# Patient Record
Sex: FEMALE | Race: WHITE | Employment: OTHER | URBAN - METROPOLITAN AREA
[De-identification: names, ages, dates, MRNs, and addresses within clinical notes are randomized per-mention and may not be internally consistent; named-entity substitution may affect disease eponyms.]

---

## 2017-03-07 ENCOUNTER — TELEPHONE (OUTPATIENT)
Dept: FAMILY MEDICINE CLINIC | Age: 82
End: 2017-03-07

## 2017-04-11 DIAGNOSIS — I10 ESSENTIAL HYPERTENSION, BENIGN: ICD-10-CM

## 2017-04-11 DIAGNOSIS — N95.1 SYMPTOMATIC MENOPAUSAL OR FEMALE CLIMACTERIC STATES: ICD-10-CM

## 2017-04-11 RX ORDER — MEDROXYPROGESTERONE ACETATE 2.5 MG/1
TABLET ORAL
Qty: 90 TABLET | Refills: 1 | Status: SHIPPED | OUTPATIENT
Start: 2017-04-11 | End: 2017-10-09 | Stop reason: SDUPTHER

## 2017-04-11 RX ORDER — HYDROCHLOROTHIAZIDE 25 MG/1
TABLET ORAL
Qty: 90 TABLET | Refills: 1 | Status: SHIPPED | OUTPATIENT
Start: 2017-04-11 | End: 2017-10-30

## 2017-04-20 RX ORDER — CELECOXIB 200 MG/1
CAPSULE ORAL
Qty: 30 CAPSULE | Refills: 5 | Status: SHIPPED | OUTPATIENT
Start: 2017-04-20 | End: 2017-10-17 | Stop reason: SDUPTHER

## 2017-05-08 ENCOUNTER — OFFICE VISIT (OUTPATIENT)
Dept: FAMILY MEDICINE CLINIC | Age: 82
End: 2017-05-08

## 2017-05-08 VITALS
SYSTOLIC BLOOD PRESSURE: 132 MMHG | DIASTOLIC BLOOD PRESSURE: 78 MMHG | OXYGEN SATURATION: 98 % | HEART RATE: 82 BPM | TEMPERATURE: 97.5 F | BODY MASS INDEX: 21.51 KG/M2 | WEIGHT: 112 LBS | RESPIRATION RATE: 16 BRPM

## 2017-05-08 DIAGNOSIS — K58.9 IRRITABLE BOWEL SYNDROME WITHOUT DIARRHEA: ICD-10-CM

## 2017-05-08 DIAGNOSIS — I10 ESSENTIAL HYPERTENSION, BENIGN: Primary | ICD-10-CM

## 2017-05-08 DIAGNOSIS — R60.0 PEDAL EDEMA: ICD-10-CM

## 2017-05-08 DIAGNOSIS — R73.9 HYPERGLYCEMIA: ICD-10-CM

## 2017-05-08 DIAGNOSIS — Z13.29 THYROID DISORDER SCREEN: ICD-10-CM

## 2017-05-08 PROCEDURE — 1036F TOBACCO NON-USER: CPT | Performed by: FAMILY MEDICINE

## 2017-05-08 PROCEDURE — 1123F ACP DISCUSS/DSCN MKR DOCD: CPT | Performed by: FAMILY MEDICINE

## 2017-05-08 PROCEDURE — 1090F PRES/ABSN URINE INCON ASSESS: CPT | Performed by: FAMILY MEDICINE

## 2017-05-08 PROCEDURE — G8419 CALC BMI OUT NRM PARAM NOF/U: HCPCS | Performed by: FAMILY MEDICINE

## 2017-05-08 PROCEDURE — 4040F PNEUMOC VAC/ADMIN/RCVD: CPT | Performed by: FAMILY MEDICINE

## 2017-05-08 PROCEDURE — G8399 PT W/DXA RESULTS DOCUMENT: HCPCS | Performed by: FAMILY MEDICINE

## 2017-05-08 PROCEDURE — 99214 OFFICE O/P EST MOD 30 MIN: CPT | Performed by: FAMILY MEDICINE

## 2017-05-08 PROCEDURE — G8427 DOCREV CUR MEDS BY ELIG CLIN: HCPCS | Performed by: FAMILY MEDICINE

## 2017-05-08 RX ORDER — DIPHENOXYLATE HYDROCHLORIDE AND ATROPINE SULFATE 2.5; .025 MG/1; MG/1
1 TABLET ORAL 4 TIMES DAILY
Qty: 120 TABLET | Refills: 5 | Status: SHIPPED | OUTPATIENT
Start: 2017-05-08 | End: 2017-10-30 | Stop reason: SDUPTHER

## 2017-05-08 ASSESSMENT — PATIENT HEALTH QUESTIONNAIRE - PHQ9
2. FEELING DOWN, DEPRESSED OR HOPELESS: 0
1. LITTLE INTEREST OR PLEASURE IN DOING THINGS: 0
SUM OF ALL RESPONSES TO PHQ QUESTIONS 1-9: 0
SUM OF ALL RESPONSES TO PHQ9 QUESTIONS 1 & 2: 0

## 2017-07-17 DIAGNOSIS — I10 ESSENTIAL HYPERTENSION, BENIGN: ICD-10-CM

## 2017-07-17 DIAGNOSIS — R73.9 HYPERGLYCEMIA: ICD-10-CM

## 2017-07-17 DIAGNOSIS — Z13.29 THYROID DISORDER SCREEN: ICD-10-CM

## 2017-07-17 DIAGNOSIS — R60.0 PEDAL EDEMA: ICD-10-CM

## 2017-07-17 LAB
A/G RATIO: 1.4 (ref 1.1–2.2)
ALBUMIN SERPL-MCNC: 4.2 G/DL (ref 3.4–5)
ALP BLD-CCNC: 46 U/L (ref 40–129)
ALT SERPL-CCNC: <5 U/L (ref 10–40)
ANION GAP SERPL CALCULATED.3IONS-SCNC: 13 MMOL/L (ref 3–16)
AST SERPL-CCNC: 12 U/L (ref 15–37)
BILIRUB SERPL-MCNC: 0.3 MG/DL (ref 0–1)
BUN BLDV-MCNC: 19 MG/DL (ref 7–20)
CALCIUM SERPL-MCNC: 9.6 MG/DL (ref 8.3–10.6)
CHLORIDE BLD-SCNC: 99 MMOL/L (ref 99–110)
CO2: 28 MMOL/L (ref 21–32)
CREAT SERPL-MCNC: 0.6 MG/DL (ref 0.6–1.2)
GFR AFRICAN AMERICAN: >60
GFR NON-AFRICAN AMERICAN: >60
GLOBULIN: 2.9 G/DL
GLUCOSE BLD-MCNC: 109 MG/DL (ref 70–99)
HCT VFR BLD CALC: 38.6 % (ref 36–48)
HEMOGLOBIN: 12.7 G/DL (ref 12–16)
MCH RBC QN AUTO: 31.4 PG (ref 26–34)
MCHC RBC AUTO-ENTMCNC: 32.9 G/DL (ref 31–36)
MCV RBC AUTO: 95.4 FL (ref 80–100)
PDW BLD-RTO: 14.1 % (ref 12.4–15.4)
PLATELET # BLD: 270 K/UL (ref 135–450)
PMV BLD AUTO: 7.6 FL (ref 5–10.5)
POTASSIUM SERPL-SCNC: 3.4 MMOL/L (ref 3.5–5.1)
RBC # BLD: 4.05 M/UL (ref 4–5.2)
SODIUM BLD-SCNC: 140 MMOL/L (ref 136–145)
TOTAL PROTEIN: 7.1 G/DL (ref 6.4–8.2)
WBC # BLD: 8.1 K/UL (ref 4–11)

## 2017-07-18 DIAGNOSIS — E87.6 HYPOKALEMIA: Primary | ICD-10-CM

## 2017-07-18 LAB
ESTIMATED AVERAGE GLUCOSE: 102.5 MG/DL
HBA1C MFR BLD: 5.2 %
TSH REFLEX: 1.89 UIU/ML (ref 0.27–4.2)

## 2017-07-18 RX ORDER — POTASSIUM CHLORIDE 750 MG/1
10 TABLET, FILM COATED, EXTENDED RELEASE ORAL DAILY
Qty: 30 TABLET | Refills: 2 | Status: SHIPPED | OUTPATIENT
Start: 2017-07-18 | End: 2018-04-30

## 2017-10-09 DIAGNOSIS — N95.1 SYMPTOMATIC MENOPAUSAL OR FEMALE CLIMACTERIC STATES: ICD-10-CM

## 2017-10-10 RX ORDER — MEDROXYPROGESTERONE ACETATE 2.5 MG/1
TABLET ORAL
Qty: 90 TABLET | Refills: 1 | Status: SHIPPED | OUTPATIENT
Start: 2017-10-10 | End: 2018-04-17 | Stop reason: SDUPTHER

## 2017-10-16 ENCOUNTER — TELEPHONE (OUTPATIENT)
Dept: FAMILY MEDICINE CLINIC | Age: 82
End: 2017-10-16

## 2017-10-16 NOTE — TELEPHONE ENCOUNTER
Medication name:celecoxib (CELEBREX) 200 MG capsule      Medication dose:  Frequency:TAKE ONE CAPSULE BY MOUTH DAILY  Quantity:30 capsule  Pharmacy name:  [facility] [address] [place] [address], [address] [place] [address] [phone] Mikel Lopez [phone]  Last OV:5/8/17  Last Labs:

## 2017-10-17 RX ORDER — CELECOXIB 200 MG/1
CAPSULE ORAL
Qty: 30 CAPSULE | Refills: 5 | Status: SHIPPED | OUTPATIENT
Start: 2017-10-17 | End: 2018-04-30 | Stop reason: SDUPTHER

## 2017-10-30 ENCOUNTER — OFFICE VISIT (OUTPATIENT)
Dept: FAMILY MEDICINE CLINIC | Age: 82
End: 2017-10-30

## 2017-10-30 VITALS
SYSTOLIC BLOOD PRESSURE: 148 MMHG | WEIGHT: 117.2 LBS | RESPIRATION RATE: 12 BRPM | HEART RATE: 106 BPM | BODY MASS INDEX: 22.13 KG/M2 | OXYGEN SATURATION: 98 % | TEMPERATURE: 97.1 F | HEIGHT: 61 IN | DIASTOLIC BLOOD PRESSURE: 92 MMHG

## 2017-10-30 DIAGNOSIS — I10 ESSENTIAL HYPERTENSION, BENIGN: Primary | ICD-10-CM

## 2017-10-30 DIAGNOSIS — M17.0 PRIMARY OSTEOARTHRITIS OF BOTH KNEES: ICD-10-CM

## 2017-10-30 DIAGNOSIS — K58.9 IRRITABLE BOWEL SYNDROME WITHOUT DIARRHEA: ICD-10-CM

## 2017-10-30 PROCEDURE — 4040F PNEUMOC VAC/ADMIN/RCVD: CPT | Performed by: FAMILY MEDICINE

## 2017-10-30 PROCEDURE — 1090F PRES/ABSN URINE INCON ASSESS: CPT | Performed by: FAMILY MEDICINE

## 2017-10-30 PROCEDURE — G8427 DOCREV CUR MEDS BY ELIG CLIN: HCPCS | Performed by: FAMILY MEDICINE

## 2017-10-30 PROCEDURE — 1123F ACP DISCUSS/DSCN MKR DOCD: CPT | Performed by: FAMILY MEDICINE

## 2017-10-30 PROCEDURE — 99214 OFFICE O/P EST MOD 30 MIN: CPT | Performed by: FAMILY MEDICINE

## 2017-10-30 PROCEDURE — G8484 FLU IMMUNIZE NO ADMIN: HCPCS | Performed by: FAMILY MEDICINE

## 2017-10-30 PROCEDURE — G8420 CALC BMI NORM PARAMETERS: HCPCS | Performed by: FAMILY MEDICINE

## 2017-10-30 PROCEDURE — 1036F TOBACCO NON-USER: CPT | Performed by: FAMILY MEDICINE

## 2017-10-30 PROCEDURE — G8399 PT W/DXA RESULTS DOCUMENT: HCPCS | Performed by: FAMILY MEDICINE

## 2017-10-30 RX ORDER — LISINOPRIL 10 MG/1
10 TABLET ORAL DAILY
Qty: 30 TABLET | Refills: 5 | Status: SHIPPED | OUTPATIENT
Start: 2017-10-30 | End: 2018-04-30 | Stop reason: SDUPTHER

## 2017-10-30 RX ORDER — DIPHENOXYLATE HYDROCHLORIDE AND ATROPINE SULFATE 2.5; .025 MG/1; MG/1
1 TABLET ORAL 4 TIMES DAILY
Qty: 120 TABLET | Refills: 5 | Status: SHIPPED | OUTPATIENT
Start: 2017-10-30 | End: 2018-04-30 | Stop reason: SDUPTHER

## 2017-10-30 NOTE — LETTER
[address] [place] [address] Drive [address]  Phone: [phone]  Fax: [phone]    Glenn Diaz MD        October 30, 2017     Patient: Malika Lucero   YOB: 1935   Date of Visit: 10/30/2017       To Whom It May Concern: It is my medical opinion that Mg Hobson requires a disability parking placard for the following reasons:  She has limited walking ability due to an arthritic condition. Duration of need: 5 years    If you have any questions or concerns, please don't hesitate to call.     Sincerely,        Glenn Diaz MD

## 2018-01-30 LAB
ANION GAP SERPL CALCULATED.3IONS-SCNC: 15 MMOL/L (ref 3–16)
BUN BLDV-MCNC: 19 MG/DL (ref 7–20)
CALCIUM SERPL-MCNC: 9.2 MG/DL (ref 8.3–10.6)
CHLORIDE BLD-SCNC: 103 MMOL/L (ref 99–110)
CO2: 26 MMOL/L (ref 21–32)
CREAT SERPL-MCNC: 0.9 MG/DL (ref 0.6–1.2)
GFR AFRICAN AMERICAN: >60
GFR NON-AFRICAN AMERICAN: 60
GLUCOSE BLD-MCNC: 92 MG/DL (ref 70–99)
POTASSIUM SERPL-SCNC: 3.9 MMOL/L (ref 3.5–5.1)
SODIUM BLD-SCNC: 144 MMOL/L (ref 136–145)

## 2018-02-27 ENCOUNTER — OFFICE VISIT (OUTPATIENT)
Dept: DERMATOLOGY | Age: 83
End: 2018-02-27

## 2018-02-27 DIAGNOSIS — Z41.1 ELECTIVE PROCEDURE FOR UNACCEPTABLE COSMETIC APPEARANCE: ICD-10-CM

## 2018-02-27 DIAGNOSIS — L82.1 SEBORRHEIC KERATOSES: Primary | ICD-10-CM

## 2018-02-27 PROCEDURE — 1711000 COSMETIC-DESTRUCT B9 LESION 1-14: Performed by: DERMATOLOGY

## 2018-04-17 DIAGNOSIS — N95.1 SYMPTOMATIC MENOPAUSAL OR FEMALE CLIMACTERIC STATES: ICD-10-CM

## 2018-04-17 RX ORDER — MEDROXYPROGESTERONE ACETATE 2.5 MG/1
TABLET ORAL
Qty: 90 TABLET | Refills: 1 | Status: SHIPPED | OUTPATIENT
Start: 2018-04-17 | End: 2018-10-18 | Stop reason: SDUPTHER

## 2018-04-24 ENCOUNTER — OFFICE VISIT (OUTPATIENT)
Dept: DERMATOLOGY | Age: 83
End: 2018-04-24

## 2018-04-24 DIAGNOSIS — Z41.1 ELECTIVE PROCEDURE FOR UNACCEPTABLE COSMETIC APPEARANCE: ICD-10-CM

## 2018-04-24 DIAGNOSIS — L82.1 SEBORRHEIC KERATOSES: Primary | ICD-10-CM

## 2018-04-24 PROCEDURE — 99999 PR OFFICE/OUTPT VISIT,PROCEDURE ONLY: CPT | Performed by: DERMATOLOGY

## 2018-04-30 ENCOUNTER — OFFICE VISIT (OUTPATIENT)
Dept: FAMILY MEDICINE CLINIC | Age: 83
End: 2018-04-30

## 2018-04-30 VITALS
RESPIRATION RATE: 12 BRPM | DIASTOLIC BLOOD PRESSURE: 96 MMHG | WEIGHT: 119.4 LBS | SYSTOLIC BLOOD PRESSURE: 159 MMHG | OXYGEN SATURATION: 91 % | HEART RATE: 113 BPM | BODY MASS INDEX: 22.93 KG/M2 | TEMPERATURE: 97.4 F

## 2018-04-30 DIAGNOSIS — K58.9 IRRITABLE BOWEL SYNDROME WITHOUT DIARRHEA: Primary | ICD-10-CM

## 2018-04-30 DIAGNOSIS — Z23 NEED FOR SHINGLES VACCINE: ICD-10-CM

## 2018-04-30 DIAGNOSIS — I10 ESSENTIAL HYPERTENSION, BENIGN: ICD-10-CM

## 2018-04-30 PROCEDURE — G8420 CALC BMI NORM PARAMETERS: HCPCS | Performed by: FAMILY MEDICINE

## 2018-04-30 PROCEDURE — G8399 PT W/DXA RESULTS DOCUMENT: HCPCS | Performed by: FAMILY MEDICINE

## 2018-04-30 PROCEDURE — 1090F PRES/ABSN URINE INCON ASSESS: CPT | Performed by: FAMILY MEDICINE

## 2018-04-30 PROCEDURE — 4040F PNEUMOC VAC/ADMIN/RCVD: CPT | Performed by: FAMILY MEDICINE

## 2018-04-30 PROCEDURE — 99214 OFFICE O/P EST MOD 30 MIN: CPT | Performed by: FAMILY MEDICINE

## 2018-04-30 PROCEDURE — 1123F ACP DISCUSS/DSCN MKR DOCD: CPT | Performed by: FAMILY MEDICINE

## 2018-04-30 PROCEDURE — 1036F TOBACCO NON-USER: CPT | Performed by: FAMILY MEDICINE

## 2018-04-30 PROCEDURE — G8427 DOCREV CUR MEDS BY ELIG CLIN: HCPCS | Performed by: FAMILY MEDICINE

## 2018-04-30 RX ORDER — CELECOXIB 200 MG/1
CAPSULE ORAL
Qty: 30 CAPSULE | Refills: 5 | Status: SHIPPED | OUTPATIENT
Start: 2018-04-30 | End: 2018-11-16 | Stop reason: SDUPTHER

## 2018-04-30 RX ORDER — DIPHENOXYLATE HYDROCHLORIDE AND ATROPINE SULFATE 2.5; .025 MG/1; MG/1
1 TABLET ORAL 4 TIMES DAILY
Qty: 120 TABLET | Refills: 5 | Status: SHIPPED | OUTPATIENT
Start: 2018-04-30 | End: 2018-11-16 | Stop reason: SDUPTHER

## 2018-04-30 RX ORDER — LISINOPRIL 10 MG/1
10 TABLET ORAL DAILY
Qty: 30 TABLET | Refills: 5 | Status: SHIPPED | OUTPATIENT
Start: 2018-04-30 | End: 2018-11-16 | Stop reason: ALTCHOICE

## 2018-05-16 ENCOUNTER — TELEPHONE (OUTPATIENT)
Dept: FAMILY MEDICINE CLINIC | Age: 83
End: 2018-05-16

## 2018-10-18 DIAGNOSIS — N95.1 SYMPTOMATIC MENOPAUSAL OR FEMALE CLIMACTERIC STATES: ICD-10-CM

## 2018-10-18 RX ORDER — MEDROXYPROGESTERONE ACETATE 2.5 MG/1
TABLET ORAL
Qty: 90 TABLET | Refills: 1 | Status: SHIPPED | OUTPATIENT
Start: 2018-10-18 | End: 2019-01-21 | Stop reason: SDUPTHER

## 2018-11-15 PROBLEM — E87.6 HYPOKALEMIA: Status: RESOLVED | Noted: 2017-07-18 | Resolved: 2018-11-15

## 2018-11-15 NOTE — PROGRESS NOTES
Subjective:      Patient ID: Renée Torres 81 y.o. female. The primary encounter diagnosis was Irritable bowel syndrome with diarrhea. Diagnoses of Essential hypertension, benign, Hyperglycemia, and Glucose intolerance (impaired glucose tolerance) were also pertinent to this visit. HPI  She feels well. Continues to live independently in her 3 story house. Independent in [facility]. She stopped taking Lisinopril. Took it for 4 months; BP was normal at her dentist visit. She developed swelling of her tongue an hour after taking a dose so she stopped taking it. She thought about going to the ER but did not. She did not go to the ER because she had not had a shower. The symptoms stopped after a few hours. The OA in the knees has progressed. She is going to see Dr. Caitlin Leon. She feels her balance is off; uses a cane. No falls. Diarrhea is well controlled with Lomotil. Lomotil is well tolerated. Has normal daily BMS. No bernice or hematochezia. She never tried to wean and is reluctant to do so. Keeps medication secure. She prefers to continue to take HRT. Controls hot flashes. She is not interested in mammography. Hypertension: Patient here for follow-up of elevated blood pressure. She is exercising and is adherent to low salt diet. can't do much exercise due to knee pain; does try to stay active. Blood pressure is not testing home. Cardiac symptoms none. Patient denies chest pain, chest pressure/discomfort, exertional chest pressure/discomfort, irregular heart beat and lower extremity edema. Cardiovascular risk factors: advanced age (older than 54 for men, 72 for women) and hypertension. Use of agents associated with hypertension: none. History of target organ damage: none. She continues to eat a very white and very restricted diet. Weight is stable. No polyuria, polydipsia, blurred vision.          Outpatient Prescriptions Marked as Taking for the 11/16/18 encounter (Office Visit) with Alisson Damon MD   Medication Sig Dispense Refill    estrogens, conjugated, (PREMARIN) 0.625 MG tablet TAKE ONE TABLET BY MOUTH DAILY 90 tablet 1    medroxyPROGESTERone (PROVERA) 2.5 MG tablet TAKE ONE TABLET BY MOUTH EVERY DAY 90 tablet 1    celecoxib (CELEBREX) 200 MG capsule TAKE ONE CAPSULE BY MOUTH DAILY 30 capsule 5    diphenoxylate-atropine (LOMOTIL) 2.5-0.025 MG per tablet Take 1 tablet by mouth 4 times daily. Do not fill before 05/13/2015. 120 tablet 5    aspirin 500 MG EC tablet Take 500 mg by mouth every 6 hours as needed for Pain      Ascorbic Acid (VITAMIN C) 500 MG tablet Take 500 mg by mouth daily      lansoprazole (PREVACID 24HR) 15 MG capsule Take 15 mg by mouth daily.  cycloSPORINE (RESTASIS) 0.05 % ophthalmic emulsion 1 drop 2 times daily.           Allergies   Allergen Reactions    Clindamycin/Lincomycin        Patient Active Problem List   Diagnosis    Osteoarthritis    IBS (irritable bowel syndrome)    Gout    Dry eye syndrome    Osteopenia    Essential hypertension, benign    Hyperglycemia    Symptomatic menopausal or female climacteric states    Glucose intolerance (impaired glucose tolerance)    Primary osteoarthritis of both knees    Pedal edema       Past Medical History:   Diagnosis Date    Anterior corneal dystrophy     Asymptomatic postmenopausal status (age-related) (natural)     Cervical polyp     Dry eye syndrome     Edema     Fuch's endothelial dystrophy     Gout     IBS (irritable bowel syndrome)     Osteoarthritis     Osteopenia     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    Screening mammogram     Vaginal candidiasis        Past Surgical History:   Procedure Laterality Date    APPENDECTOMY      EYE SURGERY      Cataract Surgery        Family History   Problem Relation Age of Onset    Breast Cancer Daughter         BRAC 2    Ovarian Cancer Daughter        Social History   Substance Use Topics    Smoking status: Never Smoker    Smokeless tobacco: Never Used    Alcohol use No            Review of Systems  Review of Systems    Objective:   Physical Exam  Vitals:    11/16/18 1529 11/16/18 1605   BP: (!) 165/75 (!) 152/78   Pulse: 103    Resp: 12    Temp: 96.5 °F (35.8 °C)    TempSrc: Oral    SpO2: 96%    Weight: 113 lb 8 oz (51.5 kg)        Physical Exam    NAD    Skin is warm and dry. No rash. Well hydrated  Alert and oriented x 3. Mood and affect are normal.  The neck is supple and free of adenopathy or masses, the thyroid is normal without enlargement or nodules. Chest: clear with no wheezes or rales. No retractions, or use of accessory muscles noted. Cardiovascular: PMI is not displaced, and no thrill noted. Regular rate and rhythm with no rub, murmur or gallop. No peripheral edema. No carotid bruits. The abdomen is soft without tenderness, guarding, mass, rebound or organomegaly. Aorta, femoral, DP and PT pulses intact. Assessment:       Diagnosis Orders   1. Irritable bowel syndrome with diarrhea  Controlled  Recommend try to wean off medication   2. Essential hypertension, benign  amLODIPine (NORVASC) 5 MG tablet  Lisinopril added to allergy list  Advised to follow up if intolerant of med and to call asap or go to ER if severe rxn  DASH diet  Side effects of current medications reviewed and questions answered. 3. Hyperglycemia  Diet and exercise addressed. Check glucose and HGA1C in the spring             4 Need for influenza vaccination  INFLUENZA, HIGH DOSE, 65 YRS +, IM, PF, PREFILL SYR, 0.5ML (FLUZONE HD)       5. Hormone replacement rx - risk including more aggressive breast cancer discussed. She prefers to continue HRT and to no have mammography preformed. Plan:      Side effects of current medications reviewed and questions answered. Follow up in 6 months or prn.

## 2018-11-16 ENCOUNTER — OFFICE VISIT (OUTPATIENT)
Dept: FAMILY MEDICINE CLINIC | Age: 83
End: 2018-11-16
Payer: MEDICARE

## 2018-11-16 VITALS
WEIGHT: 113.5 LBS | TEMPERATURE: 96.5 F | BODY MASS INDEX: 21.8 KG/M2 | DIASTOLIC BLOOD PRESSURE: 78 MMHG | RESPIRATION RATE: 12 BRPM | SYSTOLIC BLOOD PRESSURE: 152 MMHG | HEART RATE: 103 BPM | OXYGEN SATURATION: 96 %

## 2018-11-16 DIAGNOSIS — K58.9 IRRITABLE BOWEL SYNDROME WITHOUT DIARRHEA: ICD-10-CM

## 2018-11-16 DIAGNOSIS — K58.0 IRRITABLE BOWEL SYNDROME WITH DIARRHEA: Primary | ICD-10-CM

## 2018-11-16 DIAGNOSIS — R73.9 HYPERGLYCEMIA: ICD-10-CM

## 2018-11-16 DIAGNOSIS — Z79.890 HORMONE REPLACEMENT THERAPY: ICD-10-CM

## 2018-11-16 DIAGNOSIS — I10 ESSENTIAL HYPERTENSION, BENIGN: ICD-10-CM

## 2018-11-16 DIAGNOSIS — Z23 NEED FOR INFLUENZA VACCINATION: ICD-10-CM

## 2018-11-16 PROCEDURE — 1090F PRES/ABSN URINE INCON ASSESS: CPT | Performed by: FAMILY MEDICINE

## 2018-11-16 PROCEDURE — G8420 CALC BMI NORM PARAMETERS: HCPCS | Performed by: FAMILY MEDICINE

## 2018-11-16 PROCEDURE — G8482 FLU IMMUNIZE ORDER/ADMIN: HCPCS | Performed by: FAMILY MEDICINE

## 2018-11-16 PROCEDURE — 99214 OFFICE O/P EST MOD 30 MIN: CPT | Performed by: FAMILY MEDICINE

## 2018-11-16 PROCEDURE — G8399 PT W/DXA RESULTS DOCUMENT: HCPCS | Performed by: FAMILY MEDICINE

## 2018-11-16 PROCEDURE — 1036F TOBACCO NON-USER: CPT | Performed by: FAMILY MEDICINE

## 2018-11-16 PROCEDURE — G8427 DOCREV CUR MEDS BY ELIG CLIN: HCPCS | Performed by: FAMILY MEDICINE

## 2018-11-16 PROCEDURE — 90662 IIV NO PRSV INCREASED AG IM: CPT | Performed by: FAMILY MEDICINE

## 2018-11-16 PROCEDURE — G0008 ADMIN INFLUENZA VIRUS VAC: HCPCS | Performed by: FAMILY MEDICINE

## 2018-11-16 PROCEDURE — 1123F ACP DISCUSS/DSCN MKR DOCD: CPT | Performed by: FAMILY MEDICINE

## 2018-11-16 PROCEDURE — 1101F PT FALLS ASSESS-DOCD LE1/YR: CPT | Performed by: FAMILY MEDICINE

## 2018-11-16 PROCEDURE — 4040F PNEUMOC VAC/ADMIN/RCVD: CPT | Performed by: FAMILY MEDICINE

## 2018-11-16 RX ORDER — CELECOXIB 200 MG/1
CAPSULE ORAL
Qty: 30 CAPSULE | Refills: 5 | Status: SHIPPED | OUTPATIENT
Start: 2018-11-16 | End: 2019-05-14 | Stop reason: SDUPTHER

## 2018-11-16 RX ORDER — DIPHENOXYLATE HYDROCHLORIDE AND ATROPINE SULFATE 2.5; .025 MG/1; MG/1
1 TABLET ORAL 4 TIMES DAILY
Qty: 120 TABLET | Refills: 5 | Status: SHIPPED | OUTPATIENT
Start: 2018-11-16 | End: 2019-05-14 | Stop reason: SDUPTHER

## 2018-11-16 RX ORDER — AMLODIPINE BESYLATE 5 MG/1
5 TABLET ORAL DAILY
Qty: 90 TABLET | Refills: 1 | Status: SHIPPED | OUTPATIENT
Start: 2018-11-16 | End: 2019-05-14

## 2018-11-16 ASSESSMENT — PATIENT HEALTH QUESTIONNAIRE - PHQ9
1. LITTLE INTEREST OR PLEASURE IN DOING THINGS: 0
2. FEELING DOWN, DEPRESSED OR HOPELESS: 0
SUM OF ALL RESPONSES TO PHQ9 QUESTIONS 1 & 2: 0
SUM OF ALL RESPONSES TO PHQ QUESTIONS 1-9: 0
SUM OF ALL RESPONSES TO PHQ QUESTIONS 1-9: 0

## 2018-11-16 NOTE — PROGRESS NOTES
Vaccine Information Sheet, \"Influenza - Inactivated\"  given to Clau Us, or parent/legal guardian of  Clau Us and verbalized understanding. Patient responses:    Have you ever had a reaction to a flu vaccine? No  Are you able to eat eggs without adverse effects? Yes  Do you have any current illness? No  Have you ever had Guillian Mary Alice Syndrome? No    Flu vaccine given per order. Please see immunization tab. Current Influenza vaccine VIS given to patient. Influenza consent form/questionnaire completed and signed. Patient responses to  Influenza consent form / questionnaire  reviewed. Vaccine given per protocol.

## 2018-12-07 ENCOUNTER — TELEPHONE (OUTPATIENT)
Dept: FAMILY MEDICINE CLINIC | Age: 83
End: 2018-12-07

## 2019-01-21 DIAGNOSIS — N95.1 SYMPTOMATIC MENOPAUSAL OR FEMALE CLIMACTERIC STATES: ICD-10-CM

## 2019-01-21 RX ORDER — MEDROXYPROGESTERONE ACETATE 2.5 MG/1
TABLET ORAL
Qty: 90 TABLET | Refills: 1 | Status: SHIPPED | OUTPATIENT
Start: 2019-01-21 | End: 2019-04-22 | Stop reason: SDUPTHER

## 2019-04-22 ENCOUNTER — TELEPHONE (OUTPATIENT)
Dept: FAMILY MEDICINE CLINIC | Age: 84
End: 2019-04-22

## 2019-04-22 DIAGNOSIS — N95.1 SYMPTOMATIC MENOPAUSAL OR FEMALE CLIMACTERIC STATES: ICD-10-CM

## 2019-04-22 RX ORDER — MEDROXYPROGESTERONE ACETATE 2.5 MG/1
TABLET ORAL
Qty: 90 TABLET | Refills: 1 | Status: SHIPPED | OUTPATIENT
Start: 2019-04-22 | End: 2019-05-14 | Stop reason: SDUPTHER

## 2019-05-14 ENCOUNTER — OFFICE VISIT (OUTPATIENT)
Dept: FAMILY MEDICINE CLINIC | Age: 84
End: 2019-05-14
Payer: MEDICARE

## 2019-05-14 VITALS
SYSTOLIC BLOOD PRESSURE: 118 MMHG | WEIGHT: 121 LBS | BODY MASS INDEX: 23.75 KG/M2 | HEIGHT: 60 IN | TEMPERATURE: 96.7 F | OXYGEN SATURATION: 97 % | HEART RATE: 107 BPM | DIASTOLIC BLOOD PRESSURE: 64 MMHG | RESPIRATION RATE: 10 BRPM

## 2019-05-14 DIAGNOSIS — M17.0 PRIMARY OSTEOARTHRITIS OF BOTH KNEES: ICD-10-CM

## 2019-05-14 DIAGNOSIS — R60.0 PEDAL EDEMA: ICD-10-CM

## 2019-05-14 DIAGNOSIS — I10 ESSENTIAL HYPERTENSION, BENIGN: Primary | ICD-10-CM

## 2019-05-14 DIAGNOSIS — Z13.29 THYROID DISORDER SCREEN: ICD-10-CM

## 2019-05-14 DIAGNOSIS — K58.9 IRRITABLE BOWEL SYNDROME WITHOUT DIARRHEA: ICD-10-CM

## 2019-05-14 DIAGNOSIS — R73.02 GLUCOSE INTOLERANCE (IMPAIRED GLUCOSE TOLERANCE): ICD-10-CM

## 2019-05-14 DIAGNOSIS — M85.89 OSTEOPENIA OF MULTIPLE SITES: ICD-10-CM

## 2019-05-14 DIAGNOSIS — N95.1 SYMPTOMATIC MENOPAUSAL OR FEMALE CLIMACTERIC STATES: ICD-10-CM

## 2019-05-14 PROCEDURE — 1036F TOBACCO NON-USER: CPT | Performed by: FAMILY MEDICINE

## 2019-05-14 PROCEDURE — G8399 PT W/DXA RESULTS DOCUMENT: HCPCS | Performed by: FAMILY MEDICINE

## 2019-05-14 PROCEDURE — G8420 CALC BMI NORM PARAMETERS: HCPCS | Performed by: FAMILY MEDICINE

## 2019-05-14 PROCEDURE — 1123F ACP DISCUSS/DSCN MKR DOCD: CPT | Performed by: FAMILY MEDICINE

## 2019-05-14 PROCEDURE — 4040F PNEUMOC VAC/ADMIN/RCVD: CPT | Performed by: FAMILY MEDICINE

## 2019-05-14 PROCEDURE — G8427 DOCREV CUR MEDS BY ELIG CLIN: HCPCS | Performed by: FAMILY MEDICINE

## 2019-05-14 PROCEDURE — 99214 OFFICE O/P EST MOD 30 MIN: CPT | Performed by: FAMILY MEDICINE

## 2019-05-14 PROCEDURE — 1090F PRES/ABSN URINE INCON ASSESS: CPT | Performed by: FAMILY MEDICINE

## 2019-05-14 RX ORDER — CELECOXIB 200 MG/1
CAPSULE ORAL
Qty: 30 CAPSULE | Refills: 5 | Status: ON HOLD | OUTPATIENT
Start: 2019-05-14 | End: 2019-09-19 | Stop reason: HOSPADM

## 2019-05-14 RX ORDER — MEDROXYPROGESTERONE ACETATE 2.5 MG/1
TABLET ORAL
Qty: 90 TABLET | Refills: 1 | Status: ON HOLD | OUTPATIENT
Start: 2019-05-14 | End: 2019-09-21 | Stop reason: HOSPADM

## 2019-05-14 RX ORDER — DIPHENOXYLATE HYDROCHLORIDE AND ATROPINE SULFATE 2.5; .025 MG/1; MG/1
1 TABLET ORAL 4 TIMES DAILY
Qty: 120 TABLET | Refills: 5 | Status: SHIPPED | OUTPATIENT
Start: 2019-05-14 | End: 2019-09-17 | Stop reason: CLARIF

## 2019-05-14 NOTE — PROGRESS NOTES
TABLET BY MOUTH EVERY DAY 90 tablet 1    celecoxib (CELEBREX) 200 MG capsule TAKE ONE CAPSULE BY MOUTH DAILY 30 capsule 5    diphenoxylate-atropine (LOMOTIL) 2.5-0.025 MG per tablet Take 1 tablet by mouth 4 times daily. Do not fill before 05/13/2015. 120 tablet 5    aspirin 500 MG EC tablet Take 500 mg by mouth every 6 hours as needed for Pain      Ascorbic Acid (VITAMIN C) 500 MG tablet Take 500 mg by mouth daily      lansoprazole (PREVACID 24HR) 15 MG capsule Take 15 mg by mouth daily. Allergies   Allergen Reactions    Lisinopril Swelling     Swelling tongue.      Clindamycin/Lincomycin        Patient Active Problem List   Diagnosis    Osteoarthritis    IBS (irritable bowel syndrome)    Gout    Dry eye syndrome    Osteopenia    Essential hypertension, benign    Hyperglycemia    Symptomatic menopausal or female climacteric states    Glucose intolerance (impaired glucose tolerance)    Primary osteoarthritis of both knees    Pedal edema    Hormone replacement therapy       Past Medical History:   Diagnosis Date    Anterior corneal dystrophy     Asymptomatic postmenopausal status (age-related) (natural)     Cervical polyp     Dry eye syndrome     Edema     Fuch's endothelial dystrophy     Gout     IBS (irritable bowel syndrome)     Osteoarthritis     Osteopenia     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    Screening mammogram     Vaginal candidiasis        Past Surgical History:   Procedure Laterality Date    APPENDECTOMY      EYE SURGERY      Cataract Surgery        Family History   Problem Relation Age of Onset    Breast Cancer Daughter         BRAC 2    Ovarian Cancer Daughter        Social History     Tobacco Use    Smoking status: Never Smoker    Smokeless tobacco: Never Used   Substance Use Topics    Alcohol use: No    Drug use: No            Review of Systems  Review of Systems  Lab Results   Component Value Date     01/30/2018    K 3.9 01/30/2018     01/30/2018 CO2 26 01/30/2018    BUN 19 01/30/2018    CREATININE 0.9 01/30/2018    GLUCOSE 92 01/30/2018    CALCIUM 9.2 01/30/2018    PROT 7.1 07/17/2017    LABALBU 4.2 07/17/2017    BILITOT 0.3 07/17/2017    ALKPHOS 46 07/17/2017    AST 12 (L) 07/17/2017    ALT <5 (L) 07/17/2017    LABGLOM 60 (A) 01/30/2018    GFRAA >60 01/30/2018    AGRATIO 1.4 07/17/2017    GLOB 2.9 07/17/2017        No results found for: CHOL  No results found for: TRIG  No results found for: HDL  No results found for: LDLCHOLESTEROL, LDLCALC  No results found for: LABVLDL, VLDL  No results found for: CHOLHDLRATIO  TSH   Date Value Ref Range Status   07/17/2017 1.89 0.27 - 4.20 uIU/mL Final   02/23/2016 1.92 0.27 - 4.20 uIU/mL Final   04/26/2013 1.86 0.35 - 5.5 uIU/ml Final      Lab Results   Component Value Date    LABA1C 5.2 07/17/2017     Lab Results   Component Value Date    .5 07/17/2017      Objective:   Physical Exam  Vitals:    05/14/19 1536 05/14/19 1552   BP: (!) 142/80 118/64   Pulse: 107    Resp: 10    Temp: 96.7 °F (35.9 °C)    TempSrc: Oral    SpO2: 97%    Weight: 121 lb (54.9 kg)    Height: 5' (1.524 m)        Physical Exam  NAD    Skin is warm and dry. No rash. Well hydrated  Alert and oriented x 3. Mood and affect are normal.  The neck is supple and free of adenopathy or masses, the thyroid is normal without enlargement or nodules. Chest: clear with no wheezes or rales. No retractions, or use of accessory muscles noted. Cardiovascular: PMI is not displaced, and no thrill noted. Regular rate and rhythm with no rub, murmur or gallop. 1+ pitting edema. No cords or calf tenderness. The abdomen is soft without tenderness, guarding, mass, rebound or organomegaly. Aorta, femoral, DP and PT pulses intact. Gait antalgic bilateral.  Stable with walker. Assessment:       Diagnosis Orders   1. Essential hypertension, benign  At goal, currently off meds. Continue to monitor.     2. Symptomatic menopausal or female climacteric states  medroxyPROGESTERone (PROVERA) 2.5 MG tablet    estrogens, conjugated, (PREMARIN) 0.625 MG tablet   3. Glucose intolerance (impaired glucose tolerance)  Discussed diet, exercise and weight loss strategies   She refuses labs. 4. Primary osteoarthritis of both knees  TKR addressed. safety and fall risk at  Home discussed. 5. Osteopenia of multiple sites  continue estrogen. Ca and Vitamin D supplementation addressed   6. Thyroid disorder screen  Declines labs. 7. Irritable bowel syndrome without diarrhea  diphenoxylate-atropine (LOMOTIL) 2.5-0.025 MG per tablet  Well controlled. 8. Pedal edema  - low sodium diet, support hose      Plan:      She declines mammogram (risk of \"feeding\" a cancer with HRT addressed), labs, bone density. Concerns addressed. She will consider DTAP and SHingrix. Will discuss with her pharmacist.    Side effects of current medications reviewed and questions answered. Follow up in 6 months or prn.

## 2019-05-29 ENCOUNTER — TELEPHONE (OUTPATIENT)
Dept: FAMILY MEDICINE CLINIC | Age: 84
End: 2019-05-29

## 2019-09-17 ENCOUNTER — APPOINTMENT (OUTPATIENT)
Dept: GENERAL RADIOLOGY | Age: 84
DRG: 956 | End: 2019-09-17
Payer: MEDICARE

## 2019-09-17 ENCOUNTER — HOSPITAL ENCOUNTER (INPATIENT)
Age: 84
LOS: 4 days | Discharge: NURSING, REHAB OR LONG-TERM CARE | DRG: 956 | End: 2019-09-21
Attending: EMERGENCY MEDICINE | Admitting: INTERNAL MEDICINE
Payer: MEDICARE

## 2019-09-17 DIAGNOSIS — S72.142A CLOSED COMMINUTED INTERTROCHANTERIC FRACTURE OF LEFT FEMUR, INITIAL ENCOUNTER (HCC): Primary | ICD-10-CM

## 2019-09-17 PROBLEM — S72.002A HIP FRACTURE REQUIRING OPERATIVE REPAIR, LEFT, CLOSED, INITIAL ENCOUNTER (HCC): Status: ACTIVE | Noted: 2019-09-17

## 2019-09-17 LAB
ABO/RH: NORMAL
ANION GAP SERPL CALCULATED.3IONS-SCNC: 15 MMOL/L (ref 3–16)
ANTIBODY SCREEN: NORMAL
BACTERIA: ABNORMAL /HPF
BASE EXCESS VENOUS: 2 (ref -3–3)
BASOPHILS ABSOLUTE: 0 K/UL (ref 0–0.2)
BASOPHILS ABSOLUTE: 0.1 K/UL (ref 0–0.2)
BASOPHILS RELATIVE PERCENT: 0.3 %
BASOPHILS RELATIVE PERCENT: 0.4 %
BILIRUBIN URINE: NEGATIVE
BLOOD, URINE: ABNORMAL
BUN BLDV-MCNC: 43 MG/DL (ref 7–20)
CALCIUM SERPL-MCNC: 9.2 MG/DL (ref 8.3–10.6)
CHLORIDE BLD-SCNC: 103 MMOL/L (ref 99–110)
CLARITY: ABNORMAL
CO2: 26 MMOL/L (ref 21–32)
COLOR: YELLOW
CREAT SERPL-MCNC: 1.1 MG/DL (ref 0.6–1.2)
EKG ATRIAL RATE: 103 BPM
EKG DIAGNOSIS: NORMAL
EKG P AXIS: 69 DEGREES
EKG P-R INTERVAL: 130 MS
EKG Q-T INTERVAL: 310 MS
EKG QRS DURATION: 72 MS
EKG QTC CALCULATION (BAZETT): 406 MS
EKG R AXIS: -32 DEGREES
EKG T AXIS: 50 DEGREES
EKG VENTRICULAR RATE: 103 BPM
EOSINOPHILS ABSOLUTE: 0 K/UL (ref 0–0.6)
EOSINOPHILS ABSOLUTE: 0 K/UL (ref 0–0.6)
EOSINOPHILS RELATIVE PERCENT: 0 %
EOSINOPHILS RELATIVE PERCENT: 0.2 %
EPITHELIAL CELLS, UA: ABNORMAL /HPF
GFR AFRICAN AMERICAN: 57
GFR NON-AFRICAN AMERICAN: 47
GLUCOSE BLD-MCNC: 116 MG/DL (ref 70–99)
GLUCOSE URINE: NEGATIVE MG/DL
HCO3 VENOUS: 26.4 MMOL/L (ref 23–29)
HCT VFR BLD CALC: 25.3 % (ref 36–48)
HCT VFR BLD CALC: 30.7 % (ref 36–48)
HEMOGLOBIN: 10 G/DL (ref 12–16)
HEMOGLOBIN: 8.3 G/DL (ref 12–16)
INR BLD: 1.06 (ref 0.86–1.14)
KETONES, URINE: ABNORMAL MG/DL
LEUKOCYTE ESTERASE, URINE: ABNORMAL
LYMPHOCYTES ABSOLUTE: 0.6 K/UL (ref 1–5.1)
LYMPHOCYTES ABSOLUTE: 0.7 K/UL (ref 1–5.1)
LYMPHOCYTES RELATIVE PERCENT: 3.8 %
LYMPHOCYTES RELATIVE PERCENT: 4.3 %
MCH RBC QN AUTO: 29 PG (ref 26–34)
MCH RBC QN AUTO: 29.1 PG (ref 26–34)
MCHC RBC AUTO-ENTMCNC: 32.7 G/DL (ref 31–36)
MCHC RBC AUTO-ENTMCNC: 32.7 G/DL (ref 31–36)
MCV RBC AUTO: 88.8 FL (ref 80–100)
MCV RBC AUTO: 88.9 FL (ref 80–100)
MICROSCOPIC EXAMINATION: YES
MONOCYTES ABSOLUTE: 0.7 K/UL (ref 0–1.3)
MONOCYTES ABSOLUTE: 0.8 K/UL (ref 0–1.3)
MONOCYTES RELATIVE PERCENT: 4.3 %
MONOCYTES RELATIVE PERCENT: 4.8 %
NEUTROPHILS ABSOLUTE: 12.6 K/UL (ref 1.7–7.7)
NEUTROPHILS ABSOLUTE: 16.5 K/UL (ref 1.7–7.7)
NEUTROPHILS RELATIVE PERCENT: 90.4 %
NEUTROPHILS RELATIVE PERCENT: 91.5 %
NITRITE, URINE: NEGATIVE
O2 SAT, VEN: 29 %
PCO2, VEN: 38.6 MM HG (ref 40–50)
PDW BLD-RTO: 14.9 % (ref 12.4–15.4)
PDW BLD-RTO: 15.1 % (ref 12.4–15.4)
PERFORMED ON: ABNORMAL
PH UA: 6 (ref 5–8)
PH VENOUS: 7.44 (ref 7.35–7.45)
PLATELET # BLD: 250 K/UL (ref 135–450)
PLATELET # BLD: 279 K/UL (ref 135–450)
PMV BLD AUTO: 7 FL (ref 5–10.5)
PMV BLD AUTO: 7.4 FL (ref 5–10.5)
PO2, VEN: 18 MM HG
POC SAMPLE TYPE: ABNORMAL
POTASSIUM SERPL-SCNC: 3.3 MMOL/L (ref 3.5–5.1)
PROTEIN UA: 100 MG/DL
PROTHROMBIN TIME: 12.1 SEC (ref 9.8–13)
RBC # BLD: 2.85 M/UL (ref 4–5.2)
RBC # BLD: 3.45 M/UL (ref 4–5.2)
RBC UA: ABNORMAL /HPF (ref 0–2)
SODIUM BLD-SCNC: 144 MMOL/L (ref 136–145)
SPECIFIC GRAVITY UA: >=1.03 (ref 1–1.03)
TCO2 CALC VENOUS: 28 MMOL/L
TOTAL CK: 1187 U/L (ref 26–192)
URINE REFLEX TO CULTURE: YES
URINE TYPE: ABNORMAL
UROBILINOGEN, URINE: 0.2 E.U./DL
WBC # BLD: 14 K/UL (ref 4–11)
WBC # BLD: 18.1 K/UL (ref 4–11)
WBC UA: >100 /HPF (ref 0–5)

## 2019-09-17 PROCEDURE — 80048 BASIC METABOLIC PNL TOTAL CA: CPT

## 2019-09-17 PROCEDURE — 73560 X-RAY EXAM OF KNEE 1 OR 2: CPT

## 2019-09-17 PROCEDURE — 83550 IRON BINDING TEST: CPT

## 2019-09-17 PROCEDURE — 83540 ASSAY OF IRON: CPT

## 2019-09-17 PROCEDURE — 36415 COLL VENOUS BLD VENIPUNCTURE: CPT

## 2019-09-17 PROCEDURE — 82306 VITAMIN D 25 HYDROXY: CPT

## 2019-09-17 PROCEDURE — 86923 COMPATIBILITY TEST ELECTRIC: CPT

## 2019-09-17 PROCEDURE — 86900 BLOOD TYPING SEROLOGIC ABO: CPT

## 2019-09-17 PROCEDURE — 96361 HYDRATE IV INFUSION ADD-ON: CPT

## 2019-09-17 PROCEDURE — 86850 RBC ANTIBODY SCREEN: CPT

## 2019-09-17 PROCEDURE — 85025 COMPLETE CBC W/AUTO DIFF WBC: CPT

## 2019-09-17 PROCEDURE — 87086 URINE CULTURE/COLONY COUNT: CPT

## 2019-09-17 PROCEDURE — 6360000002 HC RX W HCPCS: Performed by: PHYSICIAN ASSISTANT

## 2019-09-17 PROCEDURE — 73552 X-RAY EXAM OF FEMUR 2/>: CPT

## 2019-09-17 PROCEDURE — 2580000003 HC RX 258: Performed by: INTERNAL MEDICINE

## 2019-09-17 PROCEDURE — 82746 ASSAY OF FOLIC ACID SERUM: CPT

## 2019-09-17 PROCEDURE — 99285 EMERGENCY DEPT VISIT HI MDM: CPT

## 2019-09-17 PROCEDURE — 73502 X-RAY EXAM HIP UNI 2-3 VIEWS: CPT

## 2019-09-17 PROCEDURE — 82803 BLOOD GASES ANY COMBINATION: CPT

## 2019-09-17 PROCEDURE — 96375 TX/PRO/DX INJ NEW DRUG ADDON: CPT

## 2019-09-17 PROCEDURE — 82607 VITAMIN B-12: CPT

## 2019-09-17 PROCEDURE — 86901 BLOOD TYPING SEROLOGIC RH(D): CPT

## 2019-09-17 PROCEDURE — 82550 ASSAY OF CK (CPK): CPT

## 2019-09-17 PROCEDURE — 93005 ELECTROCARDIOGRAM TRACING: CPT | Performed by: EMERGENCY MEDICINE

## 2019-09-17 PROCEDURE — 96365 THER/PROPH/DIAG IV INF INIT: CPT

## 2019-09-17 PROCEDURE — 2580000003 HC RX 258: Performed by: PHYSICIAN ASSISTANT

## 2019-09-17 PROCEDURE — 1200000000 HC SEMI PRIVATE

## 2019-09-17 PROCEDURE — 85610 PROTHROMBIN TIME: CPT

## 2019-09-17 PROCEDURE — P9016 RBC LEUKOCYTES REDUCED: HCPCS

## 2019-09-17 PROCEDURE — 81001 URINALYSIS AUTO W/SCOPE: CPT

## 2019-09-17 PROCEDURE — 51702 INSERT TEMP BLADDER CATH: CPT

## 2019-09-17 RX ORDER — MORPHINE SULFATE 2 MG/ML
2 INJECTION, SOLUTION INTRAMUSCULAR; INTRAVENOUS ONCE
Status: COMPLETED | OUTPATIENT
Start: 2019-09-17 | End: 2019-09-17

## 2019-09-17 RX ORDER — POLYVINYL ALCOHOL 14 MG/ML
1 SOLUTION/ DROPS OPHTHALMIC 2 TIMES DAILY
Status: DISCONTINUED | OUTPATIENT
Start: 2019-09-17 | End: 2019-09-17

## 2019-09-17 RX ORDER — SODIUM CHLORIDE 0.9 % (FLUSH) 0.9 %
10 SYRINGE (ML) INJECTION PRN
Status: DISCONTINUED | OUTPATIENT
Start: 2019-09-17 | End: 2019-09-21 | Stop reason: HOSPADM

## 2019-09-17 RX ORDER — SODIUM CHLORIDE 0.9 % (FLUSH) 0.9 %
10 SYRINGE (ML) INJECTION EVERY 12 HOURS SCHEDULED
Status: DISCONTINUED | OUTPATIENT
Start: 2019-09-17 | End: 2019-09-21 | Stop reason: HOSPADM

## 2019-09-17 RX ORDER — SODIUM CHLORIDE 9 MG/ML
INJECTION, SOLUTION INTRAVENOUS CONTINUOUS
Status: DISCONTINUED | OUTPATIENT
Start: 2019-09-17 | End: 2019-09-17 | Stop reason: SDUPTHER

## 2019-09-17 RX ORDER — ACETAMINOPHEN 325 MG/1
650 TABLET ORAL EVERY 4 HOURS PRN
Status: DISCONTINUED | OUTPATIENT
Start: 2019-09-17 | End: 2019-09-21 | Stop reason: HOSPADM

## 2019-09-17 RX ORDER — SODIUM CHLORIDE, SODIUM LACTATE, POTASSIUM CHLORIDE, CALCIUM CHLORIDE 600; 310; 30; 20 MG/100ML; MG/100ML; MG/100ML; MG/100ML
1000 INJECTION, SOLUTION INTRAVENOUS ONCE
Status: COMPLETED | OUTPATIENT
Start: 2019-09-17 | End: 2019-09-17

## 2019-09-17 RX ORDER — ONDANSETRON 2 MG/ML
4 INJECTION INTRAMUSCULAR; INTRAVENOUS EVERY 6 HOURS PRN
Status: DISCONTINUED | OUTPATIENT
Start: 2019-09-17 | End: 2019-09-17 | Stop reason: SDUPTHER

## 2019-09-17 RX ORDER — HYDROCODONE BITARTRATE AND ACETAMINOPHEN 5; 325 MG/1; MG/1
2 TABLET ORAL EVERY 4 HOURS PRN
Status: DISCONTINUED | OUTPATIENT
Start: 2019-09-17 | End: 2019-09-21 | Stop reason: HOSPADM

## 2019-09-17 RX ORDER — SODIUM CHLORIDE 9 MG/ML
INJECTION, SOLUTION INTRAVENOUS CONTINUOUS
Status: DISCONTINUED | OUTPATIENT
Start: 2019-09-17 | End: 2019-09-18

## 2019-09-17 RX ORDER — MORPHINE SULFATE 2 MG/ML
4 INJECTION, SOLUTION INTRAMUSCULAR; INTRAVENOUS
Status: DISCONTINUED | OUTPATIENT
Start: 2019-09-17 | End: 2019-09-21 | Stop reason: HOSPADM

## 2019-09-17 RX ORDER — PANTOPRAZOLE SODIUM 40 MG/1
40 TABLET, DELAYED RELEASE ORAL
Status: DISCONTINUED | OUTPATIENT
Start: 2019-09-18 | End: 2019-09-21 | Stop reason: HOSPADM

## 2019-09-17 RX ORDER — SODIUM CHLORIDE 0.9 % (FLUSH) 0.9 %
10 SYRINGE (ML) INJECTION EVERY 12 HOURS SCHEDULED
Status: DISCONTINUED | OUTPATIENT
Start: 2019-09-17 | End: 2019-09-17 | Stop reason: SDUPTHER

## 2019-09-17 RX ORDER — ACETAMINOPHEN 325 MG/1
650 TABLET ORAL EVERY 4 HOURS PRN
Status: DISCONTINUED | OUTPATIENT
Start: 2019-09-17 | End: 2019-09-17 | Stop reason: SDUPTHER

## 2019-09-17 RX ORDER — MORPHINE SULFATE 2 MG/ML
2 INJECTION, SOLUTION INTRAMUSCULAR; INTRAVENOUS
Status: DISCONTINUED | OUTPATIENT
Start: 2019-09-17 | End: 2019-09-21 | Stop reason: HOSPADM

## 2019-09-17 RX ORDER — SODIUM CHLORIDE 0.9 % (FLUSH) 0.9 %
10 SYRINGE (ML) INJECTION PRN
Status: DISCONTINUED | OUTPATIENT
Start: 2019-09-17 | End: 2019-09-17 | Stop reason: SDUPTHER

## 2019-09-17 RX ORDER — ONDANSETRON 2 MG/ML
4 INJECTION INTRAMUSCULAR; INTRAVENOUS EVERY 6 HOURS PRN
Status: DISCONTINUED | OUTPATIENT
Start: 2019-09-17 | End: 2019-09-21 | Stop reason: HOSPADM

## 2019-09-17 RX ORDER — HYDROCODONE BITARTRATE AND ACETAMINOPHEN 5; 325 MG/1; MG/1
1 TABLET ORAL EVERY 4 HOURS PRN
Status: DISCONTINUED | OUTPATIENT
Start: 2019-09-17 | End: 2019-09-21 | Stop reason: HOSPADM

## 2019-09-17 RX ORDER — DIPHENOXYLATE HYDROCHLORIDE AND ATROPINE SULFATE 2.5; .025 MG/1; MG/1
1 TABLET ORAL 4 TIMES DAILY PRN
COMMUNITY
End: 2020-03-06 | Stop reason: SDUPTHER

## 2019-09-17 RX ADMIN — MORPHINE SULFATE 2 MG: 2 INJECTION, SOLUTION INTRAMUSCULAR; INTRAVENOUS at 14:36

## 2019-09-17 RX ADMIN — SODIUM CHLORIDE: 9 INJECTION, SOLUTION INTRAVENOUS at 19:40

## 2019-09-17 RX ADMIN — SODIUM CHLORIDE, POTASSIUM CHLORIDE, SODIUM LACTATE AND CALCIUM CHLORIDE 1000 ML: 600; 310; 30; 20 INJECTION, SOLUTION INTRAVENOUS at 14:36

## 2019-09-17 RX ADMIN — SODIUM CHLORIDE, POTASSIUM CHLORIDE, SODIUM LACTATE AND CALCIUM CHLORIDE 1000 ML: 600; 310; 30; 20 INJECTION, SOLUTION INTRAVENOUS at 16:57

## 2019-09-17 RX ADMIN — CEFTRIAXONE 1 G: 1 INJECTION, POWDER, FOR SOLUTION INTRAMUSCULAR; INTRAVENOUS at 16:24

## 2019-09-17 ASSESSMENT — PAIN SCALES - GENERAL
PAINLEVEL_OUTOF10: 0
PAINLEVEL_OUTOF10: 9

## 2019-09-17 NOTE — ED NOTES
Pt is now boarding, Bed not assigned yet, still says cleaning.       Alanna Lomeli, LEIGHANN  09/17/19 [id number]

## 2019-09-17 NOTE — ED PROVIDER NOTES
EKG 12 Lead   Result Value Ref Range    Ventricular Rate 103 BPM    Atrial Rate 103 BPM    P-R Interval 130 ms    QRS Duration 72 ms    Q-T Interval 310 ms    QTc Calculation (Bazett) 406 ms    P Axis 69 degrees    R Axis -32 degrees    T Axis 50 degrees    Diagnosis       EKG performed in ER and to be interpreted by ER physician. Confirmed by MD, ER ([id number]),  Mae Robles ([id number]) on 9/17/2019 2:18:06 PM   TYPE AND SCREEN   Result Value Ref Range    ABO/Rh A POS     Antibody Screen NEG        ED BEDSIDE ULTRASOUND:  n/a    RECENT VITALS:  BP: 120/69, Temp: 98.2 °F (36.8 °C), Pulse: 85, Resp: 16, SpO2: 95 %     Procedures     n/a    ED Course     Nursing Notes, Past Medical Hx,Past Surgical Hx, Social Hx, Allergies, and Family Hx were reviewed.     The patient was given the following medications:  Orders Placed This Encounter   Medications    morphine (PF) injection 2 mg    lactated ringers infusion 1,000 mL    cefTRIAXone (ROCEPHIN) 1 g IVPB in 50 mL D5W minibag    lactated ringers infusion 1,000 mL    DISCONTD: polyvinyl alcohol (LIQUIFILM TEARS) 1.4 % ophthalmic solution 1 drop    pantoprazole (PROTONIX) tablet 40 mg    DISCONTD: vitamin D (CHOLECALCIFEROL) tablet 1,000 Units    DISCONTD: 0.9 % sodium chloride infusion    cefTRIAXone (ROCEPHIN) 1 g IVPB in 50 mL D5W minibag    sodium chloride flush 0.9 % injection 10 mL    sodium chloride flush 0.9 % injection 10 mL    magnesium hydroxide (MILK OF MAGNESIA) 400 MG/5ML suspension 30 mL    DISCONTD: ondansetron (ZOFRAN) injection 4 mg    DISCONTD: acetaminophen (TYLENOL) tablet 650 mg    DISCONTD: 0.9 % sodium chloride infusion    DISCONTD: sodium chloride flush 0.9 % injection 10 mL    DISCONTD: sodium chloride flush 0.9 % injection 10 mL    acetaminophen (TYLENOL) tablet 650 mg    ondansetron (ZOFRAN) injection 4 mg    OR Linked Order Group     HYDROcodone-acetaminophen (NORCO) 5-325 MG per tablet 1 tablet     HYDROcodone-acetaminophen (NORCO) 5-325 MG per tablet 2 tablet    OR Linked Order Group     morphine (PF) injection 2 mg     morphine (PF) injection 4 mg    potassium chloride 10 mEq/100 mL IVPB (Peripheral Line)    dextrose 5 % and 0.45 % NaCl with KCl 20 mEq infusion       CONSULTS:  IP CONSULT TO ORTHOPEDIC SURGERY  IP CONSULT TO HOSPITALIST  IP CONSULT TO HOSPITALIST  [facility] / ASSESSMENT / PLAN       Vitals:    09/17/19 1842 09/17/19 2245 09/18/19 0245 09/18/19 0700   BP: 118/75 128/74 118/70 120/69   Pulse: 89 88 85    Resp: 16 16 16 16   Temp: 99 °F (37.2 °C) 98.9 °F (37.2 °C) 98.6 °F (37 °C) 98.2 °F (36.8 °C)   TempSrc: Oral Oral Oral Oral   SpO2: 90% 94% 93% 95%   Weight:       Height:           Sonali Glynn is a 81 y.o. female who presents to the emergency department with left leg pain after fall. The patient fell 3 days ago and has been crawling to get around her house. She did not hit her head or lose consciousness and is not on any blood thinners. She has no other complaints at this time, including specifically chest pain, shortness of breath or abdominal pain. She has not had any fevers or chills at home. Patient's left leg is externally rotated and shortened. The patient has remained hemodynamically stable throughout their stay in the emergency department, and appears well overall. Diffuse tenderness to palpation over the lateral aspect of the hip, distal femur and left knee. X-rays were obtained of all these. Patient's lungs are clear to oscillation bilaterally without any obvious wheezing or rales. Cardiac exam reveals regular rate and rhythm without any obvious murmurs. Abdomen is soft without any focal tenderness or rebound or guarding. She is no lower extremity edema. Work-up was performed for syncopal type event that was all unremarkable. Her x-ray of her hip shows an intertrochanteric, comminuted, displaced left fracture.   Orthopedics was consulted who asked the patient be admitted to the hospital for likely OR tomorrow. The patient also has a significant urinary tract infection that we treated with Rocephin prior to her being admitted to the hospital.  She was given IV fluids as well. At this point in time, patient will require admission to the hospital for further management of their clinical condition. I spoke with the admitting team who is in agreement with plan for admission. Patient and family are in agreement with plan for admission. Patient will be cared for in the ED prior to a bed becoming available upstairs    The patient was evaluated by myself and the ED Attending Physician, Dr. Jewel Rosado. All management and disposition plans were discussed and agreed upon. Clinical Impression     1.  Closed comminuted intertrochanteric fracture of left femur, initial encounter [facility])        Disposition     PATIENT REFERRED TO:  MD Geoffrey Retana 150  [address]            DISCHARGE MEDICATIONS:  Current Discharge Medication List          DISPOSITION Admitted 09/17/2019 05:08:45 PM     Luis Toth, [address]  09/18/19 Josefina 22 [address] [facility], [address]  09/18/19 0643

## 2019-09-17 NOTE — H&P
medroxyPROGESTERone (PROVERA) 2.5 MG tablet TAKE ONE TABLET BY MOUTH EVERY DAY 5/14/19  Yes Jimbo Forte MD   estrogens, conjugated, (PREMARIN) 0.625 MG tablet TAKE ONE TABLET BY MOUTH DAILY 5/14/19  Yes Jimbo Forte MD   celecoxib (CELEBREX) 200 MG capsule TAKE ONE CAPSULE BY MOUTH DAILY 5/14/19  Yes Jimbo Forte MD   aspirin 500 MG EC tablet Take 500 mg by mouth every 6 hours as needed for Pain   Yes Historical Provider, MD   Ascorbic Acid (VITAMIN C) 500 MG tablet Take 500 mg by mouth daily   Yes Historical Provider, MD   lansoprazole (PREVACID 24HR) 15 MG capsule Take 15 mg by mouth daily. Yes Historical Provider, MD       Allergies:  Lisinopril and Clindamycin/lincomycin    Social History:  The patient currently lives at home alone    TOBACCO:   reports that she has never smoked. She has never used smokeless tobacco.  ETOH:   reports that she does not drink alcohol. Family History:  Reviewed in detail and  Positive as follows:        Problem Relation Age of Onset    Breast Cancer Daughter         BRAC 2    Ovarian Cancer Daughter        REVIEW OF SYSTEMS:   Positive and negative as noted in the HPI. All other systems reviewed and negative. PHYSICAL EXAM:    /75   Pulse 89   Temp 99 °F (37.2 °C) (Oral)   Resp 16   Ht 5' (1.524 m)   Wt 121 lb (54.9 kg)   SpO2 90%   BMI 23.63 kg/m²     General appearance: No apparent distress appears stated age and cooperative. HEENT Normal cephalic, atraumatic without obvious deformity. Conjunctivae/corneas clear. Neck: Supple, No jugular venous distention/bruits. Trachea midline without thyromegaly or adenopathy with full range of motion. Lungs: Clear to auscultation, bilaterally without Rales/Wheezes/Rhonchi with good respiratory effort.   Heart: Regular rate and rhythm with Normal S1/S2 without murmurs, rubs or gallops, point of maximum impulse non-displaced  Abdomen: Soft, non-tender or non-distended without rigidity or for resistant organism. Will cont with ceftriaxone and await culture from this admission. Traumatic rhabdomyolysis:  Trend CK daily, renal function daily and strict I/O with miranda   IVF at 100ml/hr    Anemia:  Per family and patient- had vaginal spotting. And no blood in stool. Will check folate, B12 and iron. Needs type and screen prior to surgery. Expect Hgb to drift after she is hydrated. Vaginal post menopausal bleeding: At this time family and patient does not want work up. Will monitor her symptoms closely. Will stop hormonal therapy for now that she takes for symptoms of menopause. DVT Prophylaxis: SCD  Diet: DIET GENERAL;  Diet NPO, After Midnight  Diet NPO Time Specified  Dietary Nutrition Supplements: Standard High Calorie Oral Supplement  Code Status: Full Code  PT/OT Eval Status: bedrest    Dispo - Kajal Ness MD    Thank you Tori Burden MD for the opportunity to be involved in this patient's care. If you have any questions or concerns please feel free to contact me at [phone].

## 2019-09-18 ENCOUNTER — APPOINTMENT (OUTPATIENT)
Dept: GENERAL RADIOLOGY | Age: 84
DRG: 956 | End: 2019-09-18
Payer: MEDICARE

## 2019-09-18 ENCOUNTER — ANESTHESIA (OUTPATIENT)
Dept: OPERATING ROOM | Age: 84
DRG: 956 | End: 2019-09-18
Payer: MEDICARE

## 2019-09-18 ENCOUNTER — ANESTHESIA EVENT (OUTPATIENT)
Dept: OPERATING ROOM | Age: 84
DRG: 956 | End: 2019-09-18
Payer: MEDICARE

## 2019-09-18 VITALS
SYSTOLIC BLOOD PRESSURE: 146 MMHG | DIASTOLIC BLOOD PRESSURE: 64 MMHG | RESPIRATION RATE: 7 BRPM | OXYGEN SATURATION: 99 %

## 2019-09-18 LAB
ALBUMIN SERPL-MCNC: 2.5 G/DL (ref 3.4–5)
ANION GAP SERPL CALCULATED.3IONS-SCNC: 11 MMOL/L (ref 3–16)
BASOPHILS ABSOLUTE: 0 K/UL (ref 0–0.2)
BASOPHILS RELATIVE PERCENT: 0.2 %
BUN BLDV-MCNC: 33 MG/DL (ref 7–20)
CALCIUM SERPL-MCNC: 8.3 MG/DL (ref 8.3–10.6)
CHLORIDE BLD-SCNC: 107 MMOL/L (ref 99–110)
CO2: 25 MMOL/L (ref 21–32)
CREAT SERPL-MCNC: 1 MG/DL (ref 0.6–1.2)
EOSINOPHILS ABSOLUTE: 0 K/UL (ref 0–0.6)
EOSINOPHILS RELATIVE PERCENT: 0.2 %
FOLATE: 11.99 NG/ML (ref 4.78–24.2)
GFR AFRICAN AMERICAN: >60
GFR NON-AFRICAN AMERICAN: 53
GLUCOSE BLD-MCNC: 130 MG/DL (ref 70–99)
HCT VFR BLD CALC: 24.1 % (ref 36–48)
HEMOGLOBIN: 7.8 G/DL (ref 12–16)
INR BLD: 1.07 (ref 0.86–1.14)
IRON SATURATION: 5 % (ref 15–50)
IRON: 14 UG/DL (ref 37–145)
LYMPHOCYTES ABSOLUTE: 0.8 K/UL (ref 1–5.1)
LYMPHOCYTES RELATIVE PERCENT: 7.1 %
MAGNESIUM: 2.1 MG/DL (ref 1.8–2.4)
MCH RBC QN AUTO: 28.8 PG (ref 26–34)
MCHC RBC AUTO-ENTMCNC: 32.4 G/DL (ref 31–36)
MCV RBC AUTO: 88.8 FL (ref 80–100)
MONOCYTES ABSOLUTE: 0.7 K/UL (ref 0–1.3)
MONOCYTES RELATIVE PERCENT: 5.8 %
NEUTROPHILS ABSOLUTE: 10.2 K/UL (ref 1.7–7.7)
NEUTROPHILS RELATIVE PERCENT: 86.7 %
PDW BLD-RTO: 15.2 % (ref 12.4–15.4)
PHOSPHORUS: 2.9 MG/DL (ref 2.5–4.9)
PLATELET # BLD: 221 K/UL (ref 135–450)
PMV BLD AUTO: 7.2 FL (ref 5–10.5)
POTASSIUM SERPL-SCNC: 3.2 MMOL/L (ref 3.5–5.1)
PROTHROMBIN TIME: 12.2 SEC (ref 9.8–13)
RBC # BLD: 2.72 M/UL (ref 4–5.2)
SODIUM BLD-SCNC: 143 MMOL/L (ref 136–145)
TOTAL CK: 281 U/L (ref 26–192)
TOTAL IRON BINDING CAPACITY: 275 UG/DL (ref 260–445)
URINE CULTURE, ROUTINE: NORMAL
VITAMIN B-12: 734 PG/ML (ref 211–911)
VITAMIN D 25-HYDROXY: 25.8 NG/ML
WBC # BLD: 11.8 K/UL (ref 4–11)

## 2019-09-18 PROCEDURE — 73501 X-RAY EXAM HIP UNI 1 VIEW: CPT

## 2019-09-18 PROCEDURE — C1713 ANCHOR/SCREW BN/BN,TIS/BN: HCPCS | Performed by: ORTHOPAEDIC SURGERY

## 2019-09-18 PROCEDURE — 83735 ASSAY OF MAGNESIUM: CPT

## 2019-09-18 PROCEDURE — 1200000000 HC SEMI PRIVATE

## 2019-09-18 PROCEDURE — 3209999900 FLUORO FOR SURGICAL PROCEDURES

## 2019-09-18 PROCEDURE — 3700000000 HC ANESTHESIA ATTENDED CARE: Performed by: ORTHOPAEDIC SURGERY

## 2019-09-18 PROCEDURE — 0QS736Z REPOSITION LEFT UPPER FEMUR WITH INTRAMEDULLARY INTERNAL FIXATION DEVICE, PERCUTANEOUS APPROACH: ICD-10-PCS | Performed by: ORTHOPAEDIC SURGERY

## 2019-09-18 PROCEDURE — 2720000010 HC SURG SUPPLY STERILE: Performed by: ORTHOPAEDIC SURGERY

## 2019-09-18 PROCEDURE — 80069 RENAL FUNCTION PANEL: CPT

## 2019-09-18 PROCEDURE — 73590 X-RAY EXAM OF LOWER LEG: CPT

## 2019-09-18 PROCEDURE — 7100000001 HC PACU RECOVERY - ADDTL 15 MIN: Performed by: ORTHOPAEDIC SURGERY

## 2019-09-18 PROCEDURE — 2580000003 HC RX 258: Performed by: NURSE ANESTHETIST, CERTIFIED REGISTERED

## 2019-09-18 PROCEDURE — 6370000000 HC RX 637 (ALT 250 FOR IP): Performed by: ORTHOPAEDIC SURGERY

## 2019-09-18 PROCEDURE — 2580000003 HC RX 258: Performed by: ORTHOPAEDIC SURGERY

## 2019-09-18 PROCEDURE — 85025 COMPLETE CBC W/AUTO DIFF WBC: CPT

## 2019-09-18 PROCEDURE — 2W3KX1Z IMMOBILIZATION OF LEFT FINGER USING SPLINT: ICD-10-PCS | Performed by: ORTHOPAEDIC SURGERY

## 2019-09-18 PROCEDURE — 7100000000 HC PACU RECOVERY - FIRST 15 MIN: Performed by: ORTHOPAEDIC SURGERY

## 2019-09-18 PROCEDURE — 6360000002 HC RX W HCPCS: Performed by: INTERNAL MEDICINE

## 2019-09-18 PROCEDURE — 2580000003 HC RX 258: Performed by: INTERNAL MEDICINE

## 2019-09-18 PROCEDURE — 73140 X-RAY EXAM OF FINGER(S): CPT

## 2019-09-18 PROCEDURE — 36415 COLL VENOUS BLD VENIPUNCTURE: CPT

## 2019-09-18 PROCEDURE — 3700000001 HC ADD 15 MINUTES (ANESTHESIA): Performed by: ORTHOPAEDIC SURGERY

## 2019-09-18 PROCEDURE — 73552 X-RAY EXAM OF FEMUR 2/>: CPT

## 2019-09-18 PROCEDURE — 6360000002 HC RX W HCPCS: Performed by: ORTHOPAEDIC SURGERY

## 2019-09-18 PROCEDURE — 2500000003 HC RX 250 WO HCPCS: Performed by: INTERNAL MEDICINE

## 2019-09-18 PROCEDURE — C1769 GUIDE WIRE: HCPCS | Performed by: ORTHOPAEDIC SURGERY

## 2019-09-18 PROCEDURE — 3600000014 HC SURGERY LEVEL 4 ADDTL 15MIN: Performed by: ORTHOPAEDIC SURGERY

## 2019-09-18 PROCEDURE — 3E0R3BZ INTRODUCTION OF ANESTHETIC AGENT INTO SPINAL CANAL, PERCUTANEOUS APPROACH: ICD-10-PCS | Performed by: ANESTHESIOLOGY

## 2019-09-18 PROCEDURE — 3600000004 HC SURGERY LEVEL 4 BASE: Performed by: ORTHOPAEDIC SURGERY

## 2019-09-18 PROCEDURE — 2709999900 HC NON-CHARGEABLE SUPPLY: Performed by: ORTHOPAEDIC SURGERY

## 2019-09-18 PROCEDURE — 85610 PROTHROMBIN TIME: CPT

## 2019-09-18 PROCEDURE — 6370000000 HC RX 637 (ALT 250 FOR IP): Performed by: INTERNAL MEDICINE

## 2019-09-18 PROCEDURE — 2500000003 HC RX 250 WO HCPCS: Performed by: NURSE ANESTHETIST, CERTIFIED REGISTERED

## 2019-09-18 PROCEDURE — 6360000002 HC RX W HCPCS: Performed by: NURSE ANESTHETIST, CERTIFIED REGISTERED

## 2019-09-18 PROCEDURE — 82550 ASSAY OF CK (CPK): CPT

## 2019-09-18 DEVICE — LONG NAIL KIT R1.5, TI, LEFT
Type: IMPLANTABLE DEVICE | Site: HIP | Status: FUNCTIONAL
Brand: GAMMA

## 2019-09-18 DEVICE — LAG SCREW, TI
Type: IMPLANTABLE DEVICE | Site: HIP | Status: FUNCTIONAL
Brand: GAMMA

## 2019-09-18 DEVICE — LOCKING SCREW
Type: IMPLANTABLE DEVICE | Site: HIP | Status: FUNCTIONAL
Brand: T2 ALPHA

## 2019-09-18 RX ORDER — MEPERIDINE HYDROCHLORIDE 25 MG/ML
12.5 INJECTION INTRAMUSCULAR; INTRAVENOUS; SUBCUTANEOUS EVERY 5 MIN PRN
Status: DISCONTINUED | OUTPATIENT
Start: 2019-09-18 | End: 2019-09-18 | Stop reason: HOSPADM

## 2019-09-18 RX ORDER — DOCUSATE SODIUM 100 MG/1
100 CAPSULE, LIQUID FILLED ORAL 2 TIMES DAILY
Status: DISCONTINUED | OUTPATIENT
Start: 2019-09-18 | End: 2019-09-21 | Stop reason: HOSPADM

## 2019-09-18 RX ORDER — CEFAZOLIN SODIUM 1 G/3ML
INJECTION, POWDER, FOR SOLUTION INTRAMUSCULAR; INTRAVENOUS PRN
Status: DISCONTINUED | OUTPATIENT
Start: 2019-09-18 | End: 2019-09-18 | Stop reason: SDUPTHER

## 2019-09-18 RX ORDER — POTASSIUM CHLORIDE 7.45 MG/ML
10 INJECTION INTRAVENOUS
Status: COMPLETED | OUTPATIENT
Start: 2019-09-18 | End: 2019-09-18

## 2019-09-18 RX ORDER — SODIUM CHLORIDE 0.9 % (FLUSH) 0.9 %
10 SYRINGE (ML) INJECTION PRN
Status: DISCONTINUED | OUTPATIENT
Start: 2019-09-18 | End: 2019-09-21 | Stop reason: HOSPADM

## 2019-09-18 RX ORDER — PROMETHAZINE HYDROCHLORIDE 25 MG/ML
6.25 INJECTION, SOLUTION INTRAMUSCULAR; INTRAVENOUS
Status: DISCONTINUED | OUTPATIENT
Start: 2019-09-18 | End: 2019-09-18 | Stop reason: HOSPADM

## 2019-09-18 RX ORDER — MORPHINE SULFATE 4 MG/ML
1 INJECTION, SOLUTION INTRAMUSCULAR; INTRAVENOUS EVERY 5 MIN PRN
Status: DISCONTINUED | OUTPATIENT
Start: 2019-09-18 | End: 2019-09-18 | Stop reason: HOSPADM

## 2019-09-18 RX ORDER — HYDRALAZINE HYDROCHLORIDE 20 MG/ML
5 INJECTION INTRAMUSCULAR; INTRAVENOUS EVERY 10 MIN PRN
Status: DISCONTINUED | OUTPATIENT
Start: 2019-09-18 | End: 2019-09-18 | Stop reason: HOSPADM

## 2019-09-18 RX ORDER — METOCLOPRAMIDE HYDROCHLORIDE 5 MG/ML
10 INJECTION INTRAMUSCULAR; INTRAVENOUS
Status: DISCONTINUED | OUTPATIENT
Start: 2019-09-18 | End: 2019-09-18 | Stop reason: HOSPADM

## 2019-09-18 RX ORDER — OXYCODONE HYDROCHLORIDE 5 MG/1
10 TABLET ORAL PRN
Status: DISCONTINUED | OUTPATIENT
Start: 2019-09-18 | End: 2019-09-18 | Stop reason: HOSPADM

## 2019-09-18 RX ORDER — PROPOFOL 10 MG/ML
INJECTION, EMULSION INTRAVENOUS PRN
Status: DISCONTINUED | OUTPATIENT
Start: 2019-09-18 | End: 2019-09-18 | Stop reason: SDUPTHER

## 2019-09-18 RX ORDER — BUPIVACAINE HYDROCHLORIDE 7.5 MG/ML
INJECTION, SOLUTION INTRASPINAL PRN
Status: DISCONTINUED | OUTPATIENT
Start: 2019-09-18 | End: 2019-09-18 | Stop reason: SDUPTHER

## 2019-09-18 RX ORDER — SODIUM CHLORIDE 0.9 % (FLUSH) 0.9 %
10 SYRINGE (ML) INJECTION EVERY 12 HOURS SCHEDULED
Status: DISCONTINUED | OUTPATIENT
Start: 2019-09-18 | End: 2019-09-21 | Stop reason: HOSPADM

## 2019-09-18 RX ORDER — DIPHENHYDRAMINE HYDROCHLORIDE 50 MG/ML
12.5 INJECTION INTRAMUSCULAR; INTRAVENOUS
Status: DISCONTINUED | OUTPATIENT
Start: 2019-09-18 | End: 2019-09-18 | Stop reason: HOSPADM

## 2019-09-18 RX ORDER — LABETALOL 20 MG/4 ML (5 MG/ML) INTRAVENOUS SYRINGE
5 EVERY 10 MIN PRN
Status: DISCONTINUED | OUTPATIENT
Start: 2019-09-18 | End: 2019-09-18 | Stop reason: HOSPADM

## 2019-09-18 RX ORDER — SODIUM CHLORIDE, SODIUM LACTATE, POTASSIUM CHLORIDE, CALCIUM CHLORIDE 600; 310; 30; 20 MG/100ML; MG/100ML; MG/100ML; MG/100ML
INJECTION, SOLUTION INTRAVENOUS CONTINUOUS PRN
Status: DISCONTINUED | OUTPATIENT
Start: 2019-09-18 | End: 2019-09-18 | Stop reason: SDUPTHER

## 2019-09-18 RX ORDER — KETAMINE HYDROCHLORIDE 50 MG/ML
INJECTION, SOLUTION, CONCENTRATE INTRAMUSCULAR; INTRAVENOUS PRN
Status: DISCONTINUED | OUTPATIENT
Start: 2019-09-18 | End: 2019-09-18 | Stop reason: SDUPTHER

## 2019-09-18 RX ORDER — DEXTROSE, SODIUM CHLORIDE, AND POTASSIUM CHLORIDE 5; .45; .15 G/100ML; G/100ML; G/100ML
INJECTION INTRAVENOUS CONTINUOUS
Status: DISCONTINUED | OUTPATIENT
Start: 2019-09-18 | End: 2019-09-19

## 2019-09-18 RX ORDER — OXYCODONE HYDROCHLORIDE 5 MG/1
5 TABLET ORAL PRN
Status: DISCONTINUED | OUTPATIENT
Start: 2019-09-18 | End: 2019-09-18 | Stop reason: HOSPADM

## 2019-09-18 RX ADMIN — PROPOFOL 20 MG: 10 INJECTION, EMULSION INTRAVENOUS at 18:53

## 2019-09-18 RX ADMIN — KETAMINE HYDROCHLORIDE 25 MG: 50 INJECTION, SOLUTION INTRAMUSCULAR; INTRAVENOUS at 18:00

## 2019-09-18 RX ADMIN — PROPOFOL 20 MG: 10 INJECTION, EMULSION INTRAVENOUS at 18:31

## 2019-09-18 RX ADMIN — POTASSIUM CHLORIDE 10 MEQ: 7.46 INJECTION, SOLUTION INTRAVENOUS at 09:35

## 2019-09-18 RX ADMIN — PROPOFOL 40 MG: 10 INJECTION, EMULSION INTRAVENOUS at 18:17

## 2019-09-18 RX ADMIN — PHENYLEPHRINE HYDROCHLORIDE 100 MCG: 10 INJECTION, SOLUTION INTRAMUSCULAR; INTRAVENOUS; SUBCUTANEOUS at 18:29

## 2019-09-18 RX ADMIN — CEFAZOLIN SODIUM 1000 MG: 1 POWDER, FOR SOLUTION INTRAMUSCULAR; INTRAVENOUS at 18:10

## 2019-09-18 RX ADMIN — PANTOPRAZOLE SODIUM 40 MG: 40 TABLET, DELAYED RELEASE ORAL at 05:25

## 2019-09-18 RX ADMIN — DOCUSATE SODIUM 100 MG: 100 CAPSULE, LIQUID FILLED ORAL at 21:10

## 2019-09-18 RX ADMIN — PHENYLEPHRINE HYDROCHLORIDE 100 MCG: 10 INJECTION, SOLUTION INTRAMUSCULAR; INTRAVENOUS; SUBCUTANEOUS at 19:03

## 2019-09-18 RX ADMIN — IRON SUCROSE 200 MG: 20 INJECTION, SOLUTION INTRAVENOUS at 14:23

## 2019-09-18 RX ADMIN — SODIUM CHLORIDE, SODIUM LACTATE, POTASSIUM CHLORIDE, AND CALCIUM CHLORIDE: 600; 310; 30; 20 INJECTION, SOLUTION INTRAVENOUS at 16:30

## 2019-09-18 RX ADMIN — MORPHINE SULFATE 2 MG: 2 INJECTION, SOLUTION INTRAMUSCULAR; INTRAVENOUS at 10:12

## 2019-09-18 RX ADMIN — POTASSIUM CHLORIDE 10 MEQ: 7.46 INJECTION, SOLUTION INTRAVENOUS at 08:37

## 2019-09-18 RX ADMIN — PROPOFOL 20 MG: 10 INJECTION, EMULSION INTRAVENOUS at 18:45

## 2019-09-18 RX ADMIN — Medication 10 ML: at 08:42

## 2019-09-18 RX ADMIN — Medication 10 ML: at 22:08

## 2019-09-18 RX ADMIN — KETAMINE HYDROCHLORIDE 25 MG: 50 INJECTION, SOLUTION INTRAMUSCULAR; INTRAVENOUS at 17:54

## 2019-09-18 RX ADMIN — KETAMINE HYDROCHLORIDE 25 MG: 50 INJECTION, SOLUTION INTRAMUSCULAR; INTRAVENOUS at 17:58

## 2019-09-18 RX ADMIN — Medication 10 ML: at 21:13

## 2019-09-18 RX ADMIN — KETAMINE HYDROCHLORIDE 25 MG: 50 INJECTION, SOLUTION INTRAMUSCULAR; INTRAVENOUS at 17:56

## 2019-09-18 RX ADMIN — PHENYLEPHRINE HYDROCHLORIDE 100 MCG: 10 INJECTION, SOLUTION INTRAMUSCULAR; INTRAVENOUS; SUBCUTANEOUS at 18:27

## 2019-09-18 RX ADMIN — CEFTRIAXONE 1 G: 1 INJECTION, POWDER, FOR SOLUTION INTRAMUSCULAR; INTRAVENOUS at 16:59

## 2019-09-18 RX ADMIN — SODIUM CHLORIDE, SODIUM LACTATE, POTASSIUM CHLORIDE, AND CALCIUM CHLORIDE: 600; 310; 30; 20 INJECTION, SOLUTION INTRAVENOUS at 17:49

## 2019-09-18 RX ADMIN — PHENYLEPHRINE HYDROCHLORIDE 100 MCG: 10 INJECTION, SOLUTION INTRAMUSCULAR; INTRAVENOUS; SUBCUTANEOUS at 18:57

## 2019-09-18 RX ADMIN — POTASSIUM CHLORIDE, DEXTROSE MONOHYDRATE AND SODIUM CHLORIDE: 150; 5; 450 INJECTION, SOLUTION INTRAVENOUS at 08:36

## 2019-09-18 RX ADMIN — PHENYLEPHRINE HYDROCHLORIDE 100 MCG: 10 INJECTION, SOLUTION INTRAMUSCULAR; INTRAVENOUS; SUBCUTANEOUS at 19:08

## 2019-09-18 RX ADMIN — MORPHINE SULFATE 2 MG: 2 INJECTION, SOLUTION INTRAMUSCULAR; INTRAVENOUS at 06:15

## 2019-09-18 RX ADMIN — BUPIVACAINE HYDROCHLORIDE IN DEXTROSE 15 MG: 7.5 INJECTION, SOLUTION SUBARACHNOID at 18:02

## 2019-09-18 ASSESSMENT — PAIN DESCRIPTION - LOCATION
LOCATION: HIP
LOCATION: HIP

## 2019-09-18 ASSESSMENT — PAIN DESCRIPTION - PROGRESSION
CLINICAL_PROGRESSION: GRADUALLY WORSENING
CLINICAL_PROGRESSION: NOT CHANGED

## 2019-09-18 ASSESSMENT — PULMONARY FUNCTION TESTS
PIF_VALUE: 1
PIF_VALUE: 0
PIF_VALUE: 1
PIF_VALUE: 1
PIF_VALUE: 0
PIF_VALUE: 0
PIF_VALUE: 1
PIF_VALUE: 0
PIF_VALUE: 0
PIF_VALUE: 1
PIF_VALUE: 0
PIF_VALUE: 0
PIF_VALUE: 1
PIF_VALUE: 0
PIF_VALUE: 1
PIF_VALUE: 0
PIF_VALUE: 1
PIF_VALUE: 0
PIF_VALUE: 1
PIF_VALUE: 0
PIF_VALUE: 1
PIF_VALUE: 0
PIF_VALUE: 1
PIF_VALUE: 0
PIF_VALUE: 0
PIF_VALUE: 1
PIF_VALUE: 0
PIF_VALUE: 1
PIF_VALUE: 0
PIF_VALUE: 0
PIF_VALUE: 1
PIF_VALUE: 0
PIF_VALUE: 1
PIF_VALUE: 0
PIF_VALUE: 1
PIF_VALUE: 0
PIF_VALUE: 1
PIF_VALUE: 0
PIF_VALUE: 1
PIF_VALUE: 0
PIF_VALUE: 1
PIF_VALUE: 0
PIF_VALUE: 1
PIF_VALUE: 1
PIF_VALUE: 0
PIF_VALUE: 1
PIF_VALUE: 0

## 2019-09-18 ASSESSMENT — PAIN DESCRIPTION - ORIENTATION
ORIENTATION: LEFT
ORIENTATION: LEFT

## 2019-09-18 ASSESSMENT — PAIN DESCRIPTION - FREQUENCY
FREQUENCY: CONTINUOUS
FREQUENCY: CONTINUOUS

## 2019-09-18 ASSESSMENT — PAIN SCALES - GENERAL
PAINLEVEL_OUTOF10: 5
PAINLEVEL_OUTOF10: 0
PAINLEVEL_OUTOF10: 2
PAINLEVEL_OUTOF10: 0
PAINLEVEL_OUTOF10: 5
PAINLEVEL_OUTOF10: 0

## 2019-09-18 ASSESSMENT — PAIN - FUNCTIONAL ASSESSMENT
PAIN_FUNCTIONAL_ASSESSMENT: PREVENTS OR INTERFERES SOME ACTIVE ACTIVITIES AND ADLS
PAIN_FUNCTIONAL_ASSESSMENT: PREVENTS OR INTERFERES SOME ACTIVE ACTIVITIES AND ADLS

## 2019-09-18 ASSESSMENT — PAIN DESCRIPTION - ONSET
ONSET: ON-GOING
ONSET: ON-GOING

## 2019-09-18 ASSESSMENT — PAIN DESCRIPTION - PAIN TYPE
TYPE: ACUTE PAIN
TYPE: ACUTE PAIN

## 2019-09-18 ASSESSMENT — ENCOUNTER SYMPTOMS
SHORTNESS OF BREATH: 0
ABDOMINAL PAIN: 0
BACK PAIN: 0

## 2019-09-18 ASSESSMENT — PAIN DESCRIPTION - DESCRIPTORS
DESCRIPTORS: ACHING
DESCRIPTORS: ACHING

## 2019-09-18 NOTE — PLAN OF CARE
Problem: Falls - Risk of:  Goal: Will remain free from falls  Description  Will remain free from falls  Outcome: Ongoing  Note:   Non skid socks in use, bed alarm on with 2/4 guardrails in position. Bed is locked and in the lowest position with call light and personal belongings placed within reach and instructed to use as necessary. Will continue to monitor.

## 2019-09-18 NOTE — OP NOTE
PREOPERATIVE DIAGNOSIS:   Left hip intertrochanteric femur fracture  Left small finger fracture of the base of the proximal phalanx    POSTOPERATIVE DIAGNOSIS:   Left hip intertrochanteric femur fracture  Left small finger fracture of the base of the proximal phalanx    OPERATION PERFORMED:   Left hip cephalomedullary nail  Left small finger closed management with splint placement    SURGEON: Rolando Menjivar MD    ESTIMATED BLOOD LOSS: 207 mL    COMPLICATIONS: None. SPECIMENS: None. IMPLANTS:  1. Saundra gamma nail 10 x 380 mm with 125-degree neck angle. 2. A 10.5 x 90 mm lag screw. 3. A 5 x 42.5 mm locking screw fully threaded. ANTIOBIOTICS: Cefazolin 2G administered preoperatively  ? INDICATIONS FOR THE PROCEDURE:   Joby Trevizo, is a 81 y.o. female patient who was in their normal state of health when they sustained a low energy fall. The patient was transported to the emergency department where x-rays demonstrated an intertrochanteric femur fracture. I was consulted as the Orthopedic surgeon on-call regarding their injury. I discussed with the patient and their family the risks and benefits of nonoperative versus operative management. We discussed alternatives to operative management. The goal of an operation is to get the patient up and weightbearing as tolerated. The risks of non-operative care would include the inability to ever ambulate again. The standard risk of operation applies with risk of anesthesia, risk of medical complication, damage to surrounding structures, blood clot, risk of infection, further fracture or implant failure. After a full discussion of the risks, benefits, and alternatives to surgery, the patient and family did elect to proceed with cephalomedullary nail. Additionally, she was found to have a fracture of the proximal phalanx of the small finger. I recommended closed management with a splint and she and her family agreed with this as well.    ?  DESCRIPTION OF

## 2019-09-19 LAB
ALBUMIN SERPL-MCNC: 2.3 G/DL (ref 3.4–5)
ANION GAP SERPL CALCULATED.3IONS-SCNC: 10 MMOL/L (ref 3–16)
BASOPHILS ABSOLUTE: 0 K/UL (ref 0–0.2)
BASOPHILS RELATIVE PERCENT: 0 %
BLOOD BANK DISPENSE STATUS: NORMAL
BLOOD BANK PRODUCT CODE: NORMAL
BPU ID: NORMAL
BUN BLDV-MCNC: 17 MG/DL (ref 7–20)
CALCIUM SERPL-MCNC: 7.8 MG/DL (ref 8.3–10.6)
CHLORIDE BLD-SCNC: 106 MMOL/L (ref 99–110)
CO2: 24 MMOL/L (ref 21–32)
CREAT SERPL-MCNC: 0.7 MG/DL (ref 0.6–1.2)
DESCRIPTION BLOOD BANK: NORMAL
EOSINOPHILS ABSOLUTE: 0.1 K/UL (ref 0–0.6)
EOSINOPHILS RELATIVE PERCENT: 1 %
GFR AFRICAN AMERICAN: >60
GFR NON-AFRICAN AMERICAN: >60
GLUCOSE BLD-MCNC: 135 MG/DL (ref 70–99)
HCT VFR BLD CALC: 22.3 % (ref 36–48)
HCT VFR BLD CALC: 28 % (ref 36–48)
HEMOGLOBIN: 7.2 G/DL (ref 12–16)
HEMOGLOBIN: 9.3 G/DL (ref 12–16)
LYMPHOCYTES ABSOLUTE: 1.2 K/UL (ref 1–5.1)
LYMPHOCYTES RELATIVE PERCENT: 15 %
MCH RBC QN AUTO: 29.1 PG (ref 26–34)
MCHC RBC AUTO-ENTMCNC: 32.5 G/DL (ref 31–36)
MCV RBC AUTO: 89.6 FL (ref 80–100)
MONOCYTES ABSOLUTE: 0.3 K/UL (ref 0–1.3)
MONOCYTES RELATIVE PERCENT: 4 %
NEUTROPHILS ABSOLUTE: 6.5 K/UL (ref 1.7–7.7)
NEUTROPHILS RELATIVE PERCENT: 80 %
PDW BLD-RTO: 15.3 % (ref 12.4–15.4)
PHOSPHORUS: 1.8 MG/DL (ref 2.5–4.9)
PLATELET # BLD: 193 K/UL (ref 135–450)
PMV BLD AUTO: 7.4 FL (ref 5–10.5)
POTASSIUM SERPL-SCNC: 3.5 MMOL/L (ref 3.5–5.1)
RBC # BLD: 2.49 M/UL (ref 4–5.2)
SODIUM BLD-SCNC: 140 MMOL/L (ref 136–145)
WBC # BLD: 8.1 K/UL (ref 4–11)

## 2019-09-19 PROCEDURE — 2580000003 HC RX 258: Performed by: INTERNAL MEDICINE

## 2019-09-19 PROCEDURE — 6360000002 HC RX W HCPCS: Performed by: ORTHOPAEDIC SURGERY

## 2019-09-19 PROCEDURE — 1200000000 HC SEMI PRIVATE

## 2019-09-19 PROCEDURE — 85025 COMPLETE CBC W/AUTO DIFF WBC: CPT

## 2019-09-19 PROCEDURE — 2580000003 HC RX 258: Performed by: ORTHOPAEDIC SURGERY

## 2019-09-19 PROCEDURE — 85014 HEMATOCRIT: CPT

## 2019-09-19 PROCEDURE — 2500000003 HC RX 250 WO HCPCS: Performed by: ORTHOPAEDIC SURGERY

## 2019-09-19 PROCEDURE — 6370000000 HC RX 637 (ALT 250 FOR IP): Performed by: ORTHOPAEDIC SURGERY

## 2019-09-19 PROCEDURE — 97530 THERAPEUTIC ACTIVITIES: CPT

## 2019-09-19 PROCEDURE — 36430 TRANSFUSION BLD/BLD COMPNT: CPT

## 2019-09-19 PROCEDURE — 36415 COLL VENOUS BLD VENIPUNCTURE: CPT

## 2019-09-19 PROCEDURE — 97166 OT EVAL MOD COMPLEX 45 MIN: CPT

## 2019-09-19 PROCEDURE — 85018 HEMOGLOBIN: CPT

## 2019-09-19 PROCEDURE — 80069 RENAL FUNCTION PANEL: CPT

## 2019-09-19 PROCEDURE — 97162 PT EVAL MOD COMPLEX 30 MIN: CPT

## 2019-09-19 RX ORDER — HYDROCODONE BITARTRATE AND ACETAMINOPHEN 5; 325 MG/1; MG/1
1 TABLET ORAL EVERY 6 HOURS PRN
Qty: 28 TABLET | Refills: 0 | Status: SHIPPED | OUTPATIENT
Start: 2019-09-19 | End: 2019-09-26

## 2019-09-19 RX ORDER — 0.9 % SODIUM CHLORIDE 0.9 %
250 INTRAVENOUS SOLUTION INTRAVENOUS ONCE
Status: COMPLETED | OUTPATIENT
Start: 2019-09-19 | End: 2019-09-19

## 2019-09-19 RX ADMIN — HYDROCODONE BITARTRATE AND ACETAMINOPHEN 1 TABLET: 5; 325 TABLET ORAL at 14:35

## 2019-09-19 RX ADMIN — HYDROCODONE BITARTRATE AND ACETAMINOPHEN 1 TABLET: 5; 325 TABLET ORAL at 09:59

## 2019-09-19 RX ADMIN — Medication 10 ML: at 20:09

## 2019-09-19 RX ADMIN — POTASSIUM CHLORIDE, DEXTROSE MONOHYDRATE AND SODIUM CHLORIDE: 150; 5; 450 INJECTION, SOLUTION INTRAVENOUS at 07:16

## 2019-09-19 RX ADMIN — RIVAROXABAN 10 MG: 10 TABLET, FILM COATED ORAL at 12:07

## 2019-09-19 RX ADMIN — CEFTRIAXONE 1 G: 1 INJECTION, POWDER, FOR SOLUTION INTRAMUSCULAR; INTRAVENOUS at 16:24

## 2019-09-19 RX ADMIN — HYDROCODONE BITARTRATE AND ACETAMINOPHEN 1 TABLET: 5; 325 TABLET ORAL at 22:28

## 2019-09-19 RX ADMIN — SODIUM CHLORIDE 250 ML: 9 INJECTION, SOLUTION INTRAVENOUS at 18:07

## 2019-09-19 RX ADMIN — MORPHINE SULFATE 2 MG: 2 INJECTION, SOLUTION INTRAMUSCULAR; INTRAVENOUS at 09:31

## 2019-09-19 RX ADMIN — DOCUSATE SODIUM 100 MG: 100 CAPSULE, LIQUID FILLED ORAL at 09:59

## 2019-09-19 RX ADMIN — PANTOPRAZOLE SODIUM 40 MG: 40 TABLET, DELAYED RELEASE ORAL at 05:52

## 2019-09-19 ASSESSMENT — PAIN DESCRIPTION - PROGRESSION
CLINICAL_PROGRESSION: NOT CHANGED
CLINICAL_PROGRESSION: GRADUALLY IMPROVING
CLINICAL_PROGRESSION: NOT CHANGED
CLINICAL_PROGRESSION: GRADUALLY IMPROVING

## 2019-09-19 ASSESSMENT — PAIN DESCRIPTION - LOCATION
LOCATION: HIP

## 2019-09-19 ASSESSMENT — PAIN DESCRIPTION - FREQUENCY
FREQUENCY: CONTINUOUS

## 2019-09-19 ASSESSMENT — PAIN DESCRIPTION - PAIN TYPE
TYPE: SURGICAL PAIN
TYPE: ACUTE PAIN
TYPE: SURGICAL PAIN
TYPE: SURGICAL PAIN

## 2019-09-19 ASSESSMENT — PAIN SCALES - GENERAL
PAINLEVEL_OUTOF10: 10
PAINLEVEL_OUTOF10: 2
PAINLEVEL_OUTOF10: 4
PAINLEVEL_OUTOF10: 4
PAINLEVEL_OUTOF10: 2
PAINLEVEL_OUTOF10: 6
PAINLEVEL_OUTOF10: 0

## 2019-09-19 ASSESSMENT — PAIN - FUNCTIONAL ASSESSMENT
PAIN_FUNCTIONAL_ASSESSMENT: PREVENTS OR INTERFERES SOME ACTIVE ACTIVITIES AND ADLS

## 2019-09-19 ASSESSMENT — PAIN DESCRIPTION - ONSET
ONSET: ON-GOING

## 2019-09-19 ASSESSMENT — PAIN DESCRIPTION - ORIENTATION
ORIENTATION: LEFT

## 2019-09-19 ASSESSMENT — PAIN DESCRIPTION - DESCRIPTORS
DESCRIPTORS: ACHING
DESCRIPTORS: SHARP;SHOOTING
DESCRIPTORS: ACHING
DESCRIPTORS: ACHING

## 2019-09-19 NOTE — PROGRESS NOTES
Physical Therapy    Facility/Department: [facility] [address]  Initial Assessment/Tretament    NAME: Ko De Luna  : 1935  MRN: [id number]    Date of Service: 2019    Discharge Recommendations: Ko De Luna scored a 6/24 on the AM-PAC short mobility form. Current research shows that an AM-PAC score of 17 or less is typically not associated with a discharge to the patient's home setting. Based on the patients AM-PAC score and their current functional mobility deficits, it is recommended that the patient have 3-5 sessions per week of Physical Therapy at d/c to increase the patients independence. PT Equipment Recommendations  Equipment Needed: No    Assessment   Body structures, Functions, Activity limitations: Decreased functional mobility ; Decreased ROM; Decreased strength;Decreased balance; Increased Pain  Assessment: 81year old seen POD 1 from L hip nailing. Pt also has fx base of L 5th phalanx & is NWB to L hand. Required MAX A of 2 to get OOB today. Pt fearful, anxious & in pain. Was able to get IV pain meds prior to getting OOB. Was unable to stand with walker but did pivot with max A of 2 to chair. Son reports that his mom will likely not be able to return to her home unless significant modifications are done or they will look into an apt while she is recuperating from this. Anticipate need for further IP PT at [place] since pt currently requiring heavy A of 2.   Treatment Diagnosis: impaired mobility  Prognosis: Good  Decision Making: Medium Complexity  PT Education: PT Role;Goals;Plan of Care;Transfer Training;Functional Mobility Training;Weight-bearing Education  Patient Education: verbalized understanding  REQUIRES PT FOLLOW UP: Yes  Activity Tolerance  Activity Tolerance: Patient limited by pain  Activity Tolerance: anxious when trying to stand       Patient Diagnosis(es): The encounter diagnosis was Closed comminuted intertrochanteric fracture of left femur, initial encounter ([facility] has a past medical history of Anterior corneal dystrophy, Asymptomatic postmenopausal status (age-related) (natural), Cervical polyp, Dry eye syndrome, Edema, Fuch's endothelial dystrophy, Gout, IBS (irritable bowel syndrome), Osteoarthritis, Osteopenia, Papilloma of breast, Screening mammogram, and Vaginal candidiasis. has a past surgical history that includes Appendectomy; eye surgery; and hip pinning (Left, 9/18/2019). Restrictions  Position Activity Restriction  Other position/activity restrictions: Full weight bearing on operative leg ; NWB left hand. Platform walker OK        Subjective  General  Chart Reviewed: Yes  Additional Pertinent Hx: Adm 9/17 s/p fall 3 days ago.  x-ray of her hip shows an intertrochanteric, comminuted, displaced left fracture. Left small finger: minimally displaced fracture of the base of the 5th proximal phalanx, extra-articular (SPLINT APPLIED). s/p LEFT HIP GAMMA NAIL   Family / Caregiver Present: Yes(son )  Referring Practitioner: Aries Ballesteros MD  Diagnosis: L hip fracture  Follows Commands: Within Functional Limits  Subjective  Subjective: Found in bed, agreeable to PT. \"Do I have to do this? \"  \"Just say I got up. \"  Pain Screening  Patient Currently in Pain: Denies          Orientation  Orientation  Overall Orientation Status: Within Normal Limits  Social/Functional History  Social/Functional History  Lives With: Alone  Type of Home: House  Home Layout: 1/2 bath on main level, Multi-level(4 story home- laundry in basement, kitchen on first floor, bedroom on second floor, goes upstairs to 3rd floor on occasion)  Home Access: Level entry  Bathroom Shower/Tub: Tub/Shower unit  Bathroom Toilet: Standard  Home Equipment: Rolling walker, Cane  ADL Assistance: Independent  Homemaking Assistance: Independent(increasing difficulty cleaning the house)  Ambulation Assistance: Independent(uses RW (keeps a walker on each floor of home))  Transfer Assistance:

## 2019-09-19 NOTE — PROGRESS NOTES
anxious and fearful of falling)                 LUE AROM (degrees)  LUE AROM : WFL  LUE General AROM: WFL AROM digits 1-3 with minimal edema noted. splint in place to digits 4-5  RUE AROM (degrees)  RUE AROM : WFL  LUE Strength  Gross LUE Strength: WFL(not formally tested, but noted functional strength during AROM)  RUE Strength  Gross RUE Strength: WFL          Treatment consisted of:  ADLs, transfer training, education on activity promotion and fall precautions.              Plan   Plan  Times per week: 5-7x  Times per day: Daily  Current Treatment Recommendations: Strengthening, Patient/Caregiver Education & Training, Self-Care / ADL, Functional Mobility Training, Safety Education & Training, Balance Training    AM-PAC Score        AM-PAC Inpatient Daily Activity Raw Score: 15 (09/19/19 1222)  AM-PAC Inpatient ADL T-Scale Score : 34.69 (09/19/19 1222)  ADL Inpatient CMS 0-100% Score: 56.46 (09/19/19 1222)  ADL Inpatient CMS G-Code Modifier : CK (09/19/19 1222)    Goals  Short term goals  Time Frame for Short term goals: by discharge  Short term goal 1: complete bed mobility with mod A   Short term goal 2: independent sitting balance eob x10 min during ADLs/therex  Short term goal 3: transfer to bsc/chair using LRAD and mod A   Short term goal 4: tolerate standing 2 min in prep for standing ADLs/mobility  Patient Goals   Patient goals : less pain, walk again       Therapy Time   Individual Concurrent Group Co-treatment   Time In 0900         Time Out 0956         Minutes 56         Timed Code Treatment Minutes:   41  Total Treatment Minutes:  64     If patient is d/c prior to next treatment session, this note will serve as the discharge summary    Jose G Woodard OTR/L, [address], OT

## 2019-09-19 NOTE — PROGRESS NOTES
[facility] Progress Note        Subjective:  Pt is resting in bed with minimal complaints of discomfort. Blood pressure (!) 98/58, pulse 84, temperature 97.9 °F (36.6 °C), temperature source Oral, resp. rate 16, height 5' (1.524 m), weight 121 lb (54.9 kg), SpO2 94 %, not currently breastfeeding. PHYSICAL EXAM:   Left lower extremity  Dressings clean and dry  +EHL/FHL/ADF  Sensation intact to light touch distally  Skin warm and well perfused    LUE  Splint c/d/i      HgB:    Lab Results   Component Value Date    HGB 7.2 09/19/2019       ASSESSMENT AND PLAN:    81 y.o. female status post left cephalomedullary nail for intertrochanteric femur fracture. Closed mgmt of left small finger fx. Acute on chronic anemia - imminent blood transfusion. Acute blood loss anemia from fracture and surgery. 1:  Weight bearing as tolerated LLE. NWB through hand of LUE. OK for platform walker. 2:  Deep venous thrombosis prophylaxis: xarelto x 6 weeks.    3:  Continue mobilization, physical therapy  4:  D/C Plan:  Likely SNF      Guillermo Frances MD

## 2019-09-19 NOTE — CARE COORDINATION
/     Pre-cert required for SNF: No    Pharmacy    [facility] [address], [place]  Phone: [phone] Fax: [phone]      Potential assistance Purchasing Medications: Potential Assistance Purchasing Medications: No  Does Patient want to participate in local refill/ meds to beds program?: Not Assessed    Meds To Beds General Rules:  1. Can ONLY be done Monday- Friday between 8:30am-5pm  2. Prescription(s) must be in pharmacy by 3pm to be filled same day  3. Copy of patient's insurance/ prescription drug card and patient face sheet must be sent along with the prescription(s)  4. Cost of Rx cannot be added to hospital bill. If financial assistance is needed, please contact unit  or ;  or  CANNOT provide pharmacy voucher for patients co-pays  5. Patients can then  the prescription on their way out of the hospital at discharge, or pharmacy can deliver to the bedside if staff is available. (payment due at time of pick-up or delivery - cash, check, or card accepted)     Able to afford home medications/ co-pay costs: Yes    ADLS  Support Systems: Children    PT AM-PAC: 6 /24  OT AM-PAC: 15 /24    [place]: home  Steps: level entry    Plans to RETURN to current housing: No  Barriers to RETURNING to current housing: will need rehab      DISCHARGE PLAN:  Disposition: [address] for discharge: EMS transportation     Factors facilitating achievement of predicted outcomes: Family support, Cooperative, Pleasant and Sense of humor    Barriers to discharge: getting blood today    Additional Case Management Notes: MICHAEL    Radha Noe and her family were provided with choice of provider; she and her family are in agreement with the discharge plan.     Care Transition patient: No    Ashwin Reyes, LEIGHANN  [facility]  Case Management

## 2019-09-20 LAB
ALBUMIN SERPL-MCNC: 2.2 G/DL (ref 3.4–5)
ANION GAP SERPL CALCULATED.3IONS-SCNC: 10 MMOL/L (ref 3–16)
BASOPHILS ABSOLUTE: 0 K/UL (ref 0–0.2)
BASOPHILS RELATIVE PERCENT: 0 %
BUN BLDV-MCNC: 18 MG/DL (ref 7–20)
CALCIUM SERPL-MCNC: 8 MG/DL (ref 8.3–10.6)
CHLORIDE BLD-SCNC: 106 MMOL/L (ref 99–110)
CO2: 23 MMOL/L (ref 21–32)
CREAT SERPL-MCNC: 0.7 MG/DL (ref 0.6–1.2)
EOSINOPHILS ABSOLUTE: 0.4 K/UL (ref 0–0.6)
EOSINOPHILS RELATIVE PERCENT: 4 %
GFR AFRICAN AMERICAN: >60
GFR NON-AFRICAN AMERICAN: >60
GLUCOSE BLD-MCNC: 110 MG/DL (ref 70–99)
HCT VFR BLD CALC: 27.4 % (ref 36–48)
HEMOGLOBIN: 9.2 G/DL (ref 12–16)
LYMPHOCYTES ABSOLUTE: 0.9 K/UL (ref 1–5.1)
LYMPHOCYTES RELATIVE PERCENT: 9 %
MCH RBC QN AUTO: 29.8 PG (ref 26–34)
MCHC RBC AUTO-ENTMCNC: 33.4 G/DL (ref 31–36)
MCV RBC AUTO: 89.2 FL (ref 80–100)
MONOCYTES ABSOLUTE: 1 K/UL (ref 0–1.3)
MONOCYTES RELATIVE PERCENT: 10 %
MYELOCYTE PERCENT: 5 %
NEUTROPHILS ABSOLUTE: 7.9 K/UL (ref 1.7–7.7)
NEUTROPHILS RELATIVE PERCENT: 72 %
PDW BLD-RTO: 14.8 % (ref 12.4–15.4)
PHOSPHORUS: 2.2 MG/DL (ref 2.5–4.9)
PLATELET # BLD: 210 K/UL (ref 135–450)
PMV BLD AUTO: 7.2 FL (ref 5–10.5)
POTASSIUM SERPL-SCNC: 3.5 MMOL/L (ref 3.5–5.1)
RBC # BLD: 3.08 M/UL (ref 4–5.2)
SODIUM BLD-SCNC: 139 MMOL/L (ref 136–145)
WBC # BLD: 10.2 K/UL (ref 4–11)

## 2019-09-20 PROCEDURE — 1200000000 HC SEMI PRIVATE

## 2019-09-20 PROCEDURE — 2580000003 HC RX 258: Performed by: INTERNAL MEDICINE

## 2019-09-20 PROCEDURE — 6370000000 HC RX 637 (ALT 250 FOR IP): Performed by: INTERNAL MEDICINE

## 2019-09-20 PROCEDURE — 6360000002 HC RX W HCPCS: Performed by: ORTHOPAEDIC SURGERY

## 2019-09-20 PROCEDURE — 97530 THERAPEUTIC ACTIVITIES: CPT

## 2019-09-20 PROCEDURE — 2580000003 HC RX 258: Performed by: ORTHOPAEDIC SURGERY

## 2019-09-20 PROCEDURE — 2500000003 HC RX 250 WO HCPCS: Performed by: INTERNAL MEDICINE

## 2019-09-20 PROCEDURE — 36415 COLL VENOUS BLD VENIPUNCTURE: CPT

## 2019-09-20 PROCEDURE — 6370000000 HC RX 637 (ALT 250 FOR IP): Performed by: ORTHOPAEDIC SURGERY

## 2019-09-20 PROCEDURE — 85025 COMPLETE CBC W/AUTO DIFF WBC: CPT

## 2019-09-20 PROCEDURE — 51701 INSERT BLADDER CATHETER: CPT

## 2019-09-20 PROCEDURE — 80069 RENAL FUNCTION PANEL: CPT

## 2019-09-20 PROCEDURE — 51798 US URINE CAPACITY MEASURE: CPT

## 2019-09-20 RX ORDER — SENNA PLUS 8.6 MG/1
2 TABLET ORAL NIGHTLY
Status: DISCONTINUED | OUTPATIENT
Start: 2019-09-20 | End: 2019-09-21

## 2019-09-20 RX ORDER — BETHANECHOL CHLORIDE 10 MG/1
10 TABLET ORAL 3 TIMES DAILY
Status: DISCONTINUED | OUTPATIENT
Start: 2019-09-20 | End: 2019-09-21 | Stop reason: HOSPADM

## 2019-09-20 RX ADMIN — PANTOPRAZOLE SODIUM 40 MG: 40 TABLET, DELAYED RELEASE ORAL at 05:47

## 2019-09-20 RX ADMIN — Medication 10 ML: at 10:36

## 2019-09-20 RX ADMIN — POTASSIUM PHOSPHATE, MONOBASIC AND POTASSIUM PHOSPHATE, DIBASIC 20 MMOL: 224; 236 INJECTION, SOLUTION, CONCENTRATE INTRAVENOUS at 10:35

## 2019-09-20 RX ADMIN — HYDROCODONE BITARTRATE AND ACETAMINOPHEN 1 TABLET: 5; 325 TABLET ORAL at 12:06

## 2019-09-20 RX ADMIN — RIVAROXABAN 10 MG: 10 TABLET, FILM COATED ORAL at 17:49

## 2019-09-20 RX ADMIN — BETHANECHOL CHLORIDE 10 MG: 10 TABLET ORAL at 15:03

## 2019-09-20 RX ADMIN — CEFTRIAXONE 1 G: 1 INJECTION, POWDER, FOR SOLUTION INTRAMUSCULAR; INTRAVENOUS at 16:48

## 2019-09-20 RX ADMIN — BETHANECHOL CHLORIDE 10 MG: 10 TABLET ORAL at 22:53

## 2019-09-20 RX ADMIN — Medication 10 ML: at 09:00

## 2019-09-20 RX ADMIN — Medication 10 ML: at 22:53

## 2019-09-20 ASSESSMENT — PAIN SCALES - GENERAL
PAINLEVEL_OUTOF10: 2
PAINLEVEL_OUTOF10: 0
PAINLEVEL_OUTOF10: 4

## 2019-09-20 ASSESSMENT — PAIN DESCRIPTION - DESCRIPTORS: DESCRIPTORS: ACHING

## 2019-09-20 ASSESSMENT — PAIN DESCRIPTION - PAIN TYPE
TYPE: SURGICAL PAIN
TYPE: SURGICAL PAIN

## 2019-09-20 ASSESSMENT — PAIN DESCRIPTION - ORIENTATION
ORIENTATION: LEFT
ORIENTATION: LEFT

## 2019-09-20 ASSESSMENT — PAIN - FUNCTIONAL ASSESSMENT: PAIN_FUNCTIONAL_ASSESSMENT: PREVENTS OR INTERFERES SOME ACTIVE ACTIVITIES AND ADLS

## 2019-09-20 ASSESSMENT — PAIN DESCRIPTION - LOCATION
LOCATION: HIP
LOCATION: HIP

## 2019-09-20 ASSESSMENT — PAIN DESCRIPTION - FREQUENCY: FREQUENCY: CONTINUOUS

## 2019-09-20 ASSESSMENT — PAIN DESCRIPTION - ONSET: ONSET: ON-GOING

## 2019-09-20 ASSESSMENT — PAIN DESCRIPTION - PROGRESSION: CLINICAL_PROGRESSION: NOT CHANGED

## 2019-09-20 NOTE — PROGRESS NOTES
Patient bladder scanned, 383 ml. Burk catheter inserted,  per order. Patient tolerated well. Will continue to monitor.

## 2019-09-20 NOTE — PROGRESS NOTES
Physical Therapy  Facility/Department: [facility] 5T ORTHO/NEURO  Daily Treatment Note  NAME: Joby Trevizo  : 1935  MRN: [id number]    Date of Service: 2019    Discharge Recommendations: Joby Trevizo scored a 8/24 on the AM-PAC short mobility form. Current research shows that an AM-PAC score of 17 or less is typically not associated with a discharge to the patient's home setting. Based on the patients AM-PAC score and their current functional mobility deficits, it is recommended that the patient have 3-5 sessions per week of Physical Therapy at d/c to increase the patients independence. PT Equipment Recommendations  Equipment Needed: No    Assessment   Body structures, Functions, Activity limitations: Decreased functional mobility ; Decreased ROM; Decreased strength;Decreased balance; Increased Pain  Assessment: 81year old seen POD 2 from L hip nailing. Also is NWB on L hand due to fx base of L 5th phalanx. Taylor mckinney utilized for [employer]- chair. Pt highly anxious when trying to stand. Pt prefers to lie in bed all day & needs encouragement to get up. Was living at home alone PTA & is currently well below her baseline. [facility] Treatment Diagnosis: impaired mobility  Prognosis: Good  PT Education: PT Role;Goals;Plan of Care;Transfer Training;Functional Mobility Training;Weight-bearing Education  Patient Education: verbalized understanding  REQUIRES PT FOLLOW UP: Yes  Activity Tolerance  Activity Tolerance: Patient limited by pain  Activity Tolerance: anxious when trying to stand     Patient Diagnosis(es): The encounter diagnosis was Closed comminuted intertrochanteric fracture of left femur, initial encounter ([facility] [address]      has a past medical history of Anterior corneal dystrophy, Asymptomatic postmenopausal status (age-related) (natural), Cervical polyp, Dry eye syndrome, Edema, Fuch's endothelial dystrophy, Gout, IBS (irritable bowel syndrome), Osteoarthritis, Osteopenia,

## 2019-09-20 NOTE — PROGRESS NOTES
Patient bladder scanned. 300 ml of urine found in bladder. Patient straight cathed. 425 ml urine drained from bladder. Will continue to monitor and reassess.

## 2019-09-21 VITALS
RESPIRATION RATE: 16 BRPM | TEMPERATURE: 98.5 F | WEIGHT: 121 LBS | BODY MASS INDEX: 23.75 KG/M2 | DIASTOLIC BLOOD PRESSURE: 67 MMHG | SYSTOLIC BLOOD PRESSURE: 121 MMHG | OXYGEN SATURATION: 93 % | HEART RATE: 86 BPM | HEIGHT: 60 IN

## 2019-09-21 PROBLEM — N30.00 ACUTE CYSTITIS: Status: ACTIVE | Noted: 2019-09-21

## 2019-09-21 PROBLEM — D64.9 ANEMIA: Status: ACTIVE | Noted: 2019-09-21

## 2019-09-21 PROBLEM — R33.9 URINARY RETENTION: Status: ACTIVE | Noted: 2019-09-21

## 2019-09-21 LAB
ALBUMIN SERPL-MCNC: 2.3 G/DL (ref 3.4–5)
ANION GAP SERPL CALCULATED.3IONS-SCNC: 9 MMOL/L (ref 3–16)
ANISOCYTOSIS: ABNORMAL
BASOPHILS ABSOLUTE: 0 K/UL (ref 0–0.2)
BASOPHILS RELATIVE PERCENT: 0 %
BUN BLDV-MCNC: 15 MG/DL (ref 7–20)
CALCIUM SERPL-MCNC: 8 MG/DL (ref 8.3–10.6)
CHLORIDE BLD-SCNC: 105 MMOL/L (ref 99–110)
CO2: 27 MMOL/L (ref 21–32)
CREAT SERPL-MCNC: 0.6 MG/DL (ref 0.6–1.2)
EOSINOPHILS ABSOLUTE: 0.4 K/UL (ref 0–0.6)
EOSINOPHILS RELATIVE PERCENT: 4 %
GFR AFRICAN AMERICAN: >60
GFR NON-AFRICAN AMERICAN: >60
GLUCOSE BLD-MCNC: 109 MG/DL (ref 70–99)
HCT VFR BLD CALC: 26 % (ref 36–48)
HEMOGLOBIN: 8.8 G/DL (ref 12–16)
LYMPHOCYTES ABSOLUTE: 1.1 K/UL (ref 1–5.1)
LYMPHOCYTES RELATIVE PERCENT: 10 %
MCH RBC QN AUTO: 29.5 PG (ref 26–34)
MCHC RBC AUTO-ENTMCNC: 33.6 G/DL (ref 31–36)
MCV RBC AUTO: 87.8 FL (ref 80–100)
METAMYELOCYTES RELATIVE PERCENT: 1 %
MONOCYTES ABSOLUTE: 0.3 K/UL (ref 0–1.3)
MONOCYTES RELATIVE PERCENT: 3 %
NEUTROPHILS ABSOLUTE: 8.8 K/UL (ref 1.7–7.7)
NEUTROPHILS RELATIVE PERCENT: 82 %
PDW BLD-RTO: 15.1 % (ref 12.4–15.4)
PHOSPHORUS: 2.8 MG/DL (ref 2.5–4.9)
PLATELET # BLD: 249 K/UL (ref 135–450)
PMV BLD AUTO: 7.2 FL (ref 5–10.5)
POTASSIUM SERPL-SCNC: 3.8 MMOL/L (ref 3.5–5.1)
RBC # BLD: 2.96 M/UL (ref 4–5.2)
SODIUM BLD-SCNC: 141 MMOL/L (ref 136–145)
WBC # BLD: 10.6 K/UL (ref 4–11)

## 2019-09-21 PROCEDURE — 85025 COMPLETE CBC W/AUTO DIFF WBC: CPT

## 2019-09-21 PROCEDURE — 6370000000 HC RX 637 (ALT 250 FOR IP): Performed by: INTERNAL MEDICINE

## 2019-09-21 PROCEDURE — 2580000003 HC RX 258: Performed by: ORTHOPAEDIC SURGERY

## 2019-09-21 PROCEDURE — 36415 COLL VENOUS BLD VENIPUNCTURE: CPT

## 2019-09-21 PROCEDURE — 6370000000 HC RX 637 (ALT 250 FOR IP): Performed by: ORTHOPAEDIC SURGERY

## 2019-09-21 PROCEDURE — 80069 RENAL FUNCTION PANEL: CPT

## 2019-09-21 RX ORDER — CEFUROXIME AXETIL 250 MG/1
250 TABLET ORAL 2 TIMES DAILY
Qty: 6 TABLET | Refills: 0 | Status: SHIPPED | OUTPATIENT
Start: 2019-09-21 | End: 2019-09-24

## 2019-09-21 RX ORDER — PSEUDOEPHEDRINE HCL 30 MG
100 TABLET ORAL 2 TIMES DAILY
Qty: 60 CAPSULE | Refills: 1 | Status: ON HOLD | OUTPATIENT
Start: 2019-09-21 | End: 2020-01-21

## 2019-09-21 RX ORDER — BETHANECHOL CHLORIDE 10 MG/1
10 TABLET ORAL 3 TIMES DAILY
Qty: 90 TABLET | Refills: 0 | Status: SHIPPED | OUTPATIENT
Start: 2019-09-21 | End: 2019-12-16

## 2019-09-21 RX ORDER — BETHANECHOL CHLORIDE 10 MG/1
10 TABLET ORAL 3 TIMES DAILY
Qty: 90 TABLET | Refills: 3 | Status: SHIPPED | OUTPATIENT
Start: 2019-09-21 | End: 2019-09-21

## 2019-09-21 RX ADMIN — HYDROCODONE BITARTRATE AND ACETAMINOPHEN 1 TABLET: 5; 325 TABLET ORAL at 04:53

## 2019-09-21 RX ADMIN — PANTOPRAZOLE SODIUM 40 MG: 40 TABLET, DELAYED RELEASE ORAL at 04:30

## 2019-09-21 RX ADMIN — Medication 10 ML: at 08:28

## 2019-09-21 RX ADMIN — HYDROCODONE BITARTRATE AND ACETAMINOPHEN 2 TABLET: 5; 325 TABLET ORAL at 13:28

## 2019-09-21 RX ADMIN — BETHANECHOL CHLORIDE 10 MG: 10 TABLET ORAL at 08:27

## 2019-09-21 RX ADMIN — BETHANECHOL CHLORIDE 10 MG: 10 TABLET ORAL at 13:28

## 2019-09-21 ASSESSMENT — PAIN DESCRIPTION - DESCRIPTORS
DESCRIPTORS: ACHING
DESCRIPTORS: ACHING

## 2019-09-21 ASSESSMENT — PAIN DESCRIPTION - FREQUENCY: FREQUENCY: CONTINUOUS

## 2019-09-21 ASSESSMENT — PAIN DESCRIPTION - PAIN TYPE
TYPE: SURGICAL PAIN
TYPE: SURGICAL PAIN

## 2019-09-21 ASSESSMENT — PAIN SCALES - GENERAL
PAINLEVEL_OUTOF10: 0
PAINLEVEL_OUTOF10: 0
PAINLEVEL_OUTOF10: 6
PAINLEVEL_OUTOF10: 0
PAINLEVEL_OUTOF10: 7

## 2019-09-21 ASSESSMENT — PAIN DESCRIPTION - ORIENTATION
ORIENTATION: LEFT
ORIENTATION: LEFT

## 2019-09-21 ASSESSMENT — PAIN DESCRIPTION - LOCATION
LOCATION: HIP
LOCATION: HIP

## 2019-09-21 ASSESSMENT — PAIN DESCRIPTION - ONSET: ONSET: ON-GOING

## 2019-09-21 ASSESSMENT — PAIN DESCRIPTION - PROGRESSION: CLINICAL_PROGRESSION: NOT CHANGED

## 2019-10-03 ENCOUNTER — TELEPHONE (OUTPATIENT)
Dept: FAMILY MEDICINE CLINIC | Age: 84
End: 2019-10-03

## 2019-10-03 PROBLEM — I97.89 ACUTE DEEP VEIN THROMBOSIS (DVT) OF LEFT LOWER EXTREMITY AFTER PROCEDURE (HCC): Status: ACTIVE | Noted: 2019-10-03

## 2019-10-03 PROBLEM — I82.402 ACUTE DEEP VEIN THROMBOSIS (DVT) OF LEFT LOWER EXTREMITY AFTER PROCEDURE (HCC): Status: ACTIVE | Noted: 2019-10-03

## 2019-11-15 DIAGNOSIS — N95.1 SYMPTOMATIC MENOPAUSAL OR FEMALE CLIMACTERIC STATES: ICD-10-CM

## 2019-11-15 RX ORDER — MEDROXYPROGESTERONE ACETATE 2.5 MG/1
TABLET ORAL
Qty: 90 TABLET | Refills: 1 | Status: SHIPPED | OUTPATIENT
Start: 2019-11-15 | End: 2019-12-16 | Stop reason: ALTCHOICE

## 2019-11-18 ENCOUNTER — TELEPHONE (OUTPATIENT)
Dept: FAMILY MEDICINE CLINIC | Age: 84
End: 2019-11-18

## 2019-11-19 ENCOUNTER — TELEPHONE (OUTPATIENT)
Dept: FAMILY MEDICINE CLINIC | Age: 84
End: 2019-11-19

## 2019-11-22 ENCOUNTER — TELEPHONE (OUTPATIENT)
Dept: FAMILY MEDICINE CLINIC | Age: 84
End: 2019-11-22

## 2019-11-26 ENCOUNTER — TELEPHONE (OUTPATIENT)
Dept: FAMILY MEDICINE CLINIC | Age: 84
End: 2019-11-26

## 2019-12-04 PROBLEM — N30.00 ACUTE CYSTITIS: Status: RESOLVED | Noted: 2019-09-21 | Resolved: 2019-12-04

## 2019-12-04 PROBLEM — R33.9 URINARY RETENTION: Status: RESOLVED | Noted: 2019-09-21 | Resolved: 2019-12-04

## 2019-12-04 PROBLEM — K40.90 INGUINAL HERNIA, RIGHT: Status: ACTIVE | Noted: 2019-12-04

## 2019-12-15 PROBLEM — R60.0 PEDAL EDEMA: Status: RESOLVED | Noted: 2017-05-08 | Resolved: 2019-12-15

## 2019-12-16 ENCOUNTER — OFFICE VISIT (OUTPATIENT)
Dept: FAMILY MEDICINE CLINIC | Age: 84
End: 2019-12-16
Payer: MEDICARE

## 2019-12-16 VITALS
BODY MASS INDEX: 22.69 KG/M2 | OXYGEN SATURATION: 99 % | DIASTOLIC BLOOD PRESSURE: 82 MMHG | SYSTOLIC BLOOD PRESSURE: 138 MMHG | WEIGHT: 116.2 LBS | HEART RATE: 96 BPM | TEMPERATURE: 98 F

## 2019-12-16 DIAGNOSIS — R73.02 GLUCOSE INTOLERANCE (IMPAIRED GLUCOSE TOLERANCE): ICD-10-CM

## 2019-12-16 DIAGNOSIS — I82.402 ACUTE DEEP VEIN THROMBOSIS (DVT) OF LEFT LOWER EXTREMITY AFTER PROCEDURE (HCC): ICD-10-CM

## 2019-12-16 DIAGNOSIS — S72.002A HIP FRACTURE REQUIRING OPERATIVE REPAIR, LEFT, CLOSED, INITIAL ENCOUNTER (HCC): ICD-10-CM

## 2019-12-16 DIAGNOSIS — D64.9 POSTOPERATIVE ANEMIA: ICD-10-CM

## 2019-12-16 DIAGNOSIS — L89.159 PRESSURE INJURY OF SKIN OF SACRAL REGION, UNSPECIFIED INJURY STAGE: ICD-10-CM

## 2019-12-16 DIAGNOSIS — Z23 NEED FOR INFLUENZA VACCINATION: Primary | ICD-10-CM

## 2019-12-16 DIAGNOSIS — I97.89 ACUTE DEEP VEIN THROMBOSIS (DVT) OF LEFT LOWER EXTREMITY AFTER PROCEDURE (HCC): ICD-10-CM

## 2019-12-16 DIAGNOSIS — Z91.81 AT HIGH RISK FOR FALLS: ICD-10-CM

## 2019-12-16 DIAGNOSIS — I10 ESSENTIAL HYPERTENSION, BENIGN: ICD-10-CM

## 2019-12-16 PROCEDURE — G0008 ADMIN INFLUENZA VIRUS VAC: HCPCS | Performed by: FAMILY MEDICINE

## 2019-12-16 PROCEDURE — G8427 DOCREV CUR MEDS BY ELIG CLIN: HCPCS | Performed by: FAMILY MEDICINE

## 2019-12-16 PROCEDURE — G8399 PT W/DXA RESULTS DOCUMENT: HCPCS | Performed by: FAMILY MEDICINE

## 2019-12-16 PROCEDURE — G8484 FLU IMMUNIZE NO ADMIN: HCPCS | Performed by: FAMILY MEDICINE

## 2019-12-16 PROCEDURE — 1123F ACP DISCUSS/DSCN MKR DOCD: CPT | Performed by: FAMILY MEDICINE

## 2019-12-16 PROCEDURE — 99214 OFFICE O/P EST MOD 30 MIN: CPT | Performed by: FAMILY MEDICINE

## 2019-12-16 PROCEDURE — 1036F TOBACCO NON-USER: CPT | Performed by: FAMILY MEDICINE

## 2019-12-16 PROCEDURE — 4040F PNEUMOC VAC/ADMIN/RCVD: CPT | Performed by: FAMILY MEDICINE

## 2019-12-16 PROCEDURE — 1090F PRES/ABSN URINE INCON ASSESS: CPT | Performed by: FAMILY MEDICINE

## 2019-12-16 PROCEDURE — 90662 IIV NO PRSV INCREASED AG IM: CPT | Performed by: FAMILY MEDICINE

## 2019-12-16 PROCEDURE — G8420 CALC BMI NORM PARAMETERS: HCPCS | Performed by: FAMILY MEDICINE

## 2019-12-16 RX ORDER — ALENDRONATE SODIUM 70 MG/1
70 TABLET ORAL
Qty: 12 TABLET | Refills: 3 | Status: SHIPPED | OUTPATIENT
Start: 2019-12-16

## 2019-12-16 RX ORDER — PSEUDOEPHEDRINE HCL 30 MG
100 TABLET ORAL 2 TIMES DAILY
Qty: 60 CAPSULE | Refills: 1 | Status: CANCELLED | OUTPATIENT
Start: 2019-12-16

## 2019-12-16 RX ORDER — CELECOXIB 200 MG/1
CAPSULE ORAL
Qty: 90 CAPSULE | Refills: 1 | Status: SHIPPED | OUTPATIENT
Start: 2019-12-16 | End: 2020-01-16 | Stop reason: ALTCHOICE

## 2019-12-19 ENCOUNTER — TELEPHONE (OUTPATIENT)
Dept: FAMILY MEDICINE CLINIC | Age: 84
End: 2019-12-19
Payer: MEDICARE

## 2019-12-19 ENCOUNTER — TELEPHONE (OUTPATIENT)
Dept: FAMILY MEDICINE CLINIC | Age: 84
End: 2019-12-19

## 2019-12-19 DIAGNOSIS — I10 ESSENTIAL HYPERTENSION, MALIGNANT: ICD-10-CM

## 2019-12-19 DIAGNOSIS — Z87.440 HISTORY OF URINARY TRACT INFECTION: ICD-10-CM

## 2019-12-19 DIAGNOSIS — Z79.01 LONG TERM (CURRENT) USE OF ANTICOAGULANTS: ICD-10-CM

## 2019-12-19 DIAGNOSIS — Z96.642 PRESENCE OF LEFT ARTIFICIAL HIP JOINT: ICD-10-CM

## 2019-12-19 DIAGNOSIS — Z47.1 AFTERCARE FOLLOWING JOINT REPLACEMENT SURGERY, UNSPECIFIED JOINT: ICD-10-CM

## 2019-12-19 DIAGNOSIS — L89.154 STAGE IV PRESSURE ULCER OF SACRAL REGION (HCC): Primary | ICD-10-CM

## 2019-12-19 PROCEDURE — G0180 MD CERTIFICATION HHA PATIENT: HCPCS | Performed by: FAMILY MEDICINE

## 2020-01-16 ENCOUNTER — TELEPHONE (OUTPATIENT)
Dept: FAMILY MEDICINE CLINIC | Age: 85
End: 2020-01-16

## 2020-01-16 DIAGNOSIS — K58.0 IRRITABLE BOWEL SYNDROME WITH DIARRHEA: Primary | ICD-10-CM

## 2020-01-16 RX ORDER — DIPHENOXYLATE HYDROCHLORIDE AND ATROPINE SULFATE 2.5; .025 MG/1; MG/1
1 TABLET ORAL 4 TIMES DAILY PRN
Qty: 90 TABLET | Refills: 0 | Status: ON HOLD | OUTPATIENT
Start: 2020-01-16 | End: 2020-01-22 | Stop reason: HOSPADM

## 2020-01-17 ENCOUNTER — HOSPITAL ENCOUNTER (INPATIENT)
Age: 85
LOS: 5 days | Discharge: NURSING, REHAB OR LONG-TERM CARE | DRG: 757 | End: 2020-01-22
Attending: EMERGENCY MEDICINE | Admitting: INTERNAL MEDICINE
Payer: MEDICARE

## 2020-01-17 PROBLEM — N17.9 ACUTE RENAL FAILURE (ARF) (HCC): Status: ACTIVE | Noted: 2020-01-17

## 2020-01-17 LAB
ANION GAP SERPL CALCULATED.3IONS-SCNC: 17 MMOL/L (ref 3–16)
BACTERIA: ABNORMAL /HPF
BASOPHILS ABSOLUTE: 0.1 K/UL (ref 0–0.2)
BASOPHILS RELATIVE PERCENT: 0.9 %
BILIRUBIN URINE: NEGATIVE
BLOOD, URINE: ABNORMAL
BUN BLDV-MCNC: 35 MG/DL (ref 7–20)
CALCIUM SERPL-MCNC: 9.1 MG/DL (ref 8.3–10.6)
CHLORIDE BLD-SCNC: 103 MMOL/L (ref 99–110)
CLARITY: CLEAR
CO2: 19 MMOL/L (ref 21–32)
COLOR: YELLOW
CREAT SERPL-MCNC: 1.4 MG/DL (ref 0.6–1.2)
EOSINOPHILS ABSOLUTE: 0.1 K/UL (ref 0–0.6)
EOSINOPHILS RELATIVE PERCENT: 1.2 %
GFR AFRICAN AMERICAN: 43
GFR NON-AFRICAN AMERICAN: 36
GLUCOSE BLD-MCNC: 127 MG/DL (ref 70–99)
GLUCOSE BLD-MCNC: 131 MG/DL (ref 70–99)
GLUCOSE URINE: NEGATIVE MG/DL
HCT VFR BLD CALC: 35.9 % (ref 36–48)
HEMOGLOBIN: 11.5 G/DL (ref 12–16)
KETONES, URINE: ABNORMAL MG/DL
LACTIC ACID: 1.4 MMOL/L (ref 0.4–2)
LEUKOCYTE ESTERASE, URINE: ABNORMAL
LYMPHOCYTES ABSOLUTE: 0.5 K/UL (ref 1–5.1)
LYMPHOCYTES RELATIVE PERCENT: 7.6 %
MCH RBC QN AUTO: 29.2 PG (ref 26–34)
MCHC RBC AUTO-ENTMCNC: 31.9 G/DL (ref 31–36)
MCV RBC AUTO: 91.4 FL (ref 80–100)
MICROSCOPIC EXAMINATION: YES
MONOCYTES ABSOLUTE: 0.3 K/UL (ref 0–1.3)
MONOCYTES RELATIVE PERCENT: 5.2 %
NEUTROPHILS ABSOLUTE: 5.3 K/UL (ref 1.7–7.7)
NEUTROPHILS RELATIVE PERCENT: 85.1 %
NITRITE, URINE: NEGATIVE
PDW BLD-RTO: 14.7 % (ref 12.4–15.4)
PERFORMED ON: ABNORMAL
PH UA: 6 (ref 5–8)
PLATELET # BLD: 400 K/UL (ref 135–450)
PMV BLD AUTO: 6.4 FL (ref 5–10.5)
POTASSIUM REFLEX MAGNESIUM: 3.8 MMOL/L (ref 3.5–5.1)
PROTEIN UA: 100 MG/DL
RBC # BLD: 3.93 M/UL (ref 4–5.2)
RBC UA: ABNORMAL /HPF (ref 0–2)
SODIUM BLD-SCNC: 139 MMOL/L (ref 136–145)
SPECIFIC GRAVITY UA: 1.02 (ref 1–1.03)
URINE REFLEX TO CULTURE: YES
URINE TYPE: ABNORMAL
UROBILINOGEN, URINE: 0.2 E.U./DL
WBC # BLD: 6.2 K/UL (ref 4–11)
WBC UA: >100 /HPF (ref 0–5)

## 2020-01-17 PROCEDURE — 99285 EMERGENCY DEPT VISIT HI MDM: CPT

## 2020-01-17 PROCEDURE — 96365 THER/PROPH/DIAG IV INF INIT: CPT

## 2020-01-17 PROCEDURE — 80048 BASIC METABOLIC PNL TOTAL CA: CPT

## 2020-01-17 PROCEDURE — 2580000003 HC RX 258: Performed by: PHYSICIAN ASSISTANT

## 2020-01-17 PROCEDURE — 1200000000 HC SEMI PRIVATE

## 2020-01-17 PROCEDURE — 6360000002 HC RX W HCPCS: Performed by: PHYSICIAN ASSISTANT

## 2020-01-17 PROCEDURE — 85025 COMPLETE CBC W/AUTO DIFF WBC: CPT

## 2020-01-17 PROCEDURE — 36415 COLL VENOUS BLD VENIPUNCTURE: CPT

## 2020-01-17 PROCEDURE — 81001 URINALYSIS AUTO W/SCOPE: CPT

## 2020-01-17 PROCEDURE — 83605 ASSAY OF LACTIC ACID: CPT

## 2020-01-17 PROCEDURE — 87086 URINE CULTURE/COLONY COUNT: CPT

## 2020-01-17 RX ORDER — CELECOXIB 200 MG/1
200 CAPSULE ORAL DAILY
Status: ON HOLD | COMMUNITY
End: 2020-01-22 | Stop reason: HOSPADM

## 2020-01-17 RX ORDER — ASPIRIN 81 MG/1
81 TABLET, CHEWABLE ORAL DAILY
Status: ON HOLD | COMMUNITY
End: 2020-01-21

## 2020-01-17 RX ORDER — SODIUM CHLORIDE, SODIUM LACTATE, POTASSIUM CHLORIDE, CALCIUM CHLORIDE 600; 310; 30; 20 MG/100ML; MG/100ML; MG/100ML; MG/100ML
1000 INJECTION, SOLUTION INTRAVENOUS ONCE
Status: COMPLETED | OUTPATIENT
Start: 2020-01-17 | End: 2020-01-18

## 2020-01-17 RX ADMIN — SODIUM CHLORIDE, POTASSIUM CHLORIDE, SODIUM LACTATE AND CALCIUM CHLORIDE 1000 ML: 600; 310; 30; 20 INJECTION, SOLUTION INTRAVENOUS at 23:12

## 2020-01-17 RX ADMIN — CEFTRIAXONE 1 G: 1 INJECTION, POWDER, FOR SOLUTION INTRAMUSCULAR; INTRAVENOUS at 23:12

## 2020-01-18 ENCOUNTER — APPOINTMENT (OUTPATIENT)
Dept: CT IMAGING | Age: 85
DRG: 757 | End: 2020-01-18
Payer: MEDICARE

## 2020-01-18 ENCOUNTER — APPOINTMENT (OUTPATIENT)
Dept: GENERAL RADIOLOGY | Age: 85
DRG: 757 | End: 2020-01-18
Payer: MEDICARE

## 2020-01-18 PROBLEM — R53.1 GENERALIZED WEAKNESS: Status: ACTIVE | Noted: 2020-01-18

## 2020-01-18 PROBLEM — N30.01 ACUTE CYSTITIS WITH HEMATURIA: Status: ACTIVE | Noted: 2019-09-21

## 2020-01-18 LAB
ACETAMINOPHEN LEVEL: <5 UG/ML (ref 10–30)
AMMONIA: 46 UMOL/L (ref 11–51)
ANION GAP SERPL CALCULATED.3IONS-SCNC: 16 MMOL/L (ref 3–16)
BASOPHILS ABSOLUTE: 0 K/UL (ref 0–0.2)
BASOPHILS RELATIVE PERCENT: 0.7 %
BUN BLDV-MCNC: 31 MG/DL (ref 7–20)
CALCIUM SERPL-MCNC: 8.6 MG/DL (ref 8.3–10.6)
CHLORIDE BLD-SCNC: 104 MMOL/L (ref 99–110)
CHLORIDE URINE RANDOM: 56 MMOL/L
CO2: 18 MMOL/L (ref 21–32)
CREAT SERPL-MCNC: 1.3 MG/DL (ref 0.6–1.2)
CREATININE URINE: 59.8 MG/DL (ref 28–259)
EOSINOPHILS ABSOLUTE: 0.1 K/UL (ref 0–0.6)
EOSINOPHILS RELATIVE PERCENT: 1.7 %
FERRITIN: 31.8 NG/ML (ref 15–150)
GFR AFRICAN AMERICAN: 47
GFR NON-AFRICAN AMERICAN: 39
GLUCOSE BLD-MCNC: 97 MG/DL (ref 70–99)
HCT VFR BLD CALC: 28.1 % (ref 36–48)
HEMOGLOBIN: 9.2 G/DL (ref 12–16)
IRON SATURATION: 9 % (ref 15–50)
IRON: 23 UG/DL (ref 37–145)
LYMPHOCYTES ABSOLUTE: 0.5 K/UL (ref 1–5.1)
LYMPHOCYTES RELATIVE PERCENT: 9.6 %
MAGNESIUM: 1.8 MG/DL (ref 1.8–2.4)
MCH RBC QN AUTO: 29.5 PG (ref 26–34)
MCHC RBC AUTO-ENTMCNC: 32.9 G/DL (ref 31–36)
MCV RBC AUTO: 89.8 FL (ref 80–100)
MONOCYTES ABSOLUTE: 0.3 K/UL (ref 0–1.3)
MONOCYTES RELATIVE PERCENT: 6.4 %
NEUTROPHILS ABSOLUTE: 4.4 K/UL (ref 1.7–7.7)
NEUTROPHILS RELATIVE PERCENT: 81.6 %
PDW BLD-RTO: 14.9 % (ref 12.4–15.4)
PLATELET # BLD: 324 K/UL (ref 135–450)
PMV BLD AUTO: 6.2 FL (ref 5–10.5)
POTASSIUM REFLEX MAGNESIUM: 3.5 MMOL/L (ref 3.5–5.1)
PROTEIN PROTEIN: 163.7 MG/DL
RBC # BLD: 3.13 M/UL (ref 4–5.2)
SALICYLATE, SERUM: 20.8 MG/DL (ref 15–30)
SODIUM BLD-SCNC: 138 MMOL/L (ref 136–145)
SODIUM URINE: 84 MMOL/L
T4 TOTAL: 2.3 UG/DL (ref 4.5–10.9)
TOTAL IRON BINDING CAPACITY: 254 UG/DL (ref 260–445)
TSH SERPL DL<=0.05 MIU/L-ACNC: 0.23 UIU/ML (ref 0.27–4.2)
WBC # BLD: 5.4 K/UL (ref 4–11)

## 2020-01-18 PROCEDURE — 82436 ASSAY OF URINE CHLORIDE: CPT

## 2020-01-18 PROCEDURE — 51798 US URINE CAPACITY MEASURE: CPT

## 2020-01-18 PROCEDURE — 83540 ASSAY OF IRON: CPT

## 2020-01-18 PROCEDURE — 70450 CT HEAD/BRAIN W/O DYE: CPT

## 2020-01-18 PROCEDURE — 82140 ASSAY OF AMMONIA: CPT

## 2020-01-18 PROCEDURE — 84156 ASSAY OF PROTEIN URINE: CPT

## 2020-01-18 PROCEDURE — 80048 BASIC METABOLIC PNL TOTAL CA: CPT

## 2020-01-18 PROCEDURE — 2580000003 HC RX 258: Performed by: INTERNAL MEDICINE

## 2020-01-18 PROCEDURE — 36415 COLL VENOUS BLD VENIPUNCTURE: CPT

## 2020-01-18 PROCEDURE — G0480 DRUG TEST DEF 1-7 CLASSES: HCPCS

## 2020-01-18 PROCEDURE — 6360000002 HC RX W HCPCS: Performed by: INTERNAL MEDICINE

## 2020-01-18 PROCEDURE — 84443 ASSAY THYROID STIM HORMONE: CPT

## 2020-01-18 PROCEDURE — 6370000000 HC RX 637 (ALT 250 FOR IP): Performed by: INTERNAL MEDICINE

## 2020-01-18 PROCEDURE — 82570 ASSAY OF URINE CREATININE: CPT

## 2020-01-18 PROCEDURE — 84300 ASSAY OF URINE SODIUM: CPT

## 2020-01-18 PROCEDURE — 82728 ASSAY OF FERRITIN: CPT

## 2020-01-18 PROCEDURE — 85025 COMPLETE CBC W/AUTO DIFF WBC: CPT

## 2020-01-18 PROCEDURE — 1200000000 HC SEMI PRIVATE

## 2020-01-18 PROCEDURE — 83550 IRON BINDING TEST: CPT

## 2020-01-18 PROCEDURE — 84436 ASSAY OF TOTAL THYROXINE: CPT

## 2020-01-18 PROCEDURE — 80307 DRUG TEST PRSMV CHEM ANLYZR: CPT

## 2020-01-18 PROCEDURE — 83735 ASSAY OF MAGNESIUM: CPT

## 2020-01-18 PROCEDURE — 71046 X-RAY EXAM CHEST 2 VIEWS: CPT

## 2020-01-18 RX ORDER — SODIUM CHLORIDE 0.9 % (FLUSH) 0.9 %
10 SYRINGE (ML) INJECTION PRN
Status: DISCONTINUED | OUTPATIENT
Start: 2020-01-18 | End: 2020-01-22 | Stop reason: HOSPADM

## 2020-01-18 RX ORDER — ONDANSETRON 2 MG/ML
4 INJECTION INTRAMUSCULAR; INTRAVENOUS EVERY 4 HOURS PRN
Status: DISCONTINUED | OUTPATIENT
Start: 2020-01-18 | End: 2020-01-22 | Stop reason: HOSPADM

## 2020-01-18 RX ORDER — SODIUM CHLORIDE 9 MG/ML
1000 INJECTION, SOLUTION INTRAVENOUS CONTINUOUS
Status: DISCONTINUED | OUTPATIENT
Start: 2020-01-18 | End: 2020-01-18

## 2020-01-18 RX ORDER — ACETAMINOPHEN 325 MG/1
650 TABLET ORAL EVERY 4 HOURS PRN
Status: DISCONTINUED | OUTPATIENT
Start: 2020-01-18 | End: 2020-01-22 | Stop reason: HOSPADM

## 2020-01-18 RX ORDER — SODIUM CHLORIDE 0.9 % (FLUSH) 0.9 %
10 SYRINGE (ML) INJECTION EVERY 12 HOURS SCHEDULED
Status: DISCONTINUED | OUTPATIENT
Start: 2020-01-18 | End: 2020-01-22 | Stop reason: HOSPADM

## 2020-01-18 RX ORDER — SODIUM BICARBONATE 650 MG/1
650 TABLET ORAL 2 TIMES DAILY
Status: DISCONTINUED | OUTPATIENT
Start: 2020-01-18 | End: 2020-01-22 | Stop reason: HOSPADM

## 2020-01-18 RX ORDER — ALENDRONATE SODIUM 70 MG/1
70 TABLET ORAL
Status: DISCONTINUED | OUTPATIENT
Start: 2020-01-18 | End: 2020-01-18 | Stop reason: ALTCHOICE

## 2020-01-18 RX ORDER — SODIUM CHLORIDE 9 MG/ML
INJECTION, SOLUTION INTRAVENOUS CONTINUOUS
Status: CANCELLED | OUTPATIENT
Start: 2020-01-18

## 2020-01-18 RX ORDER — ASPIRIN 81 MG/1
81 TABLET, CHEWABLE ORAL DAILY
Status: CANCELLED | OUTPATIENT
Start: 2020-01-18

## 2020-01-18 RX ORDER — HEPARIN SODIUM 5000 [USP'U]/ML
5000 INJECTION, SOLUTION INTRAVENOUS; SUBCUTANEOUS 2 TIMES DAILY
Status: DISCONTINUED | OUTPATIENT
Start: 2020-01-18 | End: 2020-01-21

## 2020-01-18 RX ADMIN — Medication 10 ML: at 21:50

## 2020-01-18 RX ADMIN — SODIUM BICARBONATE 650 MG TABLET 650 MG: at 21:50

## 2020-01-18 RX ADMIN — SODIUM CHLORIDE 1000 ML: 9 INJECTION, SOLUTION INTRAVENOUS at 01:38

## 2020-01-18 RX ADMIN — HEPARIN SODIUM 5000 UNITS: 5000 INJECTION INTRAVENOUS; SUBCUTANEOUS at 10:02

## 2020-01-18 RX ADMIN — CEFTRIAXONE 1 G: 1 INJECTION, POWDER, FOR SOLUTION INTRAMUSCULAR; INTRAVENOUS at 23:37

## 2020-01-18 RX ADMIN — HEPARIN SODIUM 5000 UNITS: 5000 INJECTION INTRAVENOUS; SUBCUTANEOUS at 21:47

## 2020-01-18 RX ADMIN — Medication 10 ML: at 08:25

## 2020-01-18 ASSESSMENT — PAIN SCALES - GENERAL
PAINLEVEL_OUTOF10: 0

## 2020-01-18 NOTE — PROGRESS NOTES
ADVANCED CARE PLANNING    Name:Amber Prajapati        :  1935              MRN:  [id number]      Purpose of Encounter: Advanced care planning in light of Acute renal failure  Parties in attendance: :Zain Patel, Susan Barreto MD  Decisional Capacity:Yes    Subjective/Patient Story: Patient understands that her overall function continues to deteriorate. Patient no longer wishes further curative interventions, including hospitalization, if there is no long term benefit :No  Patient wishes to continue further interventions, patient will re-evaluate at a later time: Yes    Objective/Medical Story: Patient is an 61-year-old woman with a history of hypertension and a remote history of a DVT and her left lower extremity following the procedure. She presents to the hospital following a 2 to 3 days history of increased urinary frequency and she was concerned that she had a urinary tract infection. During the course of her inspection it was found that she had a urinary tract infection as well as acute cystitis    Goals of Care Determinations: Patient wishes to focus on treatment and return to home. Plan: Will notify Dionicio Reddy MD of change in care plan. Will look at further interventions as needed. Code Status: At this time patient wishes to be DNR-CCA    Time Spent on Advanced Planning Documents: 16+ minutes    Advanced Care Planning Documents: Completed advances directives, advanced directives in chart. Electronically signed by Susan Barreto MD   Thank you Dionicio Reddy MD for the opportunity to be involved in this patient's care. If you have any questions or concerns please feel free to contact me at [phone].

## 2020-01-18 NOTE — PROGRESS NOTES
NUTRITION ASSESSMENT  Admission Date: 1/17/2020     Type and Reason for Visit: Initial, Positive Nutrition Screen(Poor appetite/healing wound)    NUTRITION RECOMMENDATIONS:   1. PO Diet: Continue general diet   2. ONS: Ensure BID  3. Encourage po intakes     NUTRITION ASSESSMENT:  Pt is nutritionally compromised AEB wounds. Pt admitted for ARF. Pt states to be eating well and that the only thing wrong is that she \"is dehydrated\". Pt denied wt changes. Discussed protein intakes for wound healing. Pt favorable to ONS for additional nutrition support. Encouraged po intakes and hydration. Will add and monitor per [facility]. MALNUTRITION ASSESSMENT  Context: Acute illness or injury   Malnutrition Status:  At risk for malnutrition  Findings of the 6 clinical characteristics of malnutrition (Minimum of 2 out of 6 clinical characteristics is required to make the diagnosis of moderate or severe Protein Calorie Malnutrition based on AND/ASPEN Guidelines):  Energy Intake %: Unable to assess  Energy Intake Time: Unable to assess  Interpretation of Weight Loss %: No significant weight loss  Interpretation of Weight Loss Time: in 1 month  Body Fat Status: No significant subcutaneous fat loss     Muscle Mass Status: No significant muscle mass loss     Fluid Accumulation Status: Mild fluid accumulation  Fluid Accumulation Location: Extremities  Reduced  Strength: Not measured    NUTRITION DIAGNOSIS   Problem: Increased Nutrient Needs  Etiology: Increased demand for nutrient  Signs & Symptoms: Presence of wounds     NUTRITION INTERVENTION  Food and/or Nutrient Delivery:Continue Current diet  or Start ONS   Nutrition education/counseling/coordination of care: Continue Inpatient Monitoring     NUTRITION MONITORING & EVALUATION:  Evaluation:Goals set   Goals: Pt will consume >/=50% of meals and ONS this admission   Monitoring: GI Function , Hemodynamic Status , Meal Intake , Pertinent Labs , Supplement Intake , Weight  or Wound Healing      OBJECTIVE DATA:  · Nutrition-Focused Physical Findings: +1 BLE edema  · Wounds Unstageable     Past Medical History:   Diagnosis Date    Anterior corneal dystrophy     Asymptomatic postmenopausal status (age-related) (natural)     Cervical polyp     Dry eye syndrome     Edema     Fuch's endothelial dystrophy     Gout     IBS (irritable bowel syndrome)     Osteoarthritis     Osteopenia     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    Pedal edema 5/8/2017    Screening mammogram     Urinary retention 9/21/2019    Vaginal candidiasis         ANTHROPOMETRICS  Current Height: 5' 1\" (154.9 cm)  Current Weight: 117 lb 4.6 oz (53.2 kg)    Admission weight: 116 lb (52.6 kg)  Ideal Bodyweight 105 lb   Usual Bodyweight 115-120 lbs per EMR    Weight Changes pt denied changes        BMI BMI (Calculated): 22.2    Wt Readings from Last 50 Encounters:   01/18/20 117 lb 4.6 oz (53.2 kg)   12/16/19 116 lb 3.2 oz (52.7 kg)   09/17/19 121 lb (54.9 kg)   05/14/19 121 lb (54.9 kg)   11/16/18 113 lb 8 oz (51.5 kg)     COMPARATIVE STANDARDS  Estimated Total Kcals/Day : 25-30  Current Bodyweight (53 kg) 0615-5190 kcal    Estimated Total Protein (g/day) : 1.2-1.3 Current Bodyweight (53 kg) 64-69 g/day  Estimated Daily Total Fluid (ml/day): 1 mL/kcal per day     Food / Nutrition-Related History  Pre-Admission / Home Diet:  Pre-Admission/Home Diet: General   Home Supplements / Herbals:    none noted  Food Restrictions / Cultural Requests:    none noted    Diet Orders / Intake / Nutrition Support  Current diet/supplement order: DIET GENERAL;  Dietary Nutrition Supplements: Standard High Calorie Oral Supplement     NSG Recorded PO:   PO Fluids P.O.: 50 mL  PO Meals     PO Intake: lack of data in EMR  PO Supplement: None   PO Supplement Intake: n/a    NUTRITION RISK LEVEL: Risk Level:  Moderate    Colonel Chaudhry, [address] [place] [address]  Eron:  [phone]  Office:  [phone]

## 2020-01-18 NOTE — H&P
Hospital Medicine  History and Physical    PCP: Jose Orozco MD  Patient Name: Georgie Goncalves    /Date of Service: Pt seen/examined on  and Admitted to Inpatient with expected LOS greater than two midnights due to medical therapy      CHIEF COMPLAINT:  Pt c/o increased urinary frequency  HISTORY OF PRESENT ILLNESS: Patient is an 70-year-old woman with a history of hypertension and a remote history of a DVT and her left lower extremity following the procedure. She presents to the hospital following a 2 to 3 days history of increased urinary frequency and she was concerned that she had a urinary tract infection. During the course of her inspection it was found that she had a urinary tract infection as well as acute cystitis. She is being admitted for further evaluation and treatment. Associated signs and symptoms do not include fever or chills, nausea, vomiting, abdominal pain, hemoptysis, hematochezia, diarrhea, constipation or urinary symptoms. Past Medical History:        Diagnosis Date    Anterior corneal dystrophy     Asymptomatic postmenopausal status (age-related) (natural)     Cervical polyp     Dry eye syndrome     Edema     Fuch's endothelial dystrophy     Gout     IBS (irritable bowel syndrome)     Osteoarthritis     Osteopenia     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    Pedal edema 5/8/2017    Screening mammogram     Urinary retention 9/21/2019    Vaginal candidiasis        Past Surgical History:        Procedure Laterality Date    APPENDECTOMY      EYE SURGERY      Cataract Surgery    HIP PINNING Left 9/18/2019    LEFT HIP GAMMA NAIL performed by Taras Meckel, MD at [address]       Allergies:  Lisinopril and Clindamycin/lincomycin    Medications Prior to Admission:    Prior to Admission medications    Medication Sig Start Date End Date Taking?  Authorizing Provider   Celecoxib (CELEBREX PO) Take by mouth   Yes Historical Provider, MD   aspirin 81 MG chewable tablet Take 81 mg by masses,  no organomegaly  Extremities: extremities atraumatic, no cyanosis or edema and Homans sign is negative, no sign of DVT. Capillary Refill: Acceptable < 3 seconds  Peripheral Pulses: +3 easily felt, not easily obliterated with pressures  Skin: Skin color, texture, turgor normal. No rashes or lesions on exposed skin  Neurologic: Neurovascularly intact without any focal sensory/motor deficits. Cranial nerves: II-XII intact, grossly non-focal. Gait was not tested. Psychiatric: Alert and oriented, thought content appropriate, normal insight    CBC:   Recent Labs     01/17/20  2213   WBC 6.2   HGB 11.5*        BMP:    Recent Labs     01/17/20  2213      K 3.8      CO2 19*   BUN 35*   CREATININE 1.4*   GLUCOSE 131*     Troponin: No results for input(s): TROPONINI in the last 72 hours. PT/INR:  No results found for: PTINR  U/A:    Lab Results   Component Value Date    LEUKOCYTESUR MODERATE 01/17/2020    NITRITE neg 09/15/2014    RBCUA see below 01/17/2020    SPECGRAV 1.025 01/17/2020    UROBILINOGEN 0.2 01/17/2020    BILIRUBINUR Negative 01/17/2020    BILIRUBINUR neg 09/15/2014    BLOODU MODERATE 01/17/2020    GLUCOSEU Negative 01/17/2020    PROTEINU 100 01/17/2020         RAD:   I have independently reviewed and interpreted the imaging studies below and based my recommendations to the patient on those findings. No results found. Assessment:   Principal Problem:    Acute renal failure (ARF) ([facility])  Active Problems:    Essential hypertension, benign    Acute cystitis with hematuria    Generalized weakness  Resolved Problems:    * No resolved hospital problems. *      Plan:       Acute renal failure (ARF) ([address] - possibly secondary to poor oral intake? Will monitor for improvement with IV Saline and ask Nephrology to evaluate    Acute cystitis with hematuria - patient was started on Rocephin empirically.  Urine culture and sensitivities have been ordered, and antibiotic therapy will be adjusted as necessary based upon those results. Essential (primary) hypertension - per history; is not on treatment with antihypertensive medications at home. Monitor blood pressure    Generalized weakness - Will ask PT/OT to evaluate and treat patient, and if necessary to provide recommendations for post hospital therapy            DVT Prophylaxis: Heparin  Diet: DIET GENERAL;  Code Status: DNR-CCA  (Advanced care planning has been discussed with patient and/or responsible family member and is reflected in the code status. Further orders associated with this have been entered if appropriate)    Disposition: Anticipate that patient will remain in the hospital for 2 to 3 days depending on further evaluation and clinical course.      Lemuel Lutz MD

## 2020-01-18 NOTE — PROGRESS NOTES
4 Eyes Admission Assessment     I agree as the admission nurse that 2 RN's have performed a thorough Head to Toe Skin Assessment on the patient. ALL assessment sites listed below have been assessed on admission. Areas assessed by both nurses: ila  [x]   Head, Face, and Ears   [x]   Shoulders, Back, and Chest  [x]   Arms, Elbows, and Hands   [x]   Coccyx, Sacrum, and Ischum  [x]   Legs, Feet, and Heels        Does the Patient have Skin Breakdown?   Yes a wound was noted on the Admission Assessment and an LDA was Initiated documentation include the Shi-wound, Wound Assessment, Measurements, Dressing Treatment, Drainage, and Color\",         Pelon Prevention initiated:  Yes   Wound Care Orders initiated:  No      WOC nurse consulted for Pressure Injury (Stage 3,4, Unstageable, DTI, NWPT, and Complex wounds):  Yes      Nurse 1 eSignature: Electronically signed by Jarred Dixon RN on 1/18/20 at 6:02 AM    **SHARE this note so that the co-signing nurse is able to place an eSignature**    Nurse 2 eSignature: {Esignature:[id number]}

## 2020-01-18 NOTE — PLAN OF CARE
Problem: Nutrition  Goal: Optimal nutrition therapy  Outcome: Ongoing   Nutrition Problem: Increased nutrient needs  Intervention: Food and/or Nutrient Delivery: Continue current diet, Start ONS  Nutritional Goals: Pt will consume >/=50% of meals and ONS this admission

## 2020-01-18 NOTE — PLAN OF CARE
Problem: Falls - Risk of:  Goal: Will remain free from falls  Description  Will remain free from falls  Outcome: Ongoing  Note:   Pt is at risk for falls. Bedside table, call light and needs within reach. Bed alarm on and audible. Pts son at bedside. Will continue to round on pt for safety/needs. Problem: Risk for Impaired Skin Integrity  Goal: Tissue integrity - skin and mucous membranes  Description  Structural intactness and normal physiological function of skin and  mucous membranes. Outcome: Ongoing  Note:   Pt skin is intact besides a wound to her coccyx. Wound dressing changed. Skincare provided after using bedpan, and pt turned/repositioned with pillow support Q 2 and as needed for comfort. Will continue to assess pt skin and intervene to avoid skin breakdown.       Problem: Nutrition  Goal: Optimal nutrition therapy  1/18/2020 1327 by David Pat, RD, LD  Outcome: Ongoing

## 2020-01-18 NOTE — CONSULTS
Nephrology Consult Note                                                                                                                                                                                                                                                                                                                                                               Office : [phone]     Fax :[phone]              Patient's Name: Damian Mccartney  11:59 PM  1/17/2020    Reason for Consult: RONNY   Requesting Physician:  Olga Mata MD      Chief Complaint:  Ur frequency     History of Present Ilness:    Damian Mccartney is a 81 y.o. female came with ur frequency   Started 2-3 days ago   No fever   Inc lethargy   No hx of rec UTI   Has heavy ASA and celebrex use   No Diarrhea   Poor po Intake     Cr 1.4             Past Medical History:   Diagnosis Date    Anterior corneal dystrophy     Asymptomatic postmenopausal status (age-related) (natural)     Cervical polyp     Dry eye syndrome     Edema     Fuch's endothelial dystrophy     Gout     IBS (irritable bowel syndrome)     Osteoarthritis     Osteopenia     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    Pedal edema 5/8/2017    Screening mammogram     Urinary retention 9/21/2019    Vaginal candidiasis        Past Surgical History:   Procedure Laterality Date    APPENDECTOMY      EYE SURGERY      Cataract Surgery    HIP PINNING Left 9/18/2019    LEFT HIP GAMMA NAIL performed by Anjali Allred MD at [facility] OR       Family History   Problem Relation Age of Onset    Breast Cancer Daughter         [facility] 2    Ovarian Cancer Daughter         reports that she has never smoked. She has never used smokeless tobacco. She reports that she does not drink alcohol or use drugs.     Allergies:  Lisinopril and Clindamycin/lincomycin    Current Medications:    lactated ringers infusion 1,000 mL, Once        Review of Systems:   14 point ROS obtained but were

## 2020-01-19 LAB
ALBUMIN SERPL-MCNC: 3 G/DL (ref 3.4–5)
ALBUMIN SERPL-MCNC: 3 G/DL (ref 3.4–5)
ALP BLD-CCNC: 62 U/L (ref 40–129)
ALT SERPL-CCNC: <5 U/L (ref 10–40)
ANION GAP SERPL CALCULATED.3IONS-SCNC: 15 MMOL/L (ref 3–16)
ANION GAP SERPL CALCULATED.3IONS-SCNC: 15 MMOL/L (ref 3–16)
AST SERPL-CCNC: 13 U/L (ref 15–37)
BASOPHILS ABSOLUTE: 0 K/UL (ref 0–0.2)
BASOPHILS RELATIVE PERCENT: 0.6 %
BILIRUB SERPL-MCNC: <0.2 MG/DL (ref 0–1)
BILIRUBIN DIRECT: <0.2 MG/DL (ref 0–0.3)
BILIRUBIN, INDIRECT: ABNORMAL MG/DL (ref 0–1)
BUN BLDV-MCNC: 24 MG/DL (ref 7–20)
BUN BLDV-MCNC: 26 MG/DL (ref 7–20)
CALCIUM SERPL-MCNC: 8.9 MG/DL (ref 8.3–10.6)
CALCIUM SERPL-MCNC: 9.1 MG/DL (ref 8.3–10.6)
CHLORIDE BLD-SCNC: 101 MMOL/L (ref 99–110)
CHLORIDE BLD-SCNC: 105 MMOL/L (ref 99–110)
CO2: 20 MMOL/L (ref 21–32)
CO2: 21 MMOL/L (ref 21–32)
CREAT SERPL-MCNC: 1.1 MG/DL (ref 0.6–1.2)
CREAT SERPL-MCNC: 1.2 MG/DL (ref 0.6–1.2)
CREATININE URINE: 70.1 MG/DL (ref 28–259)
EOSINOPHILS ABSOLUTE: 0 K/UL (ref 0–0.6)
EOSINOPHILS RELATIVE PERCENT: 0.9 %
GFR AFRICAN AMERICAN: 52
GFR AFRICAN AMERICAN: 57
GFR NON-AFRICAN AMERICAN: 43
GFR NON-AFRICAN AMERICAN: 47
GLUCOSE BLD-MCNC: 100 MG/DL (ref 70–99)
GLUCOSE BLD-MCNC: 120 MG/DL (ref 70–99)
HCT VFR BLD CALC: 30.7 % (ref 36–48)
HEMOGLOBIN: 10 G/DL (ref 12–16)
LYMPHOCYTES ABSOLUTE: 0.7 K/UL (ref 1–5.1)
LYMPHOCYTES RELATIVE PERCENT: 15.1 %
MAGNESIUM: 2 MG/DL (ref 1.8–2.4)
MCH RBC QN AUTO: 29.4 PG (ref 26–34)
MCHC RBC AUTO-ENTMCNC: 32.7 G/DL (ref 31–36)
MCV RBC AUTO: 90.1 FL (ref 80–100)
MICROALBUMIN UR-MCNC: 34.1 MG/DL
MICROALBUMIN/CREAT UR-RTO: 486.4 MG/G (ref 0–30)
MONOCYTES ABSOLUTE: 0.4 K/UL (ref 0–1.3)
MONOCYTES RELATIVE PERCENT: 8.5 %
NEUTROPHILS ABSOLUTE: 3.5 K/UL (ref 1.7–7.7)
NEUTROPHILS RELATIVE PERCENT: 74.9 %
ORGANISM: ABNORMAL
PDW BLD-RTO: 14.9 % (ref 12.4–15.4)
PH UA: 6 (ref 5–8)
PHENOBARBITAL LEVEL: <2.4 UG/ML (ref 15–35)
PHOSPHORUS: 2.4 MG/DL (ref 2.5–4.9)
PLATELET # BLD: 298 K/UL (ref 135–450)
PMV BLD AUTO: 6.3 FL (ref 5–10.5)
POTASSIUM REFLEX MAGNESIUM: 3.5 MMOL/L (ref 3.5–5.1)
POTASSIUM SERPL-SCNC: 3.6 MMOL/L (ref 3.5–5.1)
RBC # BLD: 3.41 M/UL (ref 4–5.2)
SODIUM BLD-SCNC: 137 MMOL/L (ref 136–145)
SODIUM BLD-SCNC: 140 MMOL/L (ref 136–145)
T3 TOTAL: 0.25 NG/ML (ref 0.8–2)
T4 FREE: 0.5 NG/DL (ref 0.9–1.8)
TOTAL CK: 47 U/L (ref 26–192)
TOTAL PROTEIN: 6 G/DL (ref 6.4–8.2)
URINE CULTURE, ROUTINE: ABNORMAL
URINE CULTURE, ROUTINE: ABNORMAL
WBC # BLD: 4.7 K/UL (ref 4–11)

## 2020-01-19 PROCEDURE — 92610 EVALUATE SWALLOWING FUNCTION: CPT

## 2020-01-19 PROCEDURE — 80069 RENAL FUNCTION PANEL: CPT

## 2020-01-19 PROCEDURE — 99231 SBSQ HOSP IP/OBS SF/LOW 25: CPT | Performed by: OTOLARYNGOLOGY

## 2020-01-19 PROCEDURE — 80076 HEPATIC FUNCTION PANEL: CPT

## 2020-01-19 PROCEDURE — 1200000000 HC SEMI PRIVATE

## 2020-01-19 PROCEDURE — 85025 COMPLETE CBC W/AUTO DIFF WBC: CPT

## 2020-01-19 PROCEDURE — 36415 COLL VENOUS BLD VENIPUNCTURE: CPT

## 2020-01-19 PROCEDURE — 2580000003 HC RX 258: Performed by: INTERNAL MEDICINE

## 2020-01-19 PROCEDURE — 82550 ASSAY OF CK (CPK): CPT

## 2020-01-19 PROCEDURE — 6360000002 HC RX W HCPCS: Performed by: INTERNAL MEDICINE

## 2020-01-19 PROCEDURE — 83986 ASSAY PH BODY FLUID NOS: CPT

## 2020-01-19 PROCEDURE — 93005 ELECTROCARDIOGRAM TRACING: CPT | Performed by: INTERNAL MEDICINE

## 2020-01-19 PROCEDURE — 6370000000 HC RX 637 (ALT 250 FOR IP): Performed by: INTERNAL MEDICINE

## 2020-01-19 PROCEDURE — 87040 BLOOD CULTURE FOR BACTERIA: CPT

## 2020-01-19 PROCEDURE — 82043 UR ALBUMIN QUANTITATIVE: CPT

## 2020-01-19 PROCEDURE — 84439 ASSAY OF FREE THYROXINE: CPT

## 2020-01-19 PROCEDURE — 80184 ASSAY OF PHENOBARBITAL: CPT

## 2020-01-19 PROCEDURE — 83883 ASSAY NEPHELOMETRY NOT SPEC: CPT

## 2020-01-19 PROCEDURE — 83735 ASSAY OF MAGNESIUM: CPT

## 2020-01-19 PROCEDURE — 82570 ASSAY OF URINE CREATININE: CPT

## 2020-01-19 PROCEDURE — 84480 ASSAY TRIIODOTHYRONINE (T3): CPT

## 2020-01-19 RX ORDER — POTASSIUM CHLORIDE 20 MEQ/1
20 TABLET, EXTENDED RELEASE ORAL 2 TIMES DAILY WITH MEALS
Status: DISCONTINUED | OUTPATIENT
Start: 2020-01-19 | End: 2020-01-21

## 2020-01-19 RX ADMIN — HEPARIN SODIUM 5000 UNITS: 5000 INJECTION INTRAVENOUS; SUBCUTANEOUS at 09:45

## 2020-01-19 RX ADMIN — CEFTRIAXONE 1 G: 1 INJECTION, POWDER, FOR SOLUTION INTRAMUSCULAR; INTRAVENOUS at 23:21

## 2020-01-19 RX ADMIN — POTASSIUM CHLORIDE 20 MEQ: 20 TABLET, EXTENDED RELEASE ORAL at 18:08

## 2020-01-19 RX ADMIN — IRON SUCROSE 300 MG: 20 INJECTION, SOLUTION INTRAVENOUS at 09:45

## 2020-01-19 RX ADMIN — SODIUM BICARBONATE 650 MG TABLET 650 MG: at 11:58

## 2020-01-19 RX ADMIN — Medication 10 ML: at 09:58

## 2020-01-19 RX ADMIN — HEPARIN SODIUM 5000 UNITS: 5000 INJECTION INTRAVENOUS; SUBCUTANEOUS at 23:21

## 2020-01-19 RX ADMIN — Medication 10 ML: at 23:22

## 2020-01-19 ASSESSMENT — PAIN SCALES - GENERAL
PAINLEVEL_OUTOF10: 0

## 2020-01-19 NOTE — PROGRESS NOTES
Nephrology Consult Note                                                                                                                                                                                                                                                                                                                                                               Office : [phone]     Fax :[phone]              Patient's Name: Carline Tello    No new change   Good UO   Cr stable           Past Medical History:   Diagnosis Date    Anterior corneal dystrophy     Asymptomatic postmenopausal status (age-related) (natural)     Cervical polyp     Dry eye syndrome     Edema     Fuch's endothelial dystrophy     Gout     IBS (irritable bowel syndrome)     Osteoarthritis     Osteopenia     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    Pedal edema 5/8/2017    Screening mammogram     Urinary retention 9/21/2019    Vaginal candidiasis        Past Surgical History:   Procedure Laterality Date    APPENDECTOMY      EYE SURGERY      Cataract Surgery    HIP PINNING Left 9/18/2019    LEFT HIP GAMMA NAIL performed by Sebastián Restrepo MD at [facility] OR       Family History   Problem Relation Age of Onset    Breast Cancer Daughter         [facility] 2    Ovarian Cancer Daughter         reports that she has never smoked. She has never used smokeless tobacco. She reports that she does not drink alcohol or use drugs.     Allergies:  Lisinopril and Clindamycin/lincomycin    Current Medications:    sodium chloride flush 0.9 % injection 10 mL, 2 times per day  sodium chloride flush 0.9 % injection 10 mL, PRN  magnesium hydroxide (MILK OF MAGNESIA) 400 MG/5ML suspension 30 mL, Daily PRN  ondansetron (ZOFRAN) injection 4 mg, Q4H PRN  acetaminophen (TYLENOL) tablet 650 mg, Q4H PRN  cefTRIAXone (ROCEPHIN) 1 g IVPB in 50 mL D5W minibag, Q24H  heparin (porcine) injection 5,000 Units, BID  diclofenac sodium 1 % gel 4 g, 4x Daily Recent Labs     01/18/20  0145   CLUR 64   LABCREA 59.8   PROTEINUR 163.70*   NAUR 80             IMAGING:  CT HEAD WO CONTRAST    (Results Pending)   XR CHEST STANDARD (2 VW)    (Results Pending)       Assessment/Plan   1. UTI     2. RONNY     3. Heavy ASA/NSAID use     4. Acid- base/ Electrolyte imbalance     5. Hearing issue - recent development - ? ASA ototoxicity     6.  RTA     Plan   - d/c IVF   - Add sod bicarb for RTA   - Ur studies - proteinuria   - abx for UTI   - No NSAIDs   - D/w family  - D/w Hospitalist                 Thank you for allowing us to participate in care of [address] free to contact me   [facility] [address]  Office : [phone]  Fax :[phone]

## 2020-01-19 NOTE — PROGRESS NOTES
Hospitalist Progress Note      PCP: Bryce Wilkins MD    Date of Admission: 1/17/2020    Chief Complaint: Effusion, increased urinary frequency    Hospital Course: .  46-year-old female who presented to ED from assisted living by her son who noticed patient being confused, and with increased urinary frequency. Admitted to the hospital for management of acute kidney injury likely suspected due to heavy NSAID use    Interval Update:   Discussed with Patient's son Dr. Yaya Shaffer (#[phone])  Patient is at baseline very sharp, talks about politics, stocks etc and this new change in mental status  He is concerned about possible drug overdose  Patient takes Donatal (belladonna alkaloids, phenobarbital), Lomotil for IBS  Takes ASA, Celebrex, unsure how much she takes (ses Edmund aspirin and Celebrex for her osteoarthritis. Says Tylenol does not work for her)    Subjective:   Patient seen and examined  Out of bed in chair  Denies any specific complains    Medications:  Reviewed    Infusion Medications   Scheduled Medications    sodium chloride flush  10 mL Intravenous 2 times per day    cefTRIAXone (ROCEPHIN) IV  1 g Intravenous Q24H    heparin (porcine)  5,000 Units Subcutaneous BID    sodium bicarbonate  650 mg Oral BID     PRN Meds: sodium chloride flush, magnesium hydroxide, ondansetron, acetaminophen, diclofenac sodium      Intake/Output Summary (Last 24 hours) at 1/19/2020 1343  Last data filed at 1/19/2020 1248  Gross per 24 hour   Intake 240 ml   Output 900 ml   Net -660 ml       Physical Exam Performed:    /73   Pulse 97   Temp 100.9 °F (38.3 °C) (Oral)   Resp 18   Ht 5' 1\" (1.549 m)   Wt 117 lb 4.6 oz (53.2 kg)   SpO2 95%   BMI 22.16 kg/m²     General appearance: Hard of hearing, No apparent distress, appears stated age and cooperative. HEENT: Pupils equal, round, and reactive to light. Conjunctivae/corneas clear. Neck: Supple, with full range of motion.  No jugular venous distention. Trachea midline. Respiratory:  Normal respiratory effort. Clear to auscultation, bilaterally without Rales/Wheezes/Rhonchi. Cardiovascular: Regular rate and rhythm with normal S1/S2 without murmurs, rubs or gallops. Abdomen: Soft, non-tender, non-distended with normal bowel sounds. Musculoskeletal: No clubbing, cyanosis or edema bilaterally. Full range of motion without deformity. Skin: Skin color, texture, turgor normal.  No rashes or lesions. Neurologic:  Neurovascularly intact without any focal sensory/motor deficits. Cranial nerves: II-XII intact, grossly non-focal.  Psychiatric: Alert and oriented, although slow answers thought content appropriate, normal insight  Capillary Refill: Brisk,< 3 seconds   Peripheral Pulses: +2 palpable, equal bilaterally       Labs:   Recent Labs     01/17/20 2213 01/18/20 0723 01/19/20  0919   WBC 6.2 5.4 4.7   HGB 11.5* 9.2* 10.0*   HCT 35.9* 28.1* 30.7*    324 298     Recent Labs     01/17/20 2213 01/18/20 0723 01/19/20  0918    138 140   K 3.8 3.5 3.5    104 105   CO2 19* 18* 20*   BUN 35* 31* 26*   CREATININE 1.4* 1.3* 1.2   CALCIUM 9.1 8.6 8.9     No results for input(s): AST, ALT, BILIDIR, BILITOT, ALKPHOS in the last 72 hours. No results for input(s): INR in the last 72 hours. No results for input(s): [place] in the last 72 hours.     Urinalysis:      Lab Results   Component Value Date    NITRU Negative 01/17/2020    WBCUA >100 01/17/2020    BACTERIA 2+ 01/17/2020    RBCUA see below 01/17/2020    BLOODU MODERATE 01/17/2020    SPECGRAV 1.025 01/17/2020    GLUCOSEU Negative 01/17/2020       Radiology:  XR CHEST STANDARD (2 VW)   Final Result      No acute consolidation                  CT HEAD WO CONTRAST   Final Result      No acute intracranial pathology        Atrophy and small vessel ischemic changes                 Assessment/Plan:    Active Hospital Problems    Diagnosis Date Noted    Generalized weakness [R53.1] high-dose at home  Voltaren gel added    Bilateral hearing loss  Suspect ASA ototoxicity although she doesn't complain of tinitus  - Seen by ENT, and recommended audiogram as outpatient    DVT Prophylaxis: Heparin sq  Diet: DIET GENERAL;  Code Status: DNR-CCA    PT/OT Eval Status: ordered    Dispo - Continue inpatient status    Imtiaz Murray MD   Hospitalist

## 2020-01-19 NOTE — PROGRESS NOTES
Rales/Wheezes/Rhonchi. Cardiovascular: Regular rate and rhythm with normal S1/S2 without murmurs, rubs or gallops. Abdomen: Soft, non-tender, non-distended with normal bowel sounds. Musculoskeletal: No clubbing, cyanosis or edema bilaterally. Full range of motion without deformity. Skin: Skin color, texture, turgor normal.  No rashes or lesions. Neurologic:  Neurovascularly intact without any focal sensory/motor deficits. Cranial nerves: II-XII intact, grossly non-focal.  Psychiatric: Alert and oriented, thought content appropriate, normal insight  Capillary Refill: Brisk,< 3 seconds   Peripheral Pulses: +2 palpable, equal bilaterally       Labs:   Recent Labs     01/17/20 2213 01/18/20  0723   WBC 6.2 5.4   HGB 11.5* 9.2*   HCT 35.9* 28.1*    324     Recent Labs     01/17/20 2213 01/18/20  0723    138   K 3.8 3.5    104   CO2 19* 18*   BUN 35* 31*   CREATININE 1.4* 1.3*   CALCIUM 9.1 8.6     No results for input(s): AST, ALT, BILIDIR, BILITOT, ALKPHOS in the last 72 hours. No results for input(s): INR in the last 72 hours. No results for input(s): Chantelle Rima in the last 72 hours.     Urinalysis:      Lab Results   Component Value Date    NITRU Negative 01/17/2020    WBCUA >100 01/17/2020    BACTERIA 2+ 01/17/2020    RBCUA see below 01/17/2020    BLOODU MODERATE 01/17/2020    SPECGRAV 1.025 01/17/2020    GLUCOSEU Negative 01/17/2020       Radiology:  XR CHEST STANDARD (2 VW)   Final Result      No acute consolidation                  CT HEAD WO CONTRAST   Final Result      No acute intracranial pathology        Atrophy and small vessel ischemic changes                 Assessment/Plan:    Active Hospital Problems    Diagnosis Date Noted    Generalized weakness [R53.1] 01/18/2020    Acute renal failure (ARF) ([facility] [address] [N17.9] 01/17/2020    Acute cystitis with hematuria [N30.01] 09/21/2019    Essential hypertension, benign [I10] 04/23/2012     Acute metabolic encephalopathy present on admission  CT head done with no acute interval abnormalities  Suspect likely to underlying RONNY, possible UTI    Complicated urinary tract infection  UA with greater than 100 WBC  Urine culture sent  Continue Rocephin    Acute kidney injury  Suspect due to heavy aspirin use, she tells me that she uses 2 tablets of Edmund aspirin every 4 hours and Celebrex 1 capsule daily.   Nephrology following will follow up with recommendations  We will profile daily    Generalized weakness and fatigue  She has been living in assisted living in September  Discussed with son, who lives in [facility] patient has been progressively weak  TSH, free T4, ferritin, iron TIBC levels sent    Osteoarthritis  She is been doing aspirin high-dose at home  Voltaren gel added    Bilateral hearing loss  Suspect ASA ototoxicity although she doesn't complain of tinitus  - Per family request ENT has been consulted    DVT Prophylaxis: Heparin sq  Diet: Diet NPO Effective Now Exceptions are: Sips with Meds  Code Status: DNR-CCA    PT/OT Eval Status: ordered    Dispo - Continue inpatient status    China Campos MD   Hospitalist

## 2020-01-19 NOTE — PROGRESS NOTES
Nephrology Consult Note                                                                                                                                                                                                                                                                                                                                                               Office : [phone]     Fax :[phone]              Patient's Name: Abilio Turk    No new change   Good UO   Labs pending           Past Medical History:   Diagnosis Date    Anterior corneal dystrophy     Asymptomatic postmenopausal status (age-related) (natural)     Cervical polyp     Dry eye syndrome     Edema     Fuch's endothelial dystrophy     Gout     IBS (irritable bowel syndrome)     Osteoarthritis     Osteopenia     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    Pedal edema 5/8/2017    Screening mammogram     Urinary retention 9/21/2019    Vaginal candidiasis        Past Surgical History:   Procedure Laterality Date    APPENDECTOMY      EYE SURGERY      Cataract Surgery    HIP PINNING Left 9/18/2019    LEFT HIP GAMMA NAIL performed by Darcy Boyer MD at [address] OR       Family History   Problem Relation Age of Onset    Breast Cancer Daughter         [facility] 2    Ovarian Cancer Daughter         reports that she has never smoked. She has never used smokeless tobacco. She reports that she does not drink alcohol or use drugs.     Allergies:  Lisinopril and Clindamycin/lincomycin    Current Medications:    iron sucrose (VENOFER) 300 mg in sodium chloride 0.9 % 250 mL IVPB, Once  sodium chloride flush 0.9 % injection 10 mL, 2 times per day  sodium chloride flush 0.9 % injection 10 mL, PRN  magnesium hydroxide (MILK OF MAGNESIA) 400 MG/5ML suspension 30 mL, Daily PRN  ondansetron (ZOFRAN) injection 4 mg, Q4H PRN  acetaminophen (TYLENOL) tablet 650 mg, Q4H PRN  cefTRIAXone (ROCEPHIN) 1 g IVPB in 50 mL D5W minibag, Q24H  heparin

## 2020-01-19 NOTE — PLAN OF CARE
Completed bedside swallow evaluation. Please see report in EMR.      Julio C Nathan M.A., [place]  Speech-Language Pathologist

## 2020-01-19 NOTE — CONSULTS
[facility]      Patient Name: Kristi Martinez Record Number:  [id number]  Primary Care Physician:  Seth Angeles MD  Date of Consultation: 1/19/2020    Hearing Loss HPI  CC: hearing loss    General: [facility] BRISA is a(n) 81 y.o. female who presents with a 3 month history of deteriorating hearing. Bilateral. Denies recent bad cold or IV antibiotics or chemotherapy. Sons state she had baseline hearing loss but definite rapid increase in hearing loss. Patient does take frequent aspirin and Celebrex. Denies acoustical trauma.    How long: 3 months  Side:bilateral  Prior audiogram:No  Previous episodes: no  Tinnitus:No  Otorrhea:No  Vertigo:No  Prior ear surgery: No  Ear trauma: No  Ear problems as child: No  Denes other medical problems including: birth anoxia, ototoxic medication exposure, poor vision or progressive vision loss, syncope, thyroid problems  Family Hx of hearing loss:No  Denies family history of: thyroid disease, syncope, autoimmune disease, hearing loss at a young age    Patient Active Problem List   Diagnosis    Osteoarthritis    IBS (irritable bowel syndrome)    Gout    Dry eye syndrome    Osteopenia    Essential hypertension, benign    Symptomatic menopausal or female climacteric states    Glucose intolerance (impaired glucose tolerance)    Primary osteoarthritis of both knees    Hormone replacement therapy    Hip fracture requiring operative repair, left, closed, initial encounter ([address]    Anemia    Acute cystitis with hematuria    Acute deep vein thrombosis (DVT) of left lower extremity after procedure ([address]    Inguinal hernia, right    Acute renal failure (ARF) (HCC)    Generalized weakness     Past Surgical History:   Procedure Laterality Date    APPENDECTOMY      EYE SURGERY      Cataract Surgery    HIP PINNING Left 9/18/2019    LEFT HIP GAMMA NAIL performed by Danny Starr MD at [facility] History   Problem Relation Age of Onset    Breast Cancer Daughter         [facility] 2    Ovarian Cancer Daughter      Social History     Socioeconomic History    Marital status:      Spouse name: Not on file    Number of children: Not on file    Years of education: Not on file    Highest education level: Not on file   Occupational History    Not on file   Social Needs    Financial resource strain: Not on file    Food insecurity:     Worry: Not on file     Inability: Not on file    Transportation needs:     Medical: Not on file     Non-medical: Not on file   Tobacco Use    Smoking status: Never Smoker    Smokeless tobacco: Never Used   Substance and Sexual Activity    Alcohol use: No    Drug use: No    Sexual activity: Not Currently   Lifestyle    Physical activity:     Days per week: Not on file     Minutes per session: Not on file    Stress: Not on file   Relationships    Social connections:     Talks on phone: Not on file     Gets together: Not on file     Attends Cheondoism service: Not on file     Active member of club or organization: Not on file     Attends meetings of clubs or organizations: Not on file     Relationship status: Not on file    Intimate partner violence:     Fear of current or ex partner: Not on file     Emotionally abused: Not on file     Physically abused: Not on file     Forced sexual activity: Not on file   Other Topics Concern    Not on file   Social History Narrative    Not on file       DRUG/FOOD ALLERGIES: Lisinopril and Clindamycin/lincomycin    CURRENT MEDICATIONS  Prior to Admission medications    Medication Sig Start Date End Date Taking? Authorizing Provider   Celecoxib (CELEBREX PO) Take by mouth   Yes Historical Provider, MD   aspirin 81 MG chewable tablet Take 81 mg by mouth daily   Yes Historical Provider, MD   diphenoxylate-atropine (LOMOTIL) 2.5-0.025 MG per tablet Take 1 tablet by mouth 4 times daily as needed for Diarrhea for up to 30 days.  1/16/20 2/15/20 Yes Shanika Mendez MD   alendronate (FOSAMAX) 70 MG tablet Take 1 tablet by mouth every 7 days Take with 8 ounces water, do not lie down for 30 minutes after taking it 12/16/19   Shanika Mendez MD   docusate sodium (COLACE, DULCOLAX) 100 MG CAPS Take 100 mg by mouth 2 times daily  Patient not taking: Reported on 12/16/2019 9/21/19   Zully Oconnor MD       REVIEW OF SYSTEMS  The following systems were reviewed and revealed the following in addition to any already discussed in the HPI:    CONSTITUTIONAL: Positive weight loss, no fever, no night sweats, no chills  EYES: no vision changes, no blurry vision  EARS: Changes in hearing, no otalgia  NOSE: no epistaxis, no rhinorrhea  RESPIRATORY: No Difficulty breathing, no shortness of breath  CV: no chest pain, Peripheral vascular disease  HEME: No coagulation disorder, Bleeding disorder  NEURO: no TIA or stroke-like symptoms  SKIN: No new rashes in the head and neck, no recent skin cancers  MOUTH: No new ulcers, no recent teeth infections  GASTROINTESTINAL: No diarrhea, stomach pain  PSYCH: No anxiety, no depression      PHYSICAL EXAM  /73   Pulse 97   Temp 100.9 °F (38.3 °C) (Oral)   Resp 18   Ht 5' 1\" (1.549 m)   Wt 117 lb 4.6 oz (53.2 kg)   SpO2 95%   BMI 22.16 kg/m²     GENERAL: No Acute Distress, Alert and Oriented, No Hoarseness  EYES: EOMI, Anti-icteric  NOSE: No epistaxis, nasal mucosa within normal limits, no purulent drainage  EARS: Normal external canal appearance, EAC patent bilaterally. Moderate dry cerumen right.  No middle ear fluid  FACE: 1/6 House-Brackmann Scale, symmetric, sensation equal bilaterally  ORAL CAVITY: No masses or lesions palpated, uvula is midline, moist mucous membranes,  NECK: Normal range of motion, no thyromegaly, trachea is midline, NO lymphadenopathy, NO neck masses, no crepitus  CHEST: Normal respiratory effort, no retractions, breathing comfortably  SKIN: No rashes, normal appearing skin, no evidence of skin lesions/tumors    LABS  WBC 4.7  4.0 - 11.0 K/uL Final 01/19/2020  9:19 AM [facility] Lab   RBC 3.41Low   4.00 - 5.20 M/uL Final 01/19/2020  9:19 AM [facility] Lab   Hemoglobin 10. 0Low   12.0 - 16.0 g/dL Final 01/19/2020  9:19 AM [facility]   Hematocrit 30.7Low           Sodium 140  136 - 145 mmol/L Final 01/19/2020  9:18  [facility]   Potassium reflex Magnesium 3.5  3.5 - 5.1 mmol/L Final 01/19/2020  9:18 AM [facility]   Chloride 105  99 - 110 mmol/L Final 01/19/2020  9:18  [facility]   CO2 20Low   21 - 32 mmol/L Final 01/19/2020  9:18  [facility]   Anion Gap 15  3 - 16 Final 01/19/2020  9:18 AM [facility]   Glucose 100High   70 - 99 mg/dL Final 01/19/2020  9:18 AM [facility]   BUN 26High   7 - 20 mg/dL Final 01/19/2020  9:18 AM [facility]   CREATININE 1.2  0.6 - 1.2 mg/dL Final 01/19/2020  9:18 AM [facility]   GFR Non- 43Abnormal               RADIOLOGY  Summary of findings:  EXAM: CT HEAD WO CONTRAST ON 1/18/2020       INDICATION: Lethargy, confusion, rule our subdural       COMPARISON: None.       TECHNICAL: Axial CT imaging obtained from vertex to skull base. Axial images and multiplanar reformatted images reviewed.   Up-to-date CT equipment and radiation dose reduction techniques were employed.  Dose modulation, iterative reconstruction and/or    weight based adjustments of the mA/kV were utilized to reduce radiation dose to as low as reasonably achievable           IV Contrast: None.       LIMITATIONS: None       FINDINGS:       : No additional abnormality       INTRACRANIAL HEMORRHAGE: No intra-articular extra-axial hemorrhage is visualized.       VENTRICLES: The ventricles are mildly prominent. There is no midline shift or mass effect.       BRAIN PARENCHYMA: There is moderate atrophy.  There is decreased attenuation the periventricular white

## 2020-01-19 NOTE — PROGRESS NOTES
Phase  Oral Phase: WFL     Indicators of Pharyngeal Phase Dysfunction   Pharyngeal Phase  Pharyngeal Phase: WFL    Prognosis  Prognosis  Prognosis for safe diet advancement: excellent  Individuals consulted  Consulted and agree with results and recommendations: Patient; Family member;RN    Education  Patient Education: Education regarding rationale for BSE, definition of aspiration and consequential aspiration pneumonia, rationale for 3 ounce water test, results/recommendations, general aspiration precautions, importance of oral care. Obtained oral hygiene product for pt as there were none present. No further questions re: dysphagia. Patient Education Response: Verbalizes understanding;Needs reinforcement  Safety Devices in place: Yes  Type of devices: Call light within reach; Bed alarm in place       Therapy Time  SLP Individual Minutes  Time In: 1100  Time Out: 1115  Minutes: 15  SLP Co-Treatment Minutes  Time In: 0000  Time Out: 0000  Minutes: 0  SLP Total Treatment Time  Timed Code Treatment Minutes: 0 Minutes  Total Treatment Time: 15    Ángel Hawk M.A., [place]  Speech-Language Pathologist

## 2020-01-20 ENCOUNTER — APPOINTMENT (OUTPATIENT)
Dept: GENERAL RADIOLOGY | Age: 85
DRG: 757 | End: 2020-01-20
Payer: MEDICARE

## 2020-01-20 ENCOUNTER — APPOINTMENT (OUTPATIENT)
Dept: MRI IMAGING | Age: 85
DRG: 757 | End: 2020-01-20
Payer: MEDICARE

## 2020-01-20 ENCOUNTER — TELEPHONE (OUTPATIENT)
Dept: FAMILY MEDICINE CLINIC | Age: 85
End: 2020-01-20

## 2020-01-20 LAB
ALBUMIN SERPL-MCNC: 2.6 G/DL (ref 3.4–5)
ANION GAP SERPL CALCULATED.3IONS-SCNC: 9 MMOL/L (ref 3–16)
ANTI-THYROGLOB ABS: <10 IU/ML
BACTERIA: ABNORMAL /HPF
BASOPHILS ABSOLUTE: 0 K/UL (ref 0–0.2)
BASOPHILS RELATIVE PERCENT: 0.6 %
BILIRUBIN URINE: NEGATIVE
BLOOD, URINE: ABNORMAL
BUN BLDV-MCNC: 23 MG/DL (ref 7–20)
CALCIUM SERPL-MCNC: 8.9 MG/DL (ref 8.3–10.6)
CHLORIDE BLD-SCNC: 104 MMOL/L (ref 99–110)
CLARITY: CLEAR
CO2: 23 MMOL/L (ref 21–32)
COLOR: YELLOW
COMMENT UA: ABNORMAL
CREAT SERPL-MCNC: 1 MG/DL (ref 0.6–1.2)
EKG ATRIAL RATE: 100 BPM
EKG DIAGNOSIS: NORMAL
EKG Q-T INTERVAL: 326 MS
EKG QRS DURATION: 70 MS
EKG QTC CALCULATION (BAZETT): 392 MS
EKG R AXIS: -28 DEGREES
EKG T AXIS: 54 DEGREES
EKG VENTRICULAR RATE: 87 BPM
EOSINOPHILS ABSOLUTE: 0.1 K/UL (ref 0–0.6)
EOSINOPHILS RELATIVE PERCENT: 1.2 %
EPITHELIAL CELLS, UA: ABNORMAL /HPF
GFR AFRICAN AMERICAN: >60
GFR NON-AFRICAN AMERICAN: 53
GLUCOSE BLD-MCNC: 98 MG/DL (ref 70–99)
GLUCOSE URINE: NEGATIVE MG/DL
HCT VFR BLD CALC: 30.4 % (ref 36–48)
HEMOGLOBIN: 9.8 G/DL (ref 12–16)
KETONES, URINE: NEGATIVE MG/DL
LEUKOCYTE ESTERASE, URINE: ABNORMAL
LYMPHOCYTES ABSOLUTE: 0.8 K/UL (ref 1–5.1)
LYMPHOCYTES RELATIVE PERCENT: 15.9 %
MAGNESIUM: 1.9 MG/DL (ref 1.8–2.4)
MCH RBC QN AUTO: 29.1 PG (ref 26–34)
MCHC RBC AUTO-ENTMCNC: 32.2 G/DL (ref 31–36)
MCV RBC AUTO: 90.3 FL (ref 80–100)
MICROSCOPIC EXAMINATION: YES
MONOCYTES ABSOLUTE: 0.5 K/UL (ref 0–1.3)
MONOCYTES RELATIVE PERCENT: 10 %
MUCUS: ABNORMAL /LPF
NEUTROPHILS ABSOLUTE: 3.7 K/UL (ref 1.7–7.7)
NEUTROPHILS RELATIVE PERCENT: 72.3 %
NITRITE, URINE: NEGATIVE
PDW BLD-RTO: 14.9 % (ref 12.4–15.4)
PH UA: 6.5 (ref 5–8)
PHENOBARBITAL LEVEL: <2.4 UG/ML (ref 15–35)
PHOSPHORUS: 2.3 MG/DL (ref 2.5–4.9)
PLATELET # BLD: 293 K/UL (ref 135–450)
PMV BLD AUTO: 6.6 FL (ref 5–10.5)
POTASSIUM REFLEX MAGNESIUM: 3.4 MMOL/L (ref 3.5–5.1)
POTASSIUM SERPL-SCNC: 3.4 MMOL/L (ref 3.5–5.1)
PROTEIN UA: 100 MG/DL
RBC # BLD: 3.37 M/UL (ref 4–5.2)
RBC UA: ABNORMAL /HPF (ref 0–2)
SODIUM BLD-SCNC: 136 MMOL/L (ref 136–145)
SPECIFIC GRAVITY UA: 1.02 (ref 1–1.03)
THYROID PEROXIDASE (TPO) ABS: <5 IU/ML
URINE TYPE: ABNORMAL
UROBILINOGEN, URINE: 0.2 E.U./DL
VITAMIN B-12: 671 PG/ML (ref 211–911)
WBC # BLD: 5.2 K/UL (ref 4–11)
WBC UA: ABNORMAL /HPF (ref 0–5)
YEAST: PRESENT /HPF

## 2020-01-20 PROCEDURE — 87086 URINE CULTURE/COLONY COUNT: CPT

## 2020-01-20 PROCEDURE — 92526 ORAL FUNCTION THERAPY: CPT | Performed by: SPEECH-LANGUAGE PATHOLOGIST

## 2020-01-20 PROCEDURE — 83735 ASSAY OF MAGNESIUM: CPT

## 2020-01-20 PROCEDURE — 6360000002 HC RX W HCPCS: Performed by: STUDENT IN AN ORGANIZED HEALTH CARE EDUCATION/TRAINING PROGRAM

## 2020-01-20 PROCEDURE — 0100U HC RESPIRPTHGN MULT REV TRANS & AMP PRB TECH 21 TRGT: CPT

## 2020-01-20 PROCEDURE — 97535 SELF CARE MNGMENT TRAINING: CPT

## 2020-01-20 PROCEDURE — 86376 MICROSOMAL ANTIBODY EACH: CPT

## 2020-01-20 PROCEDURE — 2580000003 HC RX 258: Performed by: PSYCHIATRY & NEUROLOGY

## 2020-01-20 PROCEDURE — 70551 MRI BRAIN STEM W/O DYE: CPT

## 2020-01-20 PROCEDURE — 97166 OT EVAL MOD COMPLEX 45 MIN: CPT

## 2020-01-20 PROCEDURE — 36415 COLL VENOUS BLD VENIPUNCTURE: CPT

## 2020-01-20 PROCEDURE — 82607 VITAMIN B-12: CPT

## 2020-01-20 PROCEDURE — 87040 BLOOD CULTURE FOR BACTERIA: CPT

## 2020-01-20 PROCEDURE — 2580000003 HC RX 258: Performed by: INTERNAL MEDICINE

## 2020-01-20 PROCEDURE — 80069 RENAL FUNCTION PANEL: CPT

## 2020-01-20 PROCEDURE — 87077 CULTURE AEROBIC IDENTIFY: CPT

## 2020-01-20 PROCEDURE — 97530 THERAPEUTIC ACTIVITIES: CPT

## 2020-01-20 PROCEDURE — 6360000002 HC RX W HCPCS: Performed by: INTERNAL MEDICINE

## 2020-01-20 PROCEDURE — 85025 COMPLETE CBC W/AUTO DIFF WBC: CPT

## 2020-01-20 PROCEDURE — 2580000003 HC RX 258: Performed by: STUDENT IN AN ORGANIZED HEALTH CARE EDUCATION/TRAINING PROGRAM

## 2020-01-20 PROCEDURE — 71045 X-RAY EXAM CHEST 1 VIEW: CPT

## 2020-01-20 PROCEDURE — 1200000000 HC SEMI PRIVATE

## 2020-01-20 PROCEDURE — 86800 THYROGLOBULIN ANTIBODY: CPT

## 2020-01-20 PROCEDURE — 6370000000 HC RX 637 (ALT 250 FOR IP): Performed by: INTERNAL MEDICINE

## 2020-01-20 PROCEDURE — 93010 ELECTROCARDIOGRAM REPORT: CPT | Performed by: INTERNAL MEDICINE

## 2020-01-20 PROCEDURE — 81001 URINALYSIS AUTO W/SCOPE: CPT

## 2020-01-20 RX ORDER — SODIUM CHLORIDE 0.9 % (FLUSH) 0.9 %
10 SYRINGE (ML) INJECTION PRN
Status: DISCONTINUED | OUTPATIENT
Start: 2020-01-20 | End: 2020-01-22 | Stop reason: HOSPADM

## 2020-01-20 RX ORDER — SODIUM CHLORIDE 0.9 % (FLUSH) 0.9 %
10 SYRINGE (ML) INJECTION EVERY 12 HOURS SCHEDULED
Status: DISCONTINUED | OUTPATIENT
Start: 2020-01-20 | End: 2020-01-22 | Stop reason: HOSPADM

## 2020-01-20 RX ADMIN — HEPARIN SODIUM 5000 UNITS: 5000 INJECTION INTRAVENOUS; SUBCUTANEOUS at 22:11

## 2020-01-20 RX ADMIN — Medication 10 ML: at 23:18

## 2020-01-20 RX ADMIN — Medication 10 ML: at 23:16

## 2020-01-20 RX ADMIN — CEFTRIAXONE 1 G: 1 INJECTION, POWDER, FOR SOLUTION INTRAMUSCULAR; INTRAVENOUS at 22:16

## 2020-01-20 RX ADMIN — Medication 10 ML: at 10:01

## 2020-01-20 RX ADMIN — POTASSIUM CHLORIDE 20 MEQ: 20 TABLET, EXTENDED RELEASE ORAL at 18:46

## 2020-01-20 RX ADMIN — POTASSIUM CHLORIDE 20 MEQ: 20 TABLET, EXTENDED RELEASE ORAL at 10:00

## 2020-01-20 RX ADMIN — HEPARIN SODIUM 5000 UNITS: 5000 INJECTION INTRAVENOUS; SUBCUTANEOUS at 10:01

## 2020-01-20 ASSESSMENT — ENCOUNTER SYMPTOMS
EYE REDNESS: 0
DIARRHEA: 0
NAUSEA: 0
VOMITING: 0
ABDOMINAL DISTENTION: 0
WHEEZING: 0
RHINORRHEA: 0
ABDOMINAL PAIN: 0
COLOR CHANGE: 0
SORE THROAT: 0
BACK PAIN: 0
CHEST TIGHTNESS: 0
SINUS PRESSURE: 0
EYE ITCHING: 0
COUGH: 0
SHORTNESS OF BREATH: 0
EYE PAIN: 0

## 2020-01-20 ASSESSMENT — PAIN SCALES - GENERAL: PAINLEVEL_OUTOF10: 0

## 2020-01-20 NOTE — PLAN OF CARE
Problem: Falls - Risk of:  Goal: Will remain free from falls  Description  Will remain free from falls  1/20/2020 0251 by Gaby Mo RN  Outcome: Ongoing  Note:   Patient at risk for falls. Patient resting quietly in bed. Side rails up x 3. Bed locked in lowest position. Bed alarm on. Bedside table and call light within reach. Patient instructed to call for assistance. Patient verbalized understanding. Pt needs a lot of encouragement to put one foot in front of the other when ambulating- very unsteady even with walker. Using bedpan at this time, pt turning frequently prn and q2 in bed. Will continue to monitor. Problem: Risk for Impaired Skin Integrity  Goal: Tissue integrity - skin and mucous membranes  Description  Structural intactness and normal physiological function of skin and  mucous membranes. 1/20/2020 0251 by Gaby Mo RN  Outcome: Ongoing  Note:   Pt has a healing tunneled ulcer on her coccyx from a fall in September where she lie on the ground for 3 days. Dressing [facility], [id number]  RN consult in place for recs. Otherwise skin is WNL. Will continue to monitor.       Problem: Nutrition  Goal: Optimal nutrition therapy  Outcome: Ongoing

## 2020-01-20 NOTE — CONSULTS
Neurology Consult Note  Reason for Consult: encephalopathy  Chief complaint: confusion  Dr Gertrude Mcbride MD asked me to see Amaris Pascual in consultation for evaluation of encephalopathy    History of Present Illness:  Amaris Pascual is a 81 y.o. female who presents with altered mental status. Her son talked (via phone) with her wed night and she was fine but when he saw her on Friday evening she was markedly confused and trouble walking with slurred speech. On admission she had RONNY and UTI. She has improved some but still far from her baseline. There are pretty lucid periods but she has been fluctuating. She takes dontal for IBS  Of note she had hip surgery 4 months ago complicated by DVT. Very good baseline function    Medical History:  Past Medical History:   Diagnosis Date    Anterior corneal dystrophy     Asymptomatic postmenopausal status (age-related) (natural)     Cervical polyp     Dry eye syndrome     Edema     Fuch's endothelial dystrophy     Gout     IBS (irritable bowel syndrome)     Osteoarthritis     Osteopenia     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    Pedal edema 5/8/2017    Screening mammogram     Urinary retention 9/21/2019    Vaginal candidiasis      Past Surgical History:   Procedure Laterality Date    APPENDECTOMY      EYE SURGERY      Cataract Surgery    HIP PINNING Left 9/18/2019    LEFT HIP GAMMA NAIL performed by Denver Pak MD at [address]     Medications Prior to Admission: Celecoxib (CELEBREX PO), Take by mouth  aspirin 81 MG chewable tablet, Take 81 mg by mouth daily  diphenoxylate-atropine (LOMOTIL) 2.5-0.025 MG per tablet, Take 1 tablet by mouth 4 times daily as needed for Diarrhea for up to 30 days.   alendronate (FOSAMAX) 70 MG tablet, Take 1 tablet by mouth every 7 days Take with 8 ounces water, do not lie down for 30 minutes after taking it  docusate sodium (COLACE, DULCOLAX) 100 MG CAPS, Take 100 mg by mouth 2 times daily (Patient not taking: Reported on 12/16/2019)  Allergies   Allergen Reactions    Lisinopril Swelling     Swelling tongue.  Clindamycin/Lincomycin      Family History   Problem Relation Age of Onset    Breast Cancer Daughter         [facility] 2    Ovarian Cancer Daughter      Social History     Tobacco Use   Smoking Status Never Smoker   Smokeless Tobacco Never Used     Social History     Substance and Sexual Activity   Drug Use No     Social History     Substance and Sexual Activity   Alcohol Use No       ROS:  Limited by encephalopathy      Exam:  Blood pressure 105/70, pulse 94, temperature 98 °F (36.7 °C), temperature source Oral, resp. rate 16, height 5' 1\" (1.549 m), weight 117 lb 4.6 oz (53.2 kg), SpO2 97 %, not currently breastfeeding. Constitutional    Vital signs: BP, HR, and RR reviewed   General Drowsy, no distress, well-nourished  Eyes: deferred for comfort  Cardiovascular: pulses symmetric in all 4 extremities. No peripheral edema. Psychiatric: cooperative with examination, no  psychotic behavior noted. Neurologic  Mental status:   orientation to person and place. She told me it was february. General fund of knowledge grossly intact. She was able to read the clock and knew 4 quarters in a dollar   Memory 0/3 on recall but 3/3 briskly with clues   Attention decreased as had some difficulty with multiple step coin calculations but was able to attend to the exam     Language fluent in conversation   Comprehension intact; follows simple commands including 2-step commands crossing midline  Cranial nerves:   CN2: Visual Fields full on confrontational testing,   CN 3,4,6: extraocular muscles intact,  CN5: facial sensation symmetric   CN7:face symmetric without dysarthria,   CN8: hearing grossly intact  CN9: palate elevated symmetrically  CN11: trap full strength on shoulder shrug  CN12: tongue midline with protrusion  Strength: Good strength in upper extremities.   She would not lift legs against gravity but testing abd/add, KE/KF, DF

## 2020-01-20 NOTE — PROGRESS NOTES
Occupational Therapy   Occupational Therapy Initial Assessment and Treatment  Date: 2020   Patient Name: Georgie Goncalves  MRN: [id number]     : 1935    Date of Service: 2020    Discharge Recommendations:  Georgie Goncalves scored a 16/24 on the AM-PAC ADL Inpatient form. Current research shows that an AM-PAC score of 17 or less is typically not associated with a discharge to the patient's home setting. Based on the patients AM-PAC score and their current ADL deficits, it is recommended that the patient have 3-5 sessions per week of Occupational Therapy at d/c to increase the patients independence. OT Equipment Recommendations  Equipment Needed: No  Other: defer recommendations to discharge facility; [facility] is appropriate for use at this time (at bedside)    Assessment   Performance deficits / Impairments: Decreased functional mobility ; Decreased ADL status; Decreased safe awareness;Decreased strength;Decreased endurance;Decreased balance  Assessment: Pt admitted w/ c/o increased confusion and increased urinary frequency. Pt lives in [address] [place] [address] at the [facility] - reports baseline independence at walker level. Currently, pt w/ impaired standing balance and requiring hands on assist w/ transfers and ADLs. Pt is a high risk for falling. Pt not safe to return home alone. Pt encouraged to be up to [facility] vs. amb to bathroom w/ assist of staff this admission (rather than using bedpan). Pt will benefit from continued OT. Continue per POC.   Treatment Diagnosis: impaired ADLs/transfers and decreased activity tolerance  Decision Making: Medium Complexity  OT Education: OT Role;Plan of Care  Patient Education: safe hand placement during transfers - ongoing education/reinforcement  REQUIRES OT FOLLOW UP: Yes  Activity Tolerance  Activity Tolerance: Patient limited by fatigue  Activity Tolerance: needing encouragement for participation/OOB activity  Safety Devices  Safety Devices in place: Yes  Type of devices: Nurse Status: Exceptions  Arousal/Alertness: Delayed responses to stimuli  Following Commands: Follows one step commands with increased time  Attention Span: Attends with cues to redirect  Memory: Appears intact  Problem Solving: Decreased awareness of errors  Insights: Decreased awareness of deficits  Initiation: Requires cues for some  Sequencing: Requires cues for some                 LUE AROM (degrees)  LUE AROM : WFL  Left Hand AROM (degrees)  Left Hand AROM: WFL  RUE AROM (degrees)  RUE AROM : WFL  Right Hand AROM (degrees)  Right Hand AROM: WFL  LUE Strength  Gross LUE Strength: WFL  LUE Strength Comment: shoulder flex 4-/5, elbow flex 4/5  RUE Strength  Gross RUE Strength: WFL  RUE Strength Comment: shoulder flex 4-/5, elbow flex 4/5               Pt seen by OT for eval and treat. Treatment included:  Bed mobility, functional transfer, ADL, pt education         Plan  This note will serve as a discharge summary in the event the patient is discharged prior to next treatment session.     Plan  Times per week: 2-5  Times per day: Daily  Current Treatment Recommendations: Functional Mobility Training, Strengthening, Balance Training, Safety Education & Training, Self-Care / ADL, Endurance Training                                                      AM-PAC Score        AM-[facility] Inpatient Daily Activity Raw Score: 16 (01/20/20 0851)  AM-PAC Inpatient ADL T-Scale Score : 35.96 (01/20/20 0851)  ADL Inpatient CMS 0-100% Score: 53.32 (01/20/20 0851)  ADL Inpatient CMS G-Code Modifier : CK (01/20/20 0851)    Goals  Short term goals  Time Frame for Short term goals: Discharge  Short term goal 1: LB dressing w/ CGA  Short term goal 2: increase standing tolerance to 3 minutes for ADL participation, CGA  Short term goal 3: simulated BSC transfer w/ CGA  Short term goal 4: toileting hygiene, CGA  Patient Goals   Patient goals : to increase independence level       Therapy Time   Individual Concurrent Group Co-treatment   Time In

## 2020-01-20 NOTE — PROGRESS NOTES
Nephrology Consult Note                                                                                                                                                                                                                                                                                                                                                               Office : [phone]     Fax :[phone]              Patient's Name: Paula Velásquez    No new change   Good UO   Cr better   K low           Past Medical History:   Diagnosis Date    Anterior corneal dystrophy     Asymptomatic postmenopausal status (age-related) (natural)     Cervical polyp     Dry eye syndrome     Edema     Fuch's endothelial dystrophy     Gout     IBS (irritable bowel syndrome)     Osteoarthritis     Osteopenia     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    Pedal edema 5/8/2017    Screening mammogram     Urinary retention 9/21/2019    Vaginal candidiasis        Past Surgical History:   Procedure Laterality Date    APPENDECTOMY      EYE SURGERY      Cataract Surgery    HIP PINNING Left 9/18/2019    LEFT HIP GAMMA NAIL performed by Adolphus Frankel, MD at [facility] OR       Family History   Problem Relation Age of Onset    Breast Cancer Daughter         [facility] 2    Ovarian Cancer Daughter         reports that she has never smoked. She has never used smokeless tobacco. She reports that she does not drink alcohol or use drugs.     Allergies:  Lisinopril and Clindamycin/lincomycin    Current Medications:    potassium chloride (KLOR-CON M) extended release tablet 20 mEq, BID WC  perflutren lipid microspheres (DEFINITY) injection 1.65 mg, ONCE PRN  sodium chloride flush 0.9 % injection 10 mL, 2 times per day  sodium chloride flush 0.9 % injection 10 mL, PRN  magnesium hydroxide (MILK OF MAGNESIA) 400 MG/5ML suspension 30 mL, Daily PRN  ondansetron (ZOFRAN) injection 4 mg, Q4H PRN  acetaminophen (TYLENOL) tablet 650 mg, Q4H PRN  cefTRIAXone (ROCEPHIN) 1 g IVPB in 50 mL D5W minibag, Q24H  heparin (porcine) injection 5,000 Units, BID  diclofenac sodium 1 % gel 4 g, 4x Daily PRN  [Held by provider] sodium bicarbonate tablet 650 mg, BID        Review of Systems:   14 point ROS obtained but were negative except mentioned in HPI      Physical exam:     Vitals:  /61   Pulse 93   Temp 97.4 °F (36.3 °C) (Oral)   Resp 16   Ht 5' 1\" (1.549 m)   Wt 117 lb 4.6 oz (53.2 kg)   SpO2 97%   BMI 22.16 kg/m²   Constitutional:  OAA X3 NAD  Skin: no rash, turgor wnl  Heent:  eomi, mmm  Neck: no bruits or jvd noted  Cardiovascular:  S1, S2 without m/r/g  Respiratory: CTA B without w/r/r  Abdomen:  +bs, soft, nt, nd  Ext: + lower extremity edema  Psychiatric: mood and affect appropriate  Musculoskeletal:  Rom, muscular strength intact    Data:   Labs:  CBC:   Recent Labs     01/18/20  0723 01/19/20  0919 01/20/20  0633   WBC 5.4 4.7 5.2   HGB 9.2* 10.0* 9.8*    298 293     BMP:    Recent Labs     01/19/20  0918 01/19/20  1544 01/20/20  0633    137 136   K 3.5 3.6 3.4*  3.4*    101 104   CO2 20* 21 23   BUN 26* 24* 23*   CREATININE 1.2 1.1 1.0   GLUCOSE 100* 120* 98     Ca/Mg/Phos:   Recent Labs     01/18/20  0723 01/19/20  0918 01/19/20  1544 01/20/20  0633   CALCIUM 8.6 8.9 9.1 8.9   MG 1.80 2.00  --  1.90   PHOS  --   --  2.4* 2.3*     Hepatic:   Recent Labs     01/19/20  0918   AST 13*   ALT <5*   BILITOT <0.2   ALKPHOS 62     Troponin: No results for input(s): TROPONINI in the last 72 hours. BNP: No results for input(s): BNP in the last 72 hours. Lipids: No results for input(s): CHOL, TRIG, HDL, LDLCALC, LABVLDL in the last 72 hours. ABGs: No results for input(s): PHART, PO2ART, YMN6NBT in the last 72 hours. INR: No results for input(s): INR in the last 72 hours.   UA:  Recent Labs     01/17/20  2213 01/19/20  0939   COLORU Yellow  --    CLARITYU Clear  --    GLUCOSEU Negative  --    BILIRUBINUR Negative  --    [address]

## 2020-01-20 NOTE — PROGRESS NOTES
Physical Therapy  Hold note    Referral received and chart reviewed. Pt off floor for test this am and requesting to work with PT later pm today. Will f/u later pm as time allows or 1/21/20. RN aware.        Efraín Hayes, PT

## 2020-01-20 NOTE — ED PROVIDER NOTES
[address] [facility] ENCOUNTER          PHYSICIAN ASSISTANT NOTE       Date of evaluation: 1/17/2020    Chief Complaint     Urinary Frequency and Altered Mental Status (son sts patient has new confusion that stared today - patient is A/O x 4. Although son said she is 'off today' )      History of Present Illness     Amaris Pascual is a 81 y.o. female with a past medical history as noted below who presents to the Emergency Department with a complaint of increased urinary frequency with mild confusion. The patient's son, who presents with the patient to the emergency department, reports that she has been acting \"off today. \"  She reports that she has been somewhat more confused than usual, with repetitive speech and awkward is in performing her ADLs. The patient lives in an adult living facility and does not have skilled nursing care. Her son frequently stopped by to see her, and noted that today she was not acting as her usual self. The patient reports that she has had increasing urinary frequency over the past 3 days. She denies any urinary symptoms. The patient notes that overall she feels as if she is fine, but has noted that she has felt slightly \"different. \"  The patient does have a history of similar symptoms with urinary tract infections in the past.  Patient denies any pain. No fevers, chills, sweats or other constitutional symptoms. Review of Systems     Review of Systems   Constitutional: Negative for chills, diaphoresis, fever and unexpected weight change. HENT: Negative for congestion, drooling, mouth sores, postnasal drip, rhinorrhea, sinus pressure and sore throat. Eyes: Negative for pain, redness and itching. Respiratory: Negative for cough, chest tightness, shortness of breath and wheezing. Cardiovascular: Negative for chest pain, palpitations and leg swelling.    Gastrointestinal: Negative for abdominal distention, abdominal pain, diarrhea, nausea and vomiting. Genitourinary: Positive for frequency. Negative for decreased urine volume, difficulty urinating, dysuria, flank pain and hematuria. Musculoskeletal: Negative for arthralgias, back pain, gait problem, myalgias, neck pain and neck stiffness. Skin: Negative for color change, pallor and rash. Neurological: Negative for dizziness, seizures, syncope, weakness, light-headedness, numbness and headaches. Hematological: Does not bruise/bleed easily. Psychiatric/Behavioral: Negative for agitation, hallucinations, self-injury, sleep disturbance and suicidal ideas. The patient is not nervous/anxious. All other systems reviewed and are negative. Past Medical, Surgical, Family, and Social History     She has a past medical history of Anterior corneal dystrophy, Asymptomatic postmenopausal status (age-related) (natural), Cervical polyp, Dry eye syndrome, Edema, Fuch's endothelial dystrophy, Gout, IBS (irritable bowel syndrome), Osteoarthritis, Osteopenia, Papilloma of breast, Pedal edema, Screening mammogram, Urinary retention, and Vaginal candidiasis. She has a past surgical history that includes Appendectomy; eye surgery; and hip pinning (Left, 9/18/2019). Her family history includes Breast Cancer in her daughter; Ovarian Cancer in her daughter. She reports that she has never smoked. She has never used smokeless tobacco. She reports that she does not drink alcohol or use drugs. Medications     Current Discharge Medication List      CONTINUE these medications which have NOT CHANGED    Details   Celecoxib (CELEBREX PO) Take by mouth      aspirin 81 MG chewable tablet Take 81 mg by mouth daily      diphenoxylate-atropine (LOMOTIL) 2.5-0.025 MG per tablet Take 1 tablet by mouth 4 times daily as needed for Diarrhea for up to 30 days.   Qty: 90 tablet, Refills: 0    Associated Diagnoses: Irritable bowel syndrome with diarrhea      alendronate (FOSAMAX) 70 MG tablet Take 1 tablet by mouth every 7 days Take with 8 ounces water, do not lie down for 30 minutes after taking it  Qty: 12 tablet, Refills: 3    Associated Diagnoses: Hip fracture requiring operative repair, left, closed, initial encounter ([facility])      docusate sodium (COLACE, DULCOLAX) 100 MG CAPS Take 100 mg by mouth 2 times daily  Qty: 60 capsule, Refills: 1             Allergies     She is allergic to lisinopril and clindamycin/lincomycin. Physical Exam     INITIAL VITALS: BP: (!) 113/56, Temp: 98.1 °F (36.7 °C), Pulse: 86, Resp: 14, SpO2: 99 %  Physical Exam  Vitals signs and nursing note reviewed. Constitutional:       General: She is not in acute distress. Appearance: She is well-developed. She is not diaphoretic. HENT:      Head: Normocephalic and atraumatic. Eyes:      General: No scleral icterus. Conjunctiva/sclera: Conjunctivae normal.      Pupils: Pupils are equal, round, and reactive to light. Neck:      Musculoskeletal: Normal range of motion and neck supple. Vascular: No JVD. Cardiovascular:      Rate and Rhythm: Normal rate and regular rhythm. Heart sounds: Normal heart sounds. Pulmonary:      Effort: Pulmonary effort is normal. No respiratory distress. Breath sounds: Normal breath sounds. No stridor. No wheezing or rales. Chest:      Chest wall: No tenderness. Abdominal:      General: There is no distension. Palpations: Abdomen is soft. Tenderness: There is no tenderness. Musculoskeletal: Normal range of motion. General: No tenderness. Skin:     General: Skin is warm and dry. Coloration: Skin is not pale. Findings: No rash. Neurological:      Mental Status: She is alert and oriented to person, place, and time. GCS: GCS eye subscore is 4. GCS verbal subscore is 5. GCS motor subscore is 6.    Psychiatric:         Behavior: Behavior normal.         Diagnostic Results     RADIOLOGY:  No imaging orders    LABS:   Results for orders placed or performed during the Bacteria, UA 2+ (A) /HPF       ED BEDSIDE ULTRASOUND:  N/A    RECENT VITALS:  BP: 117/72, Temp: 100.2 °F (37.9 °C), Pulse: 85, Resp: 18, SpO2: 96 %     Procedures     N/A    ED Course     Nursing Notes, Past Medical Hx,Past Surgical Hx, Social Hx, Allergies, and Family Hx were reviewed. The patient was given the following medications:  Orders Placed This Encounter   Medications    cefTRIAXone (ROCEPHIN) 1 g IVPB in 50 mL D5W minibag    lactated ringers infusion 1,000 mL       CONSULTS:  Rhett Lyons / LUIS / Carola Almaguering is admitted to the Emergency Department for evaluation of her chief complaint as described in the history of present illness. Complete history and physical was performed by me and my attending. Nursing notes, past medical history, surgical history, family history and social history were reviewed and addressed in the HPI. Martir Álvarez is a 81 y.o. female who presents to the emergency department with a complaint of mild, confusion with increasing urinary frequency. The patient lives alone in an adult apartment facility without skilled medical care. Her son routinely checks on her. Today, he reports that the patient was not quite acting like herself. She was expecting some mild, occasional perseveration and struggled slightly with her ADLs. The patient also notes that she's had increasing urinary frequency over the past 3 days. She's had similar symptoms in the past with urinary tract infection. On evaluation, the patient is conscious wake and oriented, pleasant and conversational.  On physical examination she has some mildly dry mucous membranes, but no other significant findings. She does not appear systemically ill or septic. She is hemodynamically stable and within normal limits. Her CBC demonstrated mild anemia, normocytic without leukocytosis or from cytopenia.   Her BMP demonstrates a mild RONNY with creatinine of 1.4

## 2020-01-20 NOTE — PROGRESS NOTES
Progress Note    Admit Date: 1/17/2020  Day: 3  Diet: DIET GENERAL;    CC: Effusion, increased urinary frequency    Interval history: No acute events overnight. Pt examined bedside this am. Still admits urinary frequency but denies any burning. Denies any other complaints. HPI: 60-year-old female who presented to ED from assisted living by her son who noticed patient being confused, and with increased urinary frequency. Admitted to the hospital for management of acute kidney injury likely suspected due to heavy NSAID use. Medications:     Scheduled Meds:   potassium chloride  20 mEq Oral BID WC    sodium chloride flush  10 mL Intravenous 2 times per day    cefTRIAXone (ROCEPHIN) IV  1 g Intravenous Q24H    heparin (porcine)  5,000 Units Subcutaneous BID    [Held by provider] sodium bicarbonate  650 mg Oral BID     Continuous Infusions:  PRN Meds:perflutren lipid microspheres, sodium chloride flush, magnesium hydroxide, ondansetron, acetaminophen, diclofenac sodium    Objective:   Vitals:   T-max:  Patient Vitals for the past 8 hrs:   BP Temp Temp src Pulse Resp SpO2   01/20/20 0915 100/61 97.4 °F (36.3 °C) Oral 93 -- 97 %   01/20/20 0437 128/80 98 °F (36.7 °C) Oral 89 16 97 %   01/20/20 0408 114/68 99.8 °F (37.7 °C) Oral 89 16 98 %       Intake/Output Summary (Last 24 hours) at 1/20/2020 0942  Last data filed at 1/20/2020 0916  Gross per 24 hour   Intake 340 ml   Output 700 ml   Net -360 ml       Review of Systems   All other systems reviewed and are negative. Physical Exam  Constitutional:       Appearance: Normal appearance. HENT:      Head: Normocephalic. Mouth/Throat:      Mouth: Mucous membranes are moist.   Eyes:      Extraocular Movements: Extraocular movements intact. Pupils: Pupils are equal, round, and reactive to light. Cardiovascular:      Rate and Rhythm: Normal rate and regular rhythm. Pulses: Normal pulses. Heart sounds: Normal heart sounds.    Pulmonary: Effort: Pulmonary effort is normal.      Breath sounds: Normal breath sounds. Abdominal:      General: Abdomen is flat. Bowel sounds are normal.      Palpations: Abdomen is soft. Musculoskeletal:         General: No swelling. Skin:     General: Skin is warm. Capillary Refill: Capillary refill takes less than 2 seconds. Neurological:      General: No focal deficit present. Mental Status: She is alert and oriented to person, place, and time. Psychiatric:         Mood and Affect: Mood normal.         LABS:    CBC:   Recent Labs     01/18/20  0723 01/19/20  0919 01/20/20  0633   WBC 5.4 4.7 5.2   HGB 9.2* 10.0* 9.8*   HCT 28.1* 30.7* 30.4*    298 293   MCV 89.8 90.1 90.3     Renal:    Recent Labs     01/18/20  0723 01/19/20  0918 01/19/20  1544 01/20/20  0633    140 137 136   K 3.5 3.5 3.6 3.4*  3.4*    105 101 104   CO2 18* 20* 21 23   BUN 31* 26* 24* 23*   CREATININE 1.3* 1.2 1.1 1.0   GLUCOSE 97 100* 120* 98   CALCIUM 8.6 8.9 9.1 8.9   MG 1.80 2.00  --  1.90   PHOS  --   --  2.4* 2.3*   ANIONGAP 16 15 15 9     Hepatic:   Recent Labs     01/19/20 0918 01/19/20  1544 01/20/20  0633   AST 13*  --   --    ALT <5*  --   --    BILITOT <0.2  --   --    BILIDIR <0.2  --   --    PROT 6.0*  --   --    LABALBU 3.0* 3.0* 2.6*   ALKPHOS 62  --   --      Troponin: No results for input(s): TROPONINI in the last 72 hours. BNP: No results for input(s): BNP in the last 72 hours. Lipids: No results for input(s): CHOL, HDL in the last 72 hours. Invalid input(s): LDLCALCU, TRIGLYCERIDE  ABGs:  No results for input(s): PHART, EZE2VHE, PO2ART, RWG0CRX, BEART, THGBART, M3ZAXYVU, OXE2MYB in the last 72 hours. INR: No results for input(s): INR in the last 72 hours. Lactate: No results for input(s): LACTATE in the last 72 hours.   Cultures:  -----------------------------------------------------------------  RAD:   XR CHEST STANDARD (2 VW)   Final Result      No acute consolidation added     Bilateral hearing loss  Suspect ASA ototoxicity although she doesn't complain of tinitus  - Seen by ENT, and recommended audiogram as outpatient    Code Status:DNR-CCA   FEN: DIET GENERAL;   PPX: Heparin  DISPO: Floor    Sera Bermduez DO, PGY-1  01/20/20  9:42 AM    This patient has been staffed and discussed with Weston Toledo MD.

## 2020-01-20 NOTE — PROGRESS NOTES
Neurology consult recalled. Spoke with Jeff Gonzalez at [address] [place] [address] [id number] who states that BRIANDA Xiong would be notified. Mary Ellen Adrian, primary RN notified and aware.

## 2020-01-21 ENCOUNTER — APPOINTMENT (OUTPATIENT)
Dept: GENERAL RADIOLOGY | Age: 85
DRG: 757 | End: 2020-01-21
Payer: MEDICARE

## 2020-01-21 PROBLEM — R50.9 FEVER: Status: ACTIVE | Noted: 2020-01-21

## 2020-01-21 LAB
ALBUMIN SERPL-MCNC: 2.6 G/DL (ref 3.4–5)
AMPHETAMINE SCREEN, URINE: NORMAL
ANION GAP SERPL CALCULATED.3IONS-SCNC: 11 MMOL/L (ref 3–16)
BARBITURATE SCREEN URINE: NORMAL
BASOPHILS ABSOLUTE: 0 K/UL (ref 0–0.2)
BASOPHILS RELATIVE PERCENT: 0 %
BENZODIAZEPINE SCREEN, URINE: NORMAL
BUN BLDV-MCNC: 20 MG/DL (ref 7–20)
CALCIUM SERPL-MCNC: 9.1 MG/DL (ref 8.3–10.6)
CANNABINOID SCREEN URINE: NORMAL
CHLORIDE BLD-SCNC: 104 MMOL/L (ref 99–110)
CO2: 23 MMOL/L (ref 21–32)
COCAINE METABOLITE SCREEN URINE: NORMAL
CREAT SERPL-MCNC: 0.9 MG/DL (ref 0.6–1.2)
EOSINOPHILS ABSOLUTE: 0.2 K/UL (ref 0–0.6)
EOSINOPHILS RELATIVE PERCENT: 3 %
GFR AFRICAN AMERICAN: >60
GFR NON-AFRICAN AMERICAN: 60
GLUCOSE BLD-MCNC: 103 MG/DL (ref 70–99)
HCT VFR BLD CALC: 32.5 % (ref 36–48)
HEMOGLOBIN: 10 G/DL (ref 12–16)
HEMOGLOBIN: 10.3 G/DL (ref 12–16)
INR BLD: 1.1 (ref 0.86–1.14)
LV EF: 58 %
LVEF MODALITY: NORMAL
LYMPHOCYTES ABSOLUTE: 1.7 K/UL (ref 1–5.1)
LYMPHOCYTES RELATIVE PERCENT: 31 %
Lab: NORMAL
MCH RBC QN AUTO: 29.1 PG (ref 26–34)
MCHC RBC AUTO-ENTMCNC: 31.8 G/DL (ref 31–36)
MCV RBC AUTO: 91.6 FL (ref 80–100)
METAMYELOCYTES RELATIVE PERCENT: 1 %
METHADONE SCREEN, URINE: NORMAL
MONOCYTES ABSOLUTE: 0.3 K/UL (ref 0–1.3)
MONOCYTES RELATIVE PERCENT: 6 %
NEUTROPHILS ABSOLUTE: 3.4 K/UL (ref 1.7–7.7)
NEUTROPHILS RELATIVE PERCENT: 59 %
OPIATE SCREEN URINE: NORMAL
ORGANISM: ABNORMAL
OXYCODONE URINE: NORMAL
PDW BLD-RTO: 15.3 % (ref 12.4–15.4)
PH UA: 5
PHENCYCLIDINE SCREEN URINE: NORMAL
PHOSPHORUS: 2 MG/DL (ref 2.5–4.9)
PLATELET # BLD: 316 K/UL (ref 135–450)
PMV BLD AUTO: 6.4 FL (ref 5–10.5)
POTASSIUM REFLEX MAGNESIUM: 4.2 MMOL/L (ref 3.5–5.1)
POTASSIUM SERPL-SCNC: 4.2 MMOL/L (ref 3.5–5.1)
PROPOXYPHENE SCREEN: NORMAL
PROTHROMBIN TIME: 12.8 SEC (ref 10–13.2)
RBC # BLD: 3.55 M/UL (ref 4–5.2)
REPORT: NORMAL
RESPIRATORY PANEL PCR: NORMAL
SODIUM BLD-SCNC: 138 MMOL/L (ref 136–145)
URINE CULTURE, ROUTINE: ABNORMAL
WBC # BLD: 5.6 K/UL (ref 4–11)

## 2020-01-21 PROCEDURE — 97162 PT EVAL MOD COMPLEX 30 MIN: CPT

## 2020-01-21 PROCEDURE — 51798 US URINE CAPACITY MEASURE: CPT

## 2020-01-21 PROCEDURE — 95819 EEG AWAKE AND ASLEEP: CPT

## 2020-01-21 PROCEDURE — 6370000000 HC RX 637 (ALT 250 FOR IP): Performed by: INTERNAL MEDICINE

## 2020-01-21 PROCEDURE — 2580000003 HC RX 258: Performed by: PSYCHIATRY & NEUROLOGY

## 2020-01-21 PROCEDURE — 2580000003 HC RX 258: Performed by: INTERNAL MEDICINE

## 2020-01-21 PROCEDURE — 51701 INSERT BLADDER CATHETER: CPT

## 2020-01-21 PROCEDURE — 2500000003 HC RX 250 WO HCPCS: Performed by: INTERNAL MEDICINE

## 2020-01-21 PROCEDURE — 85018 HEMOGLOBIN: CPT

## 2020-01-21 PROCEDURE — 93306 TTE W/DOPPLER COMPLETE: CPT

## 2020-01-21 PROCEDURE — 99223 1ST HOSP IP/OBS HIGH 75: CPT | Performed by: INTERNAL MEDICINE

## 2020-01-21 PROCEDURE — 87086 URINE CULTURE/COLONY COUNT: CPT

## 2020-01-21 PROCEDURE — 80069 RENAL FUNCTION PANEL: CPT

## 2020-01-21 PROCEDURE — 36415 COLL VENOUS BLD VENIPUNCTURE: CPT

## 2020-01-21 PROCEDURE — 97530 THERAPEUTIC ACTIVITIES: CPT

## 2020-01-21 PROCEDURE — 85610 PROTHROMBIN TIME: CPT

## 2020-01-21 PROCEDURE — 1200000000 HC SEMI PRIVATE

## 2020-01-21 PROCEDURE — 85025 COMPLETE CBC W/AUTO DIFF WBC: CPT

## 2020-01-21 RX ORDER — FLUCONAZOLE 200 MG/1
200 TABLET ORAL DAILY
Status: DISCONTINUED | OUTPATIENT
Start: 2020-01-22 | End: 2020-01-21

## 2020-01-21 RX ORDER — LEVOTHYROXINE SODIUM 0.03 MG/1
25 TABLET ORAL DAILY
Status: CANCELLED | OUTPATIENT
Start: 2020-01-21

## 2020-01-21 RX ORDER — FLUCONAZOLE 200 MG/1
200 TABLET ORAL DAILY
Status: DISCONTINUED | OUTPATIENT
Start: 2020-01-22 | End: 2020-01-22 | Stop reason: HOSPADM

## 2020-01-21 RX ORDER — ASPIRIN 325 MG
650 TABLET ORAL EVERY 4 HOURS PRN
Status: ON HOLD | COMMUNITY
End: 2020-01-22 | Stop reason: HOSPADM

## 2020-01-21 RX ADMIN — Medication 10 ML: at 21:19

## 2020-01-21 RX ADMIN — POTASSIUM PHOSPHATE, MONOBASIC AND POTASSIUM PHOSPHATE, DIBASIC 20 MMOL: 224; 236 INJECTION, SOLUTION, CONCENTRATE INTRAVENOUS at 12:45

## 2020-01-21 RX ADMIN — POTASSIUM CHLORIDE 20 MEQ: 20 TABLET, EXTENDED RELEASE ORAL at 09:02

## 2020-01-21 RX ADMIN — Medication 10 ML: at 09:03

## 2020-01-21 RX ADMIN — DICLOFENAC 4 G: 10 GEL TOPICAL at 09:03

## 2020-01-21 ASSESSMENT — ENCOUNTER SYMPTOMS
WHEEZING: 0
NAUSEA: 0
VOMITING: 0
SHORTNESS OF BREATH: 0

## 2020-01-21 ASSESSMENT — PAIN SCALES - GENERAL
PAINLEVEL_OUTOF10: 0
PAINLEVEL_OUTOF10: 0

## 2020-01-21 NOTE — CONSULTS
organomegaly. Anal exam revealed non bleeding raw appearing external hemorrhoids. SRIDHAR was benign. No pain with examination. Extremities: atraumatic, no cyanosis or edema  Skin: warm and dry  Neuro: intact  AAOX3      Data Review:    Recent Labs     01/19/20  0919 01/20/20  0633 01/21/20  0620 01/21/20  1333   WBC 4.7 5.2 5.6  --    HGB 10.0* 9.8* 10.3* 10.0*   HCT 30.7* 30.4* 32.5*  --    MCV 90.1 90.3 91.6  --     293 316  --      Recent Labs     01/19/20  1544 01/20/20  0633 01/21/20  0620    136 138   K 3.6 3.4*  3.4* 4.2  4.2    104 104   CO2 21 23 23   PHOS 2.4* 2.3* 2.0*   BUN 24* 23* 20   CREATININE 1.1 1.0 0.9     Recent Labs     01/19/20  0918   AST 13*   ALT <5*   BILIDIR <0.2   BILITOT <0.2   ALKPHOS 62     No results for input(s): LIPASE, AMYLASE in the last 72 hours. Recent Labs     01/21/20  1024   PROTIME 12.8   INR 1.10     No results for input(s): PTT in the last 72 hours. No results for input(s): OCCULTBLD in the last 72 hours. Imaging Studies:                            Ultrasound:  N/A              CT-scan of abdomen and pelvis: N/A                 Assessment:     Principal Problem:    Acute renal failure (ARF) (HCC)  Active Problems:    Essential hypertension, benign    Acute cystitis with hematuria    Generalized weakness  Resolved Problems:    * No resolved hospital problems. *    BRBPR likely 2/2 to external hemorrhoids but with need to start [facility] for Afib, FH of colon cancer and no prior h/o colonoscopies will need to r/o any source of GI prior to starting [facility]. Recommendations:     - Recommend colonoscopy before start of AC for Afib  - Will discuss more with pt and family. - Will continue to follow    Pt will be discussed and staffed with aNs Antoine MD.    Thank you for the opportunity to participate in [facility] Yuriy Cordoba MD PGY1.         Given that she has   · family h/o colon cancer   · never had a colonoscopy   · Has rectal

## 2020-01-21 NOTE — PROGRESS NOTES
sounds: Normal heart sounds. Pulmonary:      Effort: Pulmonary effort is normal.      Breath sounds: Normal breath sounds. Abdominal:      General: Abdomen is flat. Bowel sounds are normal.      Palpations: Abdomen is soft. Musculoskeletal:         General: No swelling. Skin:     General: Skin is warm. Capillary Refill: Capillary refill takes less than 2 seconds. Neurological:      General: No focal deficit present. Mental Status: She is alert and oriented to person, place, and time. Psychiatric:         Mood and Affect: Mood normal.         LABS:    CBC:   Recent Labs     01/19/20  0919 01/20/20 0633 01/21/20 0620   WBC 4.7 5.2 5.6   HGB 10.0* 9.8* 10.3*   HCT 30.7* 30.4* 32.5*    293 316   MCV 90.1 90.3 91.6     Renal:    Recent Labs     01/19/20 0918 01/19/20  1544 01/20/20 0633 01/21/20 0620     --  137 136 138   K 3.5   < > 3.6 3.4*  3.4* 4.2  4.2     --  101 104 104   CO2 20*  --  21 23 23   BUN 26*  --  24* 23* 20   CREATININE 1.2  --  1.1 1.0 0.9   GLUCOSE 100*  --  120* 98 103*   CALCIUM 8.9  --  9.1 8.9 9.1   MG 2.00  --   --  1.90  --    PHOS  --   --  2.4* 2.3* 2.0*   ANIONGAP 15  --  15 9 11    < > = values in this interval not displayed. Hepatic:   Recent Labs     01/19/20 0918 01/19/20  1544 01/20/20 0633 01/21/20 0620   AST 13*  --   --   --    ALT <5*  --   --   --    BILITOT <0.2  --   --   --    BILIDIR <0.2  --   --   --    PROT 6.0*  --   --   --    LABALBU 3.0* 3.0* 2.6* 2.6*   ALKPHOS 62  --   --   --      Troponin: No results for input(s): TROPONINI in the last 72 hours. BNP: No results for input(s): BNP in the last 72 hours. Lipids: No results for input(s): CHOL, HDL in the last 72 hours. Invalid input(s): LDLCALCU, TRIGLYCERIDE  ABGs:  No results for input(s): PHART, NOC7ZDW, PO2ART, OPC1ZZS, BEART, THGBART, A7IREVXD, IFN7PDT in the last 72 hours. INR: No results for input(s): INR in the last 72 hours.   Lactate: No results for input(s): LACTATE in the last 72 hours. Cultures:  -----------------------------------------------------------------  RAD:   XR CHEST PORTABLE   Final Result   Impression: No evidence of pneumonia. MRI BRAIN WO CONTRAST   Final Result      No acute infarction, recent intracranial hemorrhage, or mass lesion. Mild diffuse atrophy and chronic small vessel ischemic change. 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 Minimal paranasal sinus inflammatory disease. XR CHEST STANDARD (2 VW)   Final Result      No acute consolidation                  CT HEAD WO CONTRAST   Final Result      No acute intracranial pathology        Atrophy and small vessel ischemic changes         FL LUMBAR PUNCTURE DIAG    (Results Pending)       Assessment/Plan:   Acute metabolic encephalopathy present on admission  Acute worsening, per family, patient was doing very well and is high neurological functioning until day of admission  Initial concern was dehydration but no signifant improvement with IVF and improvement in renal function  With barbiturate there will be depression of neuronal activity, enhanced saundra activity. No bradycardia on admission and only mild hypotension.  Also need to monitor for phenobarbital withdrawal -- delirium, agitation  Monitor for anticholenergic  - CT head done with no acute interval abnormalities  - Ammonia 46, Salicylate level 20, Tylenol level < 5  - With Fever 100.8 on 1/19/20 check Blood Cx  - EKG 12 lead to evaluate for arrhthymias, Qtc interval with concern for unintentional overdose for [place]  - Discussed with Poison control  - MRI - generalized cortical atrophy  - Neurology consulted for evaluation  - Check Hepatic panel  - Telemetry monitoring  - Will get EEG and LP today if no improvement per neurology     Complicated urinary tract infection or Bacteruria  - UA with greater than 100 WBC  - Urine culture - Normal laura  - Finished 3 day course of Rocephin  - Repeat urine

## 2020-01-21 NOTE — PROCEDURES
Name: Nena Locke  MRN: [id number]  : 1935  Interpreting Physician: Mickie Mckeon MD  Referring Physician: Audie Shahid MD  Date of EE/21      Clinical History:  Nena Locke is a 81 y.o. female with a reported history of encephalopathy who was referred for EEG. Current Antiepileptic Medications:    potassium phosphate IVPB  20 mmol Intravenous Once    sodium chloride flush  10 mL Intravenous 2 times per day    sodium chloride flush  10 mL Intravenous 2 times per day    [Held by provider] sodium bicarbonate  650 mg Oral BID       Indication:  encephalopathy    Technical Summary:  20 channels of EEG were recorded in a digital format on a patient who is reported to be awake and drowsy during the recording. The patient was not sleep deprived prior to the EEG. The recording revealed a background rhythm in the theta frequency range occasionally with PDR. There was quite a bit of artifact in the right temporal lobe. Photic stimulation showed an occipital driving response    During the recording stage II sleep  was not seen. The EKG lead revealed no rhythm abnormalties. EEG Interpretation:   The EEG was abnormal due to the presence of:     Mild Generalized slowing which is a non-specific finding consistent with a generalized disturbance of cerebral functioning including toxic, metabolic, or structural abnormalities that are multi-focal or diffuse. Clinical correlation is recommended.   The absence of epileptiform discharges on a single EEG does not rule out a diagnosis of  epilepsy or rule out non-convulsive or complex partial status epilepticus as a cause of altered mental status

## 2020-01-21 NOTE — CARE COORDINATION
Case Management Assessment           Daily Note                 Date/ Time of Note: 1/21/2020 2:03 PM         Note completed by: Dania Mondragon    Patient Name: Eddie Steward  YOB: 1935    Diagnosis:Acute renal failure (ARF) [facility]) [N17.9]  Patient Admission Status: Inpatient    Date of Admission:1/17/2020  9:32 PM Length of Stay: 4 GLOS:        Current Plan of Care:  Admitted  W/  Acute metabolic encephalopathy    neurology consulted:      EEG and LP today if no improvement per neurology  ________________________________________________________________________________________  PT AM-PAC: 11 / 24 per last evaluation on: 01/21/2020    OT AM-PAC: 16 / 24 per last evaluation on: .01/21/2020    DME Needs for discharge: defer to facility     ________________________________________________________________________________________  Discharge Plan: SNF: [facility]    Tentative discharge date: 1-32 days    Current barriers to discharge: medical clearance  /  Accepting facility     Referrals completed: SNF: [facility]    Resources/ information provided: SNF List  ________________________________________________________________________________________  Case Management Notes:TRAVIS  Spoke with son  Re:  D/C planning;  Still pending  Determination if  [facility] can  Accept  ,  CM called and left  VM with [facility]  To follow up on referral made  Yesterday , awaiting retrn nishi.  Son aware :  [facility] anticipating acceptance:  Document ID: [id number]     Cont to follow. Electronically signed by Dania Mondragon RN on 1/21/2020 at 2:06 PM     UPDATE:  CM  Received  Call back from [address] [place] [address]  [phone]   Who is able to accept the pt   And  Will come  Skilled  But  Will stay in   LTC area  Upstairs  ,  D/w  Pt and her  Son,  And  Agreeable. HENS  Submitted  : no pre cert  Needed  Pending  Medical  Clearance  :    Requested  [facility]  Transport.      Electronically signed by Dania Mondragon RN

## 2020-01-21 NOTE — PROGRESS NOTES
edema, Screening mammogram, Urinary retention, and Vaginal candidiasis. has a past surgical history that includes Appendectomy; eye surgery; and hip pinning (Left, 9/18/2019). Restrictions  Position Activity Restriction  Other position/activity restrictions: Up w/ Assist     Vision/Hearing  Vision: Impaired  Vision Exceptions: Wears glasses at all times  Hearing: Within functional limits       Subjective  General  Chart Reviewed: Yes  Additional Pertinent Hx: Pt admitted 1/17/20 for RONNY/UTI. PMHx: Osteopenia, OA, Edema. CT head (-). MRI brain unremarkable. EEG order. Lumbar puncture order. Family / Caregiver Present: No  Diagnosis: RONNY/UTI  Follows Commands: Within Functional Limits(w max encouragement)  Subjective  Subjective: Pt found sleeping in L sidelying. Pt agreeable to PT after max encouragement. \"Is there anyone else you can torture? I'm tired\"  Pain Screening  Patient Currently in Pain: Denies         Orientation  Orientation  Overall Orientation Status: Impaired  Orientation Level: Oriented to person;Oriented to situation;Disoriented to place; Disoriented to time(reported it's Feb 2020, reported she was at [facility] initially - able to identify she was at [facility] after cued she was at a hospital)     Social/Functional History  Social/Functional History  Lives With: Alone  Type of Home: Apartment([place] at the [facility])  Home Layout: One level  Home Access: Level entry, Elevator  Bathroom Shower/Tub: Walk-in shower  Bathroom Toilet: Standard  Bathroom Equipment: Grab bars in shower, Grab bars around toilet  Bathroom Accessibility: [facility]: Rolling walker, [employer], Alert Button, Grab bars(doesn't like wearing alert button)  ADL Assistance: Independent(doesn't get into shower 2* wound on back; spongebathing - indepently; dressing - independent)  [address]: (has room service delivered to room;  1 hr/day (cleans, washes sheets); does own laundry - in apartment)  Ambulation Assistance: Independent(using wh walker)  Transfer Assistance: Independent  Active : (hasn't driven recently - has a car)       Objective          AROM RLE (degrees)  RLE AROM: WFL  AROM LLE (degrees)  LLE AROM : WFL     Strength RLE  Strength RLE: grossly 3+/5  Strength LLE  Strength LLE: grossly 3+/5        Bed mobility  Rolling to Left: Minimal assistance(cued to reach across and pull on bedside rail; cued to bend knee and push through LE; performed x2 w min A x1)  Rolling to Right: Minimal assistance(cued to reach across and pull on bedside rail; cued to bend knee and push through LE; performed x2 w min A x1)  Supine to Sit: Moderate assistance(asked for assist; slow and effortful; required cues to lower LE over EOB and push with UE to sit; performed x2 with mod A x1)  Sit to Supine: Stand by assistance(slow and effortful; used UE to assist picking up LE to bed)  Scooting: Stand by assistance(demo supine scoot and seated scoot)     Transfers  Sit to Stand: Maximum/Moderate Assistance(required cues to push off bed/chair; performed x2)  Transfer #1:  From EOB, pt cued to push off bed and grab on to walker, pt required max A x1 to stand, unable to stand w/o forward flexed posture or leaning on EOB despite max cueing, pt asked to sit after 10 sec  Transfer #2:  From chair, pt cued to push off chair, pt required mod A x1 to stand, pt able to tolerate standing for 5 sec w no UE assist and  mod A from therapist   Stand to Sit: Moderate Assistance(performed x2; required cues to reach back for bed/chair; needed mod A x1 for controlled descent)  Stand Pivot Transfers: Maximum Assistance(had to be cued to let go of bedside rail and reach for armrest of chair; max A x1)        Balance  Posture: Fair(forward lean, FHP)  Sitting - Static: Fair (able to maintain sitting in midline)  Sitting - Dynamic: Fair (able to maintain sitting in midline)  Standing - Static: Poor(forward lean; required mod-max

## 2020-01-21 NOTE — PROGRESS NOTES
Nephrology Consult Note                                                                                                                                                                                                                                                                                                                                                               Office : [phone]     Fax :[phone]              Patient's Name: Lindsay Melendrez    Feels better   Ate better   Good UO   Cr better   Phos low           Past Medical History:   Diagnosis Date    Anterior corneal dystrophy     Asymptomatic postmenopausal status (age-related) (natural)     Cervical polyp     Dry eye syndrome     Edema     Fuch's endothelial dystrophy     Gout     IBS (irritable bowel syndrome)     Osteoarthritis     Osteopenia     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    Pedal edema 5/8/2017    Screening mammogram     Urinary retention 9/21/2019    Vaginal candidiasis        Past Surgical History:   Procedure Laterality Date    APPENDECTOMY      EYE SURGERY      Cataract Surgery    HIP PINNING Left 9/18/2019    LEFT HIP GAMMA NAIL performed by Brandon Richard MD at [facility] OR       Family History   Problem Relation Age of Onset    Breast Cancer Daughter         [facility]    Ovarian Cancer Daughter         reports that she has never smoked. She has never used smokeless tobacco. She reports that she does not drink alcohol or use drugs.     Allergies:  Lisinopril and Clindamycin/lincomycin    Current Medications:    sodium chloride flush 0.9 % injection 10 mL, 2 times per day  sodium chloride flush 0.9 % injection 10 mL, PRN  potassium chloride (KLOR-CON M) extended release tablet 20 mEq, BID WC  perflutren lipid microspheres (DEFINITY) injection 1.65 mg, ONCE PRN  sodium chloride flush 0.9 % injection 10 mL, 2 times per day  sodium chloride flush 0.9 % injection 10 mL, PRN  magnesium hydroxide (MILK OF MAGNESIA) 400 MG/5ML 1.025   BLOODU LARGE*   PHUR 6.5   PROTEINU 100*   UROBILINOGEN 0.2   NITRU Negative   LEUKOCYTESUR MODERATE*   LABMICR YES   URINETYPE NotGiven      Urine Microscopic:   Recent Labs     01/20/20  1348   BACTERIA 1+*   COMU see below   WBCUA *   RBCUA 5-10*   EPIU 0-2     Urine Culture:   No results for input(s): LABURIN in the last 72 hours. Urine Chemistry:   Recent Labs     01/19/20  0939   LABCREA 70.1             IMAGING:  XR CHEST PORTABLE   Final Result   Impression: No evidence of pneumonia. MRI BRAIN WO CONTRAST   Final Result      No acute infarction, recent intracranial hemorrhage, or mass lesion. Mild diffuse atrophy and chronic small vessel ischemic change. 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 Minimal paranasal sinus inflammatory disease. XR CHEST STANDARD (2 VW)   Final Result      No acute consolidation                  CT HEAD WO CONTRAST   Final Result      No acute intracranial pathology        Atrophy and small vessel ischemic changes         FL LUMBAR PUNCTURE DIAG    (Results Pending)       Assessment/Plan   1. UTI     2. RONNY     3. Heavy ASA/NSAID use     4. Acid- base/ Electrolyte imbalance     5. Hearing issue - recent development - ? ASA ototoxicity     6.  RTA     Plan   - d/c IVF   - cr better   - replace K and phos   - Bicarb better - off sod bicarb   - Ur studies - proteinuria   - abx for UTI   - No NSAIDs   - D/w family  - D/w Hospitalist                 Thank you for allowing us to participate in care of [address] free to contact me   [facility] [address]  Office : [phone]  Fax :[phone]

## 2020-01-21 NOTE — PROGRESS NOTES
Pain: Pt denies any pain this shift.  Continued to monitor pain and provide pain relieving measures as necessary  Neuro: A&Ox4  Heent: WDL  Cardio: tele monitoring, New onset A fib  Respiratory: Clear but diminished, room air  GI/: Continent of B&B, bladder scanned and SC pt per order  Mobility:SB  assist x 1 with walker  Transfers: SB  assist x 1  Skin: dry, tunneled PU on coccyx  Lines/drains: 22g right FA  No fluids at this time

## 2020-01-21 NOTE — PROGRESS NOTES
Neurology Progress Note  Seen for encephalopathy    Updates:  - More alert, oriented, and interactive today    ROS:  Review of Systems   Respiratory: Negative for shortness of breath and wheezing. Gastrointestinal: Negative for nausea and vomiting. Neurological: Positive for weakness. Negative for headaches. Exam:  Blood pressure 131/89, pulse 101, temperature 98 °F (36.7 °C), temperature source Oral, resp. rate 16, height 5' 1\" (1.549 m), weight 117 lb 4.6 oz (53.2 kg), SpO2 97 %, not currently breastfeeding. Constitutional                          Vital signs: BP, HR, and RR reviewed            General Alert, no distress, well-nourished  Eyes: Unable to visualize the fundi  Cardiovascular: pulses symmetric in all 4 extremities. No peripheral edema. Psychiatric: cooperative with examination, no  psychotic behavior noted. Neurologic  Mental status: Eyes open spontaneously  orientation to person, place, month, year              General fund of knowledge grossly intact. Able to name current president              Memory Grossly intact, able to name 1st president              Attention Intact, attends well to exam, able to read the clock and calculate coins              Language fluent in conversation              Comprehension intact; follows simple commands including 2-step commands crossing midline  Cranial nerves:   CN2: Visual Fields full on confrontational testing  CN 3,4,6: extraocular muscles intact  CN5: facial sensation symmetric   CN7:face symmetric without dysarthria  CN8: hearing grossly intact  CN9: palate elevated symmetrically  CN11: trap full strength on shoulder shrug  CN12: tongue midline with protrusion  Strength: BUEs 4+/5, BLEs 4/5  Sensory: light touch intact in all 4 extremities.     Cerebellar/coordination: finger nose finger without ataxia   Tone: normal in all 4 extremities   Gait: deferred for safety       Labs:  BUN: 20  Creatinine:  0.9  Glucose: 103  CK: 47  Ammonia: 46  WBC: 5.6    UA:   Ref. Range 1/20/2020 13:48   Nitrite, Urine Latest Ref Range: Negative  Negative   Leukocyte Esterase, Urine Latest Ref Range: Negative  MODERATE (A)     Studies:  MRI with generalized cortical atrophy but minimal white matter disease    Routine EEG  The EEG was abnormal due to the presence of:   Mild Generalized slowing which is a non-specific finding   consistent with a generalized disturbance of cerebral functioning   including toxic, metabolic, or structural abnormalities that are   multi-focal or diffuse. Impression:  1. Acute encephalopathy  2. UTI  3. RONNY    Lashae Galvez is a 81 y.o. female who presented with acute encephalopathy. Exam improving with treatment of UTI and RONNY. MRI is reassuring. Routine EEG unrevealing for epileptiform discharges. Very likely metabolic encephalopathy from combination of renal failure, UTI, possibly medications, and less likely thyroid. Cannot entirely rule out CNS infection or non-convulsive seizures but think less likely. Recommendations:  - Given improvement in encephalopathy, will continue to hold off on LP for today.  If she fails to continue to improve, or were to worsen, would then pursue LP tomorrow  - Please continue to hold subQ Heparin for now given possibility of LP tomorrow  - SCD's for DVT prophylaxis         A copy of this note was provided for MD Jaylan Hou [address] Neurology

## 2020-01-22 ENCOUNTER — APPOINTMENT (OUTPATIENT)
Dept: GENERAL RADIOLOGY | Age: 85
DRG: 757 | End: 2020-01-22
Payer: MEDICARE

## 2020-01-22 VITALS
HEART RATE: 105 BPM | WEIGHT: 117.28 LBS | TEMPERATURE: 99.6 F | BODY MASS INDEX: 22.14 KG/M2 | RESPIRATION RATE: 16 BRPM | SYSTOLIC BLOOD PRESSURE: 101 MMHG | DIASTOLIC BLOOD PRESSURE: 61 MMHG | OXYGEN SATURATION: 95 % | HEIGHT: 61 IN

## 2020-01-22 LAB
ALBUMIN SERPL-MCNC: 2.8 G/DL (ref 3.4–5)
ANION GAP SERPL CALCULATED.3IONS-SCNC: 12 MMOL/L (ref 3–16)
BASOPHILS ABSOLUTE: 0 K/UL (ref 0–0.2)
BASOPHILS RELATIVE PERCENT: 0.8 %
BUN BLDV-MCNC: 18 MG/DL (ref 7–20)
CALCIUM SERPL-MCNC: 8.9 MG/DL (ref 8.3–10.6)
CHLORIDE BLD-SCNC: 100 MMOL/L (ref 99–110)
CO2: 22 MMOL/L (ref 21–32)
CREAT SERPL-MCNC: 0.8 MG/DL (ref 0.6–1.2)
EOSINOPHILS ABSOLUTE: 0.3 K/UL (ref 0–0.6)
EOSINOPHILS RELATIVE PERCENT: 6.3 %
GFR AFRICAN AMERICAN: >60
GFR NON-AFRICAN AMERICAN: >60
GLUCOSE BLD-MCNC: 104 MG/DL (ref 70–99)
HCT VFR BLD CALC: 31.9 % (ref 36–48)
HEMOGLOBIN: 10.4 G/DL (ref 12–16)
LYMPHOCYTES ABSOLUTE: 1.1 K/UL (ref 1–5.1)
LYMPHOCYTES RELATIVE PERCENT: 20.4 %
MCH RBC QN AUTO: 29 PG (ref 26–34)
MCHC RBC AUTO-ENTMCNC: 32.6 G/DL (ref 31–36)
MCV RBC AUTO: 88.9 FL (ref 80–100)
MONOCYTES ABSOLUTE: 0.6 K/UL (ref 0–1.3)
MONOCYTES RELATIVE PERCENT: 10.4 %
NEUTROPHILS ABSOLUTE: 3.4 K/UL (ref 1.7–7.7)
NEUTROPHILS RELATIVE PERCENT: 62.1 %
ORGANISM: ABNORMAL
PDW BLD-RTO: 14.6 % (ref 12.4–15.4)
PHOSPHORUS: 2.2 MG/DL (ref 2.5–4.9)
PLATELET # BLD: 332 K/UL (ref 135–450)
PMV BLD AUTO: 6.7 FL (ref 5–10.5)
POTASSIUM REFLEX MAGNESIUM: 4.3 MMOL/L (ref 3.5–5.1)
POTASSIUM SERPL-SCNC: 4.3 MMOL/L (ref 3.5–5.1)
RBC # BLD: 3.59 M/UL (ref 4–5.2)
SODIUM BLD-SCNC: 134 MMOL/L (ref 136–145)
URINE CULTURE, ROUTINE: ABNORMAL
WBC # BLD: 5.5 K/UL (ref 4–11)

## 2020-01-22 PROCEDURE — 2580000003 HC RX 258: Performed by: PSYCHIATRY & NEUROLOGY

## 2020-01-22 PROCEDURE — 6370000000 HC RX 637 (ALT 250 FOR IP): Performed by: INTERNAL MEDICINE

## 2020-01-22 PROCEDURE — 36415 COLL VENOUS BLD VENIPUNCTURE: CPT

## 2020-01-22 PROCEDURE — 99232 SBSQ HOSP IP/OBS MODERATE 35: CPT | Performed by: INTERNAL MEDICINE

## 2020-01-22 PROCEDURE — 6370000000 HC RX 637 (ALT 250 FOR IP): Performed by: STUDENT IN AN ORGANIZED HEALTH CARE EDUCATION/TRAINING PROGRAM

## 2020-01-22 PROCEDURE — 80069 RENAL FUNCTION PANEL: CPT

## 2020-01-22 PROCEDURE — 85025 COMPLETE CBC W/AUTO DIFF WBC: CPT

## 2020-01-22 RX ORDER — POLYETHYLENE GLYCOL 3350 17 G/17G
17 POWDER, FOR SOLUTION ORAL DAILY
Qty: 1530 G | Refills: 1 | Status: SHIPPED | OUTPATIENT
Start: 2020-01-22 | End: 2020-02-21

## 2020-01-22 RX ORDER — FLUCONAZOLE 200 MG/1
200 TABLET ORAL DAILY
Qty: 7 TABLET | Refills: 0 | Status: SHIPPED | OUTPATIENT
Start: 2020-01-23 | End: 2020-01-30

## 2020-01-22 RX ORDER — ACETAMINOPHEN 500 MG
500 TABLET ORAL 4 TIMES DAILY PRN
Qty: 360 TABLET | Refills: 1 | Status: SHIPPED | OUTPATIENT
Start: 2020-01-22

## 2020-01-22 RX ADMIN — Medication 10 ML: at 10:53

## 2020-01-22 RX ADMIN — APIXABAN 2.5 MG: 2.5 TABLET, FILM COATED ORAL at 14:43

## 2020-01-22 RX ADMIN — DICLOFENAC 4 G: 10 GEL TOPICAL at 11:03

## 2020-01-22 RX ADMIN — FLUCONAZOLE 200 MG: 200 TABLET ORAL at 06:02

## 2020-01-22 ASSESSMENT — PAIN SCALES - GENERAL
PAINLEVEL_OUTOF10: 0

## 2020-01-22 NOTE — DISCHARGE SUMMARY
Pt d/c to facility. IV removed. Tele removed. D/c instructions and new medications/side effects explained to pt and family. Pt verbalized understanding with no further questions. Left floor via stretcher with all personal belongings.

## 2020-01-22 NOTE — DISCHARGE INSTR - COC
Continuity of Care Form    Patient Name: Cristina Thomason   :  1935  MRN:  [id number]    [facility] date:  2020  Discharge date:  20      Code Status Order: DNR-CCA   Advance Directives:   [facility] Documentation     Date/Time Healthcare Directive Type of Healthcare Directive Copy in [address] Agent's Name Healthcare Agent's Phone Number    20 [phone]  Yes, patient has an advance directive for healthcare treatment  Durable power of  for health care  No, copy requested from family  [facility] power of   Corin Peterson  --          Admitting Physician:  Tariq Lovell MD  PCP: Parvin Bowles MD    Discharging Nurse: [facility] Unit/Room#: 0187/3724-91  Discharging Unit Phone Number: ***    Emergency Contact:   Extended Emergency Contact Information  Primary Emergency Contact: [facility] [address] [place] [address] Phone: [phone]  Relation: Other  Secondary Emergency Contact: [place] [address] Phone: [phone]  Relation: Other    Past Surgical History:  Past Surgical History:   Procedure Laterality Date    APPENDECTOMY      EYE SURGERY      Cataract Surgery    HIP PINNING Left 2019    LEFT HIP GAMMA NAIL performed by Florentin Badillo MD at [address]       Immunization History:   Immunization History   Administered Date(s) Administered    Influenza, High Dose (Fluzone 65 yrs and older) 2013, 2015, 2016, 2018, 2019    Pneumococcal Conjugate 13-valent (Karle Backer) 2015    Pneumococcal Polysaccharide (Fxnaxrktg53) 2009    Zoster Live (Zostavax) 2008       Active Problems:  Patient Active Problem List   Diagnosis Code    Osteoarthritis M19.90    IBS (irritable bowel syndrome) K58.9    Gout M10.9    Dry eye syndrome H04.129    Osteopenia M85.80    Essential hypertension, benign I10    Symptomatic menopausal or female climacteric states N95.1    Glucose intolerance (impaired glucose tolerance) R73.02    Primary osteoarthritis of both knees M17.0    Hormone replacement therapy Z79.890    Hip fracture requiring operative repair, left, closed, initial encounter ([facility] [address] S72.002A    Anemia D64.9    Acute cystitis with hematuria N30.01    Acute deep vein thrombosis (DVT) of left lower extremity after procedure ([facility]) I97.89, I82.402    Inguinal hernia, right K40.90    Acute renal failure (ARF) ([facility]) N17.9    Generalized weakness R53.1    Fever R50.9       Isolation/Infection:   Isolation          No Isolation        Patient Infection Status     None to display          Nurse Assessment:  Last Vital Signs: /61   Pulse 105   Temp 99.6 °F (37.6 °C) (Axillary)   Resp 16   Ht 5' 1\" (1.549 m)   Wt 117 lb 4.6 oz (53.2 kg)   SpO2 95%   BMI 22.16 kg/m²     Last documented pain score (0-10 scale): Pain Level: 0  Last Weight:   Wt Readings from Last 1 Encounters:   01/18/20 117 lb 4.6 oz (53.2 kg)     Mental Status:  oriented and alert    IV Access:  - None    Nursing Mobility/ADLs:  Walking   Assisted  Transfer  Assisted  Bathing  Assisted  Dressing  Assisted  Toileting  Assisted  Feeding  Assisted  Med Admin  Assisted  Med Delivery   whole    Wound Care Documentation and Therapy:  Wound 01/18/20 Coccyx Mid healing tunneled wound (Active)   Wound Pressure Unstageable 1/18/2020  4:47 AM   Dressing Changed Changed/New 1/21/2020  9:05 AM   Dressing/Treatment Open to air 1/22/2020  6:03 AM   Drainage Amount None 1/22/2020  6:03 AM   Number of days: 4        Elimination:  Continence:   · Bowel: No  · Bladder: No  Urinary Catheter: None   Colostomy/Ileostomy/Ileal Conduit: No       Date of Last BM: 1/22    Intake/Output Summary (Last 24 hours) at 1/22/2020 1244  Last data filed at 1/22/2020 0603  Gross per 24 hour   Intake 120 ml   Output 400 ml   Net -280 ml     I/O last 3 completed shifts:   In: 360 [P.O.:360]  Out: 400 [Urine:400]    Safety Nursing Facility for greater 30 days.      Update Admission H&P: No change in H&P    PHYSICIAN SIGNATURE:  Electronically signed by Gonsalo Munoz MD on 1/22/20 at 12:59 PM

## 2020-01-22 NOTE — DISCHARGE SUMMARY
Fluconazole for the yeast in her UA. Fevers resolved. Patient also had GI evaluation for BRBPR. Colonoscopy was offered, but patient declined. Patient remained AO x 4 throughout admission and was discharged to a SNF. Physical Exam Performed:     /61   Pulse 105   Temp 99.6 °F (37.6 °C) (Axillary)   Resp 16   Ht 5' 1\" (1.549 m)   Wt 117 lb 4.6 oz (53.2 kg)   SpO2 95%   BMI 22.16 kg/m²       Physical Exam  Constitutional:       Appearance: Normal appearance. HENT:      Head: Normocephalic. Mouth/Throat:      Mouth: Mucous membranes are moist.   Eyes:      Extraocular Movements: Extraocular movements intact. Pupils: Pupils are equal, round, and reactive to light. Cardiovascular:      Rate and Rhythm: Normal rate and regular rhythm. Pulses: Normal pulses. Heart sounds: Normal heart sounds. Pulmonary:      Effort: Pulmonary effort is normal.      Breath sounds: Normal breath sounds. Abdominal:      General: Abdomen is flat. Bowel sounds are normal.      Palpations: Abdomen is soft. Musculoskeletal:         General: No swelling. Skin:     General: Skin is warm. Capillary Refill: Capillary refill takes less than 2 seconds. Neurological:      General: No focal deficit present. Mental Status: She is alert and oriented to person, place, and time. Psychiatric:         Mood and Affect: Mood normal.       Significant Diagnostic Studies    Labs:   For convenience and continuity at follow-up the following most recent labs are provided:    CBC:    Lab Results   Component Value Date    WBC 5.5 01/22/2020    HGB 10.4 01/22/2020    HCT 31.9 01/22/2020     01/22/2020       Renal:    Lab Results   Component Value Date     01/22/2020    K 4.3 01/22/2020    K 4.3 01/22/2020     01/22/2020    CO2 22 01/22/2020    BUN 18 01/22/2020    CREATININE 0.8 01/22/2020    CALCIUM 8.9 01/22/2020    PHOS 2.2 01/22/2020       Radiology:   XR CHEST PORTABLE   Final Result Impression: No evidence of pneumonia. MRI BRAIN WO CONTRAST   Final Result      No acute infarction, recent intracranial hemorrhage, or mass lesion. Mild diffuse atrophy and chronic small vessel ischemic change. 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 Minimal paranasal sinus inflammatory disease.       XR CHEST STANDARD (2 VW)   Final Result      No acute consolidation                  CT HEAD WO CONTRAST   Final Result      No acute intracranial pathology        Atrophy and small vessel ischemic changes         FL LUMBAR PUNCTURE DIAG    (Results Pending)          Consults:     IP CONSULT TO HOSPITALIST  IP CONSULT TO OTOLARYNGOLOGY  IP CONSULT TO NEUROLOGY  IP CONSULT TO INFECTIOUS DISEASES  IP CONSULT TO PHARMACY  IP CONSULT TO GI        Condition at Discharge: Stable    Discharge Instructions/Follow-up:   See dc instructions on chart      Activity: activity as tolerated    Diet: DIET GENERAL;       Discharge Medications:        Medication List      START taking these medications    acetaminophen 500 MG tablet  Commonly known as:  TYLENOL  Take 1 tablet by mouth 4 times daily as needed for Pain     apixaban 2.5 MG Tabs tablet  Commonly known as:  ELIQUIS  Take 1 tablet by mouth 2 times daily  Notes to patient:  Apixaban (Eliquis)  USE--Prevents blood clots and strokes (slows blood clotting time-- \"blood thinner\")  SIDE EFFECTS--  Bleeding or bruising more easily, upset stomach  Blood thinner-- refer to drug information handout     fluconazole 200 MG tablet  Commonly known as:  DIFLUCAN  Take 1 tablet by mouth daily for 7 days  Start taking on:  January 23, 2020     polyethylene glycol powder  Commonly known as:  GLYCOLAX  Take 17 g by mouth daily        CONTINUE taking these medications    alendronate 70 MG tablet  Commonly known as:  Fosamax  Take 1 tablet by mouth every 7 days Take with 8 ounces water, do not lie down for 30 minutes after taking it        STOP taking these

## 2020-01-22 NOTE — CARE COORDINATION
Case Management Assessment           Daily Note                 Date/ Time of Note: 1/22/2020 10:27 AM         Note completed by: Malik Solis    Patient Name: Damian Mccartney  YOB: 1935    Diagnosis:Acute renal failure (ARF) [facility]) [N17.9]  Patient Admission Status: Inpatient    Date of Admission:1/17/2020  9:32 PM Length of Stay: 5 GLOS:        Current Plan of Care:  Admitted  W/  Acute metabolic encephalopathy    neurology consulted:      EEG and LP today if no improvement per neurology  ________________________________________________________________________________________  PT AM-PAC: 11 / 24 per last evaluation on: 01/21/2020    OT AM-PAC: 16 / 24 per last evaluation on: .01/21/2020    DME Needs for discharge: defer to facility     ________________________________________________________________________________________  Discharge Plan: [facility]: [facility]    Tentative discharge date: 1-2 days    Current barriers to discharge: medical clearance  /  Referrals completed: SNF: [facility]    Resources/ information provided: [facility] List  ________________________________________________________________________________________  Case Management Notes:TRAVIS  Spoke with son  Re:  D/C planning;  [facility]  Can  Accept  :  Pending medical clearance  : Will need  Medical transport ,  KAILEY  Submitted anticipating acceptance:  Document ID: [id number] No pre cert  Needed      Cont to follow. TRAVIS  [facility] [address] [facility], [place] [address]      REPORT Phone: [phone]     will transfer tot he  LEIGHANN Barrow Fax: [phone]          TRAVIS  Spoke w/ Yissel Carrillo  Who perfect served the MD  If Planning d/c today . Transport  Arranged  for  1630   Dr Miguelangel Sterling  Will inform  Us  If  Ready to D/C today .        Electronically signed by Malik Solis RN on 1/22/2020 at 10:27 AM   [facility] BRISA and her family were provided with choice of provider; she and her family are in agreement with the discharge

## 2020-01-22 NOTE — PROGRESS NOTES
distress  Head: Normocephalic, without obvious abnormality, atraumatic  Neck: supple, symmetrical, trachea midline and thyroid not enlarged, symmetric, no tenderness/mass/nodules  CVS:  RRR, Nl s1s2  Lungs CTA Bilaterally, normal effort  Abdomen: soft, non-tender; bowel sounds normal; no masses,  no organomegaly  AAOx3, No asterixis or encephalopathy  Extremities: No edema. Recent Labs     01/20/20  0633 01/21/20  0620 01/21/20  1333 01/22/20  0645   WBC 5.2 5.6  --  5.5   HGB 9.8* 10.3* 10.0* 10.4*   HCT 30.4* 32.5*  --  31.9*   MCV 90.3 91.6  --  88.9    316  --  332     Recent Labs     01/20/20  0633 01/21/20  0620 01/22/20  0645    138 134*   K 3.4*  3.4* 4.2  4.2 4.3  4.3    104 100   CO2 23 23 22   PHOS 2.3* 2.0* 2.2*   BUN 23* 20 18   CREATININE 1.0 0.9 0.8     No results for input(s): AST, ALT, ALB, BILIDIR, BILITOT, ALKPHOS in the last 72 hours. No results for input(s): LIPASE, AMYLASE in the last 72 hours. Recent Labs     01/21/20  1024   INR 1.10     No results for input(s): PTT in the last 72 hours. No results for input(s): OCCULTBLD in the last 72 hours. Radiology review: No new Rad. Assessment:       Principal Problem:    Acute renal failure (ARF) (HCC)  Active Problems:    Essential hypertension, benign    Acute cystitis with hematuria    Generalized weakness    Fever  Resolved Problems:    * No resolved hospital problems. *    BRBPR likely 2/2 to external hemorrhoids but with need to start [facility] for Afib, FH of colon cancer and no prior h/o colonoscopies will need to r/o any source of GI prior to starting [facility]. Recommendations:       - No colonoscopy at this time since pt is refusing  - Pt can get colonoscopy outpt if she eventually agrees  - Will sign off. Re contact if we can be of further assistance.       Pt will be discussed and staffed with Duke Isaac MD.    Jefe Palmer MD. PGY1  1/22/2020  11:21 AM

## 2020-01-22 NOTE — PROGRESS NOTES
Neurology Follow Up  Note    Updates:  Seen for confusion. She is alert and oriented. No changes. ROS:  Constitutional: denies fatigue, malaise  Eyes: denies any vision changes,  Musculoskeletal: denies any pain or decreased ROM, no functional deficit. Neurological: denies headache, numbness or tingling, speech problems. Exam:  Blood pressure 125/79, pulse 103, temperature 98.1 °F (36.7 °C), temperature source Oral, resp. rate 16, height 5' 1\" (1.549 m), weight 117 lb 4.6 oz (53.2 kg), SpO2 96 %, not currently breastfeeding. Constitutional    Vital signs: BP, HR, and RR reviewed   General Sleeping but opens eyes to verbal stimuli, no distress, well-nourished  Eyes: fundoscopic exam not visualized. .   Cardiovascular: pulses symmetric in all 4 extremities. No peripheral edema. Psychiatric: cooperative with examination, no  psychotic behavior noted. Neurologic  Mental status:   orientation to person, place and time. General fund of knowledge grossly intact   Memory grossly intact   Attention intact as able to attend well to the exam     Language fluent in conversation   Comprehension intact; follows simple commands  Cranial nerves:   CN2: Visual Fields full to threat,   CN 3,4,6: extraocular muscles intact, PERRL  CN5: facial sensation symmetric   CN7:face symmetric without dysarthria,   CN8: hearing grossly intact  CN9: palate elevated symmetrically  CN11: trap full strength on shoulder shrug  CN12: tongue midline with protrusion  Strength: ALBER upper and lower extremities 4/5. Sensory: light touch intact in all 4 extremities. Cerebellar/coordination: finger nose finger normal without ataxia  Tone: normal in all 4 extremities  Gait: deferred due to safety concerns.        Labs:    CBC:   Lab Results   Component Value Date    WBC 5.5 01/22/2020    RBC 3.59 01/22/2020    HGB 10.4 01/22/2020    HCT 31.9 01/22/2020    MCV 88.9 01/22/2020    MCH 29.0 01/22/2020    MCHC 32.6 01/22/2020    RDW 14.6 2020     2020    MPV 6.7 2020     BMP:    Lab Results   Component Value Date     2020    K 4.3 2020    K 4.3 2020     2020    CO2 22 2020    BUN 18 2020    LABALBU 2.8 2020    CREATININE 0.8 2020    CALCIUM 8.9 2020    GFRAA >60 2020    GFRAA >60 2013    LABGLOM >60 2020    GLUCOSE 104 2020     INR:   Recent Labs     20  1024   INR 1.10       Recent Labs     20  1544   T4FREE 0.5*   X9COYNQ 0.25*       Studies:  XR CHEST PORTABLE   Final Result   Impression: No evidence of pneumonia. MRI BRAIN WO CONTRAST   Final Result      No acute infarction, recent intracranial hemorrhage, or mass lesion. Mild diffuse atrophy and chronic small vessel ischemic change. 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 Minimal paranasal sinus inflammatory disease. XR CHEST STANDARD (2 VW)   Final Result      No acute consolidation                  CT HEAD WO CONTRAST   Final Result      No acute intracranial pathology        Atrophy and small vessel ischemic changes         FL LUMBAR PUNCTURE DIAG    (Results Pending)         EE19  The EEG was abnormal due to the presence of:      Mild Generalized slowing which is a non-specific finding consistent with a generalized disturbance of cerebral functioning including toxic, metabolic, or structural abnormalities that are multi-focal or diffuse.     Clinical correlation is recommended.   The absence of epileptiform discharges on a single EEG does not rule out a diagnosis of  epilepsy or rule out non-convulsive or complex partial status epilepticus as a cause of altered mental status      Scheduled Meds:   fluconazole  200 mg Oral Daily    sodium chloride flush  10 mL Intravenous 2 times per day    sodium chloride flush  10 mL Intravenous 2 times per day    [Held by provider] sodium bicarbonate  650 mg Oral BID Impression:  Paula Velásquez is a 81 y.o. female who presented with pretty acute encephalopathy. Exam with some inattention but otherwise okay. MRI is reassuring. Very likely metabolic encephalopathy from combination of renal failure, UTI, possibly medications, and less likely thyroid. Her EEG with no epileptogenic features. She has improved in the last several days and remains oriented at this time. Recommendations:  --May discontinue lumbar puncture for CSF analysis.   -No further neurological work-up or recommendations.  -Continue all current corrective and supportive measures        A copy of this note was provided for MD Clementina Johnson, APRN-[address] Neuroscience  [phone]  Evenings, weekends, and off weeks please discuss with the covering neurologist.

## 2020-01-22 NOTE — PROGRESS NOTES
ID Follow-up NOTE  Medical Student note - reviewed and modified, see Attending addendum at bottom    CC:   Fever, persistent pyuria, past bacturia/funguria   Antifungals: Fluconazole    Admit Date: 1/17/2020  Hospital Day: 6    Subjective:     Patient is doing well overnight. Denies any new complaints. She does still have urinary urgency without frequency or pain on urination. Denies Headache, rhinorrhea, sore throat, shortness of breath, chest pain, nausea, vomiting, diarrhea, constipation, dysuria, fever, or chills. Positive for lacrimal tearing and muscle weakness. Objective:     Patient Vitals for the past 8 hrs:   BP Temp Temp src Pulse Resp SpO2   01/22/20 1153 101/61 99.6 °F (37.6 °C) Axillary 105 16 95 %   01/22/20 0838 125/79 98.1 °F (36.7 °C) Oral 103 16 96 %   01/22/20 0603 126/87 98.4 °F (36.9 °C) Oral 99 16 97 %     I/O last 3 completed shifts: In: 360 [P.O.:360]  Out: 400 [Urine:400]  No intake/output data recorded. EXAM:  GENERAL: Patient seen lying in bed. No apparent distress. HEENT: Membranes moist, no oral lesion. Cranial nerves II-XII intact. NECK:  Supple  LUNGS: Lung sounds clear to auscultation. No wheezes, rhonchi, or rales. CARDIAC: Heart rate irregularly irregular. A grade 2/6 systolic murmur is auscultated in the left 5th intercostal at the parasternal border. ABD:  Bowel sounds normoactive and auscultated in all 4 quadrants. Tenderness on palpation of the suprapubic area. EXT:  Some bruising seen on upper extremities bilaterally. Strength 5/5 in upper extremities bilaterally. Strength 4/5 in lower extremities bilaterally.   NEURO: No focal neurologic findings  PSYCH: Orientation, sensorium, mood normal  LINES:  Peripheral iv       Data Review:  Lab Results   Component Value Date    WBC 5.5 01/22/2020    HGB 10.4 (L) 01/22/2020    HCT 31.9 (L) 01/22/2020    MCV 88.9 01/22/2020     01/22/2020     Lab Results   Component Value Date    CREATININE 0.8 2020    BUN 18 2020     (L) 2020    K 4.3 2020    K 4.3 2020     2020    CO2 22 2020       Hepatic Function Panel:   Lab Results   Component Value Date    ALKPHOS 62 2020    ALT <5 2020    AST 13 2020    PROT 6.0 2020    BILITOT <0.2 2020    BILIDIR <0.2 2020    IBILI see below 2020    LABALBU 2.8 2020       Micro:  2020 Urine culture shows more than 100,000 colony forming units. Positive for Yeast.   2020 Urinalysis shows trace ketones. Moderate leukocyte esterase. Moderate blood. 2020 Blood cultures x2 no growth to date. 2020 Urine culture sent   2020 Blood cultures x2 sent   2020 Urinalysis shows Large blood, moderate leukocyte esterase.  2020 Urine culture sent. Imagin2020 CT scan of the head without contrast   Impression   No acute intracranial pathology         Atrophy and small vessel ischemic changes      2020 Chest X-ray   Impression   No acute consolidation          2020 Chest X-ray   Impression   Impression: No evidence of pneumonia.      2020 Brain MRI without Contrast   Impression       No acute infarction, recent intracranial hemorrhage, or mass lesion.       Mild diffuse atrophy and chronic small vessel ischemic change. 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       Minimal paranasal sinus inflammatory disease.      2020 EEG awake and asleep   Technical Summary:  20 channels of EEG were recorded in a digital format on a patient   who is reported to be awake and drowsy during the recording. The   patient was not sleep deprived prior to the EEG. The recording revealed a background rhythm in the theta frequency   range occasionally with PDR. Jacob Jensen was quite a bit of artifact   in the right temporal lobe. Photic stimulation showed an occipital driving response    During the recording stage II sleep  was not seen. The EKG lead revealed no rhythm abnormalties. EEG Interpretation:   The EEG was abnormal due to the presence of:     Mild Generalized slowing which is a non-specific finding   consistent with a generalized disturbance of cerebral functioning   including toxic, metabolic, or structural abnormalities that are   multi-focal or diffuse. Clinical correlation is recommended.  The absence of epileptiform   discharges on a single EEG does not rule out a diagnosis of    epilepsy or rule out non-convulsive or complex partial status   epilepticus as a cause of altered mental status    1/21/2020 Echocardiogram  Findings      Left Ventricle   Normal left ventricle size, wall thickness, and systolic function with an   estimated ejection fraction of 55-60%. No regional wall motion abnormalities   are seen. Diastolic function is indeterminate due to abnormal heart rhythm. Mitral Valve   Mitral valve is structurally normal. Mild to moderate mitral regurgitation   is present. No evidence of mitral valve stenosis. Left Atrium   The left atrium is normal in size. Aortic Valve   Aortic valve appears sclerotic but appears to open well. Individual aortic   valve leaflets are not clearly visualized. No evidence of aortic valve   regurgitation. No evidence of aortic valve stenosis. Aorta   The aortic root is normal in size. Right Ventricle   The right ventricle is normal in size. TAPSE measures: 1.73 cm. RV S   velocity measures: 9.79 cm/s. Tricuspid Valve   Tricuspid valve is structurally normal. Moderate tricuspid regurgitation. No   evidence of tricuspid stenosis. Right Atrium   The right atrial size is normal.      Pulmonic Valve   The pulmonic valve is not well visualized. No evidence of pulmonic valve   regurgitation. No evidence of pulmonic valve stenosis. Pericardial Effusion   No pericardial effusion noted. Pleural Effusion   No pleural effusion.       Miscellaneous   IVC size is normal (<2.1 cm) but collapses < 50% with respiration consistent   with elevated RA pressure (8 mmHg). Estimated pulmonary artery systolic   pressure is at 37 mmHg assuming a right atrial pressure of 8 mmHg. Scheduled Meds:   apixaban  2.5 mg Oral BID    fluconazole  200 mg Oral Daily    sodium chloride flush  10 mL Intravenous 2 times per day    sodium chloride flush  10 mL Intravenous 2 times per day    [Held by provider] sodium bicarbonate  650 mg Oral BID       Continuous Infusions:      PRN Meds:  sodium chloride flush, perflutren lipid microspheres, sodium chloride flush, magnesium hydroxide, ondansetron, acetaminophen, diclofenac sodium      Assessment:     81 female patient with past medical history of hypertension and increased aspirin intake recently is currently in the hospital for management of possible urinary tract infection as well as recurrent fevers of unknown origin. Appears to be doing better. She is conversant. She is focused on discharge and return to independent living. She is still concerned about her urinary incontinence which has not resolved. Patient had 2 temperatures of 99.6 F overnight (1/21/2020). WBC count has remained within normal range. She denies any subjective fevers. She has suprapubic tenderness that could indicate active bladder inflammation or overfill. Concern over an occult infection or other inflammatory process causing elevated temperatures. Plan:     1. Urine Culture positive for yeast  -Start fluconazole  -Urine cultures sent,  pending results. 2. Encephalopathy   -Resolved    Discussed with MD Clovis Hou, MS4    Addendum to Medical Student Progress note:  Pt seen,examined and evaluated. I have independently performed history, physical exam, lab and data review. I have determined assessment and plan as documented by student Clovis Rush).     80 yo woman with hx HTN, DVT.   Lives in independent

## 2020-01-23 LAB
BLOOD CULTURE, ROUTINE: NORMAL
CULTURE, BLOOD 2: NORMAL

## 2020-01-23 NOTE — PROGRESS NOTES
Nephrology Consult Note                                                                                                                                                                                                                                                                                                                                                               Office : [phone]     Fax :[phone]              Patient's Name: Carline Tello    Feels better   Good UO   Cr better   Phos replaced           Past Medical History:   Diagnosis Date    Anterior corneal dystrophy     Asymptomatic postmenopausal status (age-related) (natural)     Cervical polyp     Dry eye syndrome     Edema     Fuch's endothelial dystrophy     Gout     IBS (irritable bowel syndrome)     Osteoarthritis     Osteopenia     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    Pedal edema 5/8/2017    Screening mammogram     Urinary retention 9/21/2019    Vaginal candidiasis        Past Surgical History:   Procedure Laterality Date    APPENDECTOMY      EYE SURGERY      Cataract Surgery    HIP PINNING Left 9/18/2019    LEFT HIP GAMMA NAIL performed by Sebastián Restrepo MD at [facility] OR       Family History   Problem Relation Age of Onset    Breast Cancer Daughter         [facility]    Ovarian Cancer Daughter         reports that she has never smoked. She has never used smokeless tobacco. She reports that she does not drink alcohol or use drugs. Allergies:  Lisinopril and Clindamycin/lincomycin    Current Medications:    No current facility-administered medications for this encounter.        Review of Systems:   14 point ROS obtained but were negative except mentioned in HPI      Physical exam:     Vitals:  /61   Pulse 105   Temp 99.6 °F (37.6 °C) (Axillary)   Resp 16   Ht 5' 1\" (1.549 m)   Wt 117 lb 4.6 oz (53.2 kg)   SpO2 95%   BMI 22.16 kg/m²   Constitutional:  OAA X3 NAD  Skin: no rash, turgor wnl  Heent:  eomi, pneumonia. MRI BRAIN WO CONTRAST   Final Result      No acute infarction, recent intracranial hemorrhage, or mass lesion. Mild diffuse atrophy and chronic small vessel ischemic change. 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 Minimal paranasal sinus inflammatory disease. XR CHEST STANDARD (2 VW)   Final Result      No acute consolidation                  CT HEAD WO CONTRAST   Final Result      No acute intracranial pathology        Atrophy and small vessel ischemic changes         FL LUMBAR PUNCTURE DIAG    (Results Pending)       Assessment/Plan   1. UTI     2. RONNY     3. Heavy ASA/NSAID use     4. Acid- base/ Electrolyte imbalance     5. Hearing issue - recent development - ? ASA ototoxicity     6.  RTA     Plan   - d/c IVF   - cr better   - replace K and phos   - Bicarb better - off sod bicarb   - Ur studies - proteinuria   - abx for UTI   - No NSAIDs   - D/w family  - D/w Hospitalist                 Thank you for allowing us to participate in care of [address] free to contact me   [facility] [address]  Office : [phone]  Fax :[phone]

## 2020-01-24 LAB
BLOOD CULTURE, ROUTINE: NORMAL
KAPPA, FREE LIGHT CHAINS, SERUM: 20.68 MG/L (ref 3.3–19.4)
KAPPA/LAMBDA RATIO: 0.84 (ref 0.26–1.65)
KAPPA/LAMBDA TEST COMMENT: ABNORMAL
LAMBDA, FREE LIGHT CHAINS, SERUM: 24.69 MG/L (ref 5.71–26.3)

## 2020-02-10 ENCOUNTER — TELEPHONE (OUTPATIENT)
Dept: FAMILY MEDICINE CLINIC | Age: 85
End: 2020-02-10

## 2020-02-10 NOTE — TELEPHONE ENCOUNTER
Anupama Lopes with [facility] wanting a verbal order to start home care,and PT/OT. Please call [phone] ask for Dulcie Decree.

## 2020-02-11 NOTE — TELEPHONE ENCOUNTER
Ravindra Rolon from [facility] called still needing the verbal to start orders. I let her know I would send a reminder.

## 2020-02-17 ENCOUNTER — TELEPHONE (OUTPATIENT)
Dept: FAMILY MEDICINE CLINIC | Age: 85
End: 2020-02-17

## 2020-03-06 ENCOUNTER — TELEPHONE (OUTPATIENT)
Dept: FAMILY MEDICINE CLINIC | Age: 85
End: 2020-03-06

## 2020-03-06 RX ORDER — DIPHENOXYLATE HYDROCHLORIDE AND ATROPINE SULFATE 2.5; .025 MG/1; MG/1
1 TABLET ORAL 4 TIMES DAILY PRN
Qty: 120 TABLET | Refills: 2 | Status: SHIPPED | OUTPATIENT
Start: 2020-03-06 | End: 2020-06-08 | Stop reason: SDUPTHER

## 2020-03-06 NOTE — TELEPHONE ENCOUNTER
Beau Ruffin called stating she needs a refill on Lomotil 2.5-0.025 mg.      Medication name: Lomotil  Medication dose: 2.5-0.025 mg  Frequency: Take 1 tablet by mouth 4 times daily as needed for Diarrhea for up to 30 days.   Quantity: 90 tabs    Pharmacy name: [facility], [address] [phone] [place] [phone]    Last ov: 12/16/2019  Last labs: 1/22/2020  Next ov: 3/17/2020

## 2020-03-12 ENCOUNTER — TELEPHONE (OUTPATIENT)
Dept: FAMILY MEDICINE CLINIC | Age: 85
End: 2020-03-12

## 2020-04-08 ENCOUNTER — TELEPHONE (OUTPATIENT)
Dept: FAMILY MEDICINE CLINIC | Age: 85
End: 2020-04-08

## 2020-05-13 ENCOUNTER — TELEPHONE (OUTPATIENT)
Dept: FAMILY MEDICINE CLINIC | Age: 85
End: 2020-05-13

## 2020-05-13 NOTE — TELEPHONE ENCOUNTER
Staff sent a fax over on May sixth and wanted to confirm it was received from [facility] . Their contact number is  [phone].

## 2020-06-08 RX ORDER — DIPHENOXYLATE HYDROCHLORIDE AND ATROPINE SULFATE 2.5; .025 MG/1; MG/1
1 TABLET ORAL 4 TIMES DAILY PRN
Qty: 120 TABLET | Refills: 0 | Status: SHIPPED | OUTPATIENT
Start: 2020-06-08 | End: 2020-07-30 | Stop reason: SDUPTHER

## 2020-06-08 RX ORDER — DIPHENOXYLATE HYDROCHLORIDE AND ATROPINE SULFATE 2.5; .025 MG/1; MG/1
1 TABLET ORAL 4 TIMES DAILY PRN
Qty: 120 TABLET | Refills: 0 | Status: SHIPPED | OUTPATIENT
Start: 2020-06-08 | End: 2020-06-08 | Stop reason: SDUPTHER

## 2020-06-08 NOTE — TELEPHONE ENCOUNTER
Patient requesting a medication refill.   Medication: diphenoxylate-atropine (LOMOTIL) 2.5-0.025 MG per tablet  Pharmacy: [facility], [address] [place] [address] [phone]  Last office visit: 12/16/2019  Next office visit: Visit date not found

## 2020-06-26 ENCOUNTER — OFFICE VISIT (OUTPATIENT)
Dept: ORTHOPEDIC SURGERY | Age: 85
End: 2020-06-26
Payer: MEDICARE

## 2020-06-26 PROCEDURE — G8399 PT W/DXA RESULTS DOCUMENT: HCPCS | Performed by: ORTHOPAEDIC SURGERY

## 2020-06-26 PROCEDURE — G8428 CUR MEDS NOT DOCUMENT: HCPCS | Performed by: ORTHOPAEDIC SURGERY

## 2020-06-26 PROCEDURE — G8420 CALC BMI NORM PARAMETERS: HCPCS | Performed by: ORTHOPAEDIC SURGERY

## 2020-06-26 PROCEDURE — 4040F PNEUMOC VAC/ADMIN/RCVD: CPT | Performed by: ORTHOPAEDIC SURGERY

## 2020-06-26 PROCEDURE — 1090F PRES/ABSN URINE INCON ASSESS: CPT | Performed by: ORTHOPAEDIC SURGERY

## 2020-06-26 PROCEDURE — 1036F TOBACCO NON-USER: CPT | Performed by: ORTHOPAEDIC SURGERY

## 2020-06-26 PROCEDURE — 99203 OFFICE O/P NEW LOW 30 MIN: CPT | Performed by: ORTHOPAEDIC SURGERY

## 2020-06-26 PROCEDURE — 1123F ACP DISCUSS/DSCN MKR DOCD: CPT | Performed by: ORTHOPAEDIC SURGERY

## 2020-06-26 PROCEDURE — 20610 DRAIN/INJ JOINT/BURSA W/O US: CPT | Performed by: ORTHOPAEDIC SURGERY

## 2020-06-26 RX ORDER — METHYLPREDNISOLONE ACETATE 40 MG/ML
80 INJECTION, SUSPENSION INTRA-ARTICULAR; INTRALESIONAL; INTRAMUSCULAR; SOFT TISSUE ONCE
Status: COMPLETED | OUTPATIENT
Start: 2020-06-26 | End: 2020-06-26

## 2020-06-26 RX ADMIN — METHYLPREDNISOLONE ACETATE 80 MG: 40 INJECTION, SUSPENSION INTRA-ARTICULAR; INTRALESIONAL; INTRAMUSCULAR; SOFT TISSUE at 15:28

## 2020-07-07 ENCOUNTER — TELEPHONE (OUTPATIENT)
Dept: FAMILY MEDICINE CLINIC | Age: 85
End: 2020-07-07

## 2020-07-07 RX ORDER — DIPHENOXYLATE HYDROCHLORIDE AND ATROPINE SULFATE 2.5; .025 MG/1; MG/1
1 TABLET ORAL 4 TIMES DAILY PRN
Qty: 120 TABLET | Refills: 0 | Status: CANCELLED | OUTPATIENT
Start: 2020-07-07 | End: 2020-08-06

## 2020-07-07 NOTE — TELEPHONE ENCOUNTER
Patient scheduled for telephone visit on 7- as she is unable to do VV. She does not want to come in to the office due to possible exposure.

## 2020-07-07 NOTE — TELEPHONE ENCOUNTER
Patient requesting a refill and would like it sent to [facility]. Please advise. Thanks.        diphenoxylate-atropine (LOMOTIL) 2.5-0.025 MG per tablet [[id number]]     Order Details   Dose: 1 tablet Route: Oral Frequency: 4 TIMES DAILY PRN for Diarrhea  Dispense Quantity: 120 tablet Refills: 0 Fills remaining: --         Sig: Take 1 tablet by mouth 4 times daily as needed for Diarrhea for up to 30 days.        Written Date: 06/08/20 Expiration Date: 06/08/21    Start Date: 06/08/20 End Date: 07/08/20    Ordering Provider:  -- Authorizing Provider: Angela Cherry MD Ordering User:  Angela Cherry MD        Diagnosis Association: Irritable bowel syndrome with diarrhea (K58.0)    Original Order:  diphenoxylate-atropine (LOMOTIL) 2.5-0.025 MG per tablet [[id number]]

## 2020-07-22 ENCOUNTER — TELEPHONE (OUTPATIENT)
Dept: FAMILY MEDICINE CLINIC | Age: 85
End: 2020-07-22

## 2020-07-29 PROBLEM — R50.9 FEVER: Status: RESOLVED | Noted: 2020-01-21 | Resolved: 2020-07-29

## 2020-07-29 PROBLEM — Z79.890 HORMONE REPLACEMENT THERAPY: Status: RESOLVED | Noted: 2018-11-16 | Resolved: 2020-07-29

## 2020-07-29 PROBLEM — S72.002A HIP FRACTURE REQUIRING OPERATIVE REPAIR, LEFT, CLOSED, INITIAL ENCOUNTER (HCC): Status: RESOLVED | Noted: 2019-09-17 | Resolved: 2020-07-29

## 2020-07-29 PROBLEM — N30.01 ACUTE CYSTITIS WITH HEMATURIA: Status: RESOLVED | Noted: 2019-09-21 | Resolved: 2020-07-29

## 2020-07-29 PROBLEM — N17.9 ACUTE RENAL FAILURE (ARF) (HCC): Status: RESOLVED | Noted: 2020-01-17 | Resolved: 2020-07-29

## 2020-07-29 NOTE — TELEPHONE ENCOUNTER
Mike Fajardo has called to ask about status of this Back-dated certification 3 times since initial request, stating she needs the form returned as soon as possible. Was placed in 's basket on 7/22, but have not received completed form at  as of 7/29, and did not see in pending basket either. Mike Fajardo is requesting form faxed to them at [phone] asap.

## 2020-07-30 ENCOUNTER — VIRTUAL VISIT (OUTPATIENT)
Dept: FAMILY MEDICINE CLINIC | Age: 85
End: 2020-07-30
Payer: MEDICARE

## 2020-07-30 PROCEDURE — 99442 PR PHYS/QHP TELEPHONE EVALUATION 11-20 MIN: CPT | Performed by: FAMILY MEDICINE

## 2020-07-30 RX ORDER — ACETAMINOPHEN 500 MG
500 TABLET ORAL 4 TIMES DAILY PRN
Qty: 360 TABLET | Refills: 1 | Status: CANCELLED | OUTPATIENT
Start: 2020-07-30

## 2020-07-30 RX ORDER — DIPHENOXYLATE HYDROCHLORIDE AND ATROPINE SULFATE 2.5; .025 MG/1; MG/1
1 TABLET ORAL 4 TIMES DAILY PRN
Qty: 120 TABLET | Refills: 2 | Status: SHIPPED | OUTPATIENT
Start: 2020-07-30 | End: 2020-12-07 | Stop reason: SDUPTHER

## 2020-07-30 ASSESSMENT — PATIENT HEALTH QUESTIONNAIRE - PHQ9
SUM OF ALL RESPONSES TO PHQ QUESTIONS 1-9: 0
1. LITTLE INTEREST OR PLEASURE IN DOING THINGS: 0
SUM OF ALL RESPONSES TO PHQ9 QUESTIONS 1 & 2: 0
SUM OF ALL RESPONSES TO PHQ QUESTIONS 1-9: 0
2. FEELING DOWN, DEPRESSED OR HOPELESS: 0

## 2020-07-30 NOTE — PROGRESS NOTES
Renetta Minaya is a 81 y.o. female evaluated via telephone on 7/30/2020. Consent:  She and/or health care decision maker is aware that that she may receive a bill for this telephone service, depending on her insurance coverage, and has provided verbal consent to proceed: Yes      Documentation:  I communicated with the patient and/or health care decision maker about The primary encounter diagnosis was Essential hypertension, benign. Diagnoses of Acute deep vein thrombosis (DVT) of left lower extremity after procedure ([address] Irritable bowel syndrome with diarrhea, and Glucose intolerance (impaired glucose tolerance) were also pertinent to this visit. Ashly Yadav is here for follow up in IBS with diarrhea. She has taken Lomotil for this for many years; is well tolerated and effective. She is eating better since moving and having meals provided. Diarrhea is well controlled. She has a formed BM every day. No abdominal pain, melena or hematochezia. She is s/p repair hip fx complicated by DVT last October. She was in rehab post op and moved from her home to assisted living after her fall. DEXA scan in 2015 showed osteopenia. She did not start Alendronate for fracture prevention. She notes \"I am not disciplined enough to follow the directions\". She is now settled and she is willing to start it. She is now off Eliquis. She is considering knee replacement with Dr. Mady Verdugo. Had steroid injection that helped for about 48 hours. She goes to the gym 3 days a week. Somewhat stressed with COVID restrictions but managing well. She has a good support system. Is living at the [facility]. She is off Prevacid without heartburn. She is not on BP medication. She reports her BP has been normal when checked.        Lab Results   Component Value Date     (L) 01/22/2020    K 4.3 01/22/2020    K 4.3 01/22/2020     01/22/2020    CO2 22 01/22/2020    BUN 18 01/22/2020    CREATININE 0.8 01/22/2020    GLUCOSE 104 (H) 01/22/2020    CALCIUM 8.9 01/22/2020    PROT 6.0 (L) 01/19/2020    LABALBU 2.8 (L) 01/22/2020    BILITOT <0.2 01/19/2020    ALKPHOS 62 01/19/2020    AST 13 (L) 01/19/2020    ALT <5 (L) 01/19/2020    LABGLOM >60 01/22/2020    GFRAA >60 01/22/2020    AGRATIO 1.4 07/17/2017    GLOB 2.9 07/17/2017        Lab Results   Component Value Date    WBC 5.5 01/22/2020    HGB 10.4 (L) 01/22/2020    HCT 31.9 (L) 01/22/2020    MCV 88.9 01/22/2020     01/22/2020     Lab Results   Component Value Date    LABA1C 5.2 07/17/2017     Lab Results   Component Value Date    .5 07/17/2017     TSH   Date Value Ref Range Status   01/18/2020 0.23 (L) 0.27 - 4.20 uIU/mL Final   07/17/2017 1.89 0.27 - 4.20 uIU/mL Final   02/23/2016 1.92 0.27 - 4.20 uIU/mL Final   04/26/2013 1.86 0.35 - 5.5 uIU/ml Final       Patient Active Problem List   Diagnosis    Osteoarthritis    IBS (irritable bowel syndrome)    Gout    Dry eye syndrome    Osteopenia    Essential hypertension, benign    Glucose intolerance (impaired glucose tolerance)    Primary osteoarthritis of both knees    Anemia    Acute deep vein thrombosis (DVT) of left lower extremity after procedure ([address]    Inguinal hernia, right    Generalized weakness      Past Medical History:   Diagnosis Date    Acute renal failure (ARF) ([facility] 1/17/2020    Anterior corneal dystrophy     Asymptomatic postmenopausal status (age-related) (natural)     Cervical polyp     Dry eye syndrome     Edema     Fuch's endothelial dystrophy     Gout     Hip fracture requiring operative repair, left, closed, initial encounter ([facility] 9/17/2019    IBS (irritable bowel syndrome)     Osteoarthritis     Osteopenia     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    Pedal edema 5/8/2017    Screening mammogram     Symptomatic menopausal or female climacteric states 11/4/2013    Urinary retention 9/21/2019    Vaginal candidiasis      Past Surgical History:   Procedure Laterality Date    APPENDECTOMY      EYE SURGERY      Cataract Surgery    HIP PINNING Left 9/18/2019    LEFT HIP GAMMA NAIL performed by Dorothy Ta MD at [address] OR      Family History   Problem Relation Age of Onset    Breast Cancer Daughter         [facility] 2    Ovarian Cancer Daughter      Social History     Tobacco Use    Smoking status: Never Smoker    Smokeless tobacco: Never Used   Substance Use Topics    Alcohol use: No    Drug use: No      Allergies   Allergen Reactions    Lisinopril Swelling     Swelling tongue.  Clindamycin/Lincomycin       Outpatient Medications Marked as Taking for the 7/30/20 encounter (Virtual Visit) with Kelsey Noguera MD   Medication Sig Dispense Refill    diphenoxylate-atropine (LOMOTIL) 2.5-0.025 MG per tablet Take 1 tablet by mouth 4 times daily as needed for Diarrhea for up to 90 days. 120 tablet 2    acetaminophen (TYLENOL) 500 MG tablet Take 1 tablet by mouth 4 times daily as needed for Pain 360 tablet 1       . Details of this discussion including any medical advice provided:    Diagnosis Orders   1. Irritable bowel syndrome with diarrhea  diphenoxylate-atropine (LOMOTIL) 2.5-0.025 MG per tablet  Well controlled      2. Essential hypertension, benign  Controlled off meds   3. Glucose intolerance (impaired glucose tolerance)  Continue diet and exercise. Annual labs. 4. History of hip fracture  She agrees to start Alendronate  Ca, D and exercise addressed      Side effects of current medications reviewed and questions answered. Follow up in 3 months or prn. I affirm this is a Patient Initiated Episode with a Patient who has not had a related appointment within my department in the past 7 days or scheduled within the next 24 hours.     Patient identification was verified at the start of the visit: Yes    Total Time: minutes: 11-20 minutes    Note: not billable if this call serves to triage the patient into an appointment for the relevant

## 2020-08-21 ENCOUNTER — OFFICE VISIT (OUTPATIENT)
Dept: ORTHOPEDIC SURGERY | Age: 85
End: 2020-08-21
Payer: MEDICARE

## 2020-08-21 PROCEDURE — 4040F PNEUMOC VAC/ADMIN/RCVD: CPT | Performed by: ORTHOPAEDIC SURGERY

## 2020-08-21 PROCEDURE — G8427 DOCREV CUR MEDS BY ELIG CLIN: HCPCS | Performed by: ORTHOPAEDIC SURGERY

## 2020-08-21 PROCEDURE — 1123F ACP DISCUSS/DSCN MKR DOCD: CPT | Performed by: ORTHOPAEDIC SURGERY

## 2020-08-21 PROCEDURE — 99213 OFFICE O/P EST LOW 20 MIN: CPT | Performed by: ORTHOPAEDIC SURGERY

## 2020-08-21 PROCEDURE — G8399 PT W/DXA RESULTS DOCUMENT: HCPCS | Performed by: ORTHOPAEDIC SURGERY

## 2020-08-21 PROCEDURE — 1036F TOBACCO NON-USER: CPT | Performed by: ORTHOPAEDIC SURGERY

## 2020-08-21 PROCEDURE — G8420 CALC BMI NORM PARAMETERS: HCPCS | Performed by: ORTHOPAEDIC SURGERY

## 2020-08-21 PROCEDURE — 1090F PRES/ABSN URINE INCON ASSESS: CPT | Performed by: ORTHOPAEDIC SURGERY

## 2020-08-21 NOTE — PROGRESS NOTES
Chief Complaint    Follow-up (left knee)      History of Present Illness:  Africa Hernández is a 81 y.o. female. She is here today for follow-up for her left knee. She does have a known history of left knee osteoarthritis. We did treat this with Visco injection back in June of this year. She is also done some physical therapy. Continues to have a lot of pain within the left knee. She was asking about total knee arthroplasty and is interested in that in the future           Medical History:  Patient's medications, allergies, past medical, surgical, social and family histories were reviewed and updated as appropriate. Review of Systems:  Pertinent items are noted in HPI  Review of systems reviewed from Patient History Form dated on 8/21/20 and available in the patient's chart under the Media tab. Vital Signs: There were no vitals taken for this visit. General Exam:   Constitutional: Patient is adequately groomed with no evidence of malnutrition  DTRs: Deep tendon reflexes are intact  Mental Status: The patient is oriented to time, place and person. The patient's mood and affect are appropriate. Knee Examination:    Inspection: No significant swelling erythema or atrophy noted about the left knee today     Palpation: There is tenderness palpation especially along the medial joint line. No significant lateral joint line tenderness     Range of Motion: Extension left knee is 0 degrees with knee flexion today to 105 degrees     Strength: She is able to do straight leg raise     Special Tests: No instability to varus and valgus stress testing. Negative posterior drawer     Skin: There are no rashes, ulcerations or lesions.     Gait: She is ambulating in a wheelchair  Additional Comments:       Additional Examinations:         Right Lower Extremity: Examination of the right lower extremity does not show any tenderness, deformity or injury. Range of motion is unremarkable.   There is no gross

## 2020-12-07 RX ORDER — DIPHENOXYLATE HYDROCHLORIDE AND ATROPINE SULFATE 2.5; .025 MG/1; MG/1
1 TABLET ORAL 4 TIMES DAILY PRN
Qty: 120 TABLET | Refills: 2 | Status: SHIPPED | OUTPATIENT
Start: 2020-12-07 | End: 2021-03-11 | Stop reason: SDUPTHER

## 2020-12-07 NOTE — TELEPHONE ENCOUNTER
Patient calling requesting medication refill, please advise.        diphenoxylate-atropine (LOMOTIL) 2.5-0.025 MG per tablet [[id number]]  ENDED     Order Details   Dose: 1 tablet  Route: Oral  Frequency: 4 TIMES DAILY PRN for Diarrhea    Dispense Quantity: 120 tablet  Refills: 2  Fills remaining: --             Sig: Take 1 tablet by mouth 4 times daily as needed for Diarrhea for up to 90 days.            Written Date: 07/30/20  Expiration Date: 07/30/21      Start Date: 07/30/20  End Date: 10/28/20      Ordering Provider:  --  Authorizing Provider: Jose Mcege MD  Ordering User:  Jose Mcgee MD           Diagnosis Association: Irritable bowel syndrome with diarrhea (K58.0)       Original Order:  diphenoxylate-atropine (LOMOTIL) 2.5-0.025 MG per tablet [[id number]]       Pharmacy:  [facility] [phone]

## 2021-03-08 DIAGNOSIS — K58.0 IRRITABLE BOWEL SYNDROME WITH DIARRHEA: ICD-10-CM

## 2021-03-08 RX ORDER — DIPHENOXYLATE HYDROCHLORIDE AND ATROPINE SULFATE 2.5; .025 MG/1; MG/1
1 TABLET ORAL 4 TIMES DAILY PRN
Qty: 120 TABLET | Refills: 2 | OUTPATIENT
Start: 2021-03-08 | End: 2021-06-06

## 2021-03-10 PROBLEM — R53.1 GENERALIZED WEAKNESS: Status: RESOLVED | Noted: 2020-01-18 | Resolved: 2021-03-10

## 2021-03-10 PROBLEM — I97.89 ACUTE DEEP VEIN THROMBOSIS (DVT) OF LEFT LOWER EXTREMITY AFTER PROCEDURE (HCC): Status: RESOLVED | Noted: 2019-10-03 | Resolved: 2021-03-10

## 2021-03-10 PROBLEM — Z86.718 HISTORY OF DVT (DEEP VEIN THROMBOSIS): Status: ACTIVE | Noted: 2021-03-10

## 2021-03-10 PROBLEM — I82.402 ACUTE DEEP VEIN THROMBOSIS (DVT) OF LEFT LOWER EXTREMITY AFTER PROCEDURE (HCC): Status: RESOLVED | Noted: 2019-10-03 | Resolved: 2021-03-10

## 2021-03-11 ENCOUNTER — TELEPHONE (OUTPATIENT)
Dept: FAMILY MEDICINE CLINIC | Age: 86
End: 2021-03-11

## 2021-03-11 ENCOUNTER — VIRTUAL VISIT (OUTPATIENT)
Dept: FAMILY MEDICINE CLINIC | Age: 86
End: 2021-03-11
Payer: MEDICARE

## 2021-03-11 DIAGNOSIS — I10 ESSENTIAL HYPERTENSION, BENIGN: ICD-10-CM

## 2021-03-11 DIAGNOSIS — R73.02 GLUCOSE INTOLERANCE (IMPAIRED GLUCOSE TOLERANCE): ICD-10-CM

## 2021-03-11 DIAGNOSIS — E05.90 SUBCLINICAL HYPERTHYROIDISM: ICD-10-CM

## 2021-03-11 DIAGNOSIS — D64.9 POSTOPERATIVE ANEMIA: ICD-10-CM

## 2021-03-11 DIAGNOSIS — Z87.81 HX OF FRACTURE OF HIP: ICD-10-CM

## 2021-03-11 DIAGNOSIS — K58.0 IRRITABLE BOWEL SYNDROME WITH DIARRHEA: Primary | ICD-10-CM

## 2021-03-11 PROCEDURE — 99443 PR PHYS/QHP TELEPHONE EVALUATION 21-30 MIN: CPT | Performed by: FAMILY MEDICINE

## 2021-03-11 RX ORDER — CYANOCOBALAMIN (VITAMIN B-12) 1000 MCG
2 TABLET, EXTENDED RELEASE ORAL 2 TIMES DAILY WITH MEALS
Qty: 120 TABLET | Refills: 12 | Status: SHIPPED | OUTPATIENT
Start: 2021-03-11

## 2021-03-11 RX ORDER — DIPHENOXYLATE HYDROCHLORIDE AND ATROPINE SULFATE 2.5; .025 MG/1; MG/1
1 TABLET ORAL 4 TIMES DAILY PRN
Qty: 120 TABLET | Refills: 5 | Status: SHIPPED | OUTPATIENT
Start: 2021-03-11 | End: 2021-09-07

## 2021-03-11 RX ORDER — DIPHENOXYLATE HYDROCHLORIDE AND ATROPINE SULFATE 2.5; .025 MG/1; MG/1
1 TABLET ORAL 4 TIMES DAILY
COMMUNITY
End: 2021-09-15 | Stop reason: SDUPTHER

## 2021-03-11 ASSESSMENT — PATIENT HEALTH QUESTIONNAIRE - PHQ9
SUM OF ALL RESPONSES TO PHQ QUESTIONS 1-9: 0
2. FEELING DOWN, DEPRESSED OR HOPELESS: 0
1. LITTLE INTEREST OR PLEASURE IN DOING THINGS: 0
SUM OF ALL RESPONSES TO PHQ QUESTIONS 1-9: 0
SUM OF ALL RESPONSES TO PHQ9 QUESTIONS 1 & 2: 0

## 2021-03-11 NOTE — TELEPHONE ENCOUNTER
Radha Kirk from [place] [facility] called stating they are unable to order calcium citrate-vitamin D (Calcium Citrate+D3) 315-250 mg-unit tabs. She would like to know if the script can be changed to citrate-vitamin D (Calcium Citrate+D3) 315-200 mg-unit. Please advise. Thanks.         Lakesha Leon [facility] - [facility] [phone]

## 2021-03-11 NOTE — PROGRESS NOTES
3/11/2021    Sheryle Coder is a 81 y.o. female evaluated via telephone on 3/11/2021. Consent:  She and/or health care decision maker is aware that that she may receive a bill for this telephone service, depending on her insurance coverage, and has provided verbal consent to proceed: Yes      Documentation:  I communicated with the patient and/or health care decision maker about   The primary encounter diagnosis was Irritable bowel syndrome with diarrhea. Diagnoses of Glucose intolerance (impaired glucose tolerance), Essential hypertension, benign, Subclinical hyperthyroidism, and Postoperative anemia were also pertinent to this visit. The primary encounter diagnosis was Irritable bowel syndrome with diarrhea. Diagnoses of Glucose intolerance (impaired glucose tolerance), Essential hypertension, benign, Subclinical hyperthyroidism, and Postoperative anemia were also pertinent to this visit. She is having significant knee pain; was seeing Dr. Madelaine Kevin. Had to find new doctor due insurance change; has appt with Dr. Jammie Taylor. IBS-D:  [facility] has managed her IBS with Lomitil for > 30 years. She tolerates this well and has normal BMS. Lomitil is effective; she is having a normal daily BM. Glucose intolerance:  She has eaten a very restricted, carb heavy diet for years. She once told me she ate what she had to for years and now she would eat what she wanted. She is eating more variety now that she is at the [facility]    Thyroid: Patient presents for evaluation of hypothyroidism and subclinical hyperthyroid/\"sick euthyroid\". Had low   TSH and low T3 and T4 when in the hospital with hip fx. Has not had follow up labs. Current symptoms include denies fatigue, weight changes, heat/cold intolerance, bowel/skin changes or CVS symptoms. Hypertension: Patient here for follow-up of elevated blood pressure. She is not exercising due to knee pain and is adherent to low salt diet.   Blood pressure is CBC    Iron and TIBC    Ferritin   6. Hx of fracture of hip  calcium citrate-vitamin D (CALCIUM CITRATE + D3) 315-250 MG-UNIT TABS per tablet  She agrees to start Fosamax and will let me know if not tolerated. Directions for taking addressed. Side effects of current medications reviewed and questions answered. Follow up in 6 months or prn. .         I affirm this is a Patient Initiated Episode with a Patient who has not had a related appointment within my department in the past 7 days or scheduled within the next 24 hours. Patient identification was verified at the start of the visit: Yes    Total Time: minutes: 21-30 minutes    The visit was conducted pursuant to the emergency declaration under the [facility], [address] [place] [address] authority and the [employer] and [employer] Act. Patient identification was verified, and a caregiver was present when appropriate. The patient was located in a state where the provider was credentialed to provide care.     Note: not billable if this call serves to triage the patient into an appointment for the relevant concern      Charles Olvera

## 2021-04-06 ENCOUNTER — OFFICE VISIT (OUTPATIENT)
Dept: ORTHOPEDIC SURGERY | Age: 86
End: 2021-04-06
Payer: MEDICARE

## 2021-04-06 VITALS — BODY MASS INDEX: 22.14 KG/M2 | WEIGHT: 117.28 LBS | HEIGHT: 61 IN

## 2021-04-06 DIAGNOSIS — M17.12 PRIMARY OSTEOARTHRITIS OF LEFT KNEE: Primary | ICD-10-CM

## 2021-04-06 DIAGNOSIS — G89.29 CHRONIC PAIN OF LEFT KNEE: ICD-10-CM

## 2021-04-06 DIAGNOSIS — M25.562 CHRONIC PAIN OF LEFT KNEE: ICD-10-CM

## 2021-04-06 PROCEDURE — 1090F PRES/ABSN URINE INCON ASSESS: CPT | Performed by: ORTHOPAEDIC SURGERY

## 2021-04-06 PROCEDURE — G8420 CALC BMI NORM PARAMETERS: HCPCS | Performed by: ORTHOPAEDIC SURGERY

## 2021-04-06 PROCEDURE — G8427 DOCREV CUR MEDS BY ELIG CLIN: HCPCS | Performed by: ORTHOPAEDIC SURGERY

## 2021-04-06 PROCEDURE — 4040F PNEUMOC VAC/ADMIN/RCVD: CPT | Performed by: ORTHOPAEDIC SURGERY

## 2021-04-06 PROCEDURE — 1036F TOBACCO NON-USER: CPT | Performed by: ORTHOPAEDIC SURGERY

## 2021-04-06 PROCEDURE — 1123F ACP DISCUSS/DSCN MKR DOCD: CPT | Performed by: ORTHOPAEDIC SURGERY

## 2021-04-06 PROCEDURE — G8399 PT W/DXA RESULTS DOCUMENT: HCPCS | Performed by: ORTHOPAEDIC SURGERY

## 2021-04-06 PROCEDURE — 99213 OFFICE O/P EST LOW 20 MIN: CPT | Performed by: ORTHOPAEDIC SURGERY

## 2021-04-08 NOTE — PROGRESS NOTES
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    Pedal edema 5/8/2017    Screening mammogram     Symptomatic menopausal or female climacteric states 11/4/2013    Urinary retention 9/21/2019    Vaginal candidiasis         Past Surgical History:   Procedure Laterality Date    APPENDECTOMY      EYE SURGERY      Cataract Surgery    HIP PINNING Left 9/18/2019    LEFT HIP GAMMA NAIL performed by Selina White MD at [facility] [place]       Family History   Problem Relation Age of Onset    Breast Cancer Daughter         [facility]    Ovarian Cancer Daughter        Social History     Socioeconomic History    Marital status:       Spouse name: Not on file    Number of children: Not on file    Years of education: Not on file    Highest education level: Not on file   Occupational History    Not on file   Social Needs    Financial resource strain: Not on file    Food insecurity     Worry: Not on file     Inability: Not on file    Transportation needs     Medical: Not on file     Non-medical: Not on file   Tobacco Use    Smoking status: Never Smoker    Smokeless tobacco: Never Used   Substance and Sexual Activity    Alcohol use: No    Drug use: No    Sexual activity: Not Currently   Lifestyle    Physical activity     Days per week: Not on file     Minutes per session: Not on file    Stress: Not on file   Relationships    Social connections     Talks on phone: Not on file     Gets together: Not on file     Attends Shinto service: Not on file     Active member of club or organization: Not on file     Attends meetings of clubs or organizations: Not on file     Relationship status: Not on file    Intimate partner violence     Fear of current or ex partner: Not on file     Emotionally abused: Not on file     Physically abused: Not on file     Forced sexual activity: Not on file   Other Topics Concern    Not on file   Social History Narrative    Not on file       Current Outpatient Medications   Medication Sig Dispense Refill    diphenoxylate-atropine (LOMOTIL) 2.5-0.025 MG per tablet Take 1 tablet by mouth 4 times daily as needed for Diarrhea for up to 180 days. 120 tablet 5    diphenoxylate-atropine (LOMOTIL) 2.5-0.025 MG per tablet Take 1 tablet by mouth 4 times daily.  calcium citrate-vitamin D (CALCIUM CITRATE + D3) 315-250 MG-UNIT TABS per tablet Take 2 tablets by mouth 2 times daily (with meals) 120 tablet 12    acetaminophen (TYLENOL) 500 MG tablet Take 1 tablet by mouth 4 times daily as needed for Pain 360 tablet 1    alendronate (FOSAMAX) 70 MG tablet Take 1 tablet by mouth every 7 days Take with 8 ounces water, do not lie down for 30 minutes after taking it 12 tablet 3    diclofenac sodium (VOLTAREN) 1 % GEL Apply 4 g topically 4 times daily 5 Tube 0     No current facility-administered medications for this visit. Allergies   Allergen Reactions    Lisinopril Swelling     Swelling tongue.  Clindamycin/Lincomycin          Review of Systems:  A 14 point review of systems was completed by the patient and is available in the media section of the scanned medical record and was reviewed on 4/8/2021. The review is negative with the exception of those things mentioned in the HPI and Past Medical History         Vital Signs:   Ht 5' 0.98\" (1.549 m)   Wt 117 lb 4.6 oz (53.2 kg)   BMI 22.17 kg/m²     General Exam:    General: Doug Melvin is a healthy and well appearing 81y.o. year old female who is sitting comfortably in our office in no acute distress. Neuro: Alert & Oriented x 3,  normal,  no focal deficits noted. Normal mood & affect. Eyes: Sclera clear  Ears: Normal external ear  Mouth: Patient wearing a mask  Pulm: Respirations unlabored and regular   Skin: Warm, well perfused      Knee Examination: Left    Inspection:  No effusion, ecchymosis, discoloration, erythema, excessive warmth. no gross deformities, no signs of infection.      Palpation: There is patellofemoral crepitus, there is mild medial and lateral joint line tenderness. No other osseous or soft tissue tenderness around the knee. Negative calf tenderness    Range of Motion: Flexion contracture 45 degrees. Patient defers range of motion due to pain. Is able to stand. Strength: Deferred    Special Tests: Negative Homans sign    Gait: Patient in wheelchair    Alignment: Valgus deformity    Neuro: Sensation equal bilateral lower extremities    Vascular: 2+ posterior tibialis pulse        Radiology:   Previously obtain x-ray imaging reviewed. Assessment: Patient is a 81 y.o. female presenting to the office for an evaluation coordination of care for left knee pain. Patient is being treated with conservative therapy for the arthritis in her left knee. Visit Diagnoses       Codes    Chronic pain of left knee     M25.562, G89.29          No orders of the defined types were placed in this encounter. Medical decision making:  Patient's workup and evaluation were reviewed with the patient in the office today. Previously obtained x-ray imaging was reviewed with the patient. Given this patient's history and physical exam as well as review of x-ray imaging I believe the patient will need a total knee replacement as she has failed conservative measures. This patient is fairly complicated with regards to coordinating a total knee replacement. She does have a terri fixation for a prior femoral neck fracture. This could likely complicate the patient's total knee replacement. In addition the patient lives alone and has little social support. After surgery she would require to stay in a SNF. Was thoroughly discussed with the patient that we do not have good connections with any skilled nursing facility and have had poor outcomes with patients who have undergone a total knee replacement and stayed at a long-term or short-term care facility.   Patient was given contact information for a physician who could better facilitate this patient's care. All the patient's questions were answered while in the clinic. The patient is understanding of all instructions and agrees with the plan. Treatment Plan:    1. Utilize wheelchair or walker while ambulating  2. Activity modification  3. Ice 20 minutes ever 1-2 hours PRN  4. Take medications as prescribed/instructed  5. Rest   6. Elevation at least 2 inches above the heart with pillows supporting the joint underneath  7. Appropriate diet/weight management      Follow up: As needed    This patient exhibits moderate complexity for obtaining an independent history, reviewing past medical records, independent review of diagnostic imaging, and further coordinating care. The patient exhibits low risk at this time she is being treated with conservative measures however she would be high risk should she undergo surgery. Approximately 20 minutes were spent reviewing past medical records, imaging, educating the patient, and coordinating care. Approximately 50% of this time was spent with the patient face-to-face. Lorna Cabot, PA-C    Physician Assistant - Certified    70/76/49 8:58 AM    During this examination, IDre, [address] [place] [address], functioned as a scribe for Dr. Leydi Archuleta. This dictation was performed with a verbal recognition program (DRAGON) and it was checked for errors. It is possible that there are still dictated errors within this office note. If so, please bring any errors to my attention for an addendum. All efforts were made to ensure that this office note is accurate. This dictation was performed with a verbal recognition program (DRAGON) and it was checked for errors. It is possible that there are still dictated errors within this office note. If so, please bring any errors to my attention for an addendum. All efforts were made to ensure that this office note is accurate.     Supervising Physician Attestation:  I, Dr. Leydi Archuleta, personally performed the services described in this documentation as scribed above, and it is both accurate and complete and I agree with all pertinent clinical information. I personally interviewed the patient and performed a physical examination and medical decision making. I discussed the patient's condition and treatment options and have  reviewed and agree with the past medical, family and social history unless otherwise noted. All of the patient's questions were answered.       Board Certified Orthopaedic Surgeon  [facility] and [address] and [address] and [employer]  Professor of [address]

## 2021-04-20 ENCOUNTER — OFFICE VISIT (OUTPATIENT)
Dept: ORTHOPEDIC SURGERY | Age: 86
End: 2021-04-20
Payer: MEDICARE

## 2021-04-20 VITALS
SYSTOLIC BLOOD PRESSURE: 131 MMHG | HEART RATE: 97 BPM | BODY MASS INDEX: 22.97 KG/M2 | HEIGHT: 60 IN | DIASTOLIC BLOOD PRESSURE: 81 MMHG | WEIGHT: 117 LBS

## 2021-04-20 DIAGNOSIS — G89.29 CHRONIC PAIN OF RIGHT KNEE: ICD-10-CM

## 2021-04-20 DIAGNOSIS — M17.0 PRIMARY OSTEOARTHRITIS OF BOTH KNEES: ICD-10-CM

## 2021-04-20 DIAGNOSIS — M25.561 CHRONIC PAIN OF RIGHT KNEE: ICD-10-CM

## 2021-04-20 DIAGNOSIS — G89.29 CHRONIC PAIN OF LEFT KNEE: Primary | ICD-10-CM

## 2021-04-20 DIAGNOSIS — M25.562 CHRONIC PAIN OF LEFT KNEE: Primary | ICD-10-CM

## 2021-04-20 PROCEDURE — 1090F PRES/ABSN URINE INCON ASSESS: CPT | Performed by: ORTHOPAEDIC SURGERY

## 2021-04-20 PROCEDURE — 20610 DRAIN/INJ JOINT/BURSA W/O US: CPT | Performed by: ORTHOPAEDIC SURGERY

## 2021-04-20 PROCEDURE — 1123F ACP DISCUSS/DSCN MKR DOCD: CPT | Performed by: ORTHOPAEDIC SURGERY

## 2021-04-20 PROCEDURE — 99214 OFFICE O/P EST MOD 30 MIN: CPT | Performed by: ORTHOPAEDIC SURGERY

## 2021-04-20 PROCEDURE — G8399 PT W/DXA RESULTS DOCUMENT: HCPCS | Performed by: ORTHOPAEDIC SURGERY

## 2021-04-20 PROCEDURE — G8420 CALC BMI NORM PARAMETERS: HCPCS | Performed by: ORTHOPAEDIC SURGERY

## 2021-04-20 PROCEDURE — 4040F PNEUMOC VAC/ADMIN/RCVD: CPT | Performed by: ORTHOPAEDIC SURGERY

## 2021-04-20 PROCEDURE — 1036F TOBACCO NON-USER: CPT | Performed by: ORTHOPAEDIC SURGERY

## 2021-04-20 PROCEDURE — G8427 DOCREV CUR MEDS BY ELIG CLIN: HCPCS | Performed by: ORTHOPAEDIC SURGERY

## 2021-04-20 RX ORDER — HYDROCODONE BITARTRATE AND ACETAMINOPHEN 5; 325 MG/1; MG/1
1 TABLET ORAL EVERY 6 HOURS PRN
Qty: 28 TABLET | Refills: 0 | Status: SHIPPED | OUTPATIENT
Start: 2021-04-20 | End: 2021-04-27

## 2021-04-20 RX ORDER — LIDOCAINE HYDROCHLORIDE 10 MG/ML
20 INJECTION, SOLUTION INFILTRATION; PERINEURAL ONCE
Status: COMPLETED | OUTPATIENT
Start: 2021-04-20 | End: 2021-04-20

## 2021-04-20 RX ORDER — ROPIVACAINE HYDROCHLORIDE 5 MG/ML
30 INJECTION, SOLUTION EPIDURAL; INFILTRATION; PERINEURAL ONCE
Status: COMPLETED | OUTPATIENT
Start: 2021-04-20 | End: 2021-04-20

## 2021-04-20 RX ORDER — TRIAMCINOLONE ACETONIDE 40 MG/ML
40 INJECTION, SUSPENSION INTRA-ARTICULAR; INTRAMUSCULAR ONCE
Status: COMPLETED | OUTPATIENT
Start: 2021-04-20 | End: 2021-04-20

## 2021-04-20 RX ORDER — HYDROCODONE BITARTRATE AND ACETAMINOPHEN 5; 325 MG/1; MG/1
1 TABLET ORAL EVERY 6 HOURS PRN
Qty: 28 TABLET | Refills: 0 | Status: SHIPPED | OUTPATIENT
Start: 2021-04-20 | End: 2021-04-20 | Stop reason: CLARIF

## 2021-04-20 RX ADMIN — TRIAMCINOLONE ACETONIDE 40 MG: 40 INJECTION, SUSPENSION INTRA-ARTICULAR; INTRAMUSCULAR at 16:18

## 2021-04-20 RX ADMIN — LIDOCAINE HYDROCHLORIDE 20 ML: 10 INJECTION, SOLUTION INFILTRATION; PERINEURAL at 16:17

## 2021-04-20 RX ADMIN — ROPIVACAINE HYDROCHLORIDE 30 ML: 5 INJECTION, SOLUTION EPIDURAL; INFILTRATION; PERINEURAL at 16:18

## 2021-04-21 NOTE — PROGRESS NOTES
[facility]  History and Physical  Knee Pain    Date:  2021    Name:  Carolyn Walker  Address:  [id number] [place] [id number]    :  1935      Age:   81 y.o.    SSN:  [id number]      Medical Record Number:  [id number]      Chief Complaint    Knee Pain (Bilateral knee)      History of Present Illness:  Carolyn Walker is a 81 y.o. female who presents for evaluation of her left knee pain. This patient was previously being treated by another emergency physician. She has had left knee pain for several years which she notes at this time is failing conservative therapy. She has tried rest, ice, elevation, bracing, physical therapy, home exercises, activity modification, NSAIDs as needed, corticosteroid injections and Synvisc injections. She notes very little relief with the utilization of cortisone and viscosupplementation. Because of her increasing knee pain she has been unable to walk for approximately 6 months. She has been utilizing a wheelchair in order to get around. She states she can stand for brief periods of time to \"get something out of a cabinet or reach something up high before I have to sit down. \"  The patient denies any new injury. The patient denies any numbness or paresthesias.           Past Medical History:   Diagnosis Date    Acute deep vein thrombosis (DVT) of left lower extremity after procedure ([address] 10/3/2019    Post hip rx repair    Acute renal failure (ARF) ([facility] 2020    Anterior corneal dystrophy     Asymptomatic postmenopausal status (age-related) (natural)     Cervical polyp     Dry eye syndrome     Edema     Fuch's endothelial dystrophy     Gout     Hip fracture requiring operative repair, left, closed, initial encounter ([facility] 2019    IBS (irritable bowel syndrome)     Osteoarthritis     Osteopenia     Papilloma of breast 2013    Pedal edema 2017    Screening mammogram     Symptomatic tablet Take 1 tablet by mouth 4 times daily as needed for Diarrhea for up to 180 days. 120 tablet 5    diphenoxylate-atropine (LOMOTIL) 2.5-0.025 MG per tablet Take 1 tablet by mouth 4 times daily.  calcium citrate-vitamin D (CALCIUM CITRATE + D3) 315-250 MG-UNIT TABS per tablet Take 2 tablets by mouth 2 times daily (with meals) 120 tablet 12    diclofenac sodium (VOLTAREN) 1 % GEL Apply 4 g topically 4 times daily 5 Tube 0    acetaminophen (TYLENOL) 500 MG tablet Take 1 tablet by mouth 4 times daily as needed for Pain 360 tablet 1    alendronate (FOSAMAX) 70 MG tablet Take 1 tablet by mouth every 7 days Take with 8 ounces water, do not lie down for 30 minutes after taking it 12 tablet 3     No current facility-administered medications for this visit. Allergies   Allergen Reactions    Lisinopril Swelling     Swelling tongue.  Clindamycin/Lincomycin          Review of Systems:  A 14 point review of systems was completed by the patient and is available in the media section of the scanned medical record and was reviewed on 4/20/2021. The review is negative with the exception of those things mentioned in the HPI and Past Medical History         Vital Signs:   /81   Pulse 97   Ht 5' (1.524 m)   Wt 117 lb (53.1 kg)   BMI 22.85 kg/m²     General Exam:    General: Karyn Garnica is a healthy and well appearing 81y.o. year old female who is sitting comfortably in our office in no acute distress. Neuro: Alert & Oriented x 3,  normal,  no focal deficits noted. Normal mood & affect.   Eyes: Sclera clear  Ears: Normal external ear  Mouth: Patient wearing a mask  Pulm: Respirations unlabored and regular   Skin: Warm, well perfused      Knee Examination:  Dramatic pain in the bilateral knee with difficulty with any motionsunable to assess sherri varus valgus because neither knee able to get ot motions right past 45  Left one unable to get past 80    Inspection:  No effusion, ecchymosis, discoloration, erythema, excessive warmth. no gross deformities, no signs of infection. Palpation: There is patellofemoral crepitus, there is mild medial and lateral joint line tenderness. No other osseous or soft tissue tenderness around the knee. Negative calf tenderness    Range of Motion: Flexion contracture 80 left and 45 on right  degrees. Patient defers range of motion due to pain. Is able to stand. Strength: Deferred    Special Tests: Negative Homans sign    Gait: Patient in wheelchair    Alignment: Valgus deformity    Neuro: Sensation equal bilateral lower extremities    Vascular: 2+ posterior tibialis pulse        Radiology:     4 View x-ray of left knee AP, lateral, sunrise and tunnel views were performed and reviewed today revealing left knee osteoarthritis with symmetrical joint space narrowing and severe changes noted symmetrically. No evidence of acute fracture dislocation  4 view x-ray of the right knee AP, lateral, sunrise and tunnel views were performed and reviewed today revealing right knee osteoarthritis with symmetrical joint space narrowing and severe changes noted symmetrically. No evidence of acute fracture dislocation            Assessment: Patient is a 81 y.o. female presenting to the office for an evaluation coordination of care for left knee pain. Patient is being treated with conservative therapy for the arthritis in her left knee. Visit Diagnoses       Codes    Chronic pain of right knee     M25.561, G89.29          Orders Placed This Encounter   Medications    triamcinolone acetonide (KENALOG-40) injection 40 mg    lidocaine 1 % injection 20 mL    ropivacaine (NAROPIN) 0.5% injection 30 mL    DISCONTD: HYDROcodone-acetaminophen (LORCET) 5-325 MG per tablet     Sig: Take 1 tablet by mouth every 6 hours as needed for Pain for up to 7 days. Intended supply: 7 days.  Take lowest dose possible to manage pain     Dispense:  28 tablet     Refill:  0     Reduce doses taken as pain becomes manageable    HYDROcodone-acetaminophen (LORCET) 5-325 MG per tablet     Sig: Take 1 tablet by mouth every 6 hours as needed for Pain for up to 7 days. Intended supply: 7 days. Take lowest dose possible to manage pain     Dispense:  28 tablet     Refill:  0     Reduce doses taken as pain becomes manageable       Medical decision making:  Patient's workup and evaluation were reviewed with the patient in the office today. Previously obtained x-ray imaging was reviewed with the patient. Given this patient's history and physical exam as well as review of x-ray imaging I believe the patient will need a total knee replacement as she has failed conservative measures. This patient is fairly complicated with regards to coordinating a total knee replacement. She does have a terri fixation for a prior femoral neck fracture. This could likely complicate the patient's total knee replacement. In addition the patient lives alone and has little social support. After surgery she would require to stay in a SNF. Was thoroughly discussed with the patient that we do not have good connections with any skilled nursing facility and have had poor outcomes with patients who have undergone a total knee replacement and stayed at a long-term or short-term care facility. All the patient's questions were answered while in the clinic. The patient is understanding of all instructions and agrees with the plan. Injection Procedure Note  Procedure Details     Verbal consent was obtained for the procedure. The right knee(s) was prepped with iodine and the skin was anesthetized. Using a 22 gauge needle the right knee(s) joint is injected with 2 ml 1% lidocaine and 2 ml of triamcinolone (KENALOG) 40mg/ml under the lateral aspect of the knee. The injection site was cleansed with topical isopropyl alcohol and a dressing was applied. Complications:  None; patient tolerated the procedure well.   Injection Procedure Note  Procedure Details     Verbal consent was obtained for the procedure. The left knee(s) was prepped with iodine and the skin was anesthetized. Using a 22 gauge needle the left knee(s) joint is injected with 2 ml 1% lidocaine and 2 ml of triamcinolone (KENALOG) 40mg/ml under the lateral aspect of the knee. The injection site was cleansed with topical isopropyl alcohol and a dressing was applied. Complications:  None; patient tolerated the procedure well. After injections pain relief was immediate. Left and right knee now extended to 30 degrees with better control of pain. Start on pain medications for short term use to enable mobility to some degree and assess for conservative mangaement vs surgical intervention ie total knee.           04/20/21 11:18 PM

## 2021-05-11 ENCOUNTER — OFFICE VISIT (OUTPATIENT)
Dept: ORTHOPEDIC SURGERY | Age: 86
End: 2021-05-11
Payer: MEDICARE

## 2021-05-11 VITALS — BODY MASS INDEX: 22.97 KG/M2 | WEIGHT: 117 LBS | HEIGHT: 60 IN

## 2021-05-11 DIAGNOSIS — M17.0 PRIMARY OSTEOARTHRITIS OF BOTH KNEES: Primary | ICD-10-CM

## 2021-05-11 PROCEDURE — 4040F PNEUMOC VAC/ADMIN/RCVD: CPT | Performed by: ORTHOPAEDIC SURGERY

## 2021-05-11 PROCEDURE — 1123F ACP DISCUSS/DSCN MKR DOCD: CPT | Performed by: ORTHOPAEDIC SURGERY

## 2021-05-11 PROCEDURE — 1036F TOBACCO NON-USER: CPT | Performed by: ORTHOPAEDIC SURGERY

## 2021-05-11 PROCEDURE — 99213 OFFICE O/P EST LOW 20 MIN: CPT | Performed by: ORTHOPAEDIC SURGERY

## 2021-05-11 PROCEDURE — L1812 KO ELASTIC W/JOINTS PRE OTS: HCPCS | Performed by: ORTHOPAEDIC SURGERY

## 2021-05-11 PROCEDURE — G8427 DOCREV CUR MEDS BY ELIG CLIN: HCPCS | Performed by: ORTHOPAEDIC SURGERY

## 2021-05-11 PROCEDURE — G8420 CALC BMI NORM PARAMETERS: HCPCS | Performed by: ORTHOPAEDIC SURGERY

## 2021-05-11 PROCEDURE — G8399 PT W/DXA RESULTS DOCUMENT: HCPCS | Performed by: ORTHOPAEDIC SURGERY

## 2021-05-11 PROCEDURE — 1090F PRES/ABSN URINE INCON ASSESS: CPT | Performed by: ORTHOPAEDIC SURGERY

## 2021-05-11 RX ORDER — TRAMADOL HYDROCHLORIDE 50 MG/1
50 TABLET ORAL EVERY 4 HOURS PRN
Qty: 42 TABLET | Refills: 0 | Status: SHIPPED | OUTPATIENT
Start: 2021-05-11 | End: 2021-09-09 | Stop reason: SDUPTHER

## 2021-05-12 ENCOUNTER — TELEPHONE (OUTPATIENT)
Dept: ORTHOPEDIC SURGERY | Age: 86
End: 2021-05-12

## 2021-05-12 NOTE — TELEPHONE ENCOUNTER
Prescription Refill     Medication Name:  Hydrocodone  Pharmacy: [id number]  Patient Contact Number:  [phone]    Patient would like to know if Dr Della Sousa would fill prescription.

## 2021-05-12 NOTE — TELEPHONE ENCOUNTER
General Question     Subject: Patient checking the status of medication request  Patient Amanda Greene, [address] [place] [address] Road Number: [phone]

## 2021-05-12 NOTE — PROGRESS NOTES
12 [facility]  History and Physical  Knee Pain    Date:  2021    Name:  Naveen Kearns  Address:  [address] [place] [id number]    :  1935      Age:   81 y.o.    SSN:  [id number]      Medical Record Number:  [id number]      Chief Complaint    Follow-up (Had bilateral knee injections 21. No pain in knees at rest now)    Pain was much better after prior injections. She was very mobile and actively pursued additional therapy and motions, strengthening. Until this past week she felt her recovery was marked with greater independence than she has had in the past year. Unfortunately she fell onto the knees and was unable toget up. Family helped her upon arival to her house and assited with rising. She has been doing better over the past 5-6 days. Less pain. Give way more on the left side. History of Present Illness:  Naveen Kearns is a 81 y.o. female who presents for evaluation of her left knee pain. This patient was previously being treated by another emergency physician. She has had left knee pain for several years which she notes at this time is failing conservative therapy. She has tried rest, ice, elevation, bracing, physical therapy, home exercises, activity modification, NSAIDs as needed, corticosteroid injections and Synvisc injections. She notes very little relief with the utilization of cortisone and viscosupplementation. Because of her increasing knee pain she has been unable to walk for approximately 6 months. She has been utilizing a wheelchair in order to get around. She states she can stand for brief periods of time to \"get something out of a cabinet or reach something up high before I have to sit down. \"  The patient denies any new injury. The patient denies any numbness or paresthesias.      Pain Assessment  Location of Pain: Knee  Location Modifiers: Left, Right  Severity of Pain: 0    Past Medical History: on file     Attends Adventism service: Not on file     Active member of club or organization: Not on file     Attends meetings of clubs or organizations: Not on file     Relationship status: Not on file    Intimate partner violence     Fear of current or ex partner: Not on file     Emotionally abused: Not on file     Physically abused: Not on file     Forced sexual activity: Not on file   Other Topics Concern    Not on file   Social History Narrative    Not on file       Current Outpatient Medications   Medication Sig Dispense Refill    traMADol (ULTRAM) 50 MG tablet Take 1 tablet by mouth every 4 hours as needed for Pain for up to 7 days. Intended supply: 7 days. Take lowest dose possible to manage pain 42 tablet 0    diphenoxylate-atropine (LOMOTIL) 2.5-0.025 MG per tablet Take 1 tablet by mouth 4 times daily as needed for Diarrhea for up to 180 days. 120 tablet 5    diphenoxylate-atropine (LOMOTIL) 2.5-0.025 MG per tablet Take 1 tablet by mouth 4 times daily.  calcium citrate-vitamin D (CALCIUM CITRATE + D3) 315-250 MG-UNIT TABS per tablet Take 2 tablets by mouth 2 times daily (with meals) 120 tablet 12    acetaminophen (TYLENOL) 500 MG tablet Take 1 tablet by mouth 4 times daily as needed for Pain 360 tablet 1    alendronate (FOSAMAX) 70 MG tablet Take 1 tablet by mouth every 7 days Take with 8 ounces water, do not lie down for 30 minutes after taking it 12 tablet 3    diclofenac sodium (VOLTAREN) 1 % GEL Apply 4 g topically 4 times daily 5 Tube 0     No current facility-administered medications for this visit. Allergies   Allergen Reactions    Lisinopril Swelling     Swelling tongue.  Clindamycin/Lincomycin          Review of Systems:  A 14 point review of systems was completed by the patient and is available in the media section of the scanned medical record and was reviewed on 5/11/2021.   The review is negative with the exception of those things mentioned in the HPI and Past Medical History         Vital Signs:   Ht 5' (1.524 m)   Wt 117 lb (53.1 kg)   BMI 22.85 kg/m²     General Exam:    General: Raina Renee is a healthy and well appearing 81y.o. year old female who is sitting comfortably in our office in no acute distress. Neuro: Alert & Oriented x 3,  normal,  no focal deficits noted. Normal mood & affect. Eyes: Sclera clear  Ears: Normal external ear  Mouth: Patient wearing a mask  Pulm: Respirations unlabored and regular   Skin: Warm, well perfused      Knee Examination:  Dramatic pain in the bilateral knee resolved. Despite injury now able to move both knees to nearly full extension minus 5-6 degrees bilaterally  Flexion bilaterally to 105 degrees. Inspection:  No effusion, ecchymosis, discoloration, erythema, excessive warmth. no gross deformities, no signs of infection. Palpation: There is patellofemoral crepitus, there is mild medial and lateral joint line tenderness. No other osseous or soft tissue tenderness around the knee. Negative calf tenderness    Range of Motion: Flexion contracture improved. Strength: improved quad tone and hamstring use/ Function    Special Tests: Negative Homans sign    Gait: Patient in wheelchair    Alignment: Valgus deformity    Neuro: Sensation equal bilateral lower extremities    Vascular: 2+ posterior tibialis pulse        Radiology:   Bilateral knee osteoarthritis with symmetrical joint space narrowing and severe loss bilaterally symmetrical.           Assessment: Patient is a 81 y.o. female presenting to the office for an evaluation coordination of care for left knee pain. Patient is being treated with conservative therapy for the arthritis in her left knee. Orders Placed This Encounter   Medications    traMADol (ULTRAM) 50 MG tablet     Sig: Take 1 tablet by mouth every 4 hours as needed for Pain for up to 7 days. Intended supply: 7 days.  Take lowest dose possible to manage pain     Dispense:  42 tablet     Refill: 0     Reduce doses taken as pain becomes manageable       Medical decision making:  Patient's workup and evaluation were reviewed with the patient in the office today. Previously obtained x-ray imaging was reviewed with the patient. Given this patient's history and physical exam as well as review of x-ray imaging she is doing well with conservative management. All the patient's questions were answered while in the clinic. The patient is understanding of all instructions and agrees with the plan. Better control of pain. Start on pain medications for short term use to enable mobility to some degree and assess for conservative mangaement vs surgical intervention ie total knee. She will start on nustep in the building where she resides. Patient requires off shelf knee/brace orthosis to protect knee integrity. Oa bracing required to provide off loading to the medial/lateral  joint line to decrease knee instability and provide patient to return to activities of daily living. Continue pain medications although can consider tramadol instead of pain medications/narcotics.        05/11/21 10:25 PM

## 2021-07-08 ENCOUNTER — TELEPHONE (OUTPATIENT)
Dept: ORTHOPEDIC SURGERY | Age: 86
End: 2021-07-08

## 2021-07-08 DIAGNOSIS — M17.0 PRIMARY OSTEOARTHRITIS OF BOTH KNEES: Primary | ICD-10-CM

## 2021-07-08 NOTE — TELEPHONE ENCOUNTER
Prescription Refill     Medication Name:  TRAMADOL  Pharmacy: [facility] [phone]   Patient Contact Number:  [phone]

## 2021-07-09 RX ORDER — TRAMADOL HYDROCHLORIDE 50 MG/1
50 TABLET ORAL EVERY 6 HOURS PRN
Qty: 28 TABLET | Refills: 0 | Status: SHIPPED | OUTPATIENT
Start: 2021-07-09 | End: 2021-07-16

## 2021-08-03 ENCOUNTER — OFFICE VISIT (OUTPATIENT)
Dept: ORTHOPEDIC SURGERY | Age: 86
End: 2021-08-03
Payer: MEDICARE

## 2021-08-03 VITALS
HEART RATE: 103 BPM | HEIGHT: 60 IN | DIASTOLIC BLOOD PRESSURE: 84 MMHG | BODY MASS INDEX: 22.97 KG/M2 | WEIGHT: 117 LBS | SYSTOLIC BLOOD PRESSURE: 139 MMHG

## 2021-08-03 DIAGNOSIS — M25.562 CHRONIC PAIN OF LEFT KNEE: ICD-10-CM

## 2021-08-03 DIAGNOSIS — G89.29 CHRONIC PAIN OF RIGHT KNEE: ICD-10-CM

## 2021-08-03 DIAGNOSIS — M79.89 SWELLING OF BOTH LOWER EXTREMITIES: ICD-10-CM

## 2021-08-03 DIAGNOSIS — M25.561 CHRONIC PAIN OF RIGHT KNEE: ICD-10-CM

## 2021-08-03 DIAGNOSIS — M17.0 PRIMARY OSTEOARTHRITIS OF BOTH KNEES: Primary | ICD-10-CM

## 2021-08-03 DIAGNOSIS — G89.29 CHRONIC PAIN OF LEFT KNEE: ICD-10-CM

## 2021-08-03 PROCEDURE — 4040F PNEUMOC VAC/ADMIN/RCVD: CPT | Performed by: PHYSICIAN ASSISTANT

## 2021-08-03 PROCEDURE — G8420 CALC BMI NORM PARAMETERS: HCPCS | Performed by: PHYSICIAN ASSISTANT

## 2021-08-03 PROCEDURE — 1090F PRES/ABSN URINE INCON ASSESS: CPT | Performed by: PHYSICIAN ASSISTANT

## 2021-08-03 PROCEDURE — G8399 PT W/DXA RESULTS DOCUMENT: HCPCS | Performed by: PHYSICIAN ASSISTANT

## 2021-08-03 PROCEDURE — 1036F TOBACCO NON-USER: CPT | Performed by: PHYSICIAN ASSISTANT

## 2021-08-03 PROCEDURE — G8427 DOCREV CUR MEDS BY ELIG CLIN: HCPCS | Performed by: PHYSICIAN ASSISTANT

## 2021-08-03 PROCEDURE — 1123F ACP DISCUSS/DSCN MKR DOCD: CPT | Performed by: PHYSICIAN ASSISTANT

## 2021-08-03 PROCEDURE — 99214 OFFICE O/P EST MOD 30 MIN: CPT | Performed by: PHYSICIAN ASSISTANT

## 2021-08-03 RX ORDER — TRAMADOL HYDROCHLORIDE 50 MG/1
50 TABLET ORAL EVERY 6 HOURS PRN
Qty: 28 TABLET | Refills: 0 | Status: SHIPPED | OUTPATIENT
Start: 2021-08-03 | End: 2021-08-10

## 2021-08-03 NOTE — PROGRESS NOTES
12 [facility]  History and Physical  Knee Pain    Date:  8/3/2021    Name:  Dean Salas  Address:  [address] [facility] [address]    :  1935      Age:   81 y.o.    SSN:  [id number]      Medical Record Number:  [id number]      Chief Complaint    Knee Pain (BILATERAL KNEE-Doing okay)    Currently: 8/3/2021  Patient is here for follow-up regarding bilateral knee pain and osteoarthritis. Patient is doing okay overall she is noting she is continuing to increase her walking noting she is getting about 130 feet with breaks and utilization of her walker. She is hopeful for a refill of tramadol as it helps significantly better overall pain. She notes her current pain level is a 2-3 out of 10. Denies numbness burning tingling radiating pains down the leg. She is in a wheelchair today's visit but does utilize a walker at home. Denies any fall trauma or injury      Previously: 2021  Pain was much better after prior injections. She was very mobile and actively pursued additional therapy and motions, strengthening. Until this past week she felt her recovery was marked with greater independence than she has had in the past year. Unfortunately she fell onto the knees and was unable toget up. Family helped her upon arival to her house and assited with rising. She has been doing better over the past 5-6 days. Less pain. Give way more on the left side. History of Present Illness:  Dean Salas is a 81 y.o. female who presents for evaluation of her left knee pain. This patient was previously being treated by another emergency physician. She has had left knee pain for several years which she notes at this time is failing conservative therapy. She has tried rest, ice, elevation, bracing, physical therapy, home exercises, activity modification, NSAIDs as needed, corticosteroid injections and Synvisc injections.   She notes very little relief with the utilization of cortisone and viscosupplementation. Because of her increasing knee pain she has been unable to walk for approximately 6 months. She has been utilizing a wheelchair in order to get around. She states she can stand for brief periods of time to \"get something out of a cabinet or reach something up high before I have to sit down. \"  The patient denies any new injury. The patient denies any numbness or paresthesias. Past Medical History:   Diagnosis Date    Acute deep vein thrombosis (DVT) of left lower extremity after procedure ([address] 10/3/2019    Post hip rx repair    Acute renal failure (ARF) ([address] 1/17/2020    Anterior corneal dystrophy     Asymptomatic postmenopausal status (age-related) (natural)     Cervical polyp     Dry eye syndrome     Edema     Fuch's endothelial dystrophy     Gout     Hip fracture requiring operative repair, left, closed, initial encounter ([facility] 9/17/2019    IBS (irritable bowel syndrome)     Osteoarthritis     Osteopenia     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    Pedal edema 5/8/2017    Screening mammogram     Symptomatic menopausal or female climacteric states 11/4/2013    Urinary retention 9/21/2019    Vaginal candidiasis         Past Surgical History:   Procedure Laterality Date    APPENDECTOMY      EYE SURGERY      Cataract Surgery    HIP PINNING Left 9/18/2019    LEFT HIP GAMMA NAIL performed by Alcides Guerrero MD at [facility] OR       Family History   Problem Relation Age of Onset    Breast Cancer Daughter         [facility]    Ovarian Cancer Daughter        Social History     Socioeconomic History    Marital status:       Spouse name: None    Number of children: None    Years of education: None    Highest education level: None   Occupational History    None   Tobacco Use    Smoking status: Never Smoker    Smokeless tobacco: Never Used   Substance and Sexual Activity    Alcohol use: No    Drug use: No    Sexual activity: Not Currently   Other Topics Concern    None   Social History Narrative    None     Social Determinants of Health     Financial Resource Strain:     Difficulty of Paying Living Expenses:    Food Insecurity:     Worried About Running Out of Food in the Last Year:     [address] in the Last Year:    Transportation Needs:     Lack of Transportation (Medical):  Lack of Transportation (Non-Medical):    Physical Activity:     Days of Exercise per Week:     Minutes of Exercise per Session:    Stress:     Feeling of Stress :    Social Connections:     Frequency of Communication with Friends and Family:     Frequency of Social Gatherings with Friends and Family:     Attends Orthodoxy Services:     Active Member of Clubs or Organizations:     Attends Club or Organization Meetings:     Marital Status:    Intimate Partner Violence:     Fear of Current or Ex-Partner:     Emotionally Abused:     Physically Abused:     Sexually Abused:        Current Outpatient Medications   Medication Sig Dispense Refill    traMADol (ULTRAM) 50 MG tablet Take 1 tablet by mouth every 6 hours as needed for Pain for up to 7 days. Intended supply: 7 days. Take lowest dose possible to manage pain 28 tablet 0    diphenoxylate-atropine (LOMOTIL) 2.5-0.025 MG per tablet Take 1 tablet by mouth 4 times daily as needed for Diarrhea for up to 180 days. 120 tablet 5    diphenoxylate-atropine (LOMOTIL) 2.5-0.025 MG per tablet Take 1 tablet by mouth 4 times daily.       calcium citrate-vitamin D (CALCIUM CITRATE + D3) 315-250 MG-UNIT TABS per tablet Take 2 tablets by mouth 2 times daily (with meals) 120 tablet 12    diclofenac sodium (VOLTAREN) 1 % GEL Apply 4 g topically 4 times daily 5 Tube 0    acetaminophen (TYLENOL) 500 MG tablet Take 1 tablet by mouth 4 times daily as needed for Pain 360 tablet 1    alendronate (FOSAMAX) 70 MG tablet Take 1 tablet by mouth every 7 days Take with 8 ounces water, do not lie down for 30 minutes after taking it 12 tablet 3     No current facility-administered medications for this visit. Allergies   Allergen Reactions    Lisinopril Swelling     Swelling tongue.  Clindamycin/Lincomycin          Review of Systems:  A 14 point review of systems was completed by the patient and is available in the media section of the scanned medical record and was reviewed on 8/3/2021. The review is negative with the exception of those things mentioned in the HPI and Past Medical History         Vital Signs:   /84   Pulse 103   Ht 5' (1.524 m)   Wt 117 lb (53.1 kg)   BMI 22.85 kg/m²     General Exam:    General: Rayo Alvarado is a healthy and well appearing 81y.o. year old female who is sitting comfortably in our office in no acute distress. Neuro: Alert & Oriented x 3,  normal,  no focal deficits noted. Normal mood & affect. Eyes: Sclera clear  Ears: Normal external ear  Mouth: Patient wearing a mask  Pulm: Respirations unlabored and regular   Skin: Warm, well perfused  Noted bilateral lower extremity swelling from mid shin down worse on the right than the left. Knee Examination:  Dramatic pain in the bilateral knee resolved. Despite injury now able to move both knees to nearly full extension minus 5-6 degrees bilaterally  Flexion bilaterally to 105 degrees. Inspection:  No effusion, ecchymosis, discoloration, erythema, excessive warmth. no gross deformities, no signs of infection. Palpation: There is patellofemoral crepitus, there is mild medial and lateral joint line tenderness. No other osseous or soft tissue tenderness around the knee. Negative calf tenderness    Range of Motion: Flexion contracture improved.      Strength: improved quad tone and hamstring use/ Function    Special Tests: Negative Homans sign    Gait: Patient in wheelchair    Alignment: Valgus deformity    Neuro: Sensation equal bilateral lower extremities    Vascular: 2+ posterior tibialis pulse        Radiology: Bilateral knee osteoarthritis with symmetrical joint space narrowing and severe loss bilaterally symmetrical.           Assessment: Patient is a 81 y.o. female presenting to the office for an evaluation coordination of care for left knee pain. Patient is being treated with conservative therapy for the arthritis in her left knee. Visit Diagnoses       Codes    Swelling of both lower extremities     M79.89          Orders Placed This Encounter   Medications    traMADol (ULTRAM) 50 MG tablet     Sig: Take 1 tablet by mouth every 6 hours as needed for Pain for up to 7 days. Intended supply: 7 days. Take lowest dose possible to manage pain     Dispense:  28 tablet     Refill:  0     Reduce doses taken as pain becomes manageable       Medical decision making:  Patient's workup and evaluation were reviewed with the patient in the office today. Previously obtained x-ray imaging was reviewed with the patient. Given this patient's history and physical exam as well as review of x-ray imaging she is doing well with conservative management. All the patient's questions were answered while in the clinic. The patient is understanding of all instructions and agrees with the plan. Better control of pain. Start on pain medications for short term use to enable mobility to some degree and assess for conservative mangaement vs surgical intervention ie total knee. She will start on nustep in the building where she resides. Patient requires off shelf knee/brace orthosis to protect knee integrity. Oa bracing required to provide off loading to the medial/lateral  joint line to decrease knee instability and provide patient to return to activities of daily living. Continue pain medications although can consider tramadol instead of pain medications/narcotics.    Refill tramadol today's visit    With the bilateral lower extremity edema ordered laboratory evaluation and reordered laboratory evaluation and primary care ordered as well and will try and have [address] [facility] come out and have this drawn for further evaluation to ensure no obvious causes of her swelling beyond dependent edema.     08/03/21 3:58 PM

## 2021-08-16 ENCOUNTER — TELEPHONE (OUTPATIENT)
Dept: FAMILY MEDICINE CLINIC | Age: 86
End: 2021-08-16

## 2021-08-16 NOTE — TELEPHONE ENCOUNTER
Edna Sayra called with 2 questions. 1.  She states Dr. Violette Quigley ordered blood work for her. She is in a wheelchair and doesn't want to go to the lab if she does not have to. She wants to know if Dr. Violette Quigley knows of any way someone can come to her home to draw her blood. Possible home health referral?    2. She thinks she has cellulitis on her leg. It is swollen and painful and someone told her it looks like cellulitis. I offered to schedule next available VV same day, but she wants to make sure a VV will be ok with Dr. Violette Quigley. She is wondering if it would be better to come in, but she would prefer a VV. Please advise. Thanks.         Edna [place] [phone] (home) [phone] (work)

## 2021-08-16 NOTE — TELEPHONE ENCOUNTER
VV is fine. Home health will not go to home just for labs but if she has cellulitis I may be able to get home health for that reason.   We can discuss at [phone]

## 2021-08-17 ENCOUNTER — TELEPHONE (OUTPATIENT)
Dept: ORTHOPEDIC SURGERY | Age: 86
End: 2021-08-17

## 2021-08-17 NOTE — TELEPHONE ENCOUNTER
General Question     Subject: BLOOD WORK  Patient and /or Facility Request: [facility]ur[facility]  Contact Number: [phone]    PATIENT WAS IN TO SEE DR. Garza ON 8/3/21    PATIENT CALLING FOLLOWING UP ON IF SOMEONE CAN COME TO HER HOME AND DRAWN HER BLOOD SINCE SHE IS IN A WHEEL CHAIR    PATIENT DOES HAVE HER ORDERS AND SOMEONE IN THE OFFICE WAS GOING  TO CHECK TO SEE IF THIS CAN BE DONE IN HE HOME. PLEASE CALL BACK AT THE ABOVE NUMBER.

## 2021-08-18 DIAGNOSIS — M79.89 SWELLING OF BOTH LOWER EXTREMITIES: ICD-10-CM

## 2021-08-18 DIAGNOSIS — M17.0 PRIMARY OSTEOARTHRITIS OF BOTH KNEES: Primary | ICD-10-CM

## 2021-08-18 NOTE — TELEPHONE ENCOUNTER
I spoke with the patient and let her know that we will send a referral over to [facility] and they will contact her.

## 2021-08-19 ENCOUNTER — TELEPHONE (OUTPATIENT)
Dept: ORTHOPEDIC SURGERY | Age: 86
End: 2021-08-19

## 2021-08-19 ENCOUNTER — VIRTUAL VISIT (OUTPATIENT)
Dept: FAMILY MEDICINE CLINIC | Age: 86
End: 2021-08-19
Payer: MEDICARE

## 2021-08-19 DIAGNOSIS — M79.89 REDNESS AND SWELLING OF LOWER LEG: ICD-10-CM

## 2021-08-19 DIAGNOSIS — M79.89 SWELLING OF BOTH LOWER EXTREMITIES: Primary | ICD-10-CM

## 2021-08-19 DIAGNOSIS — R23.8 REDNESS AND SWELLING OF LOWER LEG: ICD-10-CM

## 2021-08-19 DIAGNOSIS — I10 ESSENTIAL HYPERTENSION, BENIGN: ICD-10-CM

## 2021-08-19 PROCEDURE — 99442 PR PHYS/QHP TELEPHONE EVALUATION 11-20 MIN: CPT | Performed by: FAMILY MEDICINE

## 2021-08-19 NOTE — TELEPHONE ENCOUNTER
Medical Facility Question     Facility Name: [address] [facility]  Contact Name: [address] Number: [phone]  Request or Information: CALL [facility] [phone] REGARDING PATIENT'S BLOOD WORK.

## 2021-08-19 NOTE — TELEPHONE ENCOUNTER
Ishaan with [facility] called and stated that he could not get the blood drawn today. He is going to send a nurse out on Monday.

## 2021-08-19 NOTE — PROGRESS NOTES
8/19/2021  Dean Salas is a 81 y.o. female evaluated via telephone on 8/19/2021. Consent:  She and/or health care decision maker is aware that that she may receive a bill for this telephone service, depending on her insurance coverage, and has provided verbal consent to proceed: Yes      Documentation:  I communicated with the patient and/or health care decision maker about cellulitis       Anjum Fleming is concerned for cellulitis on her right leg just above the ankle. She had the nurse in her bldg look at it who confirmed. Both legs are swollen. The area is about 3 inches wide. When she pushes on the red area it goes away and comes back when she lets go. The area is not itchy and is not getting bigger. It does not feel hot and is not woody feeling. Onset x 3 - 4 days. Denies fever. The swelling in her legs is stable x one year. She also notes she has intermittent urge incontinence at night. Never during the day. She drinks 1/2 can or diet coke at HS. OA knees - seeing Dr. Dionisio Fernando. Limited mostly to WC. Can stand for brief periods. Is looking for a way to have labs done at home; would like to avoid having to go to a lab. She did find someone to come today.         Patient Active Problem List   Diagnosis    Osteoarthritis    IBS (irritable bowel syndrome)    Gout    Dry eye syndrome    Osteopenia    Essential hypertension, benign    Glucose intolerance (impaired glucose tolerance)    Primary osteoarthritis of both knees    Anemia    Inguinal hernia, right    History of DVT (deep vein thrombosis)    Chronic pain of left knee    Chronic pain of right knee    Swelling of both lower extremities      Past Medical History:   Diagnosis Date    Acute deep vein thrombosis (DVT) of left lower extremity after procedure ([address] 10/3/2019    Post hip rx repair    Acute renal failure (ARF) ([address] 1/17/2020    Anterior corneal dystrophy     Asymptomatic postmenopausal status (age-related) (natural)     Cervical polyp     Dry eye syndrome     Edema     Fuch's endothelial dystrophy     Gout     Hip fracture requiring operative repair, left, closed, initial encounter ([facility] 9/17/2019    IBS (irritable bowel syndrome)     Osteoarthritis     Osteopenia     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    Pedal edema 5/8/2017    Screening mammogram     Symptomatic menopausal or female climacteric states 11/4/2013    Urinary retention 9/21/2019    Vaginal candidiasis      Past Surgical History:   Procedure Laterality Date    APPENDECTOMY      EYE SURGERY      Cataract Surgery    HIP PINNING Left 9/18/2019    LEFT HIP GAMMA NAIL performed by Claude Baars, MD at [facility]   Allergen Reactions    Lisinopril Swelling     Swelling tongue.  Clindamycin/Lincomycin       Outpatient Medications Marked as Taking for the 8/19/21 encounter (Virtual Visit) with Raghav Reynolds MD   Medication Sig Dispense Refill    diphenoxylate-atropine (LOMOTIL) 2.5-0.025 MG per tablet Take 1 tablet by mouth 4 times daily as needed for Diarrhea for up to 180 days. 120 tablet 5    diphenoxylate-atropine (LOMOTIL) 2.5-0.025 MG per tablet Take 1 tablet by mouth 4 times daily.       calcium citrate-vitamin D (CALCIUM CITRATE + D3) 315-250 MG-UNIT TABS per tablet Take 2 tablets by mouth 2 times daily (with meals) 120 tablet 12    acetaminophen (TYLENOL) 500 MG tablet Take 1 tablet by mouth 4 times daily as needed for Pain 360 tablet 1    alendronate (FOSAMAX) 70 MG tablet Take 1 tablet by mouth every 7 days Take with 8 ounces water, do not lie down for 30 minutes after taking it 12 tablet 3          Lab Results   Component Value Date     (L) 01/22/2020    K 4.3 01/22/2020    K 4.3 01/22/2020     01/22/2020    CO2 22 01/22/2020    BUN 18 01/22/2020    CREATININE 0.8 01/22/2020    GLUCOSE 104 (H) 01/22/2020    CALCIUM 8.9 01/22/2020    PROT 6.0 (L) 01/19/2020    LABALBU 2.8 (L) 01/22/2020    BILITOT <0.2 01/19/2020    ALKPHOS 62 01/19/2020    AST 13 (L) 01/19/2020    ALT <5 (L) 01/19/2020    LABGLOM >60 01/22/2020    GFRAA >60 01/22/2020    AGRATIO 1.4 07/17/2017    GLOB 2.9 07/17/2017        Lab Results   Component Value Date    WBC 5.5 01/22/2020    HGB 10.4 (L) 01/22/2020    HCT 31.9 (L) 01/22/2020    MCV 88.9 01/22/2020     01/22/2020     No results found for: CHOL  No results found for: TRIG  No results found for: HDL  No results found for: LDLCHOLESTEROL, LDLCALC  No results found for: LABVLDL, VLDL  No results found for: [facility]      Details of this discussion including any medical advice provided:    Diagnosis Orders   1. Swelling of both lower extremities  Stable   Continue support hose, elevate, low Na diet   2. Redness and swelling of lower leg  I have low suspicion for infection  Continue elevation, support hose. Monitor and if area increases in size or becomes warmer than surrounding tissue, she should follow up asap   3. Essential hypertension, benign  Elevated today but she states she had to wheel herself to nurse to have BP taken. Was normal ast Dr. Marko Sanchez office last week. Labs to be drawn today        I affirm this is a Patient Initiated Episode with a Patient who has not had a related appointment within my department in the past 7 days or scheduled within the next 24 hours. Patient identification was verified at the start of the visit: Yes    Total Time: minutes: 11-20 minutes    The visit was conducted pursuant to the emergency declaration under the [address] [place] [address], [address] [place] [address] authority and the [employer] General Act. Patient identification was verified, and a caregiver was present when appropriate. The patient was located in a state where the provider was credentialed to provide care.     Note: not billable if this call serves to triage the patient into an appointment for the relevant concern      Rod Batista MD

## 2021-08-20 ENCOUNTER — TELEPHONE (OUTPATIENT)
Dept: ORTHOPEDIC SURGERY | Age: 86
End: 2021-08-20

## 2021-08-20 NOTE — TELEPHONE ENCOUNTER
Pt with [facility] called states  Pt evaluation was done and patient declined further visits at this time states that she is doing what the doctor ordered on her own.

## 2021-08-27 ENCOUNTER — TELEPHONE (OUTPATIENT)
Dept: FAMILY MEDICINE CLINIC | Age: 86
End: 2021-08-27

## 2021-08-27 DIAGNOSIS — R32 URINARY INCONTINENCE, UNSPECIFIED TYPE: Primary | ICD-10-CM

## 2021-08-27 NOTE — TELEPHONE ENCOUNTER
I suspect she has a bladder infection. I placed an order. someone from her family can take her to the lab or can come her for specimen container to get home UA. Lab will not take UA unless in the correct container. Order placed.

## 2021-08-27 NOTE — TELEPHONE ENCOUNTER
Emmanuel Cortes called requesting to speak to Dr. Shanika Tomas directly regarding incontinence she has been having for the last week. I let her know I could send a message back, but she will likely get a call back rom the MA. There are no appts available to offer today and pt does not have Mychart and cannot do e-visit. She wants the message sent back high priority and she wants Dr. Shanika Tomas to know she is extremely anxious about what is happening. She states she is unable to leave her house at all because she cannot control her bladder. Please advise. Thanks.         Emmanuel Cortes [phone] (home) [phone] (work)

## 2021-09-01 ENCOUNTER — TELEPHONE (OUTPATIENT)
Dept: FAMILY MEDICINE CLINIC | Age: 86
End: 2021-09-01

## 2021-09-01 NOTE — TELEPHONE ENCOUNTER
Spoke to lab and they have nothing   Informed them that this was sent out on Monday and they state they never received it.   We will need a new sample

## 2021-09-01 NOTE — TELEPHONE ENCOUNTER
Please refer to telephone note from 08/27/2021  Venu Catherine called to see if we had her test results back from when she had her sample dropped off by an aid. I do not not even see where the numbers have been submitted. I remember that the sample was dropped of on Monday. I set it on the back counter for Dr. Lucy Gregory per [address] [facility]. Venu Catherine would like to know when she can expect her results back.

## 2021-09-02 RX ORDER — NITROFURANTOIN 25; 75 MG/1; MG/1
100 CAPSULE ORAL 2 TIMES DAILY
Qty: 14 CAPSULE | Refills: 0 | Status: SHIPPED | OUTPATIENT
Start: 2021-09-02 | End: 2021-09-09

## 2021-09-02 NOTE — TELEPHONE ENCOUNTER
Jennifer Hoyt called again stating she is so upset with our office. She wants to make sure we have checked to make sure we don't have the specimen laying. I let her know it is highly unlikely that it is still at our office.

## 2021-09-02 NOTE — TELEPHONE ENCOUNTER
Tell her I am very sorry that this happened. I am going to order an antibiotic for her. She should call the office if not better in 3 days.

## 2021-09-02 NOTE — TELEPHONE ENCOUNTER
I gave Ry Shaw the message that the lab does not have sample and that a new sample is needed. She is very upset. She states she had to hire someone to come to our office, get the specimen cup, and bring it back to our office. She states this is absurd. She has company coming over this weekend and she will have to cancel. She states her symptoms are getting worse and she \"spends her life in the bathroom. \"  She does not think she will be able to get another sample to our office and wants to know what Dr. Mati Sheffield recommends. I let her know that Dr. Mati Sheffield is working from home today. She wants to hear back as soon as possible and wants to stress to Dr. Mati Sheffield how upset she is. Please advise. Thanks.         Ry Shaw [phone] (home) [phone] (work)

## 2021-09-03 ENCOUNTER — TELEPHONE (OUTPATIENT)
Dept: ORTHOPEDIC SURGERY | Age: 86
End: 2021-09-03

## 2021-09-03 NOTE — TELEPHONE ENCOUNTER
[address] [place] Name: [address] [place] [address]  Contact Name: Janet Tom  Number: [phone]  Request or Information: PATIENT WAS DISCHARGED AS OF September 3, 2021. PATIENT REQUESTED DISCHARGE.

## 2021-09-09 ENCOUNTER — TELEPHONE (OUTPATIENT)
Dept: FAMILY MEDICINE CLINIC | Age: 86
End: 2021-09-09

## 2021-09-09 ENCOUNTER — TELEPHONE (OUTPATIENT)
Dept: ORTHOPEDIC SURGERY | Age: 86
End: 2021-09-09

## 2021-09-09 DIAGNOSIS — M17.0 PRIMARY OSTEOARTHRITIS OF BOTH KNEES: ICD-10-CM

## 2021-09-09 RX ORDER — TRAMADOL HYDROCHLORIDE 50 MG/1
50 TABLET ORAL EVERY 4 HOURS PRN
Qty: 42 TABLET | Refills: 0 | Status: SHIPPED | OUTPATIENT
Start: 2021-09-09 | End: 2021-09-16

## 2021-09-09 NOTE — TELEPHONE ENCOUNTER
Prescription Refill     Medication Name:  TRAMADOL  Pharmacy: [address] [phone]   Patient Contact Number:  [phone]

## 2021-09-09 NOTE — TELEPHONE ENCOUNTER
Patient was prescribed an antibiotic for a possible UTI and she's requesting a refill. Patient states she started feeling better but ran out of medication. Please call patient back either way. Please advise. Thanks.

## 2021-09-15 ENCOUNTER — OFFICE VISIT (OUTPATIENT)
Dept: FAMILY MEDICINE CLINIC | Age: 86
End: 2021-09-15
Payer: MEDICARE

## 2021-09-15 ENCOUNTER — TELEPHONE (OUTPATIENT)
Dept: FAMILY MEDICINE CLINIC | Age: 86
End: 2021-09-15

## 2021-09-15 VITALS
DIASTOLIC BLOOD PRESSURE: 88 MMHG | HEART RATE: 98 BPM | OXYGEN SATURATION: 96 % | RESPIRATION RATE: 12 BRPM | TEMPERATURE: 98.6 F | SYSTOLIC BLOOD PRESSURE: 122 MMHG

## 2021-09-15 DIAGNOSIS — K58.0 IRRITABLE BOWEL SYNDROME WITH DIARRHEA: ICD-10-CM

## 2021-09-15 DIAGNOSIS — R35.0 FREQUENT URINATION: ICD-10-CM

## 2021-09-15 DIAGNOSIS — Z23 FLU VACCINE NEED: ICD-10-CM

## 2021-09-15 DIAGNOSIS — N39.46 MIXED INCONTINENCE: Primary | ICD-10-CM

## 2021-09-15 DIAGNOSIS — R60.0 PEDAL EDEMA: ICD-10-CM

## 2021-09-15 LAB
BILIRUBIN, POC: ABNORMAL
BLOOD URINE, POC: ABNORMAL
CLARITY, POC: ABNORMAL
COLOR, POC: YELLOW
GLUCOSE URINE, POC: ABNORMAL
KETONES, POC: ABNORMAL
LEUKOCYTE EST, POC: ABNORMAL
NITRITE, POC: ABNORMAL
PH, POC: 5
PROTEIN, POC: ABNORMAL
SPECIFIC GRAVITY, POC: 1.02
UROBILINOGEN, POC: 0.2

## 2021-09-15 PROCEDURE — 99214 OFFICE O/P EST MOD 30 MIN: CPT | Performed by: FAMILY MEDICINE

## 2021-09-15 PROCEDURE — 90694 VACC AIIV4 NO PRSRV 0.5ML IM: CPT | Performed by: FAMILY MEDICINE

## 2021-09-15 PROCEDURE — G0008 ADMIN INFLUENZA VIRUS VAC: HCPCS | Performed by: FAMILY MEDICINE

## 2021-09-15 PROCEDURE — G8427 DOCREV CUR MEDS BY ELIG CLIN: HCPCS | Performed by: FAMILY MEDICINE

## 2021-09-15 PROCEDURE — 1036F TOBACCO NON-USER: CPT | Performed by: FAMILY MEDICINE

## 2021-09-15 PROCEDURE — G8420 CALC BMI NORM PARAMETERS: HCPCS | Performed by: FAMILY MEDICINE

## 2021-09-15 PROCEDURE — 1123F ACP DISCUSS/DSCN MKR DOCD: CPT | Performed by: FAMILY MEDICINE

## 2021-09-15 PROCEDURE — 4040F PNEUMOC VAC/ADMIN/RCVD: CPT | Performed by: FAMILY MEDICINE

## 2021-09-15 PROCEDURE — 1090F PRES/ABSN URINE INCON ASSESS: CPT | Performed by: FAMILY MEDICINE

## 2021-09-15 PROCEDURE — G8399 PT W/DXA RESULTS DOCUMENT: HCPCS | Performed by: FAMILY MEDICINE

## 2021-09-15 PROCEDURE — 0509F URINE INCON PLAN DOCD: CPT | Performed by: FAMILY MEDICINE

## 2021-09-15 PROCEDURE — 81002 URINALYSIS NONAUTO W/O SCOPE: CPT | Performed by: FAMILY MEDICINE

## 2021-09-15 RX ORDER — DIPHENOXYLATE HYDROCHLORIDE AND ATROPINE SULFATE 2.5; .025 MG/1; MG/1
1 TABLET ORAL 4 TIMES DAILY
Qty: 120 TABLET | Refills: 5 | Status: SHIPPED | OUTPATIENT
Start: 2021-09-15 | End: 2022-02-14 | Stop reason: SDUPTHER

## 2021-09-15 RX ORDER — CEPHALEXIN 500 MG/1
500 CAPSULE ORAL 3 TIMES DAILY
Qty: 15 CAPSULE | Refills: 0 | Status: SHIPPED | OUTPATIENT
Start: 2021-09-15 | End: 2021-09-20

## 2021-09-15 NOTE — PROGRESS NOTES
Subjective:      Patient ID: Rosa Elena Mittal 81 y.o. female. is here for evaluation for UTI        HPI    Labs: pending. Due TDAP, shingles and flu vaccine    Mina Landis was treated with Macrobid on 9/2/21 for a presumed UTI. Unfortunately urine cx was not able to be obtained due to logistical/transportaion issues. She complains of urge incontinence. Wetting her bed at HS. No dysuria, her hematuria, pelvic pain, constipation, diarrhea, perineal numbness. Urine is not cloudy or malodorous. Macrobid did not help at all. She stopped cola without improvement. IBS is well controlled with Lomoitil. Has one normal BP per day. Has mild pedal right > left x 6 - 8 months. Wears support hose most days. Help some. Not getting worse. Is seeing ortho for her knees. Is doing PT for her knees. Walks with a walker. Lab Results   Component Value Date     01/22/2020    K 4.3 01/22/2020    K 4.3 01/22/2020     01/22/2020    CO2 22 01/22/2020    BUN 18 01/22/2020    CREATININE 0.8 01/22/2020    GLUCOSE 104 01/22/2020    CALCIUM 8.9 01/22/2020       Lab Results   Component Value Date    COLORU Yellow 01/20/2020    NITRU Negative 01/20/2020    GLUCOSEU Negative 01/20/2020    KETUA Negative 01/20/2020    UROBILINOGEN 0.2 01/20/2020    BILIRUBINUR Negative 01/20/2020    BILIRUBINUR neg 09/15/2014          Outpatient Medications Marked as Taking for the 9/15/21 encounter (Office Visit) with Rosendo Blunt MD   Medication Sig Dispense Refill    traMADol (ULTRAM) 50 MG tablet Take 1 tablet by mouth every 4 hours as needed for Pain for up to 7 days. Intended supply: 7 days. Take lowest dose possible to manage pain 42 tablet 0    diphenoxylate-atropine (LOMOTIL) 2.5-0.025 MG per tablet Take 1 tablet by mouth 4 times daily.       calcium citrate-vitamin D (CALCIUM CITRATE + D3) 315-250 MG-UNIT TABS per tablet Take 2 tablets by mouth 2 times daily (with meals) 120 tablet 12    acetaminophen (TYLENOL) 500 MG tablet Take 1 tablet by mouth 4 times daily as needed for Pain 360 tablet 1    alendronate (FOSAMAX) 70 MG tablet Take 1 tablet by mouth every 7 days Take with 8 ounces water, do not lie down for 30 minutes after taking it 12 tablet 3        Allergies   Allergen Reactions    Lisinopril Swelling     Swelling tongue.      Clindamycin/Lincomycin        Patient Active Problem List   Diagnosis    Osteoarthritis    IBS (irritable bowel syndrome)    Gout    Dry eye syndrome    Osteopenia    Essential hypertension, benign    Glucose intolerance (impaired glucose tolerance)    Primary osteoarthritis of both knees    Anemia    Inguinal hernia, right    History of DVT (deep vein thrombosis)    Chronic pain of left knee    Chronic pain of right knee    Swelling of both lower extremities       Past Medical History:   Diagnosis Date    Acute deep vein thrombosis (DVT) of left lower extremity after procedure ([facility] 10/3/2019    Post hip rx repair    Acute renal failure (ARF) ([facility] 1/17/2020    Anterior corneal dystrophy     Asymptomatic postmenopausal status (age-related) (natural)     Cervical polyp     Dry eye syndrome     Edema     Fuch's endothelial dystrophy     Gout     Hip fracture requiring operative repair, left, closed, initial encounter ([facility] 9/17/2019    IBS (irritable bowel syndrome)     Osteoarthritis     Osteopenia     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    Pedal edema 5/8/2017    Screening mammogram     Symptomatic menopausal or female climacteric states 11/4/2013    Urinary retention 9/21/2019    Vaginal candidiasis        Past Surgical History:   Procedure Laterality Date    APPENDECTOMY      EYE SURGERY      Cataract Surgery    HIP PINNING Left 9/18/2019    LEFT HIP GAMMA NAIL performed by Isabelle Kent MD [facility]        Family History   Problem Relation Age of Onset    Breast Cancer Daughter         [facility]    Ovarian Cancer Daughter        Social History     Tobacco Use    Smoking status: Never Smoker    Smokeless tobacco: Never Used   Substance Use Topics    Alcohol use: No    Drug use: No            Review of Systems  Review of Systems    Objective:   Physical Exam  Vitals:    09/15/21 1557   BP: 122/88   Pulse: 98   Resp: 12   Temp: 98.6 °F (37 °C)   TempSrc: Temporal   SpO2: 96%     Wt Readings from Last 3 Encounters:   08/03/21 117 lb (53.1 kg)   05/11/21 117 lb (53.1 kg)   04/20/21 117 lb (53.1 kg)        Physical Exam    NAD  Skin is warm and dry. Well hydrated, no rash. No respiratory distress. Alert and oriented x 3. Mood and affect are normal.   Neck no JVD  Chest is clear, no wheezing or rales. Normal symmetric air entry throughout both lung fields. Heart regular with normal rate, no murmer or gallop  The abdomen is soft without tenderness, guarding, mass, rebound or organomegaly. Bowel sounds are normal. No CVA tenderness or inguinal adenopathy noted. Ext trace bilateral pedal edema. No cords or tenderness. PT pulses 2/4 bilateral    Lab Results   Component Value Date    COLORU yellow 09/15/2021    COLORU Yellow 01/20/2020    NITRU Negative 01/20/2020    GLUCOSEU neg 09/15/2021    GLUCOSEU Negative 01/20/2020    KETUA neg 09/15/2021    KETUA Negative 01/20/2020    UROBILINOGEN 0.2 01/20/2020    BILIRUBINUR neg 09/15/2021       Assessment:       Diagnosis Orders   1. Mixed incontinence  mirabegron (MYRBETRIQ) 25 MG TB24   2. Irritable bowel syndrome with diarrhea  diphenoxylate-atropine (LOMOTIL) 2.5-0.025 MG per tablet  PDMP reviewed and no concerns noted. 3. Pedal edema  Continue low Na diet, support hose and elevation  Labs drawn by home health requested. 4. Frequent urination  Urinalysis Reflex to Culture    POCT Urinalysis no Micro    Culture, Urine  Prevention of UTIs discussed: drink plenty of fluids, avoid melquiades   5.  Flu vaccine need  INFLUENZA, HIGH-DOSE, QUADV, 65 YRS +, IM, PF, 0.7ML (FLUZONE)          Plan:      Side effects of current medications reviewed and questions answered. Call or return to clinic prn if these symptoms worsen or fail to improve as anticipated.

## 2021-09-15 NOTE — TELEPHONE ENCOUNTER
Please ask [facility]. They when to her apartment for home blood draw a month ago; I never got results.   Thanks

## 2021-09-17 ENCOUNTER — TELEPHONE (OUTPATIENT)
Dept: FAMILY MEDICINE CLINIC | Age: 86
End: 2021-09-17

## 2021-09-17 RX ORDER — SULFAMETHOXAZOLE AND TRIMETHOPRIM 800; 160 MG/1; MG/1
1 TABLET ORAL 2 TIMES DAILY
Qty: 6 TABLET | Refills: 0 | Status: SHIPPED | OUTPATIENT
Start: 2021-09-17 | End: 2021-09-20

## 2021-09-17 NOTE — TELEPHONE ENCOUNTER
There is a message in the chart to call the [employer] that kesha the labs to get results. She should stop the Keflex.     I ordered Bactrim for the UTI instead

## 2021-09-17 NOTE — TELEPHONE ENCOUNTER
Pt advised. We need to call Dr. Warren to see if we can get the results for her. Leaving this open for Monday.

## 2021-09-17 NOTE — TELEPHONE ENCOUNTER
PT called stating she was in two days ago and she was given 2 different medications. One the side effects scared her so she didn't take it but the other one has now made her itch crazily. States the incontinence is better but the itching is hard. The medication she took is:    cephALEXin (KEFLEX) 500 MG capsule [[id number]]     Order Details  Dose: 500 mg Route: Oral Frequency: 3 TIMES DAILY   Dispense Quantity: 15 capsule Refills: 0          Sig: Take 1 capsule by mouth 3 times daily for 5 days         Start Date: 09/15/21 End Date: 09/20/21 after 15 doses   Written Date: 09/15/21 Expiration Date: 09/15/22     Please advise, thank you!

## 2021-09-17 NOTE — TELEPHONE ENCOUNTER
Mateo Estrada called with an update on her blood work for Dr. Clayton Young. Dr. Clayton Young was wondering why she did not receive her results. [facility] states the results her forwarded to her orthopedic physician, Dr. Stan Alston. He is a [facility] physician so the results should also be in [facility], but they are not.

## 2021-09-18 LAB — URINE CULTURE, ROUTINE: NORMAL

## 2021-09-23 ENCOUNTER — VIRTUAL VISIT (OUTPATIENT)
Dept: FAMILY MEDICINE CLINIC | Age: 86
End: 2021-09-23
Payer: MEDICARE

## 2021-09-23 DIAGNOSIS — Z23 NEED FOR PROPHYLACTIC VACCINATION AGAINST DIPHTHERIA-TETANUS-PERTUSSIS (DTP): ICD-10-CM

## 2021-09-23 DIAGNOSIS — Z00.00 ROUTINE GENERAL MEDICAL EXAMINATION AT A HEALTH CARE FACILITY: Primary | ICD-10-CM

## 2021-09-23 DIAGNOSIS — Z23 NEED FOR PROPHYLACTIC VACCINATION AND INOCULATION AGAINST VARICELLA: ICD-10-CM

## 2021-09-23 PROCEDURE — G0438 PPPS, INITIAL VISIT: HCPCS | Performed by: FAMILY MEDICINE

## 2021-09-23 PROCEDURE — 4040F PNEUMOC VAC/ADMIN/RCVD: CPT | Performed by: FAMILY MEDICINE

## 2021-09-23 PROCEDURE — 1123F ACP DISCUSS/DSCN MKR DOCD: CPT | Performed by: FAMILY MEDICINE

## 2021-09-23 ASSESSMENT — LIFESTYLE VARIABLES
AUDIT-C TOTAL SCORE: INCOMPLETE
HOW OFTEN DO YOU HAVE A DRINK CONTAINING ALCOHOL: NEVER
AUDIT TOTAL SCORE: INCOMPLETE
HOW OFTEN DO YOU HAVE A DRINK CONTAINING ALCOHOL: 0

## 2021-09-23 ASSESSMENT — PATIENT HEALTH QUESTIONNAIRE - PHQ9
SUM OF ALL RESPONSES TO PHQ QUESTIONS 1-9: 0
1. LITTLE INTEREST OR PLEASURE IN DOING THINGS: 0
SUM OF ALL RESPONSES TO PHQ9 QUESTIONS 1 & 2: 0
SUM OF ALL RESPONSES TO PHQ QUESTIONS 1-9: 0
SUM OF ALL RESPONSES TO PHQ QUESTIONS 1-9: 0
SUM OF ALL RESPONSES TO PHQ9 QUESTIONS 1 & 2: 0
SUM OF ALL RESPONSES TO PHQ QUESTIONS 1-9: 0
SUM OF ALL RESPONSES TO PHQ QUESTIONS 1-9: 0
1. LITTLE INTEREST OR PLEASURE IN DOING THINGS: 0
SUM OF ALL RESPONSES TO PHQ QUESTIONS 1-9: 0
2. FEELING DOWN, DEPRESSED OR HOPELESS: 0
2. FEELING DOWN, DEPRESSED OR HOPELESS: 0

## 2021-09-23 NOTE — PROGRESS NOTES
Medicare Annual Wellness Visit  Are Name: Kaylin Greene Date: 2021   MRN: <[id number]> Sex: Female   Age: 81 y.o. Ethnicity: Non- / Non    : 1935 Race: White (non-)      Gina Singh is here for Medicare AWV    Screenings for behavioral, psychosocial and functional/safety risks, and cognitive dysfunction are all negative except as indicated below. These results, as well as other patient data from the [address] form, are documented in Flowsheets linked to this Encounter. Her bladder symptoms are some better. She plans to start Myrbetriq today. Allergies   Allergen Reactions    Lisinopril Swelling     Swelling tongue.  Clindamycin/Lincomycin      Outpatient Medications Marked as Taking for the 21 encounter (Virtual Visit) with Raghav Reynolds MD   Medication Sig Dispense Refill    diphenoxylate-atropine (LOMOTIL) 2.5-0.025 MG per tablet Take 1 tablet by mouth 4 times daily for 180 days.  120 tablet 5    mirabegron (MYRBETRIQ) 25 MG TB24 Take 1 tablet by mouth daily 30 tablet 5    calcium citrate-vitamin D (CALCIUM CITRATE + D3) 315-250 MG-UNIT TABS per tablet Take 2 tablets by mouth 2 times daily (with meals) 120 tablet 12    acetaminophen (TYLENOL) 500 MG tablet Take 1 tablet by mouth 4 times daily as needed for Pain 360 tablet 1    alendronate (FOSAMAX) 70 MG tablet Take 1 tablet by mouth every 7 days Take with 8 ounces water, do not lie down for 30 minutes after taking it 12 tablet 3        Past Medical History:   Diagnosis Date    Acute deep vein thrombosis (DVT) of left lower extremity after procedure ([address] 10/3/2019    Post hip rx repair    Acute renal failure (ARF) ([address] 2020    Anterior corneal dystrophy     Asymptomatic postmenopausal status (age-related) (natural)     Cervical polyp     Dry eye syndrome     Edema     Fuch's endothelial dystrophy     Gout     Hip fracture requiring operative repair, left, closed, Anger?: None of These  Do you get the social and emotional support that you need?: Yes  Do you have a Living Will?: Yes  Advance Directives     Power of Fidencio Kilgore Will ACP-Advance Directive ACP-Power of     Not on File Not on File Not on File Not on File      General Health Risk Interventions:  · will provide a copy of LW and DPOA   · Does not want life support or tube feeding if terminal.     Health Habits/Nutrition:  Health Habits/Nutrition  Do you exercise for at least 20 minutes 2-3 times per week?: Yes  Have you lost any weight without trying in the past 3 months?: No  Do you eat only one meal per day?: No  Have you seen the dentist within the past year?: (!) No     Health Habits/Nutrition Interventions:  · Dental exam overdue:  patient encouraged to make appointment with his/her dentist    Hearing/Vision:  No exam data present  Hearing/Vision  Do you or your family notice any trouble with your hearing that hasn't been managed with hearing aids?: No  Do you have difficulty driving, watching TV, or doing any of your daily activities because of your eyesight?: (!) Yes  Have you had an eye exam within the past year?: Yes  Hearing/Vision Interventions:  · she does fine with reading glasses     ADL:  ADLs  In the past 7 days, did you need help from others to perform any of the following everyday activities? Eating, dressing, grooming, bathing, toileting, or walking/balance?: None  In the past 7 days, did you need help from others to take care of any of the following?  Laundry, housekeeping, banking/finances, shopping, telephone use, food preparation, transportation, or taking medications?: (!) Transportation  ADL Interventions:  · has assistance    Personalized Preventive Plan   Current Health Maintenance Status  Immunization History   Administered Date(s) Administered    COVID-19, Caraballo Peter, PF, 30mcg/0.3mL 01/05/2021, 01/25/2021    Influenza, High Dose (Fluzone 65 yrs and older) 11/04/2013, 11/13/2015, 11/04/2016, 11/16/2018, 12/16/2019    Influenza, High-dose, Quadv, 65 yrs +, IM (Fluzone) 10/14/2020, 10/19/2020, 09/15/2021    Pneumococcal Conjugate 13-valent ([id number]) 11/13/2015    Pneumococcal Polysaccharide ([id number]) 01/05/2009    Zoster Live (Zostavax) 02/03/2008        Health Maintenance   Topic Date Due    DTaP/Tdap/Td vaccine (1 - Tdap) Never done    Shingles Vaccine (2 of 3) 03/30/2008    Annual Wellness Visit (AWV)  Never done    Potassium monitoring  01/22/2021    Creatinine monitoring  01/22/2021    Flu vaccine  Completed    Pneumococcal 65+ years Vaccine  Completed    COVID-19 Vaccine  Completed    Hepatitis A vaccine  Aged Out    Hepatitis B vaccine  Aged Out    Hib vaccine  Aged Out    Meningococcal (ACWY) vaccine  Aged Out     Recommendations for [employer] Due: see orders and patient instructions/AVS.  . Recommended screening schedule for the next 5-10 years is provided to the patient in written form: see Patient Chelsie Verde was seen today for medicare awv. Diagnoses and all orders for this visit:    Routine general medical examination at a health care facility    Need for prophylactic vaccination against diphtheria-tetanus-pertussis (DTP)  -     Tdap (ADACEL) 5-2-15.5 LF-MCG/0.5 injection; Inject 0.5 mLs into the muscle once for 1 dose    Need for prophylactic vaccination and inoculation against varicella  -     zoster recombinant adjuvanted vaccine [facility]) 50 MCG/0.5ML SUSR injection; Inject 0.5 mLs into the muscle once for 1 dose               Martinez Riley is a 81 y.o. female being evaluated by a Virtual Visit (phone) encounter to address concerns as mentioned above. A caregiver was present when appropriate. Due to this being a TeleHealth encounter (During RMIAV-93 public health emergency), evaluation of the following organ systems was limited: Vitals/Constitutional/EENT/Resp/CV/GI//MS/Neuro/Skin/Heme-Lymph-Imm.   Pursuant to the weight and determine a healthy BMI goal  · Monitor blood pressure and treat if higher than 140/90 mmHg  · Maintain blood total cholesterol levels under 5 mmol/l or 190 mg/dl  · Maintain LDL cholesterol levels under 3.0 mmol/l or 115 mg/dl   · Control blood glucose levels   Provided a follow up plan.   Time spent (minutes): 2

## 2021-09-23 NOTE — PATIENT INSTRUCTIONS
Personalized Preventive Plan for Glendora Dance - 9/23/2021  Medicare offers a range of preventive health benefits. Some of the tests and screenings are paid in full while other may be subject to a deductible, co-insurance, and/or copay. Some of these benefits include a comprehensive review of your medical history including lifestyle, illnesses that may run in your family, and various assessments and screenings as appropriate. After reviewing your medical record and screening and assessments performed today your provider may have ordered immunizations, labs, imaging, and/or referrals for you. A list of these orders (if applicable) as well as your Preventive Care list are included within your After Visit Summary for your review. Other Preventive Recommendations:    · A preventive eye exam performed by an eye specialist is recommended every 1-2 years to screen for glaucoma; cataracts, macular degeneration, and other eye disorders. · A preventive dental visit is recommended every 6 months. · Try to get at least 150 minutes of exercise per week or 10,000 steps per day on a pedometer . · Order or download the FREE \"Exercise & Physical Activity: Your Everyday Guide\" from [employer]. Call 9-462.577.9169 or search [employer] online. · You need 5067-1493 mg of calcium and 1032-7730 IU of vitamin D per day. It is possible to meet your calcium requirement with diet alone, but a vitamin D supplement is usually necessary to meet this goal.  · When exposed to the sun, use a sunscreen that protects against both UVA and UVB radiation with an SPF of 30 or greater. Reapply every 2 to 3 hours or after sweating, drying off with a towel, or swimming. · Always wear a seat belt when traveling in a car. Always wear a helmet when riding a bicycle or motorcycle.

## 2021-09-28 ENCOUNTER — OFFICE VISIT (OUTPATIENT)
Dept: ORTHOPEDIC SURGERY | Age: 86
End: 2021-09-28
Payer: MEDICARE

## 2021-09-28 VITALS
WEIGHT: 117 LBS | BODY MASS INDEX: 22.97 KG/M2 | DIASTOLIC BLOOD PRESSURE: 84 MMHG | HEART RATE: 93 BPM | SYSTOLIC BLOOD PRESSURE: 145 MMHG | HEIGHT: 60 IN

## 2021-09-28 DIAGNOSIS — G89.29 CHRONIC PAIN OF LEFT KNEE: Primary | ICD-10-CM

## 2021-09-28 DIAGNOSIS — M25.562 CHRONIC PAIN OF LEFT KNEE: Primary | ICD-10-CM

## 2021-09-28 DIAGNOSIS — M25.561 CHRONIC PAIN OF RIGHT KNEE: ICD-10-CM

## 2021-09-28 DIAGNOSIS — M17.0 PRIMARY OSTEOARTHRITIS OF BOTH KNEES: ICD-10-CM

## 2021-09-28 DIAGNOSIS — G89.29 CHRONIC PAIN OF RIGHT KNEE: ICD-10-CM

## 2021-09-28 PROCEDURE — G8420 CALC BMI NORM PARAMETERS: HCPCS | Performed by: ORTHOPAEDIC SURGERY

## 2021-09-28 PROCEDURE — 1036F TOBACCO NON-USER: CPT | Performed by: ORTHOPAEDIC SURGERY

## 2021-09-28 PROCEDURE — 4040F PNEUMOC VAC/ADMIN/RCVD: CPT | Performed by: ORTHOPAEDIC SURGERY

## 2021-09-28 PROCEDURE — G8399 PT W/DXA RESULTS DOCUMENT: HCPCS | Performed by: ORTHOPAEDIC SURGERY

## 2021-09-28 PROCEDURE — 1123F ACP DISCUSS/DSCN MKR DOCD: CPT | Performed by: ORTHOPAEDIC SURGERY

## 2021-09-28 PROCEDURE — 1090F PRES/ABSN URINE INCON ASSESS: CPT | Performed by: ORTHOPAEDIC SURGERY

## 2021-09-28 PROCEDURE — 99214 OFFICE O/P EST MOD 30 MIN: CPT | Performed by: ORTHOPAEDIC SURGERY

## 2021-09-28 PROCEDURE — G8427 DOCREV CUR MEDS BY ELIG CLIN: HCPCS | Performed by: ORTHOPAEDIC SURGERY

## 2021-09-28 NOTE — PROGRESS NOTES
12 [facility]  History and Physical  Knee Pain    Date:  2021    Name:  Rayo Alvardao  Address:  [id number] [place] [id number]    :  1935      Age:   81 y.o.    SSN:  [id number]      Medical Record Number:  [id number]      Chief Complaint    Knee Pain (Bilateral knee)    Currently: 8/3/2021  Patient is here for follow-up regarding bilateral knee pain and osteoarthritis. Patient is doing okay overall she is noting she is continuing to increase her walking noting she is getting about 130 feet with breaks and utilization of her walker. She is hopeful for a refill of tramadol as it helps significantly better overall pain. She notes her current pain level is a 2-3 out of 10. Denies numbness burning tingling radiating pains down the leg. She is in a wheelchair today's visit but does utilize a walker at home. Denies any fall trauma or injury      Previously: 2021  Pain was much better after prior injections. She was very mobile and actively pursued additional therapy and motions, strengthening. Until this past week she felt her recovery was marked with greater independence than she has had in the past year. Unfortunately she fell onto the knees and was unable toget up. Family helped her upon arival to her house and assited with rising. She has been doing better over the past 5-6 days. Less pain. Give way more on the left side. History of Present Illness:  Rayo Alvarado is a 81 y.o. female who presents for evaluation of her left knee pain. This patient was previously being treated by another emergency physician. She has had left knee pain for several years which she notes at this time is failing conservative therapy. She has tried rest, ice, elevation, bracing, physical therapy, home exercises, activity modification, NSAIDs as needed, corticosteroid injections and Synvisc injections.   She notes very little relief with the utilization of cortisone and viscosupplementation. Because of her increasing knee pain she has been unable to walk for approximately 6 months. She has been utilizing a wheelchair in order to get around. She states she can stand for brief periods of time to \"get something out of a cabinet or reach something up high before I have to sit down. \"  The patient denies any new injury. The patient denies any numbness or paresthesias. Past Medical History:   Diagnosis Date    Acute deep vein thrombosis (DVT) of left lower extremity after procedure ([facility] [address] 10/3/2019    Post hip rx repair    Acute renal failure (ARF) ([facility] [address] [facility] 1/17/2020    Anterior corneal dystrophy     Asymptomatic postmenopausal status (age-related) (natural)     Cervical polyp     Dry eye syndrome     Edema     Fuch's endothelial dystrophy     Gout     Hip fracture requiring operative repair, left, closed, initial encounter ([facility] [address] [facility] 9/17/2019    IBS (irritable bowel syndrome)     Osteoarthritis     Osteopenia     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    Pedal edema 5/8/2017    Screening mammogram     Symptomatic menopausal or female climacteric states 11/4/2013    Urinary retention 9/21/2019    Vaginal candidiasis         Past Surgical History:   Procedure Laterality Date    APPENDECTOMY      EYE SURGERY      Cataract Surgery    HIP PINNING Left 9/18/2019    LEFT HIP GAMMA NAIL performed by Isabella [facility]       Family History   Problem Relation Age of Onset    Breast Cancer Daughter         [facility] 2    Ovarian Cancer Daughter        Social History     Socioeconomic History    Marital status:       Spouse name: None    Number of children: None    Years of education: None    Highest education level: None   Occupational History    None   Tobacco Use    Smoking status: Never Smoker    Smokeless tobacco: Never Used   Substance and Sexual Activity    Alcohol use: No    Drug use: No    Sexual activity: Not Currently Other Topics Concern    None   Social History Narrative    None     Social Determinants of Health     Financial Resource Strain:     Difficulty of Paying Living Expenses:    Food Insecurity:     Worried About Running Out of Food in the Last Year:     [address] in the Last Year:    Transportation Needs:     Lack of Transportation (Medical):  Lack of Transportation (Non-Medical):    Physical Activity:     Days of Exercise per Week:     Minutes of Exercise per Session:    Stress:     Feeling of Stress :    Social Connections:     Frequency of Communication with Friends and Family:     Frequency of Social Gatherings with Friends and Family:     Attends Taoism Services:     Active Member of Clubs or Organizations:     Attends Club or Organization Meetings:     Marital Status:    Intimate Partner Violence:     Fear of Current or Ex-Partner:     Emotionally Abused:     Physically Abused:     Sexually Abused:        Current Outpatient Medications   Medication Sig Dispense Refill    diphenoxylate-atropine (LOMOTIL) 2.5-0.025 MG per tablet Take 1 tablet by mouth 4 times daily for 180 days. 120 tablet 5    mirabegron (MYRBETRIQ) 25 MG TB24 Take 1 tablet by mouth daily 30 tablet 5    calcium citrate-vitamin D (CALCIUM CITRATE + D3) 315-250 MG-UNIT TABS per tablet Take 2 tablets by mouth 2 times daily (with meals) 120 tablet 12    diclofenac sodium (VOLTAREN) 1 % GEL Apply 4 g topically 4 times daily 5 Tube 0    acetaminophen (TYLENOL) 500 MG tablet Take 1 tablet by mouth 4 times daily as needed for Pain 360 tablet 1    alendronate (FOSAMAX) 70 MG tablet Take 1 tablet by mouth every 7 days Take with 8 ounces water, do not lie down for 30 minutes after taking it 12 tablet 3     No current facility-administered medications for this visit. Allergies   Allergen Reactions    Lisinopril Swelling     Swelling tongue.      Clindamycin/Lincomycin          Review of Systems:  A 14 point review of systems was completed by the patient and is available in the media section of the scanned medical record and was reviewed on 9/28/2021. The review is negative with the exception of those things mentioned in the HPI and Past Medical History         Vital Signs:   BP (!) 145/84   Pulse 93   Ht 5' (1.524 m)   Wt 117 lb (53.1 kg)   BMI 22.85 kg/m²     General Exam:    General: Radha Long is a healthy and well appearing 81y.o. year old female who is sitting comfortably in our office in no acute distress. Neuro: Alert & Oriented x 3,  normal,  no focal deficits noted. Normal mood & affect. Eyes: Sclera clear  Ears: Normal external ear  Mouth: Patient wearing a mask  Pulm: Respirations unlabored and regular   Skin: Warm, well perfused  Noted bilateral lower extremity swelling from mid shin down worse on the right than the left. Knee Examination:  Dramatic pain in the bilateral knee resolved. Despite injury now able to move both knees to nearly full extension minus 5-6 degrees bilaterally  Flexion bilaterally to 105 degrees. Inspection:  No effusion, ecchymosis, discoloration, erythema, excessive warmth. no gross deformities, no signs of infection. Palpation: There is patellofemoral crepitus, there is mild medial and lateral joint line tenderness. No other osseous or soft tissue tenderness around the knee. Negative calf tenderness    Range of Motion: Flexion contracture improved.      Strength: improved quad tone and hamstring use/ Function    Special Tests: Negative Homans sign    Gait: Patient in wheelchair    Alignment: Valgus deformity    Neuro: Sensation equal bilateral lower extremities    Vascular: 2+ posterior tibialis pulse        Radiology:   Bilateral knee osteoarthritis with symmetrical joint space narrowing and severe loss bilaterally symmetrical.           Assessment: Patient is a 81 y.o. female presenting to the office for an evaluation coordination of care for left knee pain.  Patient is being treated with conservative therapy for the arthritis in her left knee. No orders of the defined types were placed in this encounter. Medical decision making:  Patient's workup and evaluation were reviewed with the patient in the office today. Previously obtained x-ray imaging was reviewed with the patient. Given this patient's history and physical exam as well as review of x-ray imaging she is doing well with conservative management. All the patient's questions were answered while in the clinic. The patient is understanding of all instructions and agrees with the plan. Better control of pain. Start on pain medications for short term use to enable mobility to some degree and assess for conservative mangaement vs surgical intervention ie total knee. She will start on nustep in the building where she resides. Patient requires off shelf knee/brace orthosis to protect knee integrity. Oa bracing required to provide off loading to the medial/lateral  joint line to decrease knee instability and provide patient to return to activities of daily living. Continue pain medications although can consider tramadol instead of pain medications/narcotics. Refill tramadol today's visit    Plan for additional topical medications. Squatting and getting up from a sitting position.       Concentrated on protein supplementation given her recent lab testing showing low albumin.           09/28/21 3:12 PM

## 2021-10-26 ENCOUNTER — TELEPHONE (OUTPATIENT)
Dept: ORTHOPEDIC SURGERY | Age: 86
End: 2021-10-26

## 2021-10-26 DIAGNOSIS — M17.0 PRIMARY OSTEOARTHRITIS OF BOTH KNEES: Primary | ICD-10-CM

## 2021-10-26 RX ORDER — TRAMADOL HYDROCHLORIDE 50 MG/1
50 TABLET ORAL EVERY 6 HOURS PRN
Qty: 28 TABLET | Refills: 0 | Status: SHIPPED | OUTPATIENT
Start: 2021-10-26 | End: 2021-11-02

## 2021-10-26 NOTE — TELEPHONE ENCOUNTER
Prescription Refill     Medication Name:  TRAMADOL  Pharmacy: [facility] [id number]  Number: [phone]  Patient Contact Number:  [phone]    PATIENT REQ REFILL.

## 2021-11-16 ENCOUNTER — TELEPHONE (OUTPATIENT)
Dept: ORTHOPEDIC SURGERY | Age: 86
End: 2021-11-16

## 2021-11-18 DIAGNOSIS — M17.0 PRIMARY OSTEOARTHRITIS OF BOTH KNEES: Primary | ICD-10-CM

## 2021-11-18 NOTE — TELEPHONE ENCOUNTER
Patient is calling back to check on her request for a refill of Tramadol. Please call into [facility] [phone]. Patient is concerned that her script got \"lost somewhere in the shuffle\".

## 2021-11-19 RX ORDER — TRAMADOL HYDROCHLORIDE 50 MG/1
TABLET ORAL
Qty: 28 TABLET | Refills: 2 | Status: SHIPPED | OUTPATIENT
Start: 2021-11-19 | End: 2021-11-26

## 2021-11-22 ENCOUNTER — TELEPHONE (OUTPATIENT)
Dept: FAMILY MEDICINE CLINIC | Age: 86
End: 2021-11-22

## 2021-11-22 NOTE — TELEPHONE ENCOUNTER
Patient states that the doctor said if she had a break through with this medication she would up her dose. Patient would like medication increased. Please advise. Thanks.       mirabegron (MYRBETRIQ) 25 MG TB24 [[id number]]     Order Details  Dose: 25 mg Route: Oral Frequency: DAILY  Dispense Quantity: 30 tablet Refills: 5        Sig: Take 1 tablet by mouth daily       Start Date: 09/15/21 End Date: --  Written Date: 09/15/21 Expiration Date: 09/15/22     Diagnosis Association: Mixed incontinence (N39.46)    Providers    Authorizing Provider: Lorene Guerrero MD NPI: [id number]  Ordering User:  Lorene Guerrero MD        Pharmacy    [phone]   [address] [place] [address]   Phone:  [phone]  Fax:  [phone]

## 2021-11-30 ENCOUNTER — OFFICE VISIT (OUTPATIENT)
Dept: ORTHOPEDIC SURGERY | Age: 86
End: 2021-11-30
Payer: MEDICARE

## 2021-11-30 VITALS — HEIGHT: 60 IN | WEIGHT: 117 LBS | BODY MASS INDEX: 22.97 KG/M2

## 2021-11-30 DIAGNOSIS — G89.29 CHRONIC PAIN OF LEFT KNEE: Primary | ICD-10-CM

## 2021-11-30 DIAGNOSIS — M17.0 PRIMARY OSTEOARTHRITIS OF BOTH KNEES: ICD-10-CM

## 2021-11-30 DIAGNOSIS — M25.561 CHRONIC PAIN OF RIGHT KNEE: ICD-10-CM

## 2021-11-30 DIAGNOSIS — M25.562 CHRONIC PAIN OF LEFT KNEE: Primary | ICD-10-CM

## 2021-11-30 DIAGNOSIS — G89.29 CHRONIC PAIN OF RIGHT KNEE: ICD-10-CM

## 2021-11-30 PROCEDURE — 1036F TOBACCO NON-USER: CPT | Performed by: ORTHOPAEDIC SURGERY

## 2021-11-30 PROCEDURE — 4040F PNEUMOC VAC/ADMIN/RCVD: CPT | Performed by: ORTHOPAEDIC SURGERY

## 2021-11-30 PROCEDURE — G8399 PT W/DXA RESULTS DOCUMENT: HCPCS | Performed by: ORTHOPAEDIC SURGERY

## 2021-11-30 PROCEDURE — 1123F ACP DISCUSS/DSCN MKR DOCD: CPT | Performed by: ORTHOPAEDIC SURGERY

## 2021-11-30 PROCEDURE — G8427 DOCREV CUR MEDS BY ELIG CLIN: HCPCS | Performed by: ORTHOPAEDIC SURGERY

## 2021-11-30 PROCEDURE — G8420 CALC BMI NORM PARAMETERS: HCPCS | Performed by: ORTHOPAEDIC SURGERY

## 2021-11-30 PROCEDURE — G8484 FLU IMMUNIZE NO ADMIN: HCPCS | Performed by: ORTHOPAEDIC SURGERY

## 2021-11-30 PROCEDURE — 1090F PRES/ABSN URINE INCON ASSESS: CPT | Performed by: ORTHOPAEDIC SURGERY

## 2021-11-30 PROCEDURE — 99214 OFFICE O/P EST MOD 30 MIN: CPT | Performed by: ORTHOPAEDIC SURGERY

## 2021-12-02 NOTE — PROGRESS NOTES
12 [facility]  History and Physical  Knee Pain    Date:  2021    Name:  Cyrus Berg  Address:  [address] [place] [address] [address] [place] [id number]    :  1935      Age:   81 y.o.    SSN:  [id number]      Medical Record Number:  <[id number]>      Chief Complaint    Knee Pain (Bilateral knee)    Currently:    She is walking and oding functional ADL. In wheelchair today in office. Pain in the knees worse with longer periods of sitting. Has not obtained new lab testing since last visit. 8/3/2021  Patient is here for follow-up regarding bilateral knee pain and osteoarthritis. Patient is doing okay overall she is noting she is continuing to increase her walking noting she is getting about 130 feet with breaks and utilization of her walker. She is hopeful for a refill of tramadol as it helps significantly better overall pain. She notes her current pain level is a 2-3 out of 10. Denies numbness burning tingling radiating pains down the leg. She is in a wheelchair today's visit but does utilize a walker at home. Denies any fall trauma or injury      Previously: 2021  Pain was much better after prior injections. She was very mobile and actively pursued additional therapy and motions, strengthening. Until this past week she felt her recovery was marked with greater independence than she has had in the past year. Unfortunately she fell onto the knees and was unable toget up. Family helped her upon arival to her house and assited with rising. She has been doing better over the past 5-6 days. Less pain. Give way more on the left side. History of Present Illness:  Cyrus Berg is a 81 y.o. female who presents for evaluation of her left knee pain. This patient was previously being treated by another emergency physician. She has had left knee pain for several years which she notes at this time is failing conservative therapy.   She has tried rest, ice, elevation, bracing, physical therapy, home exercises, activity modification, NSAIDs as needed, corticosteroid injections and Synvisc injections. She notes very little relief with the utilization of cortisone and viscosupplementation. Because of her increasing knee pain she has been unable to walk for approximately 6 months. She has been utilizing a wheelchair in order to get around. She states she can stand for brief periods of time to \"get something out of a cabinet or reach something up high before I have to sit down. \"  The patient denies any new injury. The patient denies any numbness or paresthesias. Past Medical History:   Diagnosis Date    Acute deep vein thrombosis (DVT) of left lower extremity after procedure ([facility] [address] 10/3/2019    Post hip rx repair    Acute renal failure (ARF) ([facility] [address] 1/17/2020    Anterior corneal dystrophy     Asymptomatic postmenopausal status (age-related) (natural)     Cervical polyp     Dry eye syndrome     Edema     Fuch's endothelial dystrophy     Gout     Hip fracture requiring operative repair, left, closed, initial encounter ([facility] [address] 9/17/2019    IBS (irritable bowel syndrome)     Osteoarthritis     Osteopenia     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    Pedal edema 5/8/2017    Screening mammogram     Symptomatic menopausal or female climacteric states 11/4/2013    Urinary retention 9/21/2019    Vaginal candidiasis         Past Surgical History:   Procedure Laterality Date    APPENDECTOMY      EYE SURGERY      Cataract Surgery    HIP PINNING Left 9/18/2019    LEFT HIP GAMMA NAIL performed by Slava Flood MD at [facility] OR       Family History   Problem Relation Age of Onset    Breast Cancer Daughter         [facility] 2    Ovarian Cancer Daughter        Social History     Socioeconomic History    Marital status:       Spouse name: None    Number of children: None    Years of education: None    Highest education level: None   Occupational History    None   Tobacco Use    Smoking status: Never Smoker    Smokeless tobacco: Never Used   Substance and Sexual Activity    Alcohol use: No    Drug use: No    Sexual activity: Not Currently   Other Topics Concern    None   Social History Narrative    None     Social Determinants of Health     Financial Resource Strain:     Difficulty of Paying Living Expenses: Not on file   Food Insecurity:     Worried About Running Out of Food in the Last Year: Not on file    Jennifer of Food in the Last Year: Not on file   Transportation Needs:     Lack of Transportation (Medical): Not on file    Lack of Transportation (Non-Medical): Not on file   Physical Activity:     Days of Exercise per Week: Not on file    Minutes of Exercise per Session: Not on file   Stress:     Feeling of Stress : Not on file   Social Connections:     Frequency of Communication with Friends and Family: Not on file    Frequency of Social Gatherings with Friends and Family: Not on file    Attends Mormon Services: Not on file    Active Member of [address] [place] [address] or Organizations: Not on file    Attends Club or Organization Meetings: Not on file    Marital Status: Not on file   Intimate Partner Violence:     Fear of Current or Ex-Partner: Not on file    Emotionally Abused: Not on file    Physically Abused: Not on file    Sexually Abused: Not on file   Housing Stability:     Unable to Pay for Housing in the Last Year: Not on file    Number of [place] in the Last Year: Not on file    Unstable Housing in the Last Year: Not on file       Current Outpatient Medications   Medication Sig Dispense Refill    diphenoxylate-atropine (LOMOTIL) 2.5-0.025 MG per tablet Take 1 tablet by mouth 4 times daily for 180 days.  120 tablet 5    mirabegron (MYRBETRIQ) 25 MG TB24 Take 1 tablet by mouth daily 30 tablet 5    calcium citrate-vitamin D (CALCIUM CITRATE + D3) 315-250 MG-UNIT TABS per tablet Take 2 tablets by mouth 2 times daily (with meals) 120 tablet 12    diclofenac sodium (VOLTAREN) 1 % GEL Apply 4 g topically 4 times daily 5 Tube 0    acetaminophen (TYLENOL) 500 MG tablet Take 1 tablet by mouth 4 times daily as needed for Pain 360 tablet 1    alendronate (FOSAMAX) 70 MG tablet Take 1 tablet by mouth every 7 days Take with 8 ounces water, do not lie down for 30 minutes after taking it 12 tablet 3     No current facility-administered medications for this visit. Allergies   Allergen Reactions    Lisinopril Swelling     Swelling tongue.  Clindamycin/Lincomycin          Review of Systems:  A 14 point review of systems was completed by the patient and is available in the media section of the scanned medical record and was reviewed on 12/1/2021. The review is negative with the exception of those things mentioned in the HPI and Past Medical History         Vital Signs:   Ht 5' (1.524 m)   Wt 117 lb (53.1 kg)   BMI 22.85 kg/m²     General Exam:    General: Young Lorenzana is a healthy and well appearing 81y.o. year old female who is sitting comfortably in our office in no acute distress. Neuro: Alert & Oriented x 3,  normal,  no focal deficits noted. Normal mood & affect. Eyes: Sclera clear  Ears: Normal external ear  Mouth: Patient wearing a mask  Pulm: Respirations unlabored and regular   Skin: Warm, well perfused  Noted bilateral lower extremity swelling from mid shin down worse on the right than the left. Knee Examination:  Dramatic pain in the bilateral knee resolved. Despite injury now able to move both knees to nearly full extension minus 5-6 degrees bilaterally  Flexion bilaterally to 105 degrees. Inspection:  No effusion, ecchymosis, discoloration, erythema, excessive warmth. no gross deformities, no signs of infection. Palpation: There is patellofemoral crepitus, there is mild medial and lateral joint line tenderness. No other osseous or soft tissue tenderness around the knee.   Negative calf tenderness    Range of Motion: Flexion contracture improved. Strength: improved quad tone and hamstring use/ Function    Special Tests: Negative Homans sign    Gait: Patient in wheelchair    Alignment: Valgus deformity    Neuro: Sensation equal bilateral lower extremities    Vascular: 2+ posterior tibialis pulse        Radiology:   Bilateral knee osteoarthritis with symmetrical joint space narrowing and severe loss bilaterally symmetrical.           Assessment: Patient is a 81 y.o. female presenting to the office for an evaluation coordination of care for left knee pain. Patient is being treated with conservative therapy for the arthritis in her left knee. No orders of the defined types were placed in this encounter. Medical decision making:  Patient's workup and evaluation were reviewed with the patient in the office today. Previously obtained x-ray imaging was reviewed with the patient. Given this patient's history and physical exam as well as review of x-ray imaging she is doing well with conservative management. All the patient's questions were answered while in the clinic. The patient is understanding of all instructions and agrees with the plan. Better control of pain. Start on pain medications for short term use to enable mobility to some degree and assess for conservative mangaement vs surgical intervention ie total knee. She will start on nustep in the building where she resides. Patient requires off shelf knee/brace orthosis to protect knee integrity. Oa bracing required to provide off loading to the medial/lateral  joint line to decrease knee instability and provide patient to return to activities of daily living. Continue pain medications although can consider tramadol instead of pain medications/narcotics. Plan for additional topical medications. Squatting and getting up from a sitting position.       Concentrated on protein supplementation given her recent lab testing showing low albumin. Have discussed with her the need for additional intervention including total knee or the potential for stem cell BMAC to decrease pain. She will consider options but at this point is still considering total knees and will let us know . We have discussed that hte blood tests would need to be completed prior to any additional surgery.          12/01/21 8:17 PM

## 2022-02-08 ENCOUNTER — TELEPHONE (OUTPATIENT)
Dept: FAMILY MEDICINE CLINIC | Age: 87
End: 2022-02-08

## 2022-02-09 NOTE — PROGRESS NOTES
Subjective:      Patient ID: Lotus Nguyen 81 y.o. female. is here for evaluation for low back pain. HPI  DUE LABS     No imaging is available except MRI pelvis in 2019 that showed disc degeneration and fact joint spondylosis. She takes Fosamax for osteoporosis with fx history. Today lower back pain x 2 weeks. No trauma or injury. Started on the right and now is bilateral.  No rest pain. Pain comes on getting from bed, to [address], getting out of the wheel chair. Was walking with a walker; now not walking because the pain prevents her from walking  The pain is sharp, grabbing. No radicular pain. Denies weakness, numbness in the legs. No change in baseline urinary incontinence. Not getting better or worse. Tylenol q 4 hours while awake, helps a bit. Has Tramadol that Dr. Gabbi Jones gave her - has 5 pills left. Has not tried it for her back pain. Urinary incontinence - better with Myrbetriq but not resolved. IBS-D well controlled with Lomotil. Has one normal BM per day. OA knee: seeing Dr. Jimbo Penaloza. He is considering plasma injections. Lab Results   Component Value Date     01/22/2020    K 4.3 01/22/2020    K 4.3 01/22/2020     01/22/2020    CO2 22 01/22/2020    BUN 18 01/22/2020    CREATININE 0.8 01/22/2020    GLUCOSE 104 01/22/2020    CALCIUM 8.9 01/22/2020       TSH   Date Value Ref Range Status   01/18/2020 0.23 (L) 0.27 - 4.20 uIU/mL Final   07/17/2017 1.89 0.27 - 4.20 uIU/mL Final   02/23/2016 1.92 0.27 - 4.20 uIU/mL Final   04/26/2013 1.86 0.35 - 5.5 uIU/ml Final         Outpatient Medications Marked as Taking for the 2/14/22 encounter (Office Visit) with Valentin Shearer MD   Medication Sig Dispense Refill    diphenoxylate-atropine (LOMOTIL) 2.5-0.025 MG per tablet Take 1 tablet by mouth 4 times daily for 180 days.  120 tablet 5    mirabegron (MYRBETRIQ) 25 MG TB24 Take 1 tablet by mouth daily 30 tablet 5    calcium citrate-vitamin D (CALCIUM CITRATE + D3) 315-250 MG-UNIT TABS per tablet Take 2 tablets by mouth 2 times daily (with meals) 120 tablet 12    acetaminophen (TYLENOL) 500 MG tablet Take 1 tablet by mouth 4 times daily as needed for Pain 360 tablet 1    alendronate (FOSAMAX) 70 MG tablet Take 1 tablet by mouth every 7 days Take with 8 ounces water, do not lie down for 30 minutes after taking it 12 tablet 3        Allergies   Allergen Reactions    Lisinopril Swelling     Swelling tongue.      Clindamycin/Lincomycin        Patient Active Problem List   Diagnosis    Osteoarthritis    IBS (irritable bowel syndrome)    Gout    Dry eye syndrome    Osteopenia    Essential hypertension, benign    Glucose intolerance (impaired glucose tolerance)    Primary osteoarthritis of both knees    Anemia    Inguinal hernia, right    History of DVT (deep vein thrombosis)    Chronic pain of left knee    Chronic pain of right knee    Swelling of both lower extremities       Past Medical History:   Diagnosis Date    Acute deep vein thrombosis (DVT) of left lower extremity after procedure ([address] 10/3/2019    Post hip rx repair    Acute renal failure (ARF) ([address] 1/17/2020    Anterior corneal dystrophy     Asymptomatic postmenopausal status (age-related) (natural)     Cervical polyp     Dry eye syndrome     Edema     Fuch's endothelial dystrophy     Gout     Hip fracture requiring operative repair, left, closed, initial encounter ([facility] 9/17/2019    IBS (irritable bowel syndrome)     Osteoarthritis     Osteopenia     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    Pedal edema 5/8/2017    Screening mammogram     Symptomatic menopausal or female climacteric states 11/4/2013    Urinary retention 9/21/2019    Vaginal candidiasis        Past Surgical History:   Procedure Laterality Date    APPENDECTOMY      EYE SURGERY      Cataract Surgery    HIP PINNING Left 9/18/2019    LEFT HIP GAMMA NAIL performed by Aurora Willson MD at [facility] OR        Family History Problem Relation Age of Onset    Breast Cancer Daughter         [facility] 2    Ovarian Cancer Daughter        Social History     Tobacco Use    Smoking status: Never Smoker    Smokeless tobacco: Never Used   Substance Use Topics    Alcohol use: No    Drug use: No            Review of Systems  Review of Systems    Objective:   Physical Exam  Vitals:    02/14/22 1551 02/14/22 1643   BP: (!) 150/92 138/86   Pulse: 120    Resp: 12    Temp: 97.1 °F (36.2 °C)    TempSrc: Temporal    SpO2: 99%    Weight: 127 lb (57.6 kg)        Physical Exam    NAD    Skin is warm and dry. No rash. Well hydrated  Alert and oriented x 3. Mood and affect are normal.  The neck is supple and free of adenopathy or masses, the thyroid is normal without enlargement or nodules. Chest: clear with no wheezes or rales. No retractions, or use of accessory muscles noted. Cardiovascular: PMI is not displaced, and no thrill noted. Regular rate and rhythm with no rub, murmur or gallop.  trace peripheral edema. The abdomen is soft without tenderness, guarding, mass, rebound or organomegaly. + bilateral lower back tenderness, no bony tenderness or deformity. Motor 4/5 hip flexors, flexion and extension knees, dorsiflexion and plantar flexion feet. DTR 2/4 patella and Achilles tendons. Assessment:       Diagnosis Orders   1. Acute bilateral low back pain without sciatica  External Referral To Physical Therapy    tiZANidine (ZANAFLEX) 2 MG tablet  Continue Tylenol. Can use Tramadol prn.     2. Mixed incontinence  mirabegron (MYRBETRIQ) 50 MG TB24   3. Irritable bowel syndrome with diarrhea  diphenoxylate-atropine (LOMOTIL) 2.5-0.025 MG per tablet          Plan:      Side effects of current medications reviewed and questions answered. Call or return to clinic prn if these symptoms worsen or fail to improve as anticipated. Follow up in 3 months or prn.

## 2022-02-14 ENCOUNTER — OFFICE VISIT (OUTPATIENT)
Dept: FAMILY MEDICINE CLINIC | Age: 87
End: 2022-02-14
Payer: MEDICARE

## 2022-02-14 VITALS
BODY MASS INDEX: 24.8 KG/M2 | HEART RATE: 120 BPM | RESPIRATION RATE: 12 BRPM | DIASTOLIC BLOOD PRESSURE: 86 MMHG | SYSTOLIC BLOOD PRESSURE: 138 MMHG | OXYGEN SATURATION: 99 % | WEIGHT: 127 LBS | TEMPERATURE: 97.1 F

## 2022-02-14 DIAGNOSIS — N39.46 MIXED INCONTINENCE: ICD-10-CM

## 2022-02-14 DIAGNOSIS — K58.0 IRRITABLE BOWEL SYNDROME WITH DIARRHEA: ICD-10-CM

## 2022-02-14 DIAGNOSIS — M54.50 ACUTE BILATERAL LOW BACK PAIN WITHOUT SCIATICA: Primary | ICD-10-CM

## 2022-02-14 PROCEDURE — G8484 FLU IMMUNIZE NO ADMIN: HCPCS | Performed by: FAMILY MEDICINE

## 2022-02-14 PROCEDURE — G8399 PT W/DXA RESULTS DOCUMENT: HCPCS | Performed by: FAMILY MEDICINE

## 2022-02-14 PROCEDURE — 99214 OFFICE O/P EST MOD 30 MIN: CPT | Performed by: FAMILY MEDICINE

## 2022-02-14 PROCEDURE — G8420 CALC BMI NORM PARAMETERS: HCPCS | Performed by: FAMILY MEDICINE

## 2022-02-14 PROCEDURE — 1123F ACP DISCUSS/DSCN MKR DOCD: CPT | Performed by: FAMILY MEDICINE

## 2022-02-14 PROCEDURE — 1090F PRES/ABSN URINE INCON ASSESS: CPT | Performed by: FAMILY MEDICINE

## 2022-02-14 PROCEDURE — G8427 DOCREV CUR MEDS BY ELIG CLIN: HCPCS | Performed by: FAMILY MEDICINE

## 2022-02-14 PROCEDURE — 4040F PNEUMOC VAC/ADMIN/RCVD: CPT | Performed by: FAMILY MEDICINE

## 2022-02-14 PROCEDURE — 1036F TOBACCO NON-USER: CPT | Performed by: FAMILY MEDICINE

## 2022-02-14 PROCEDURE — 0509F URINE INCON PLAN DOCD: CPT | Performed by: FAMILY MEDICINE

## 2022-02-14 RX ORDER — TIZANIDINE 2 MG/1
2 TABLET ORAL 3 TIMES DAILY PRN
Qty: 30 TABLET | Refills: 2 | Status: SHIPPED | OUTPATIENT
Start: 2022-02-14

## 2022-02-14 RX ORDER — DIPHENOXYLATE HYDROCHLORIDE AND ATROPINE SULFATE 2.5; .025 MG/1; MG/1
1 TABLET ORAL 4 TIMES DAILY
Qty: 120 TABLET | Refills: 5 | Status: SHIPPED | OUTPATIENT
Start: 2022-02-14 | End: 2022-09-14 | Stop reason: SDUPTHER

## 2022-03-02 ENCOUNTER — TELEPHONE (OUTPATIENT)
Dept: ORTHOPEDIC SURGERY | Age: 87
End: 2022-03-02

## 2022-03-02 DIAGNOSIS — M17.0 PRIMARY OSTEOARTHRITIS OF BOTH KNEES: Primary | ICD-10-CM

## 2022-03-02 NOTE — TELEPHONE ENCOUNTER
Prescription Refill     Medication Name:  TRAMADOL  Pharmacy: [id number]  Patient Contact Number: [phone]

## 2022-03-03 RX ORDER — TRAMADOL HYDROCHLORIDE 50 MG/1
50 TABLET ORAL EVERY 6 HOURS PRN
Qty: 28 TABLET | Refills: 0 | Status: SHIPPED | OUTPATIENT
Start: 2022-03-03 | End: 2022-08-04 | Stop reason: SDUPTHER

## 2022-03-04 ENCOUNTER — TELEPHONE (OUTPATIENT)
Dept: ORTHOPEDIC SURGERY | Age: 87
End: 2022-03-04

## 2022-03-04 NOTE — TELEPHONE ENCOUNTER
Prescription Refill     Medication Name:  TRAMADOL  Pharmacy: [facility]  Patient Contact Number:  [phone]

## 2022-03-24 ENCOUNTER — TELEPHONE (OUTPATIENT)
Dept: FAMILY MEDICINE CLINIC | Age: 87
End: 2022-03-24

## 2022-03-24 NOTE — TELEPHONE ENCOUNTER
Dr. Shannan Ramirez    [address] [facility]  [phone]    Dr. Bhavna River  [address] [facility]  [phone]    Or [facility] in [facility]

## 2022-03-24 NOTE — TELEPHONE ENCOUNTER
Pt advised and she will be calling us once she figures out who is taking new pt so that we can send in the referral

## 2022-03-24 NOTE — TELEPHONE ENCOUNTER
Reason for referral request? Patient needs a new dermatologist  down stairs is no longer there and they are not taking new patients please   advise

## 2022-03-24 NOTE — TELEPHONE ENCOUNTER
----- Message from Courtney Tuttle LPN sent at 5/46/9703  3:46 PM EDT -----  Subject: Referral Request    QUESTIONS   Reason for referral request? Patient needs a new dermatologist  down   stairs is no longer there and they are not taking new patients please   advise   Has the physician seen you for this condition before? Yes  Select a date? 2022-02-14  Select the Provider the patient wants to be referred to, if known (PCP or   Specialist)? Raji Carmichael   Preferred Specialist (if applicable)? Do you already have an appointment scheduled? No  Additional Information for Provider?   ---------------------------------------------------------------------------  --------------  CALL BACK INFO  What is the best way for the office to contact you? OK to leave message on   voicemail  Preferred Call Back Phone Number?  [phone]

## 2022-06-22 NOTE — PROGRESS NOTES
This addendum has been created to correct a medical record clerical error, wherein an erroneous  was selected for your administered  flu vaccine.

## 2022-08-03 ENCOUNTER — TELEPHONE (OUTPATIENT)
Dept: ORTHOPEDIC SURGERY | Age: 87
End: 2022-08-03

## 2022-08-03 DIAGNOSIS — M17.0 PRIMARY OSTEOARTHRITIS OF BOTH KNEES: ICD-10-CM

## 2022-08-03 NOTE — TELEPHONE ENCOUNTER
Prescription Refill     Medication Name:  TRAMADOL  Pharmacy: [address] [place] [address], [facility], [address] [facility]  [phone]    Patient Contact Number:  [phone]

## 2022-08-04 RX ORDER — TRAMADOL HYDROCHLORIDE 50 MG/1
50 TABLET ORAL EVERY 6 HOURS PRN
Qty: 28 TABLET | Refills: 0 | Status: SHIPPED | OUTPATIENT
Start: 2022-08-04 | End: 2022-08-11

## 2022-09-14 ENCOUNTER — OFFICE VISIT (OUTPATIENT)
Dept: FAMILY MEDICINE CLINIC | Age: 87
End: 2022-09-14
Payer: MEDICARE

## 2022-09-14 VITALS
SYSTOLIC BLOOD PRESSURE: 120 MMHG | HEART RATE: 67 BPM | RESPIRATION RATE: 16 BRPM | OXYGEN SATURATION: 95 % | DIASTOLIC BLOOD PRESSURE: 70 MMHG | TEMPERATURE: 97.3 F

## 2022-09-14 DIAGNOSIS — M85.80 OSTEOPENIA WITH HIGH RISK OF FRACTURE: ICD-10-CM

## 2022-09-14 DIAGNOSIS — D50.8 OTHER IRON DEFICIENCY ANEMIA: ICD-10-CM

## 2022-09-14 DIAGNOSIS — E05.90 SUBCLINICAL HYPERTHYROIDISM: ICD-10-CM

## 2022-09-14 DIAGNOSIS — R73.02 GLUCOSE INTOLERANCE (IMPAIRED GLUCOSE TOLERANCE): ICD-10-CM

## 2022-09-14 DIAGNOSIS — Z23 NEED FOR INFLUENZA VACCINATION: ICD-10-CM

## 2022-09-14 DIAGNOSIS — K58.0 IRRITABLE BOWEL SYNDROME WITH DIARRHEA: Primary | ICD-10-CM

## 2022-09-14 DIAGNOSIS — I10 ESSENTIAL HYPERTENSION, BENIGN: ICD-10-CM

## 2022-09-14 PROCEDURE — 1123F ACP DISCUSS/DSCN MKR DOCD: CPT | Performed by: FAMILY MEDICINE

## 2022-09-14 PROCEDURE — 1090F PRES/ABSN URINE INCON ASSESS: CPT | Performed by: FAMILY MEDICINE

## 2022-09-14 PROCEDURE — G8427 DOCREV CUR MEDS BY ELIG CLIN: HCPCS | Performed by: FAMILY MEDICINE

## 2022-09-14 PROCEDURE — 1036F TOBACCO NON-USER: CPT | Performed by: FAMILY MEDICINE

## 2022-09-14 PROCEDURE — 90694 VACC AIIV4 NO PRSRV 0.5ML IM: CPT | Performed by: FAMILY MEDICINE

## 2022-09-14 PROCEDURE — 99214 OFFICE O/P EST MOD 30 MIN: CPT | Performed by: FAMILY MEDICINE

## 2022-09-14 PROCEDURE — G0008 ADMIN INFLUENZA VIRUS VAC: HCPCS | Performed by: FAMILY MEDICINE

## 2022-09-14 PROCEDURE — G8420 CALC BMI NORM PARAMETERS: HCPCS | Performed by: FAMILY MEDICINE

## 2022-09-14 RX ORDER — DIPHENOXYLATE HYDROCHLORIDE AND ATROPINE SULFATE 2.5; .025 MG/1; MG/1
1 TABLET ORAL 4 TIMES DAILY
Qty: 120 TABLET | Refills: 5 | Status: SHIPPED | OUTPATIENT
Start: 2022-09-14 | End: 2023-03-13

## 2022-09-14 SDOH — ECONOMIC STABILITY: FOOD INSECURITY: WITHIN THE PAST 12 MONTHS, THE FOOD YOU BOUGHT JUST DIDN'T LAST AND YOU DIDN'T HAVE MONEY TO GET MORE.: NEVER TRUE

## 2022-09-14 SDOH — ECONOMIC STABILITY: FOOD INSECURITY: WITHIN THE PAST 12 MONTHS, YOU WORRIED THAT YOUR FOOD WOULD RUN OUT BEFORE YOU GOT MONEY TO BUY MORE.: NEVER TRUE

## 2022-09-14 ASSESSMENT — SOCIAL DETERMINANTS OF HEALTH (SDOH): HOW HARD IS IT FOR YOU TO PAY FOR THE VERY BASICS LIKE FOOD, HOUSING, MEDICAL CARE, AND HEATING?: NOT HARD AT ALL

## 2022-09-14 ASSESSMENT — PATIENT HEALTH QUESTIONNAIRE - PHQ9
SUM OF ALL RESPONSES TO PHQ QUESTIONS 1-9: 0
2. FEELING DOWN, DEPRESSED OR HOPELESS: 0
SUM OF ALL RESPONSES TO PHQ QUESTIONS 1-9: 0
SUM OF ALL RESPONSES TO PHQ9 QUESTIONS 1 & 2: 0
SUM OF ALL RESPONSES TO PHQ QUESTIONS 1-9: 0
1. LITTLE INTEREST OR PLEASURE IN DOING THINGS: 0
SUM OF ALL RESPONSES TO PHQ QUESTIONS 1-9: 0

## 2022-09-14 NOTE — PROGRESS NOTES
GFRAA >60 01/22/2020    AGRATIO 1.4 07/17/2017    GLOB 2.9 07/17/2017        Labs Renal Latest Ref Rng & Units 1/22/2020 1/22/2020 1/21/2020 1/21/2020 1/20/2020   BUN 7 - 20 mg/dL 18 - 20 - 23(H)   Cr 0.6 - 1.2 mg/dL 0.8 - 0.9 - 1.0   K 3.5 - 5.1 mmol/L 4.3 4.3 4.2 4.2 3.4(L)   Na 136 - 145 mmol/L 134(L) - 138 - 136     TSH   Date Value Ref Range Status   01/18/2020 0.23 (L) 0.27 - 4.20 uIU/mL Final   07/17/2017 1.89 0.27 - 4.20 uIU/mL Final   02/23/2016 1.92 0.27 - 4.20 uIU/mL Final   04/26/2013 1.86 0.35 - 5.5 uIU/ml Final     No results found for: CHOL  No results found for: TRIG  No results found for: HDL  No results found for: LDLCHOLESTEROL, LDLCALC  No results found for: LABVLDL, VLDL  No results found for: [facility]   Lab Results   Component Value Date    WBC 5.5 01/22/2020    HGB 10.4 (L) 01/22/2020    HCT 31.9 (L) 01/22/2020    MCV 88.9 01/22/2020     01/22/2020      Lab Results   Component Value Date    LABA1C 5.2 07/17/2017     Lab Results   Component Value Date    .5 07/17/2017        Outpatient Medications Marked as Taking for the 9/14/22 encounter (Office Visit) with Carley Blackburn MD   Medication Sig Dispense Refill    mirabegron (MYRBETRIQ) 50 MG TB24 Take 50 mg by mouth daily 90 tablet 3    tiZANidine (ZANAFLEX) 2 MG tablet Take 1 tablet by mouth 3 times daily as needed (muscle spasm) 30 tablet 2    calcium citrate-vitamin D (CALCIUM CITRATE + D3) 315-250 MG-UNIT TABS per tablet Take 2 tablets by mouth 2 times daily (with meals) 120 tablet 12    diclofenac sodium (VOLTAREN) 1 % GEL Apply 4 g topically 4 times daily 5 Tube 0    acetaminophen (TYLENOL) 500 MG tablet Take 1 tablet by mouth 4 times daily as needed for Pain 360 tablet 1    alendronate (FOSAMAX) 70 MG tablet Take 1 tablet by mouth every 7 days Take with 8 ounces water, do not lie down for 30 minutes after taking it 12 tablet 3        Allergies   Allergen Reactions    Lisinopril Swelling     Swelling tongue. Clindamycin/Lincomycin        Patient Active Problem List   Diagnosis    Osteoarthritis    IBS (irritable bowel syndrome)    Gout    Dry eye syndrome    Osteopenia    Essential hypertension, benign    Glucose intolerance (impaired glucose tolerance)    Primary osteoarthritis of both knees    Anemia    Inguinal hernia, right    History of DVT (deep vein thrombosis)    Chronic pain of left knee    Chronic pain of right knee    Swelling of both lower extremities       Past Medical History:   Diagnosis Date    Acute deep vein thrombosis (DVT) of left lower extremity after procedure ([facility] [address] 10/3/2019    Post hip rx repair    Acute renal failure (ARF) ([facility] [address] 1/17/2020    Anterior corneal dystrophy     Asymptomatic postmenopausal status (age-related) (natural)     Cervical polyp     Dry eye syndrome     Edema     Fuch's endothelial dystrophy     Gout     Hip fracture requiring operative repair, left, closed, initial encounter ([facility] [address] 9/17/2019    IBS (irritable bowel syndrome)     Osteoarthritis     Osteopenia     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    Pedal edema 5/8/2017    Screening mammogram     Symptomatic menopausal or female climacteric states 11/4/2013    Urinary retention 9/21/2019    Vaginal candidiasis        Past Surgical History:   Procedure Laterality Date    APPENDECTOMY      EYE SURGERY      Cataract Surgery    HIP PINNING Left 9/18/2019    LEFT HIP GAMMA NAIL performed by Tex Kyle MD at [facility] OR        Family History   Problem Relation Age of Onset    Breast Cancer Daughter         [facility] 2    Ovarian Cancer Daughter        Social History     Tobacco Use    Smoking status: Never    Smokeless tobacco: Never   Substance Use Topics    Alcohol use: No    Drug use: No            Review of Systems  Review of Systems    Objective:   Physical Exam  Vitals:    09/14/22 1412   BP: 120/70   Pulse: 67   Resp: 16   Temp: 97.3 °F (36.3 °C)   TempSrc: Temporal   SpO2: 95%     Wt Readings from Last 3 Encounters:   02/14/22 127

## 2022-11-24 ENCOUNTER — APPOINTMENT (OUTPATIENT)
Dept: CT IMAGING | Age: 87
DRG: 871 | End: 2022-11-24
Payer: MEDICARE

## 2022-11-24 ENCOUNTER — APPOINTMENT (OUTPATIENT)
Dept: GENERAL RADIOLOGY | Age: 87
DRG: 871 | End: 2022-11-24
Payer: MEDICARE

## 2022-11-24 ENCOUNTER — HOSPITAL ENCOUNTER (INPATIENT)
Age: 87
LOS: 7 days | Discharge: NURSING, REHAB OR LONG-TERM CARE | DRG: 871 | End: 2022-12-01
Attending: EMERGENCY MEDICINE | Admitting: INTERNAL MEDICINE
Payer: MEDICARE

## 2022-11-24 DIAGNOSIS — R33.9 URINARY RETENTION: ICD-10-CM

## 2022-11-24 DIAGNOSIS — S92.002A CLOSED NONDISPLACED FRACTURE OF LEFT CALCANEUS, UNSPECIFIED PORTION OF CALCANEUS, INITIAL ENCOUNTER: ICD-10-CM

## 2022-11-24 DIAGNOSIS — R53.1 GENERAL WEAKNESS: ICD-10-CM

## 2022-11-24 DIAGNOSIS — N17.9 AKI (ACUTE KIDNEY INJURY) (HCC): ICD-10-CM

## 2022-11-24 DIAGNOSIS — T80.1XXA INTRAVENOUS INFILTRATION, INITIAL ENCOUNTER: ICD-10-CM

## 2022-11-24 DIAGNOSIS — I48.91 ATRIAL FIBRILLATION WITH RVR (HCC): Primary | ICD-10-CM

## 2022-11-24 DIAGNOSIS — I10 ESSENTIAL HYPERTENSION, BENIGN: ICD-10-CM

## 2022-11-24 DIAGNOSIS — R77.8 ELEVATED TROPONIN: ICD-10-CM

## 2022-11-24 DIAGNOSIS — N39.0 URINARY TRACT INFECTION WITHOUT HEMATURIA, SITE UNSPECIFIED: ICD-10-CM

## 2022-11-24 LAB
ALBUMIN SERPL-MCNC: 2.7 G/DL (ref 3.4–5)
ALP BLD-CCNC: 83 U/L (ref 40–129)
ALT SERPL-CCNC: 37 U/L (ref 10–40)
ANION GAP SERPL CALCULATED.3IONS-SCNC: 12 MMOL/L (ref 3–16)
APTT: 33 SEC (ref 23–34.3)
AST SERPL-CCNC: 110 U/L (ref 15–37)
BACTERIA: ABNORMAL /HPF
BASE EXCESS VENOUS: -1.8 MMOL/L (ref -2–3)
BASOPHILS ABSOLUTE: 0 K/UL (ref 0–0.2)
BASOPHILS RELATIVE PERCENT: 0.1 %
BILIRUB SERPL-MCNC: 0.4 MG/DL (ref 0–1)
BILIRUBIN DIRECT: <0.2 MG/DL (ref 0–0.3)
BILIRUBIN URINE: NEGATIVE
BILIRUBIN, INDIRECT: ABNORMAL MG/DL (ref 0–1)
BLOOD, URINE: ABNORMAL
BUN BLDV-MCNC: 57 MG/DL (ref 7–20)
CALCIUM SERPL-MCNC: 8.4 MG/DL (ref 8.3–10.6)
CARBOXYHEMOGLOBIN: 1.1 % (ref 0–1.5)
CHLORIDE BLD-SCNC: 98 MMOL/L (ref 99–110)
CLARITY: ABNORMAL
CO2: 19 MMOL/L (ref 21–32)
COLOR: YELLOW
CREAT SERPL-MCNC: 1.5 MG/DL (ref 0.6–1.2)
EKG ATRIAL RATE: 187 BPM
EKG DIAGNOSIS: NORMAL
EKG Q-T INTERVAL: 254 MS
EKG QRS DURATION: 68 MS
EKG QTC CALCULATION (BAZETT): 409 MS
EKG R AXIS: -14 DEGREES
EKG T AXIS: 69 DEGREES
EKG VENTRICULAR RATE: 156 BPM
EOSINOPHILS ABSOLUTE: 0 K/UL (ref 0–0.6)
EOSINOPHILS RELATIVE PERCENT: 0.1 %
EPITHELIAL CELLS, UA: ABNORMAL /HPF (ref 0–5)
GFR SERPL CREATININE-BSD FRML MDRD: 34 ML/MIN/{1.73_M2}
GLUCOSE BLD-MCNC: 132 MG/DL (ref 70–99)
GLUCOSE URINE: NEGATIVE MG/DL
HCO3 VENOUS: 23.7 MMOL/L (ref 24–28)
HCT VFR BLD CALC: 34.4 % (ref 36–48)
HEMOGLOBIN, VEN, REDUCED: 75.4 %
HEMOGLOBIN: 11.2 G/DL (ref 12–16)
INFLUENZA A: NOT DETECTED
INFLUENZA B: NOT DETECTED
INR BLD: 1.21 (ref 0.87–1.14)
KETONES, URINE: ABNORMAL MG/DL
LACTIC ACID: 1.5 MMOL/L (ref 0.4–2)
LEUKOCYTE ESTERASE, URINE: ABNORMAL
LYMPHOCYTES ABSOLUTE: 0.5 K/UL (ref 1–5.1)
LYMPHOCYTES RELATIVE PERCENT: 3.7 %
MCH RBC QN AUTO: 32.2 PG (ref 26–34)
MCHC RBC AUTO-ENTMCNC: 32.5 G/DL (ref 31–36)
MCV RBC AUTO: 99.2 FL (ref 80–100)
METHEMOGLOBIN VENOUS: <0 % (ref 0–1.5)
MICROSCOPIC EXAMINATION: YES
MONOCYTES ABSOLUTE: 1.4 K/UL (ref 0–1.3)
MONOCYTES RELATIVE PERCENT: 9.8 %
NEUTROPHILS ABSOLUTE: 12.7 K/UL (ref 1.7–7.7)
NEUTROPHILS RELATIVE PERCENT: 86.3 %
NITRITE, URINE: POSITIVE
O2 SAT, VEN: 24 %
PCO2, VEN: 41.9 MMHG (ref 41–51)
PDW BLD-RTO: 13.1 % (ref 12.4–15.4)
PH UA: 6 (ref 5–8)
PH VENOUS: 7.36 (ref 7.35–7.45)
PHOSPHORUS: 2.4 MG/DL (ref 2.5–4.9)
PLATELET # BLD: 389 K/UL (ref 135–450)
PMV BLD AUTO: 7.3 FL (ref 5–10.5)
PO2, VEN: <30 MMHG (ref 25–40)
POTASSIUM REFLEX MAGNESIUM: 4.8 MMOL/L (ref 3.5–5.1)
PRO-BNP: 8905 PG/ML (ref 0–449)
PROTEIN UA: 100 MG/DL
PROTHROMBIN TIME: 15.3 SEC (ref 11.7–14.5)
RBC # BLD: 3.47 M/UL (ref 4–5.2)
RBC UA: >100 /HPF (ref 0–4)
SARS-COV-2 RNA, RT PCR: NOT DETECTED
SODIUM BLD-SCNC: 129 MMOL/L (ref 136–145)
SPECIFIC GRAVITY UA: 1.02 (ref 1–1.03)
TCO2 CALC VENOUS: 25 MMOL/L
TOTAL CK: 270 U/L (ref 26–192)
TOTAL PROTEIN: 6.4 G/DL (ref 6.4–8.2)
TROPONIN: 0.05 NG/ML
TROPONIN: 0.05 NG/ML
URINE REFLEX TO CULTURE: YES
URINE TYPE: ABNORMAL
UROBILINOGEN, URINE: 0.2 E.U./DL
WBC # BLD: 14.7 K/UL (ref 4–11)
WBC UA: ABNORMAL /HPF (ref 0–5)

## 2022-11-24 PROCEDURE — 87077 CULTURE AEROBIC IDENTIFY: CPT

## 2022-11-24 PROCEDURE — 96366 THER/PROPH/DIAG IV INF ADDON: CPT

## 2022-11-24 PROCEDURE — 6370000000 HC RX 637 (ALT 250 FOR IP): Performed by: PHYSICIAN ASSISTANT

## 2022-11-24 PROCEDURE — 96375 TX/PRO/DX INJ NEW DRUG ADDON: CPT

## 2022-11-24 PROCEDURE — 80076 HEPATIC FUNCTION PANEL: CPT

## 2022-11-24 PROCEDURE — 83605 ASSAY OF LACTIC ACID: CPT

## 2022-11-24 PROCEDURE — 2580000003 HC RX 258: Performed by: PHYSICIAN ASSISTANT

## 2022-11-24 PROCEDURE — 51702 INSERT TEMP BLADDER CATH: CPT

## 2022-11-24 PROCEDURE — 84443 ASSAY THYROID STIM HORMONE: CPT

## 2022-11-24 PROCEDURE — 96365 THER/PROPH/DIAG IV INF INIT: CPT

## 2022-11-24 PROCEDURE — 85610 PROTHROMBIN TIME: CPT

## 2022-11-24 PROCEDURE — 82803 BLOOD GASES ANY COMBINATION: CPT

## 2022-11-24 PROCEDURE — 87636 SARSCOV2 & INF A&B AMP PRB: CPT

## 2022-11-24 PROCEDURE — 29515 APPLICATION SHORT LEG SPLINT: CPT

## 2022-11-24 PROCEDURE — 93005 ELECTROCARDIOGRAM TRACING: CPT | Performed by: PHYSICIAN ASSISTANT

## 2022-11-24 PROCEDURE — 71260 CT THORAX DX C+: CPT | Performed by: PHYSICIAN ASSISTANT

## 2022-11-24 PROCEDURE — 85025 COMPLETE CBC W/AUTO DIFF WBC: CPT

## 2022-11-24 PROCEDURE — 80048 BASIC METABOLIC PNL TOTAL CA: CPT

## 2022-11-24 PROCEDURE — 2060000000 HC ICU INTERMEDIATE R&B

## 2022-11-24 PROCEDURE — 83880 ASSAY OF NATRIURETIC PEPTIDE: CPT

## 2022-11-24 PROCEDURE — 84484 ASSAY OF TROPONIN QUANT: CPT

## 2022-11-24 PROCEDURE — 74177 CT ABD & PELVIS W/CONTRAST: CPT

## 2022-11-24 PROCEDURE — 71045 X-RAY EXAM CHEST 1 VIEW: CPT

## 2022-11-24 PROCEDURE — 70450 CT HEAD/BRAIN W/O DYE: CPT

## 2022-11-24 PROCEDURE — 6360000004 HC RX CONTRAST MEDICATION: Performed by: PHYSICIAN ASSISTANT

## 2022-11-24 PROCEDURE — 85730 THROMBOPLASTIN TIME PARTIAL: CPT

## 2022-11-24 PROCEDURE — 87086 URINE CULTURE/COLONY COUNT: CPT

## 2022-11-24 PROCEDURE — 99285 EMERGENCY DEPT VISIT HI MDM: CPT

## 2022-11-24 PROCEDURE — 81001 URINALYSIS AUTO W/SCOPE: CPT

## 2022-11-24 PROCEDURE — 87186 SC STD MICRODIL/AGAR DIL: CPT

## 2022-11-24 PROCEDURE — 73700 CT LOWER EXTREMITY W/O DYE: CPT

## 2022-11-24 PROCEDURE — 82550 ASSAY OF CK (CPK): CPT

## 2022-11-24 PROCEDURE — 2500000003 HC RX 250 WO HCPCS: Performed by: PHYSICIAN ASSISTANT

## 2022-11-24 PROCEDURE — 6360000002 HC RX W HCPCS: Performed by: PHYSICIAN ASSISTANT

## 2022-11-24 PROCEDURE — 84100 ASSAY OF PHOSPHORUS: CPT

## 2022-11-24 RX ORDER — HEPARIN SODIUM 1000 [USP'U]/ML
80 INJECTION, SOLUTION INTRAVENOUS; SUBCUTANEOUS ONCE
Status: DISCONTINUED | OUTPATIENT
Start: 2022-11-24 | End: 2022-11-24

## 2022-11-24 RX ORDER — MORPHINE SULFATE 4 MG/ML
4 INJECTION, SOLUTION INTRAMUSCULAR; INTRAVENOUS ONCE
Status: COMPLETED | OUTPATIENT
Start: 2022-11-24 | End: 2022-11-24

## 2022-11-24 RX ORDER — 0.9 % SODIUM CHLORIDE 0.9 %
500 INTRAVENOUS SOLUTION INTRAVENOUS ONCE
Status: COMPLETED | OUTPATIENT
Start: 2022-11-24 | End: 2022-11-24

## 2022-11-24 RX ORDER — HEPARIN SODIUM 1000 [USP'U]/ML
40 INJECTION, SOLUTION INTRAVENOUS; SUBCUTANEOUS PRN
Status: DISCONTINUED | OUTPATIENT
Start: 2022-11-24 | End: 2022-11-24

## 2022-11-24 RX ORDER — HEPARIN SODIUM 1000 [USP'U]/ML
80 INJECTION, SOLUTION INTRAVENOUS; SUBCUTANEOUS PRN
Status: DISCONTINUED | OUTPATIENT
Start: 2022-11-24 | End: 2022-11-24

## 2022-11-24 RX ORDER — ASPIRIN 325 MG
325 TABLET ORAL ONCE
Status: COMPLETED | OUTPATIENT
Start: 2022-11-24 | End: 2022-11-24

## 2022-11-24 RX ORDER — DILTIAZEM HYDROCHLORIDE 5 MG/ML
10 INJECTION INTRAVENOUS ONCE
Status: COMPLETED | OUTPATIENT
Start: 2022-11-24 | End: 2022-11-24

## 2022-11-24 RX ORDER — HEPARIN SODIUM 10000 [USP'U]/100ML
5-30 INJECTION, SOLUTION INTRAVENOUS CONTINUOUS
Status: DISCONTINUED | OUTPATIENT
Start: 2022-11-24 | End: 2022-11-24

## 2022-11-24 RX ADMIN — SODIUM CHLORIDE 500 ML: 9 INJECTION, SOLUTION INTRAVENOUS at 19:17

## 2022-11-24 RX ADMIN — CEFTRIAXONE 1000 MG: 1 INJECTION, POWDER, FOR SOLUTION INTRAMUSCULAR; INTRAVENOUS at 23:59

## 2022-11-24 RX ADMIN — DILTIAZEM HYDROCHLORIDE 2.5 MG/HR: 5 INJECTION, SOLUTION INTRAVENOUS at 19:17

## 2022-11-24 RX ADMIN — IOPAMIDOL 80 ML: 755 INJECTION, SOLUTION INTRAVENOUS at 20:16

## 2022-11-24 RX ADMIN — MORPHINE SULFATE 4 MG: 4 INJECTION INTRAVENOUS at 19:13

## 2022-11-24 RX ADMIN — DILTIAZEM HYDROCHLORIDE 10 MG: 5 INJECTION, SOLUTION INTRAVENOUS at 19:12

## 2022-11-24 RX ADMIN — ASPIRIN 325 MG: 325 TABLET ORAL at 23:43

## 2022-11-24 ASSESSMENT — PAIN DESCRIPTION - LOCATION
LOCATION: LEG
LOCATION: LEG

## 2022-11-24 ASSESSMENT — PAIN DESCRIPTION - DESCRIPTORS
DESCRIPTORS: DISCOMFORT
DESCRIPTORS: DISCOMFORT

## 2022-11-24 ASSESSMENT — ENCOUNTER SYMPTOMS
RECTAL PAIN: 0
VOMITING: 0
WHEEZING: 0
CHEST TIGHTNESS: 0
COUGH: 0
SORE THROAT: 0
RHINORRHEA: 0
CONSTIPATION: 0
TROUBLE SWALLOWING: 0
ABDOMINAL PAIN: 1
SHORTNESS OF BREATH: 0
DIARRHEA: 0
NAUSEA: 0
BACK PAIN: 1
FACIAL SWELLING: 0

## 2022-11-24 ASSESSMENT — PAIN DESCRIPTION - PAIN TYPE: TYPE: ACUTE PAIN

## 2022-11-24 ASSESSMENT — PAIN DESCRIPTION - FREQUENCY: FREQUENCY: INTERMITTENT

## 2022-11-24 ASSESSMENT — PAIN - FUNCTIONAL ASSESSMENT
PAIN_FUNCTIONAL_ASSESSMENT: 0-10
PAIN_FUNCTIONAL_ASSESSMENT: PREVENTS OR INTERFERES SOME ACTIVE ACTIVITIES AND ADLS

## 2022-11-24 ASSESSMENT — PAIN DESCRIPTION - ONSET: ONSET: ON-GOING

## 2022-11-24 ASSESSMENT — PAIN SCALES - GENERAL
PAINLEVEL_OUTOF10: 10
PAINLEVEL_OUTOF10: 10

## 2022-11-24 ASSESSMENT — PAIN DESCRIPTION - ORIENTATION: ORIENTATION: LEFT;RIGHT

## 2022-11-24 NOTE — ED PROVIDER NOTES
breath and wheezing. Cardiovascular:  Positive for palpitations. Negative for chest pain and leg swelling. Gastrointestinal:  Positive for abdominal pain. Negative for constipation, diarrhea, nausea, rectal pain and vomiting. Genitourinary:  Negative for flank pain, hematuria and pelvic pain. Musculoskeletal:  Positive for arthralgias, back pain and myalgias. Negative for neck pain and neck stiffness. Skin:  Negative for rash. Neurological:  Positive for weakness and light-headedness. Negative for dizziness, syncope, numbness and headaches. Psychiatric/Behavioral:  Negative for confusion. All other systems reviewed and are negative. Past Medical, Surgical, Family, and Social History     She has a past medical history of Acute deep vein thrombosis (DVT) of left lower extremity after procedure ([facility] [address] Acute renal failure (ARF) ([facility] [address] Anterior corneal dystrophy, Asymptomatic postmenopausal status (age-related) (natural), Cervical polyp, Dry eye syndrome, Edema, Fuch's endothelial dystrophy, Gout, Hip fracture requiring operative repair, left, closed, [facility] IBS (irritable bowel syndrome), Osteoarthritis, Osteopenia, Papilloma of breast, Pedal edema, Screening mammogram, Symptomatic menopausal or female climacteric states, Urinary retention, and Vaginal candidiasis. She has a past surgical history that includes Appendectomy; eye surgery; and hip pinning (Left, 9/18/2019). Her family history includes Breast Cancer in her daughter; Ovarian Cancer in her daughter. She reports that she has never smoked. She has never used smokeless tobacco. She reports that she does not drink alcohol and does not use drugs.     Medications     Previous Medications    ACETAMINOPHEN (TYLENOL) 500 MG TABLET    Take 1 tablet by mouth 4 times daily as needed for Pain    ALENDRONATE (FOSAMAX) 70 MG TABLET    Take 1 tablet by mouth every 7 days Take with 8 ounces water, do not lie down for 30 minutes after taking it    CALCIUM CITRATE-VITAMIN D (CALCIUM CITRATE + D3) 315-250 MG-UNIT TABS PER TABLET    Take 2 tablets by mouth 2 times daily (with meals)    DICLOFENAC SODIUM (VOLTAREN) 1 % GEL    Apply 4 g topically 4 times daily    DIPHENOXYLATE-ATROPINE (LOMOTIL) 2.5-0.025 MG PER TABLET    Take 1 tablet by mouth 4 times daily for 180 days. MIRABEGRON (MYRBETRIQ) 50 MG TB24    Take 50 mg by mouth daily    TIZANIDINE (ZANAFLEX) 2 MG TABLET    Take 1 tablet by mouth 3 times daily as needed (muscle spasm)       Allergies     She is allergic to amoxapine and related, lisinopril, and clindamycin/lincomycin. Physical Exam     INITIAL VITALS: BP: (!) 133/99, Temp: 98.6 °F (37 °C), Heart Rate: 66, Resp: 18, SpO2: 92 %  Physical Exam  Vitals and nursing note reviewed. Constitutional:       Appearance: She is underweight. HENT:      Head: Normocephalic and atraumatic. Right Ear: External ear normal.      Left Ear: External ear normal.      Nose: Nose normal.      Mouth/Throat:      Pharynx: Oropharynx is clear. Eyes:      Extraocular Movements: Extraocular movements intact. Conjunctiva/sclera: Conjunctivae normal.      Pupils: Pupils are equal, round, and reactive to light. Cardiovascular:      Rate and Rhythm: Tachycardia present. Rhythm irregular. Pulses: Normal pulses. Heart sounds: Normal heart sounds. Pulmonary:      Effort: Pulmonary effort is normal.      Breath sounds: Normal breath sounds. Chest:      Chest wall: No tenderness. Abdominal:      General: Abdomen is flat. Bowel sounds are normal. There is no distension. Palpations: Abdomen is soft. Tenderness: There is abdominal tenderness. There is no right CVA tenderness, left CVA tenderness, guarding or rebound. Musculoskeletal:         General: Tenderness present. No swelling. Normal range of motion. Cervical back: Normal range of motion and neck supple. No tenderness. Right lower leg: No edema.       Left lower leg: No edema. Comments: Patient has a large area of ecchymosis to the plantar surface of the left foot and has tenderness to the left calcaneus and left posterior ankle. She does have 1+ dorsalis pedis or tibial pulse of bilateral lower extremities. There is no swelling of the legs or pitting edema. No erythema or warmth to touch. Negative logroll. She does have tenderness to both knees with some minimal swelling but no erythema or warmth to touch of the knees. No medial or lateral malleoli or tenderness noted bilaterally. Patient did have some tenderness to the lower lumbar paraspinous muscles. No midline tenderness of cervical, thoracic or lumbar spine. Skin:     General: Skin is warm and dry. Neurological:      General: No focal deficit present. Mental Status: She is alert and oriented to person, place, and time. Cranial Nerves: No cranial nerve deficit. Sensory: No sensory deficit. Motor: No weakness. Diagnostic Results     EKG   Interpreted in conjunction with emergency department physician Dr Mary Dejesus  EKG Interpretation    Rhythm: Atrial fibrillation with RVR  Rate: Tachycardic heart rate 156  Axis: normal  Ectopy: none  Conduction: normal  ST Segments: no acute change  T Waves: no acute change  Q Waves: none    Clinical Impression: Atrial fibrillation with RVR    RADIOLOGY:  CT HEAD WO CONTRAST   Final Result      1. No acute intracranial process. 2.  Moderate cerebral atrophy and mild chronic small vessel ischemic disease. CT CHEST PULMONARY EMBOLISM W CONTRAST   Final Result      Chest:   1. No evidence of pulmonary embolism. 2.  Moderate to large hiatal hernia. 3.  Trace right pleural effusion and mild bilateral lower lobe atelectasis. Abdomen and pelvis:   1. Severe bilateral hydroureteronephrosis and moderate urinary bladder distention. Recommend correlation for bladder outlet obstruction versus urinary retention.    2.  Bilateral nephrolithiasis and 2 small nonobstructing calculi within the urinary bladder. 3.  Age-indeterminate L1 compression fracture with 30% height loss. 4.  Gallbladder distention without evidence of wall thickening or gallstones may represent gallbladder hydrops. Mild intrahepatic biliary ductal dilatation without significant common bile duct dilatation. 5.  Moderate right inguinal hernia containing nondilated small bowel. CT ANKLE LEFT WO CONTRAST   Final Result   Nondisplaced posterior calcaneal insufficiency fracture. CT ABDOMEN PELVIS W IV CONTRAST Additional Contrast? None   Final Result      Chest:   1. No evidence of pulmonary embolism. 2.  Moderate to large hiatal hernia. 3.  Trace right pleural effusion and mild bilateral lower lobe atelectasis. Abdomen and pelvis:   1. Severe bilateral hydroureteronephrosis and moderate urinary bladder distention. Recommend correlation for bladder outlet obstruction versus urinary retention. 2.  Bilateral nephrolithiasis and 2 small nonobstructing calculi within the urinary bladder. 3.  Age-indeterminate L1 compression fracture with 30% height loss. 4.  Gallbladder distention without evidence of wall thickening or gallstones may represent gallbladder hydrops. Mild intrahepatic biliary ductal dilatation without significant common bile duct dilatation. 5.  Moderate right inguinal hernia containing nondilated small bowel. XR CHEST PORTABLE   Final Result      No acute pulmonary disease. Moderate hiatal hernia.           LABS:   Results for orders placed or performed during the hospital encounter of 11/24/22   COVID-19 & Influenza Combo    Specimen: Nasopharyngeal Swab   Result Value Ref Range    SARS-CoV-2 RNA, RT PCR NOT DETECTED NOT DETECTED    INFLUENZA A NOT DETECTED NOT DETECTED    INFLUENZA B NOT DETECTED NOT DETECTED   CBC with Auto Differential   Result Value Ref Range    WBC 14.7 (H) 4.0 - 11.0 K/uL    RBC 3.47 (L) 4.00 - 5.20 M/uL    Hemoglobin 11.2 (L) 12.0 - 16.0 g/dL    Hematocrit 34.4 (L) 36.0 - 48.0 %    MCV 99.2 80.0 - 100.0 fL    MCH 32.2 26.0 - 34.0 pg    MCHC 32.5 31.0 - 36.0 g/dL    RDW 13.1 12.4 - 15.4 %    Platelets 806 424 - 258 K/uL    MPV 7.3 5.0 - 10.5 fL    Neutrophils % 86.3 %    Lymphocytes % 3.7 %    Monocytes % 9.8 %    Eosinophils % 0.1 %    Basophils % 0.1 %    Neutrophils Absolute 12.7 (H) 1.7 - 7.7 K/uL    Lymphocytes Absolute 0.5 (L) 1.0 - 5.1 K/uL    Monocytes Absolute 1.4 (H) 0.0 - 1.3 K/uL    Eosinophils Absolute 0.0 0.0 - 0.6 K/uL    Basophils Absolute 0.0 0.0 - 0.2 K/uL   BMP w/ Reflex to MG   Result Value Ref Range    Sodium 129 (L) 136 - 145 mmol/L    Potassium reflex Magnesium 4.8 3.5 - 5.1 mmol/L    Chloride 98 (L) 99 - 110 mmol/L    CO2 19 (L) 21 - 32 mmol/L    Anion Gap 12 3 - 16    Glucose 132 (H) 70 - 99 mg/dL    BUN 57 (H) 7 - 20 mg/dL    Creatinine 1.5 (H) 0.6 - 1.2 mg/dL    Est, Glom Filt Rate 34 (A) >60    Calcium 8.4 8.3 - 10.6 mg/dL   Hepatic Function Panel   Result Value Ref Range    Total Protein 6.4 6.4 - 8.2 g/dL    Albumin 2.7 (L) 3.4 - 5.0 g/dL    Alkaline Phosphatase 83 40 - 129 U/L    ALT 37 10 - 40 U/L     (H) 15 - 37 U/L    Total Bilirubin 0.4 0.0 - 1.0 mg/dL    Bilirubin, Direct <0.2 0.0 - 0.3 mg/dL    Bilirubin, Indirect see below 0.0 - 1.0 mg/dL   Troponin   Result Value Ref Range    Troponin 0.05 (H) <0.01 ng/mL   CK   Result Value Ref Range    Total  (H) 26 - 192 U/L   Protime-INR   Result Value Ref Range    Protime 15.3 (H) 11.7 - 14.5 sec    INR 1.21 (H) 0.87 - 1.14   PTT   Result Value Ref Range    aPTT 33.0 23.0 - 34.3 sec   Brain Natriuretic Peptide   Result Value Ref Range    Pro-BNP 8,905 (H) 0 - 449 pg/mL   Phosphorus   Result Value Ref Range    Phosphorus 2.4 (L) 2.5 - 4.9 mg/dL   Urinalysis with Reflex to Culture    Specimen: Urine   Result Value Ref Range    Color, UA Yellow Straw/Yellow    Clarity, UA CLOUDY (A) Clear    Glucose, Ur Negative Negative mg/dL    Bilirubin Urine Negative Negative    Ketones, Urine TRACE (A) Negative mg/dL    Specific Gravity, UA 1.025 1.005 - 1.030    Blood, Urine LARGE (A) Negative    pH, UA 6.0 5.0 - 8.0    Protein,  (A) Negative mg/dL    Urobilinogen, Urine 0.2 <2.0 E.U./dL    Nitrite, Urine POSITIVE (A) Negative    Leukocyte Esterase, Urine LARGE (A) Negative    Microscopic Examination YES     Urine Type Voided     Urine Reflex to Culture Yes    Troponin   Result Value Ref Range    Troponin 0.05 (H) <0.01 ng/mL   Lactic Acid   Result Value Ref Range    Lactic Acid 1.5 0.4 - 2.0 mmol/L   Blood gas, venous (Lab)   Result Value Ref Range    pH, Meet 7.360 7.350 - 7.450    pCO2, Meet 41.9 41.0 - 51.0 mmHg    pO2, Meet <30.0 25.0 - 40.0 mmHg    HCO3, Venous 23.7 (L) 24.0 - 28.0 mmol/L    Base Excess, Meet -1.8 -2.0 - 3.0 mmol/L    O2 Sat, Meet 24 Not established %    Carboxyhemoglobin 1.1 0.0 - 1.5 %    MetHgb, Meet <0.0 0.0 - 1.5 %    TC02 (Calc), Meet 25 mmol/L    Hemoglobin, Meet, Reduced 75.40 %   Microscopic Urinalysis   Result Value Ref Range    WBC, UA  (A) 0 - 5 /HPF    RBC, UA >100 (A) 0 - 4 /HPF    Epithelial Cells, UA 0-1 0 - 5 /HPF    Bacteria, UA 3+ (A) None Seen /HPF   EKG 12 Lead   Result Value Ref Range    Ventricular Rate 156 BPM    Atrial Rate 187 BPM    QRS Duration 68 ms    Q-T Interval 254 ms    QTc Calculation (Bazett) 409 ms    R Axis -14 degrees    T Axis 69 degrees    Diagnosis       EKG performed in ER and to be interpreted by ER physician. Confirmed by MD, ER ([id number]),  Debo Coker [phone]) on 11/24/2022 7:31:18 PM       RECENT VITALS:  BP: 128/84, Temp: 98.6 °F (37 °C), Heart Rate: (!) 117, Resp: 20, SpO2: 98 %     Procedures       ED Course     Nursing Notes, Past Medical Hx,Past Surgical Hx, Social Hx, Allergies, and Family Hx were reviewed.          The patient was given the following medications:  Orders Placed This Encounter   Medications    FOLLOWED BY Linked Order Group     dilTIAZem injection 10 mg     dilTIAZem 125 mg in dextrose 5 % 125 mL infusion      Order Specific Question:   Titrate Infusion? Answer:   Yes      Order Specific Question:   Initial Infusion Dose: Answer:   5 mg/hr      Order Specific Question:   Goal of Therapy is: Answer:   HR less than 100 bpm      Order Specific Question:   Contact Provider if:      Answer:   SBP less than 90 mmHg      Order Specific Question:   Contact Provider if:      Answer:   HR less than 60 bpm      Order Specific Question:   HOLD for      Answer:   SBP less than 90 mmHg      Order Specific Question:   HOLD for      Answer:   HR less than 60 bpm    DISCONTD: dilTIAZem 125 mg in dextrose 5 % 125 mL infusion     Order Specific Question:   Titrate Infusion? Answer:   No     Order Specific Question:   Infusion Dose: Answer:   2.5 mg/hr    morphine injection 4 mg    0.9 % sodium chloride bolus    iopamidol (ISOVUE-370) 76 % injection 80 mL    DISCONTD: heparin (porcine) injection 3,990 Units    DISCONTD: heparin (porcine) injection 3,990 Units    DISCONTD: heparin (porcine) injection 2,000 Units    DISCONTD: heparin 25,000 units in dextrose 5% 250 mL (premix) infusion    cefTRIAXone (ROCEPHIN) 1,000 mg in dextrose 5 % 50 mL IVPB mini-bag     Order Specific Question:   Antimicrobial Indications     Answer:   Urinary Tract Infection    aspirin tablet 325 mg       CONSULTS:  IP CONSULT TO HOSPITALIST    [address] [facility] / [facility] / Tashi Suarez is a 81 y.o. female presented with bilateral leg pain left greater than right that is been present for weeks. When patient arrived she is tachycardic and the monitor looks like atrial fibrillation. EKG was done which showed atrial fibrillation with RVR. According to son patient may have had an episode of A. fib when she was ill about 2 years ago. She is not anticoagulated.   She was on some type of anticoagulation with history of DVT but then had some hematuria so this was discontinued. She does have some ecchymosis and tenderness mostly to the left ankle and calcaneus. She is otherwise neurovascular intact of all extremities and able to move all extremities. She is alert and oriented and no signs of stroke. COVID and influenza's are negative. VBG is within normal limits. BMP shows a sodium of 129 with glucose of 132 a BUN of 57 and a creatinine 1.5. Patient's last creatinine was 0.8. Initial troponin was 0.05 with repeat 90-minute troponin of 0.05. Lactic acid was 1.5. LFTs were normal.  Total CK was 270. proBNP was 8905. Patient did have a white count of 14.7 with hemoglobin of 11.2. INR is 1.2. CT head shows no acute intracranial process with mild cerebral atrophy. CTPA shows no PE with moderate to large hiatal hernia. Trace right pleural effusion and bilateral lower lobe atelectasis. CT of the abdomen and pelvis shows severe bilateral hydroureteronephrosis and moderate urinary bladder distention. Patient has bilateral nephrolithiasis and age-indeterminate L1 compression fracture with gallbladder distention without evidence of wall thickening or gallstones. Moderate right inguinal hernia. No specific midline tenderness of lumbar spine. Because of patient's bladder distention and hydroureter nephrosis we did place a Burk catheter with 700 cc of urine output. Urine shows large blood with 100 protein positive nitrites and large leukocytes with  white blood cells and greater than 100 red blood cells with 3+ bacteria. Urine culture sent and pending. Patient was started on Rocephin IV for UTI. Patient's chest x-ray was negative for acute disease. Left ankle CT was also done which showed nondisplaced posterior calcaneal insufficiency fracture. Patient was placed in a posterior splint for this. She is neurovascular intact of the left lower extremity. She is moving all extremities. She was given morphine for pain.   She did have infiltration of the IV contrast for the CT of the left AC. She does have 2+ radial pulse of the left upper extremity. There is some minimal swelling to the left AC. She also had possible infiltration of Cardizem. This IV was removed and new IV was placed. Cool compress to the area. Patient will need pulse checks to the left upper extremity due to the IV infiltration. Patient will also need Ortho consult for her calcaneal fracture. She does have elevated troponins which could be NSTEMI versus renal syndrome. Patient has atrial fibrillation on EKG and no acute ST elevation. Patient is currently on Cardizem drip. I had initially ordered heparin but after speaking to the hospitalist she states we can just do Eliquis at this time. I did speak to the hospitalist to get the patient admitted. Patient will also be placed in a posterior splint of the left lower extremity prior to admission. Patient did have infiltration of IV contrast and saline from the left upper extremity IV. She is neurovascular intact left upper extremity. Minimal swelling around Fonnie Running joint    Patient's son is  Serena Phil Campbell [phone]    This patient was also evaluated by the attending physician. All care plans were discussed and agreed upon. Clinical Impression     1. Atrial fibrillation with RVR (HCC)    2. Closed nondisplaced fracture of left calcaneus, unspecified portion of calcaneus, initial encounter    3. Urinary tract infection without hematuria, site unspecified    4. General weakness    5. Elevated troponin    6. RONNY (acute kidney injury) ([facility] [address]    7. Urinary retention    8. Intravenous infiltration, initial encounter        Disposition     PATIENT REFERRED TO:  No follow-up provider specified.     DISCHARGE MEDICATIONS:  New Prescriptions    No medications on file       DISPOSITION Decision To Admit 11/24/2022 11:06:29 [address] [place] [address], [facility] Jennifer Leon  11/25/22 [id number]

## 2022-11-25 PROBLEM — E87.20 METABOLIC ACIDOSIS: Status: ACTIVE | Noted: 2022-11-25

## 2022-11-25 PROBLEM — G93.41 ACUTE METABOLIC ENCEPHALOPATHY: Status: ACTIVE | Noted: 2022-11-25

## 2022-11-25 PROBLEM — N39.0 UTI (URINARY TRACT INFECTION): Status: ACTIVE | Noted: 2022-11-25

## 2022-11-25 PROBLEM — G31.84 MILD COGNITIVE IMPAIRMENT: Status: ACTIVE | Noted: 2022-11-25

## 2022-11-25 PROBLEM — E87.1 HYPONATREMIA: Status: ACTIVE | Noted: 2022-11-25

## 2022-11-25 LAB
ANION GAP SERPL CALCULATED.3IONS-SCNC: 9 MMOL/L (ref 3–16)
ANTI-XA UNFRAC HEPARIN: >1.1 IU/ML (ref 0.3–0.7)
APTT: 171 SEC (ref 23–34.3)
APTT: 59.3 SEC (ref 23–34.3)
BUN BLDV-MCNC: 44 MG/DL (ref 7–20)
CALCIUM SERPL-MCNC: 8.2 MG/DL (ref 8.3–10.6)
CHLORIDE BLD-SCNC: 103 MMOL/L (ref 99–110)
CO2: 21 MMOL/L (ref 21–32)
CREAT SERPL-MCNC: 1 MG/DL (ref 0.6–1.2)
CREATININE URINE: 38.5 MG/DL (ref 28–259)
CREATININE URINE: 39.5 MG/DL (ref 28–259)
FERRITIN: 231 NG/ML (ref 15–150)
FOLATE: 13.16 NG/ML (ref 4.78–24.2)
GFR SERPL CREATININE-BSD FRML MDRD: 55 ML/MIN/{1.73_M2}
GLUCOSE BLD-MCNC: 131 MG/DL (ref 70–99)
OSMOLALITY URINE: 417 MOSM/KG (ref 390–1070)
OSMOLALITY: 300 MOSM/KG (ref 278–305)
POTASSIUM SERPL-SCNC: 4.2 MMOL/L (ref 3.5–5.1)
PROTEIN PROTEIN: 185 MG/DL
PROTEIN/CREAT RATIO: 4.8 MG/DL
REASON FOR REJECTION: NORMAL
REJECTED TEST: NORMAL
SODIUM BLD-SCNC: 133 MMOL/L (ref 136–145)
SODIUM URINE: 38 MMOL/L
TROPONIN: 0.03 NG/ML
TSH REFLEX: 0.34 UIU/ML (ref 0.27–4.2)
VITAMIN B-12: 823 PG/ML (ref 211–911)

## 2022-11-25 PROCEDURE — 85730 THROMBOPLASTIN TIME PARTIAL: CPT

## 2022-11-25 PROCEDURE — 36415 COLL VENOUS BLD VENIPUNCTURE: CPT

## 2022-11-25 PROCEDURE — 82746 ASSAY OF FOLIC ACID SERUM: CPT

## 2022-11-25 PROCEDURE — 6370000000 HC RX 637 (ALT 250 FOR IP): Performed by: PHYSICIAN ASSISTANT

## 2022-11-25 PROCEDURE — 2060000000 HC ICU INTERMEDIATE R&B

## 2022-11-25 PROCEDURE — 99223 1ST HOSP IP/OBS HIGH 75: CPT | Performed by: INTERNAL MEDICINE

## 2022-11-25 PROCEDURE — 2580000003 HC RX 258

## 2022-11-25 PROCEDURE — 6370000000 HC RX 637 (ALT 250 FOR IP): Performed by: INTERNAL MEDICINE

## 2022-11-25 PROCEDURE — 6370000000 HC RX 637 (ALT 250 FOR IP)

## 2022-11-25 PROCEDURE — 85520 HEPARIN ASSAY: CPT

## 2022-11-25 PROCEDURE — 84300 ASSAY OF URINE SODIUM: CPT

## 2022-11-25 PROCEDURE — 82607 VITAMIN B-12: CPT

## 2022-11-25 PROCEDURE — 83935 ASSAY OF URINE OSMOLALITY: CPT

## 2022-11-25 PROCEDURE — 82570 ASSAY OF URINE CREATININE: CPT

## 2022-11-25 PROCEDURE — 84156 ASSAY OF PROTEIN URINE: CPT

## 2022-11-25 PROCEDURE — 6360000002 HC RX W HCPCS

## 2022-11-25 PROCEDURE — 80048 BASIC METABOLIC PNL TOTAL CA: CPT

## 2022-11-25 PROCEDURE — 2580000003 HC RX 258: Performed by: PHYSICIAN ASSISTANT

## 2022-11-25 PROCEDURE — 84484 ASSAY OF TROPONIN QUANT: CPT

## 2022-11-25 PROCEDURE — 83930 ASSAY OF BLOOD OSMOLALITY: CPT

## 2022-11-25 PROCEDURE — 82728 ASSAY OF FERRITIN: CPT

## 2022-11-25 PROCEDURE — 51702 INSERT TEMP BLADDER CATH: CPT

## 2022-11-25 RX ORDER — SODIUM CHLORIDE 9 MG/ML
INJECTION, SOLUTION INTRAVENOUS CONTINUOUS
Status: DISCONTINUED | OUTPATIENT
Start: 2022-11-25 | End: 2022-11-25

## 2022-11-25 RX ORDER — ACETAMINOPHEN 650 MG/1
650 SUPPOSITORY RECTAL EVERY 6 HOURS PRN
Status: DISCONTINUED | OUTPATIENT
Start: 2022-11-25 | End: 2022-12-01 | Stop reason: HOSPADM

## 2022-11-25 RX ORDER — DIPHENOXYLATE HYDROCHLORIDE AND ATROPINE SULFATE 2.5; .025 MG/1; MG/1
1 TABLET ORAL 4 TIMES DAILY
Status: DISCONTINUED | OUTPATIENT
Start: 2022-11-25 | End: 2022-12-01 | Stop reason: HOSPADM

## 2022-11-25 RX ORDER — 0.9 % SODIUM CHLORIDE 0.9 %
1000 INTRAVENOUS SOLUTION INTRAVENOUS ONCE
Status: COMPLETED | OUTPATIENT
Start: 2022-11-25 | End: 2022-11-25

## 2022-11-25 RX ORDER — SODIUM CHLORIDE 9 MG/ML
INJECTION, SOLUTION INTRAVENOUS PRN
Status: DISCONTINUED | OUTPATIENT
Start: 2022-11-25 | End: 2022-12-01 | Stop reason: HOSPADM

## 2022-11-25 RX ORDER — SODIUM CHLORIDE 9 MG/ML
INJECTION, SOLUTION INTRAVENOUS CONTINUOUS
Status: ACTIVE | OUTPATIENT
Start: 2022-11-25 | End: 2022-11-26

## 2022-11-25 RX ORDER — ONDANSETRON 2 MG/ML
4 INJECTION INTRAMUSCULAR; INTRAVENOUS EVERY 6 HOURS PRN
Status: DISCONTINUED | OUTPATIENT
Start: 2022-11-25 | End: 2022-12-01 | Stop reason: HOSPADM

## 2022-11-25 RX ORDER — HEPARIN SODIUM 1000 [USP'U]/ML
30 INJECTION, SOLUTION INTRAVENOUS; SUBCUTANEOUS PRN
Status: DISCONTINUED | OUTPATIENT
Start: 2022-11-25 | End: 2022-11-27

## 2022-11-25 RX ORDER — POLYETHYLENE GLYCOL 3350 17 G/17G
17 POWDER, FOR SOLUTION ORAL DAILY PRN
Status: DISCONTINUED | OUTPATIENT
Start: 2022-11-25 | End: 2022-12-01 | Stop reason: HOSPADM

## 2022-11-25 RX ORDER — ACETAMINOPHEN 325 MG/1
650 TABLET ORAL EVERY 6 HOURS PRN
Status: DISCONTINUED | OUTPATIENT
Start: 2022-11-25 | End: 2022-12-01 | Stop reason: HOSPADM

## 2022-11-25 RX ORDER — TROSPIUM CHLORIDE 20 MG/1
20 TABLET, FILM COATED ORAL NIGHTLY
Status: DISCONTINUED | OUTPATIENT
Start: 2022-11-25 | End: 2022-12-01 | Stop reason: HOSPADM

## 2022-11-25 RX ORDER — ONDANSETRON 4 MG/1
4 TABLET, ORALLY DISINTEGRATING ORAL EVERY 8 HOURS PRN
Status: DISCONTINUED | OUTPATIENT
Start: 2022-11-25 | End: 2022-12-01 | Stop reason: HOSPADM

## 2022-11-25 RX ORDER — HEPARIN SODIUM 10000 [USP'U]/100ML
5-30 INJECTION, SOLUTION INTRAVENOUS CONTINUOUS
Status: DISCONTINUED | OUTPATIENT
Start: 2022-11-25 | End: 2022-11-27

## 2022-11-25 RX ORDER — LIDOCAINE 4 G/G
2 PATCH TOPICAL DAILY
Status: DISCONTINUED | OUTPATIENT
Start: 2022-11-25 | End: 2022-12-01 | Stop reason: HOSPADM

## 2022-11-25 RX ORDER — HEPARIN SODIUM 1000 [USP'U]/ML
60 INJECTION, SOLUTION INTRAVENOUS; SUBCUTANEOUS PRN
Status: DISCONTINUED | OUTPATIENT
Start: 2022-11-25 | End: 2022-11-27

## 2022-11-25 RX ORDER — SODIUM CHLORIDE 0.9 % (FLUSH) 0.9 %
5-40 SYRINGE (ML) INJECTION EVERY 12 HOURS SCHEDULED
Status: DISCONTINUED | OUTPATIENT
Start: 2022-11-25 | End: 2022-12-01 | Stop reason: HOSPADM

## 2022-11-25 RX ORDER — SODIUM CHLORIDE 0.9 % (FLUSH) 0.9 %
5-40 SYRINGE (ML) INJECTION PRN
Status: DISCONTINUED | OUTPATIENT
Start: 2022-11-25 | End: 2022-12-01 | Stop reason: HOSPADM

## 2022-11-25 RX ORDER — TIZANIDINE 4 MG/1
2 TABLET ORAL 3 TIMES DAILY PRN
Status: DISCONTINUED | OUTPATIENT
Start: 2022-11-25 | End: 2022-12-01 | Stop reason: HOSPADM

## 2022-11-25 RX ORDER — HEPARIN SODIUM 1000 [USP'U]/ML
60 INJECTION, SOLUTION INTRAVENOUS; SUBCUTANEOUS ONCE
Status: COMPLETED | OUTPATIENT
Start: 2022-11-25 | End: 2022-11-25

## 2022-11-25 RX ORDER — 0.9 % SODIUM CHLORIDE 0.9 %
500 INTRAVENOUS SOLUTION INTRAVENOUS ONCE
Status: COMPLETED | OUTPATIENT
Start: 2022-11-25 | End: 2022-11-25

## 2022-11-25 RX ADMIN — SODIUM CHLORIDE, PRESERVATIVE FREE 10 ML: 5 INJECTION INTRAVENOUS at 21:27

## 2022-11-25 RX ADMIN — TIZANIDINE 2 MG: 4 TABLET ORAL at 21:26

## 2022-11-25 RX ADMIN — SODIUM CHLORIDE 1000 ML: 9 INJECTION, SOLUTION INTRAVENOUS at 11:00

## 2022-11-25 RX ADMIN — DILTIAZEM HYDROCHLORIDE 30 MG: 30 TABLET, FILM COATED ORAL at 12:23

## 2022-11-25 RX ADMIN — HEPARIN SODIUM 2990 UNITS: 1000 INJECTION INTRAVENOUS; SUBCUTANEOUS at 09:47

## 2022-11-25 RX ADMIN — HEPARIN SODIUM 1500 UNITS: 1000 INJECTION INTRAVENOUS; SUBCUTANEOUS at 18:29

## 2022-11-25 RX ADMIN — ACETAMINOPHEN 650 MG: 325 TABLET ORAL at 13:57

## 2022-11-25 RX ADMIN — SODIUM CHLORIDE, PRESERVATIVE FREE 10 ML: 5 INJECTION INTRAVENOUS at 09:58

## 2022-11-25 RX ADMIN — HEPARIN SODIUM 12 UNITS/KG/HR: 10000 INJECTION, SOLUTION INTRAVENOUS at 09:56

## 2022-11-25 RX ADMIN — ACETAMINOPHEN 650 MG: 325 TABLET ORAL at 08:00

## 2022-11-25 RX ADMIN — TROSPIUM CHLORIDE 20 MG: 20 TABLET, FILM COATED ORAL at 21:26

## 2022-11-25 RX ADMIN — APIXABAN 2.5 MG: 5 TABLET, FILM COATED ORAL at 00:53

## 2022-11-25 RX ADMIN — SODIUM CHLORIDE 500 ML: 9 INJECTION, SOLUTION INTRAVENOUS at 06:09

## 2022-11-25 RX ADMIN — ACETAMINOPHEN 650 MG: 325 TABLET ORAL at 21:26

## 2022-11-25 ASSESSMENT — PAIN DESCRIPTION - ONSET
ONSET: ON-GOING

## 2022-11-25 ASSESSMENT — PAIN DESCRIPTION - LOCATION
LOCATION: GENERALIZED
LOCATION: LEG

## 2022-11-25 ASSESSMENT — PAIN SCALES - GENERAL
PAINLEVEL_OUTOF10: 0
PAINLEVEL_OUTOF10: 0
PAINLEVEL_OUTOF10: 9
PAINLEVEL_OUTOF10: 6
PAINLEVEL_OUTOF10: 0
PAINLEVEL_OUTOF10: 8
PAINLEVEL_OUTOF10: 10
PAINLEVEL_OUTOF10: 0
PAINLEVEL_OUTOF10: 8
PAINLEVEL_OUTOF10: 7
PAINLEVEL_OUTOF10: 8
PAINLEVEL_OUTOF10: 0
PAINLEVEL_OUTOF10: 7
PAINLEVEL_OUTOF10: 8

## 2022-11-25 ASSESSMENT — PAIN DESCRIPTION - ORIENTATION
ORIENTATION: LEFT
ORIENTATION: RIGHT;LEFT
ORIENTATION: LEFT
ORIENTATION: LEFT

## 2022-11-25 ASSESSMENT — ENCOUNTER SYMPTOMS
SINUS PAIN: 0
COLOR CHANGE: 0
BACK PAIN: 0
APNEA: 0
COUGH: 0
ABDOMINAL PAIN: 1
SHORTNESS OF BREATH: 0
EYE DISCHARGE: 0

## 2022-11-25 ASSESSMENT — PAIN DESCRIPTION - PAIN TYPE
TYPE: ACUTE PAIN

## 2022-11-25 ASSESSMENT — PAIN SCALES - WONG BAKER
WONGBAKER_NUMERICALRESPONSE: 0

## 2022-11-25 ASSESSMENT — PAIN DESCRIPTION - FREQUENCY
FREQUENCY: CONTINUOUS
FREQUENCY: CONTINUOUS
FREQUENCY: INTERMITTENT
FREQUENCY: CONTINUOUS
FREQUENCY: CONTINUOUS

## 2022-11-25 ASSESSMENT — PAIN DESCRIPTION - DESCRIPTORS
DESCRIPTORS: ACHING
DESCRIPTORS: STABBING
DESCRIPTORS: ACHING;DISCOMFORT
DESCRIPTORS: ACHING
DESCRIPTORS: ACHING

## 2022-11-25 NOTE — PROGRESS NOTES
Pt admitted from Ed. Connected to telemetry monitoring. Vital signs and head-to-toe assessment performed. Oriented to room, call-light, routine of nurse+physician rounding, frequency of VS/assessments, meal-times, lab-draws, and medication administrations. Redness under bilat breast, angelica area, and coccyx area. Scattered bruising also noted    4 Eyes Admission Assessment     I agree as the admission nurse that 2 RN's have performed a thorough Head to Toe Skin Assessment on the patient. ALL assessment sites listed below have been assessed on admission. Areas assessed by both nurses:   [x]   Head, Face, and Ears   [x]   Shoulders, Back, and Chest  [x]   Arms, Elbows, and Hands   [x]   Coccyx, Sacrum, and Ischium  [x]   Legs, Feet, and Heels        Does the Patient have Skin Breakdown?   No         Pelon Prevention initiated:  Yes   Wound Care Orders initiated:  No      [facility] nurse consulted for Pressure Injury (Stage 3,4, Unstageable, DTI, NWPT, and Complex wounds) or Pelon score 18 or lower:  No      Nurse 1 eSignature: Electronically signed by Fadi Salazar RN on 11/25/22 at 7:12 AM EST    **SHARE this note so that the co-signing nurse is able to place an eSignature**    Nurse 2 eSignature: Electronically signed by Fadi Salazar RN on 11/25/22 at 7:12 AM EST

## 2022-11-25 NOTE — PROGRESS NOTES
Progress Note    Patient was seen and  examined at bedside   80 yo F was admitted due to [address] [place] [address] 2/2 to possible UTI and RONNY , also develop a fib with RVR which is new onset as per patient , the patient denies CP or SOB, alert and awake , on cardizem and heparin drip, consult cardiologist .   On telemetry   On iv AB for UTI  Follow urine cx   Augment BP with fluid               Admit Date: 11/24/2022  Day: 1  Diet: ADULT DIET; Regular    Interval history:     Pt seen at bedside, reports B/L LE pain, left worse then right. Reports its tolerable if immobile. Reports decreased appetite but is trying to eat, but is drink fluids well. Denies N/V/D, denies CP, SOB, abd pain, H/A.      BP low this AM 80s/50s - will give NS bolus, likely in setting of dilt gtt. -120s, otherwise VSS. Awaiting morning labs, trops downtrended. On Dilt gtt and Heparin gtt. HPI:  This is a 61-year-old female PMHx IBS, A. fib not on anticoagulation, DVT, osteoarthritis, gout, who had presented to the ED due to complaint of left leg pain. The history was obtained by the patient who is from a senior living facility and is a poor historian. As per the patient, last week she had felt a snap on her left leg that had started causing her a lot of pain. She complains of some fatigue lightheadedness and lower abdominal pain with difficulty urinating. Patient states that she has constant urge to urinate however has decreased urine output for the past couple of weeks. She denies any fever, chills, nausea, vomiting, flank pain, diarrhea, constipation, loss of consciousness, hematemesis, hematuria or hematochezia. The patient has recently become dependent 24/7. She was independent a week ago. Chart review states that the son had shown concern for possible trauma to her leg with patient denies any recent trauma. She denies any changes in appetite decreased p.o. intake or significant weight loss.   The patient's son states that she has had significant decrease in her p.o. intake since the past couple of days. According to him her last known well was on Saturday, 11/19/2022 1 his brother had visited their mom. Patient is currently ambulating via wheelchair. Of note, patient was seen on 11/22/2022 at an orthopedic clinic for concern of left leg pain at [facility]. She had x-ray of her left lower extremity done which at that time did not show any fracture. She was diagnosed with sprain of her anterior talofibular ligament of left ankle. She was given a walking boot     In the ED, the patient complained of bilateral leg pain greater on left than right. Her vitals were significant for tachycardia and EKG was significant for A. fib with RVR. Her physical exam was significant for some ecchymosis and tenderness of the left ankle and calcaneus, had normal range of motion. She was AOx3. Labs were significant for sodium of 129, bicarb 19, BUN 57, creatinine 1.5 baseline -0.8, CK2 78, proBNP 8905. White blood cell 14.7 hemoglobin 11.2, UA significant for positive leukocyte esterase and positive nitrites. CT PE was negative for pulmonary embolism. CTA abdomen was significant for urinary retention. Troponins were elevated 0.05x2 CT of ankle was significant for left calcaneus fracture. Patient was started on diltiazem drip in the ED along with morphine injection for pain, normal saline bolus, ceftriaxone. She also had a Burk's placed while in the ED.          Medications:     Scheduled Meds:   [Held by provider] diphenoxylate-atropine  1 tablet Oral 4x Daily    trospium  20 mg Oral Nightly    sodium chloride flush  5-40 mL IntraVENous 2 times per day    cefTRIAXone (ROCEPHIN) IV  1,000 mg IntraVENous Q24H    lidocaine  2 patch TransDERmal Daily    sodium chloride  1,000 mL IntraVENous Once     Continuous Infusions:   sodium chloride      heparin (PORCINE) Infusion 12 Units/kg/hr (11/25/22 0956)    [Held by provider] sodium chloride dilTIAZem       PRN Meds:tiZANidine, sodium chloride flush, sodium chloride, ondansetron **OR** ondansetron, polyethylene glycol, acetaminophen **OR** acetaminophen, heparin (porcine), heparin (porcine)    Objective:   Vitals:   T-max:  Patient Vitals for the past 8 hrs:   BP Temp Temp src Pulse Resp SpO2 Height Weight   11/25/22 0924 (!) 81/51 98.1 °F (36.7 °C) Oral (!) 111 18 94 % 5' 2\" (1.575 m) 122 lb 5.7 oz (55.5 kg)   11/25/22 0243 103/62 97.6 °F (36.4 °C) Oral (!) 122 20 99 % -- --       Intake/Output Summary (Last 24 hours) at 11/25/2022 1036  Last data filed at 11/25/2022 0600  Gross per 24 hour   Intake 798.56 ml   Output 1050 ml   Net -251.44 ml       Physical Exam  Constitutional:       General: She is not in acute distress. Appearance: She is not ill-appearing. HENT:      Head: Normocephalic and atraumatic. Eyes:      Extraocular Movements: Extraocular movements intact. Pupils: Pupils are equal, round, and reactive to light. Cardiovascular:      Rate and Rhythm: Tachycardia present. Rhythm irregular. Pulses: Normal pulses. Heart sounds: Normal heart sounds. Pulmonary:      Effort: Pulmonary effort is normal. No respiratory distress. Breath sounds: Normal breath sounds. No wheezing. Abdominal:      Palpations: Abdomen is soft. Tenderness: There is no abdominal tenderness. Musculoskeletal:         General: Signs of injury present. Cervical back: Normal range of motion. Right lower leg: No edema. Left lower leg: No edema. Comments: LLE tenderness, in ACE wrap   Skin:     General: Skin is warm and dry. Neurological:      General: No focal deficit present. Mental Status: She is alert and oriented to person, place, and time.       Comments: Motor strength UE 5/5, RLE 4/5, LLE not tested d/t pain         LABS:    CBC:   Recent Labs     11/24/22  1903   WBC 14.7*   HGB 11.2*   HCT 34.4*      MCV 99.2     Renal:    Recent Labs 11/24/22 1904   *   K 4.8   CL 98*   CO2 19*   BUN 57*   CREATININE 1.5*   GLUCOSE 132*   CALCIUM 8.4   PHOS 2.4*   ANIONGAP 12     Hepatic:   Recent Labs     11/24/22 1904   *   ALT 37   BILITOT 0.4   BILIDIR <0.2   PROT 6.4   LABALBU 2.7*   ALKPHOS 83     Troponin:   Recent Labs     11/24/22 1904 11/24/22 2118 11/25/22  0745   TROPONINI 0.05* 0.05* 0.03*     BNP: No results for input(s): BNP in the last 72 hours. Lipids: No results for input(s): CHOL, HDL in the last 72 hours. Invalid input(s): LDLCALCU, TRIGLYCERIDE  ABGs:  No results for input(s): PHART, BAX7UKY, PO2ART, CXG6FFG, BEART, THGBART, R1CUFZWK, ZBN7ISY in the last 72 hours. INR:   Recent Labs     11/24/22 1903   INR 1.21*     Lactate: No results for input(s): LACTATE in the last 72 hours. Cultures:  -----------------------------------------------------------------  RAD:   CT HEAD WO CONTRAST   Final Result      1. No acute intracranial process. 2.  Moderate cerebral atrophy and mild chronic small vessel ischemic disease. CT CHEST PULMONARY EMBOLISM W CONTRAST   Final Result      Chest:   1. No evidence of pulmonary embolism. 2.  Moderate to large hiatal hernia. 3.  Trace right pleural effusion and mild bilateral lower lobe atelectasis. Abdomen and pelvis:   1. Severe bilateral hydroureteronephrosis and moderate urinary bladder distention. Recommend correlation for bladder outlet obstruction versus urinary retention. 2.  Bilateral nephrolithiasis and 2 small nonobstructing calculi within the urinary bladder. 3.  Age-indeterminate L1 compression fracture with 30% height loss. 4.  Gallbladder distention without evidence of wall thickening or gallstones may represent gallbladder hydrops. Mild intrahepatic biliary ductal dilatation without significant common bile duct dilatation. 5.  Moderate right inguinal hernia containing nondilated small bowel.       CT ANKLE LEFT WO CONTRAST   Final Result Nondisplaced posterior calcaneal insufficiency fracture. CT ABDOMEN PELVIS W IV CONTRAST Additional Contrast? None   Final Result      Chest:   1. No evidence of pulmonary embolism. 2.  Moderate to large hiatal hernia. 3.  Trace right pleural effusion and mild bilateral lower lobe atelectasis. Abdomen and pelvis:   1. Severe bilateral hydroureteronephrosis and moderate urinary bladder distention. Recommend correlation for bladder outlet obstruction versus urinary retention. 2.  Bilateral nephrolithiasis and 2 small nonobstructing calculi within the urinary bladder. 3.  Age-indeterminate L1 compression fracture with 30% height loss. 4.  Gallbladder distention without evidence of wall thickening or gallstones may represent gallbladder hydrops. Mild intrahepatic biliary ductal dilatation without significant common bile duct dilatation. 5.  Moderate right inguinal hernia containing nondilated small bowel. XR CHEST PORTABLE   Final Result      No acute pulmonary disease. Moderate hiatal hernia. Assessment/Plan:       Acute metabolic encephalopathy 2/2 complicated UTI and urinary retention (AMS improving)  Patient appears slightly confused and in acute distress on presentation. Patient endorses lower abdominal pain with complaint of increased frequency, leukocytosis white blood cell 14.7, UA significant for urine nitrites and leukocyte Estrace. CT abdomen significant for severe bilateral hydronephrosis with moderate urinary bladder distention  -On ceftriaxone  -Burk in place  -Monitoring I's and O's  -f/u Urine Culture  -Urology consulted     A. fib with RVR  Patient has history of A. fib in the past however was not on any anticoagulation EKG in the ED was significant for A. fib with RVR. Patient is hemodynamically stable pulse: 123, possible proBNP elevation acute distress, heart due to A.  Fib Echo:01/21/2020: 55-60%, normal LV, normal DD fxn, mild-mod MR/TR, normal LA/RA size  -On heparin drip  -On diltiazem drip  -monitor tele - still in RVR 120s this AM  - Cardiology consulted, appreciate recs     RONNY, likely prerenal 2/2 hypovolemia  Patient appears altered, concerns for decreased p.o. intake.   Creatinine on presentation 1.5 last creatinine in 2020: 0.8, BUN to creatinine ratio 38:1  -Ordered urine creatinine, urine sodium to calculate Fena  -Monitoring I/O's for now  -Avoid nephrotoxic agents  -Ordered urine protein/creatinine  -Got contrast yesterday - will cont fluids for now     Hyponatremia  Patient's sodium on admission 129, possible due to decreased p.o. intake  -Ordered urine osmolality, serum osmolality, urine sodium  -We will continue monitoring sodium every 12 hourly  -We will give IV fluids as needed     Troponinemia 2/2 possible demand ischemia (resolving)  Patient is currently asymptomatic, troponin times two 0.05  -f/u Trops - decr to 0.03 - stop trending     Left calcaneal fracture  Patient presented with complaint of left leg pain, CT of the ankle showed left calcaneal fracture - Nondisplaced.  -Pain control for now     Normocytic anemia  Hemoglobin: 14.7, MCV 99.2 MCHC 32.5  -f/u iron studies  - f/u B12/Folate     Chronic medical problems  IBS  -On Lomotil - hold given no BM recently     Chronic back pain  -On tizanidine     Mixed incontinence  -On mirabegron-dose adjusted as per creatinine clearance    Code Status: Limited x4  FEN: Regular diet  PPX: heparin gtt  DISPO: Brittaney Wells MD, PGY-1  11/25/22  10:36 AM    This patient has been staffed and discussed with Val Galarza MD.

## 2022-11-25 NOTE — PLAN OF CARE
Problem: Discharge Planning  Goal: Discharge to home or other facility with appropriate resources  Outcome: Progressing  Flowsheets (Taken 11/25/2022 0243)  Discharge to home or other facility with appropriate resources:   Identify barriers to discharge with patient and caregiver   Arrange for needed discharge resources and transportation as appropriate   Identify discharge learning needs (meds, wound care, etc)   Arrange for interpreters to assist at discharge as needed   Refer to discharge planning if patient needs post-hospital services based on physician order or complex needs related to functional status, cognitive ability or social support system     Problem: Pain  Goal: Verbalizes/displays adequate comfort level or baseline comfort level  Outcome: Progressing  Flowsheets (Taken 11/25/2022 0243)  Verbalizes/displays adequate comfort level or baseline comfort level:   Encourage patient to monitor pain and request assistance   Assess pain using appropriate pain scale   Administer analgesics based on type and severity of pain and evaluate response   Implement non-pharmacological measures as appropriate and evaluate response   Consider cultural and social influences on pain and pain management   Notify Licensed Independent Practitioner if interventions unsuccessful or patient reports new pain

## 2022-11-25 NOTE — ED PROVIDER NOTES
ED Attending Attestation Note     Date of evaluation: 11/24/2022    This patient was seen by the advance practice provider. I have seen and examined the patient, agree with the workup, evaluation, management and diagnosis. The care plan has been discussed. I have reviewed the ECG and concur with the DEONTE's interpretation. My assessment reveals patient with bilateral leg pain, no swelling. Found to be in a fib with RVR. Started on diltiazem. Also found to have RONNY, UTI with urinary retention, elevated troponin, but no PE.  CT of foot revealed calcaneal fracture which explains the bruising on the patient's left foot. Splint placed and patient will be admitted to medicine for further management. Critical Care:  Due to the immediate potential for life-threatening deterioration due to rapid new onset a fib with elevated troponin and several other incidental findings including UTI and RONNY along with urinary retention requiring catheter and IV abx, I spent 30 minutes providing critical care. This time excludes time spent performing procedures but includes time spent on direct patient care, history retrieval, review of the chart, and discussions with patient, family, and consultant(s).        Vida Payton MD  11/24/22 0896

## 2022-11-25 NOTE — H&P
Internal Medicine H&P    Date: 2022   Patient: Aixa Horne   Patient : 1935     CC: Leg Pain       Subjective     HPI: This is a 67-year-old female PMHx IBS, A. fib not on anticoagulation, DVT, osteoarthritis, gout, who had presented to the ED due to complaint of left leg pain. The history was obtained by the patient who is from a senior living facility and is a poor historian. As per the patient, last week she had felt a snap on her left leg that had started causing her a lot of pain. She complains of some fatigue lightheadedness and lower abdominal pain with difficulty urinating. Patient states that she has constant urge to urinate however has decreased urine output for the past couple of weeks. She denies any fever, chills, nausea, vomiting, flank pain, diarrhea, constipation, loss of consciousness, hematemesis, hematuria or hematochezia. The patient has recently become dependent . She was independent a week ago. Chart review states that the son had shown concern for possible trauma to her leg with patient denies any recent trauma. She denies any changes in appetite decreased p.o. intake or significant weight loss. The patient's son states that she has had significant decrease in her p.o. intake since the past couple of days. According to him her last known well was on Saturday, 2022 1 his brother had visited their mom. Patient is currently ambulating via wheelchair. Of note, patient was seen on 2022 at an orthopedic clinic for concern of left leg pain at . She had x-ray of her left lower extremity done which at that time did not show any fracture. She was diagnosed with sprain of her anterior talofibular ligament of left ankle. She was given a walking boot    In the ED, the patient complained of bilateral leg pain greater on left than right. Her vitals were significant for tachycardia and EKG was significant for A. fib with RVR. Her physical exam was significant for some ecchymosis and tenderness of the left ankle and calcaneus, had normal range of motion. She was AOx3. Labs were significant for sodium of 129, bicarb 19, BUN 57, creatinine 1.5 baseline -0.8, CK2 78, proBNP 8905. White blood cell 14.7 hemoglobin 11.2, UA significant for positive leukocyte esterase and positive nitrites. CT PE was negative for pulmonary embolism. CTA abdomen was significant for urinary retention. Troponins were elevated 0.05x2 CT of ankle was significant for left calcaneus fracture. Patient was started on diltiazem drip in the ED along with morphine injection for pain, normal saline bolus, ceftriaxone. She also had a Burk's placed while in the ED. PMHx:      Diagnosis Date    Acute deep vein thrombosis (DVT) of left lower extremity after procedure ([facility] [address] 10/03/2019    Post hip rx repair    Acute renal failure (ARF) ([facility] [address] 01/17/2020    Anterior corneal dystrophy     Asymptomatic postmenopausal status (age-related) (natural)     Cervical polyp     Dry eye syndrome     Edema     Fuch's endothelial dystrophy     Gout     Hip fracture requiring operative repair, left, closed, initial encounter ([facility] [address] 09/17/2019    IBS (irritable bowel syndrome)     Mild cognitive impairment     Osteoarthritis     Osteopenia     Papilloma of breast 05/2013    Pedal edema 05/08/2017    Screening mammogram     Symptomatic menopausal or female climacteric states 11/04/2013    Urinary retention 09/21/2019    Vaginal candidiasis        PSurgHx:      Procedure Laterality Date    APPENDECTOMY      EYE SURGERY      Cataract Surgery    HIP PINNING Left 9/18/2019    LEFT HIP GAMMA NAIL performed by Bessy Coyle MD at [facility] OR        Medication:  Medications Prior to Admission: diphenoxylate-atropine (LOMOTIL) 2.5-0.025 MG per tablet, Take 1 tablet by mouth 4 times daily for 180 days.   mirabegron (MYRBETRIQ) 50 MG TB24, Take 50 mg by mouth daily  tiZANidine (ZANAFLEX) 2 MG tablet, Take 1 tablet by mouth 3 times daily as needed (muscle spasm)  calcium citrate-vitamin D (CALCIUM CITRATE + D3) 315-250 MG-UNIT TABS per tablet, Take 2 tablets by mouth 2 times daily (with meals)  diclofenac sodium (VOLTAREN) 1 % GEL, Apply 4 g topically 4 times daily  acetaminophen (TYLENOL) 500 MG tablet, Take 1 tablet by mouth 4 times daily as needed for Pain  alendronate (FOSAMAX) 70 MG tablet, Take 1 tablet by mouth every 7 days Take with 8 ounces water, do not lie down for 30 minutes after taking it    Allergies:  Amoxapine and related, Lisinopril, and Clindamycin/lincomycin    SocHx:  Social History     Socioeconomic History    Marital status:      Spouse name: None    Number of children: None    Years of education: None    Highest education level: None   Tobacco Use    Smoking status: Never    Smokeless tobacco: Never   Substance and Sexual Activity    Alcohol use: No    Drug use: No    Sexual activity: Not Currently     Social Determinants of Health     Financial Resource Strain: Low Risk     Difficulty of Paying Living Expenses: Not hard at all   Food Insecurity: No Food Insecurity    Worried About Running Out of Food in the Last Year: Never true    Ran Out of Food in the Last Year: Never true        FHx:      Problem Relation Age of Onset    Breast Cancer Daughter         BRAC 2    Ovarian Cancer Daughter        ROS:  Review of Systems   Constitutional:  Positive for fatigue. Negative for activity change, appetite change and fever. HENT:  Negative for sinus pain, sneezing and tinnitus. Eyes:  Negative for discharge. Respiratory:  Negative for apnea, cough and shortness of breath. Gastrointestinal:  Positive for abdominal pain. Genitourinary:  Positive for decreased urine volume and urgency. Negative for dysuria. Musculoskeletal:  Negative for arthralgias, back pain, gait problem, joint swelling and myalgias. Skin:  Positive for pallor. Negative for color change. Psychiatric/Behavioral:  Positive for confusion. Negative for agitation. Objective     Vital Signs:  Patient Vitals for the past 8 hrs:   BP Temp Temp src Pulse Resp SpO2   11/25/22 0243 103/62 97.6 °F (36.4 °C) Oral (!) 122 20 99 %   11/25/22 0200 (!) 149/72 -- -- (!) 115 21 95 %   11/25/22 0130 124/87 -- -- (!) 119 21 99 %   11/25/22 0100 95/68 -- -- (!) 130 23 98 %   11/25/22 0000 (!) 150/76 -- -- (!) 120 24 --   11/24/22 2350 -- -- -- (!) 117 -- --   11/24/22 2300 128/84 -- -- (!) 117 20 98 %   11/24/22 2230 (!) 171/143 -- -- (!) 125 20 --   11/24/22 2200 (!) 151/85 -- -- (!) 123 24 --       Physical Exam   Unable to perform physical exam as patient refused    Labs:  CBC:   Recent Labs     11/24/22 1903   WBC 14.7*   HGB 11.2*   HCT 34.4*          BMP:   Recent Labs     11/24/22 1904   *   K 4.8   CL 98*   CO2 19*   BUN 57*   CREATININE 1.5*   GLUCOSE 132*   PHOS 2.4*     Magnesium: No results for input(s): MG in the last 72 hours. LFT's:   Recent Labs     11/24/22 1904   *   ALT 37   BILITOT 0.4   ALKPHOS 83       Troponin:   Recent Labs     11/24/22 1904 11/24/22 2118   TROPONINI 0.05* 0.05*     Lactic acid: No results for input(s): LACTATE in the last 72 hours. BNP: No results for input(s): BNP in the last 72 hours. Pro-BNP:   Recent Labs     11/24/22 1904   PROBNP 8,905*       Procalcitonin: No results for input(s): PROCAL in the last 72 hours. CRP: No results for input(s): CRP in the last 72 hours. ESR: No results for input(s): ESR in the last 72 hours. ABGs: No results for input(s): PHART, CIG4NNM, PO2ART in the last 72 hours.   VBGs:   Recent Labs     11/24/22  2118   [place] 7.360   LWM0VMU 41.9   PO2VEN <30.0       INR:   Recent Labs     11/24/22  1903   INR 1.21*       COVID-19:   Recent Labs     11/24/22 1956   COVID19 NOT DETECTED       U/A:   Recent Labs     11/24/22  2225   COLORU Yellow   PHUR 6.0   WBCUA *   RBCUA >100*   BACTERIA 3+*   CLARITYU CLOUDY*   SPECGRAV 1.025   LEUKOCYTESUR LARGE*   UROBILINOGEN 0.2   BILIRUBINUR Negative   BLOODU LARGE*   GLUCOSEU Negative       Radiology:  CT HEAD WO CONTRAST   Final Result      1. No acute intracranial process. 2.  Moderate cerebral atrophy and mild chronic small vessel ischemic disease. CT CHEST PULMONARY EMBOLISM W CONTRAST   Final Result      Chest:   1. No evidence of pulmonary embolism. 2.  Moderate to large hiatal hernia. 3.  Trace right pleural effusion and mild bilateral lower lobe atelectasis. Abdomen and pelvis:   1. Severe bilateral hydroureteronephrosis and moderate urinary bladder distention. Recommend correlation for bladder outlet obstruction versus urinary retention. 2.  Bilateral nephrolithiasis and 2 small nonobstructing calculi within the urinary bladder. 3.  Age-indeterminate L1 compression fracture with 30% height loss. 4.  Gallbladder distention without evidence of wall thickening or gallstones may represent gallbladder hydrops. Mild intrahepatic biliary ductal dilatation without significant common bile duct dilatation. 5.  Moderate right inguinal hernia containing nondilated small bowel. CT ANKLE LEFT WO CONTRAST   Final Result   Nondisplaced posterior calcaneal insufficiency fracture. CT ABDOMEN PELVIS W IV CONTRAST Additional Contrast? None   Final Result      Chest:   1. No evidence of pulmonary embolism. 2.  Moderate to large hiatal hernia. 3.  Trace right pleural effusion and mild bilateral lower lobe atelectasis. Abdomen and pelvis:   1. Severe bilateral hydroureteronephrosis and moderate urinary bladder distention. Recommend correlation for bladder outlet obstruction versus urinary retention. 2.  Bilateral nephrolithiasis and 2 small nonobstructing calculi within the urinary bladder. 3.  Age-indeterminate L1 compression fracture with 30% height loss.    4.  Gallbladder distention without evidence of wall thickening or gallstones may represent gallbladder hydrops. Mild intrahepatic biliary ductal dilatation without significant common bile duct dilatation. 5.  Moderate right inguinal hernia containing nondilated small bowel. XR CHEST PORTABLE   Final Result      No acute pulmonary disease. Moderate hiatal hernia. Assessment & Plan   Acute metabolic encephalopathy 2/2 complicated UTI and urinary retention  Patient appears slightly confused and in acute distress on presentation. Patient endorses lower abdominal pain with complaint of increased frequency, leukocytosis white blood cell 14.7, UA significant for urine nitrites and leukocyte Estrace. CT abdomen significant for severe bilateral hydronephrosis with moderate urinary bladder distention  -On ceftriaxone  -Placed miranda's  -Monitoring I's and O's  -Ordered urine culture awaiting results    A. fib with RVR  Patient has history of A. fib in the past however was not on any anticoagulation EKG in the ED was significant for A. fib with RVR. Patient is hemodynamically stable pulse: 123, possible proBNP elevation acute distress, heart due to A. Fib Echo:01/21/2020: 55-60%  -On heparin drip  -On diltiazem drip  -We will continue to monitor    RONNY   Patient appears altered, concerns for decreased p.o. intake.   Creatinine on presentation 1.5 last creatinine in 2020: 0.8, BUN to creatinine ratio 38:1  -Ordered urine creatinine, urine sodium to calculate Fena  -Monitoring I/O's for now  -Avoid nephrotoxic agents  -Ordered urine protein/creatinine  -IV fluids    Hyponatremia  Patient's sodium on admission 129, possible due to decreased p.o. intake  -Ordered urine osmolality, serum osmolality, urine sodium  -We will continue monitoring sodium every 12 hourly  -We will give IV fluids as needed    Troponinemia 2/2 possible demand ischemia  Patient is currently asymptomatic, troponin times two 0.05  -We will monitor troponin x1    Left calcaneal fracture  Patient presented with complaint of left leg pain, CT of the ankle showed left calcaneal fracture  -Pain control for now    Normocytic anemia  Hemoglobin: 14.7, MCV 99.2 MCHC 32.5  -We will order iron TIBC ferritin vitamin B12 and folate levels    Chronic medical problems  IBS  -On Lomotil    Chronic back pain  -On tizanidine    Mixed incontinence  -On mirabegron-dose adjusted as per creatinine clearance    DVT PPx: Heparin drip  Diet: Diet NPO   Code status:  Full Code       Will discuss with attending physician MD Arian Francisco MD  Internal Medicine, PGY-1

## 2022-11-25 NOTE — PROGRESS NOTES
82 yo F was admitted due to [address] [place] [address] 2/2 to possible UTI and RONNY , also develop a fib with RVR which is new onset as per patient , the patient denies CP or SOB, alert and awake , on cardizem and heparin drip, consult cardiologist .   On telemetry   On iv AB for UTI  Follow urine cx

## 2022-11-25 NOTE — PROGRESS NOTES
Spoke with son, Dr. Carlotta Oliva, via telephone and discussed with him the updates regarding patient's clinical condition, laboratory values, plan regarding consult services, medications as well as possible future medications to be given regarding her conditions. Dr. Francis Henriquez endorsed understanding the discussion, all questions were answered, he was informed that he will be called back with significant updates regarding patient's condition.     Marita Hartman MD  PGY-1

## 2022-11-25 NOTE — CONSULTS
Cardiology Consultation                                                                    Pt Name: Don Burkett  Age: 81 y.o. Sex: female  : 1935  Location: [facility]/4308-01    Referring Physician: Marilu Willoughby MD  Primary cardiologist: luke Reynaga      Reason for Consult:     Reason for Consultation/Chief Complaint: Roxanne Batista    HPI:      Don Burkett is a 81 y.o. female with a past medical history of code status DNR/I, h/o AF not on AC, HFpEF, poor historian. Echo 2020: Normal LV, EF 60%, indeterminate diastolic function due to A. fib, normal RV, mild to moderate MR, moderate TR. No recent ischemic evaluation    Patient was brought to the emergency room with complaints of left lower extremity pain, fatigue and lightheadedness, confusion. She was admitted for left calcaneal fracture, AF RVR, UTI (possible pyelonephritis due to bladder outlet obstruction), RONNY, hyponatremia. Cardiology was consulted for the atrial fibrillation. Patient remains off supplemental oxygen. Creatinine at 1.5 (baseline 0.8), troponin 0.05, repeat troponin 0.03, proBNP 8900. Albumin 2.7, normal TSH, WBC 14 with neutrophilic predominance, UA suggestive of UTI. CTA chest/abdomen/pelvis consistent with no evidence of PE, large hiatal hernia, bilateral hydronephrosis suggestive of bladder outlet obstruction. ECG22 c/w AFRVR 156 bpm with no ischemic changes.        Histories     Past Medical History:   has a past medical history of Acute deep vein thrombosis (DVT) of left lower extremity after procedure ([address] Acute renal failure (ARF) (HCC), Anterior corneal dystrophy, Asymptomatic postmenopausal status (age-related) (natural), Cervical polyp, Dry eye syndrome, Edema, Fuch's endothelial dystrophy, Gout, Hip fracture requiring operative repair, left, closed, initial encounter ([facility] [address] IBS (irritable bowel syndrome), Mild cognitive impairment, Osteoarthritis, Osteopenia, Papilloma of breast, Pedal edema, Screening mammogram, Symptomatic menopausal or female climacteric states, Urinary retention, and Vaginal candidiasis. Surgical History:   has a past surgical history that includes Appendectomy; eye surgery; and hip pinning (Left, 9/18/2019). Social History:   reports that she has never smoked. She has never used smokeless tobacco. She reports that she does not drink alcohol and does not use drugs. Family History:  No evidence for sudden cardiac death or premature CAD      Medications:       Home Medications  Were reviewed and are listed in nursing record. and/or listed below  Prior to Admission medications    Medication Sig Start Date End Date Taking? Authorizing Provider   diphenoxylate-atropine (LOMOTIL) 2.5-0.025 MG per tablet Take 1 tablet by mouth 4 times daily for 180 days.  9/14/22 3/13/23  Iris Shaw MD   mirabegron [facility]) 50 MG TB24 Take 50 mg by mouth daily 2/14/22   Iris Shaw MD   tiZANidine (ZANAFLEX) 2 MG tablet Take 1 tablet by mouth 3 times daily as needed (muscle spasm)  Patient not taking: Reported on 11/25/2022 2/14/22   Iris Shaw MD          Inpatient Medications:   [Held by provider] diphenoxylate-atropine  1 tablet Oral 4x Daily    trospium  20 mg Oral Nightly    sodium chloride flush  5-40 mL IntraVENous 2 times per day    cefTRIAXone (ROCEPHIN) IV  1,000 mg IntraVENous Q24H    lidocaine  2 patch TransDERmal Daily    dilTIAZem  30 mg Oral 4 times per day       IV drips:   sodium chloride      heparin (PORCINE) Infusion 12 Units/kg/hr (11/25/22 1213)    sodium chloride Stopped (11/25/22 1200)    dilTIAZem Stopped (11/25/22 1221)       PRN:  tiZANidine, sodium chloride flush, sodium chloride, ondansetron **OR** ondansetron, polyethylene glycol, acetaminophen **OR** acetaminophen, heparin (porcine), heparin (porcine)    Allergy:     Amoxapine and related, Lisinopril, and Clindamycin/lincomycin       Review of Systems:     All 12 point review of symptoms completed. Pertinent positives identified in the HPI, all other review of symptoms negative as below. CONSTITUTIONAL: No fatigue  SKIN: No rash or pruritis. EYES: No visual changes or diplopia. No scleral icterus. ENT: No Headaches, hearing loss or vertigo. No mouth sores or sore throat. CARDIOVASCULAR: No chest pain/chest pressure/chest discomfort. No palpitations. No edema. RESPIRATORY: No dyspnea. No cough or wheezing, no sputum production. GASTROINTESTINAL: No N/V/D. No abdominal pain, appetite loss, blood in stools. GENITOURINARY: No dysuria, trouble voiding, or hematuria. MUSCULOSKELETAL:  No gait disturbance, weakness or joint complaints. NEUROLOGICAL: No headache, diplopia, change in muscle strength, numbness or tingling. No change in gait, balance, coordination, mood, affect, memory, mentation, behavior. ENDOCRINE: No excessive thirst, fluid intake, or urination. No tremor. HEMATOLOGIC: No abnormal bruising or bleeding. ALLERGY: No nasal congestion or hives.       Physical Examination:     Vitals:    11/25/22 0924 11/25/22 1054 11/25/22 1219 11/25/22 1338   BP: (!) 81/51 (!) 90/59 94/60 (!) 87/53   Pulse: (!) 111 89 76 88   Resp: 18  16    Temp: 98.1 °F (36.7 °C)  98.2 °F (36.8 °C)    TempSrc: Oral  Oral    SpO2: 94%  95%    Weight: 122 lb 5.7 oz (55.5 kg)      Height: 5' 2\" (1.575 m)          Wt Readings from Last 3 Encounters:   11/25/22 122 lb 5.7 oz (55.5 kg)   02/14/22 127 lb (57.6 kg)   11/30/21 117 lb (53.1 kg)         General Appearance:  Alert, cooperative, no distress, appears stated age Appropriate weight   Head:  Normocephalic, without obvious abnormality, atraumatic   Eyes:  PERRL, conjunctiva/corneas clear EOM intact  Ears normal   Throat no lesions       Nose: Nares normal, no drainage or sinus tenderness   Throat: Lips, mucosa, and tongue normal   Neck: Supple, symmetrical, trachea midline, no adenopathy, thyroid: not enlarged, symmetric, no tenderness/mass/nodules, no carotid bruit.        Lungs:   Respirations unlabored, clear to auscultation bilaterally, without any wheezes, rubs or ronchi. Chest Wall:  No tenderness or deformity   Heart:  Regular rhythm, rate is controlled, S1, S2 normal, there is no murmur, there is no rub or gallop, cannot assess jvd, no bilateral lower extremity edema   Abdomen:   Soft, non-tender, bowel sounds active all four quadrants,  no masses, no organomegaly       Extremities: Extremities normal, atraumatic, no cyanosis. Pulses: 2+ and symmetric   Skin: Skin color, texture, turgor normal, no rashes or lesions   Pysch: Normal mood and affect   Neurologic: Normal gross motor and sensory exam.  Cranial nerves intact        Labs:     Recent Labs     11/24/22 1904   *   K 4.8   BUN 57*   CREATININE 1.5*   CL 98*   CO2 19*   GLUCOSE 132*   CALCIUM 8.4     Recent Labs     11/24/22 1903   WBC 14.7*   HGB 11.2*   HCT 34.4*      MCV 99.2     No results for input(s): CHOLTOT, TRIG, HDL, CHOLHDL, LDL in the last 72 hours. Invalid input(s): [facility],[address], [address]  Recent Labs     11/24/22 1903   INR 1.21*     Recent Labs     11/24/22 1904 11/24/22 2118 11/25/22  0745   CKTOTAL 270*  --   --    TROPONINI 0.05* 0.05* 0.03*     No results for input(s): BNP in the last 72 hours. No results for input(s): TSH in the last 72 hours. No results for input(s): CHOL, HDL, LDLCALC, TRIG in the last 72 hours.]    Lab Results   Component Value Date    CKTOTAL 270 (H) 11/24/2022    TROPONINI 0.03 (H) 11/25/2022         Telemetry:     Telemetry personally reviewed:  AFCVR    Assessment / Plan:     1. AF RVR. Atrial fibrillation is of unknown duration at this time. Patient has been off anticoagulation. He was given IV Cardizem with good response. 2.  UTI, possible pyelonephritis and sepsis, possible bladder outlet obstruction. On IV antibiotics, Burk and IV fluids per primary team.  3.  RONNY. Most likely due to UTI.   4.  Left calcaneal fracture, conservative approach  5. Hyponatremia, most likely due to cardiorenal syndrome  6. Chronic HFpEF. Patient is euvolemic and hemodynamically tenuous.        - Start diltiazem 30 mg p.o. every 6 hours and wean diltiazem drip to off  - Once rate control on diltiazem short acting, will transition to long-acting form  - Continue with heparin drip; consider oral anticoagulation  - Agree with IV fluids to support BP  - Agree with IV antibiotics and Burk            I have personally reviewed the reports and images of labs, radiological studies, cardiac studies including ECG's and telemetry, current and old medical records. The note was completed using EMR and Dragon dictation system. Every effort was made to ensure accuracy; however, inadvertent computerized transcription errors may be present. All questions and concerns were addressed to the patient/family. Alternatives to my treatment were discussed. I would like to thank you for providing me the opportunity to participate in the care of your patient. If you have any questions, please do not hesitate to contact me.      Hemant Sánchez MD, [address] [facility], [address] [place] [address]  The [address] [address]  Ph: [phone]  Fax: [phone]

## 2022-11-25 NOTE — PROGRESS NOTES
Physical Therapy/Occupational Therapy  Hold    PT/OT orders received, chart reviewed. Discussed with RN, states pt in a lot of pain and declining therapy. Pt heard yelling \"No PT! No!\" When asked if therapy could work with pt later in the day, pt continued declining stating she is going home today and will not work with therapy. Will attempt again tomorrow if appropriate. Saumya Armendariz PT, DPT, NCS, CSRS  CHERY Hook, [facility]

## 2022-11-25 NOTE — PLAN OF CARE
Problem: Discharge Planning  Goal: Discharge to home or other facility with appropriate resources  11/25/2022 1852 by Tomi Mayfield, LEIGHANN  Outcome: [id number] (Taken 11/25/2022 1852)  Discharge to home or other facility with appropriate resources:   Identify barriers to discharge with patient and caregiver   Arrange for needed discharge resources and transportation as appropriate  Note: Pt transitioned off IV dilt drip to po. Bps soft throughout day, MD aware. Pt still on hep drip. Pt also still needs urology to see her. Problem: Pain  Goal: Verbalizes/displays adequate comfort level or baseline comfort level  11/25/2022 1852 by Tomi Mayfield RN  Outcome: Progressing  Flowsheets (Taken 11/25/2022 1852)  Verbalizes/displays adequate comfort level or baseline comfort level:   Encourage patient to monitor pain and request assistance   Assess pain using appropriate pain scale   Administer analgesics based on type and severity of pain and evaluate response  Note: Pt reports frequent pain in L ankle. Given prn tylenol, repositioned frequently for comfort. Problem: Skin/Tissue Integrity  Goal: Absence of new skin breakdown  Description: 1. Monitor for areas of redness and/or skin breakdown  2. Assess vascular access sites hourly  3. Every 4-6 hours minimum:  Change oxygen saturation probe site  4. Every 4-6 hours:  If on nasal continuous positive airway pressure, respiratory therapy assess nares and determine need for appliance change or resting period. Outcome: Progressing  Note: Pt w redenned R heel, heel floated on pillow, will cont, to assess. Turned q2 hrs and skin assessed frequently.

## 2022-11-25 NOTE — PROGRESS NOTES
Pharmacy  Note  - Admission Medication History    List of iqbdw-wk-[id number] medications is complete. I reviewed Rx fill history via \"Complete Dispense Report\" in Epic, and spoke to patient at bedside      The following changes made to lroqq-km-[id number] medication list:    REMOVED:  1) Acetaminophen  2) Fosamax  3) Calcium  4) Diclofenac gel    NOTE: Eliquis was last filled in 2020. Per pt, it was discontinued after she experienced bloody urine- she saw urologist    NOTE: Resides at [facility], but manages her own medications, which are filled at [address] [place] [address]- Pt not currently taking tizanidine- last filled 2/14/22. I left it on list, as it is PRN, and deleting from med list would cancel her remaining refills    Current Outpatient Medications   Medication Instructions    diphenoxylate-atropine (LOMOTIL) 2.5-0.025 MG per tablet 1 tablet, Oral, 4 TIMES DAILY    mirabegron (MYRBETRIQ) 50 mg, Oral, DAILY    tiZANidine (ZANAFLEX) 2 mg, Oral, 3 TIMES DAILY PRN      Please call with any questions    Brandyn Solis  Pharm. D. [facility]  [phone] ([phone])  [phone] ([address] [place] [address])

## 2022-11-25 NOTE — PROGRESS NOTES
Notified MD Strong of BP 81/51 taken at 924, at that time ordered 500ml bolus was started. MD ordered another 1L to be given after 500 ml complete. Recheck of BP at 1054 was 90/59. Will recheck again when 1L complete. Also informed MD of area of swelling on R arm, not painful to pt. MD says continue to monitor. Notified MD of critical aptt 171 at 1149, this was taken this morning after pt was bolused and started on drip. Per MD do not decrease dose, maintain current rate and recheck at 1600.

## 2022-11-26 LAB
ANION GAP SERPL CALCULATED.3IONS-SCNC: 9 MMOL/L (ref 3–16)
APTT: 27.6 SEC (ref 23–34.3)
APTT: 47.1 SEC (ref 23–34.3)
APTT: 75.6 SEC (ref 23–34.3)
APTT: >180 SEC (ref 23–34.3)
BASOPHILS ABSOLUTE: 0 K/UL (ref 0–0.2)
BASOPHILS RELATIVE PERCENT: 0.1 %
BUN BLDV-MCNC: 39 MG/DL (ref 7–20)
CALCIUM SERPL-MCNC: 8.4 MG/DL (ref 8.3–10.6)
CHLORIDE BLD-SCNC: 104 MMOL/L (ref 99–110)
CO2: 20 MMOL/L (ref 21–32)
CREAT SERPL-MCNC: 1 MG/DL (ref 0.6–1.2)
EOSINOPHILS ABSOLUTE: 0.1 K/UL (ref 0–0.6)
EOSINOPHILS RELATIVE PERCENT: 1 %
GFR SERPL CREATININE-BSD FRML MDRD: 55 ML/MIN/{1.73_M2}
GLUCOSE BLD-MCNC: 156 MG/DL (ref 70–99)
HCT VFR BLD CALC: 31.2 % (ref 36–48)
HEMOGLOBIN: 10.1 G/DL (ref 12–16)
LYMPHOCYTES ABSOLUTE: 0.7 K/UL (ref 1–5.1)
LYMPHOCYTES RELATIVE PERCENT: 6.6 %
MAGNESIUM: 2.1 MG/DL (ref 1.8–2.4)
MCH RBC QN AUTO: 32.5 PG (ref 26–34)
MCHC RBC AUTO-ENTMCNC: 32.5 G/DL (ref 31–36)
MCV RBC AUTO: 100.1 FL (ref 80–100)
MONOCYTES ABSOLUTE: 1 K/UL (ref 0–1.3)
MONOCYTES RELATIVE PERCENT: 9.1 %
NEUTROPHILS ABSOLUTE: 9 K/UL (ref 1.7–7.7)
NEUTROPHILS RELATIVE PERCENT: 83.2 %
ORGANISM: ABNORMAL
PDW BLD-RTO: 13.5 % (ref 12.4–15.4)
PLATELET # BLD: 356 K/UL (ref 135–450)
PMV BLD AUTO: 7.1 FL (ref 5–10.5)
POTASSIUM REFLEX MAGNESIUM: 4.5 MMOL/L (ref 3.5–5.1)
RBC # BLD: 3.12 M/UL (ref 4–5.2)
SODIUM BLD-SCNC: 133 MMOL/L (ref 136–145)
URINE CULTURE, ROUTINE: ABNORMAL
WBC # BLD: 10.8 K/UL (ref 4–11)

## 2022-11-26 PROCEDURE — 97166 OT EVAL MOD COMPLEX 45 MIN: CPT

## 2022-11-26 PROCEDURE — 80048 BASIC METABOLIC PNL TOTAL CA: CPT

## 2022-11-26 PROCEDURE — 6370000000 HC RX 637 (ALT 250 FOR IP): Performed by: INTERNAL MEDICINE

## 2022-11-26 PROCEDURE — 6360000002 HC RX W HCPCS: Performed by: STUDENT IN AN ORGANIZED HEALTH CARE EDUCATION/TRAINING PROGRAM

## 2022-11-26 PROCEDURE — 2580000003 HC RX 258: Performed by: INTERNAL MEDICINE

## 2022-11-26 PROCEDURE — 85025 COMPLETE CBC W/AUTO DIFF WBC: CPT

## 2022-11-26 PROCEDURE — 97162 PT EVAL MOD COMPLEX 30 MIN: CPT

## 2022-11-26 PROCEDURE — 97530 THERAPEUTIC ACTIVITIES: CPT

## 2022-11-26 PROCEDURE — 83735 ASSAY OF MAGNESIUM: CPT

## 2022-11-26 PROCEDURE — 36415 COLL VENOUS BLD VENIPUNCTURE: CPT

## 2022-11-26 PROCEDURE — 6370000000 HC RX 637 (ALT 250 FOR IP)

## 2022-11-26 PROCEDURE — 6370000000 HC RX 637 (ALT 250 FOR IP): Performed by: UROLOGY

## 2022-11-26 PROCEDURE — 85730 THROMBOPLASTIN TIME PARTIAL: CPT

## 2022-11-26 PROCEDURE — 6360000002 HC RX W HCPCS

## 2022-11-26 PROCEDURE — 2580000003 HC RX 258: Performed by: STUDENT IN AN ORGANIZED HEALTH CARE EDUCATION/TRAINING PROGRAM

## 2022-11-26 PROCEDURE — 2580000003 HC RX 258

## 2022-11-26 PROCEDURE — 99233 SBSQ HOSP IP/OBS HIGH 50: CPT | Performed by: INTERNAL MEDICINE

## 2022-11-26 PROCEDURE — 2060000000 HC ICU INTERMEDIATE R&B

## 2022-11-26 RX ORDER — SODIUM CHLORIDE 9 MG/ML
INJECTION, SOLUTION INTRAVENOUS CONTINUOUS
Status: ACTIVE | OUTPATIENT
Start: 2022-11-26 | End: 2022-11-26

## 2022-11-26 RX ORDER — TAMSULOSIN HYDROCHLORIDE 0.4 MG/1
0.4 CAPSULE ORAL DAILY
Status: DISCONTINUED | OUTPATIENT
Start: 2022-11-26 | End: 2022-12-01 | Stop reason: HOSPADM

## 2022-11-26 RX ORDER — 0.9 % SODIUM CHLORIDE 0.9 %
500 INTRAVENOUS SOLUTION INTRAVENOUS ONCE
Status: COMPLETED | OUTPATIENT
Start: 2022-11-26 | End: 2022-11-26

## 2022-11-26 RX ADMIN — METOPROLOL TARTRATE 25 MG: 25 TABLET, FILM COATED ORAL at 11:15

## 2022-11-26 RX ADMIN — AMPICILLIN SODIUM AND SULBACTAM SODIUM 3000 MG: 2; 1 INJECTION, POWDER, FOR SOLUTION INTRAMUSCULAR; INTRAVENOUS at 20:06

## 2022-11-26 RX ADMIN — SODIUM CHLORIDE, PRESERVATIVE FREE 10 ML: 5 INJECTION INTRAVENOUS at 20:04

## 2022-11-26 RX ADMIN — AMPICILLIN SODIUM AND SULBACTAM SODIUM 3000 MG: 2; 1 INJECTION, POWDER, FOR SOLUTION INTRAMUSCULAR; INTRAVENOUS at 10:22

## 2022-11-26 RX ADMIN — HEPARIN SODIUM 15 UNITS/KG/HR: 10000 INJECTION, SOLUTION INTRAVENOUS at 22:21

## 2022-11-26 RX ADMIN — SODIUM CHLORIDE, PRESERVATIVE FREE 10 ML: 5 INJECTION INTRAVENOUS at 10:15

## 2022-11-26 RX ADMIN — AMPICILLIN SODIUM AND SULBACTAM SODIUM 3000 MG: 2; 1 INJECTION, POWDER, FOR SOLUTION INTRAMUSCULAR; INTRAVENOUS at 15:59

## 2022-11-26 RX ADMIN — CEFTRIAXONE 1000 MG: 1 INJECTION, POWDER, FOR SOLUTION INTRAMUSCULAR; INTRAVENOUS at 01:01

## 2022-11-26 RX ADMIN — HEPARIN SODIUM 2990 UNITS: 1000 INJECTION INTRAVENOUS; SUBCUTANEOUS at 18:50

## 2022-11-26 RX ADMIN — TROSPIUM CHLORIDE 20 MG: 20 TABLET, FILM COATED ORAL at 20:03

## 2022-11-26 RX ADMIN — SODIUM CHLORIDE 500 ML: 9 INJECTION, SOLUTION INTRAVENOUS at 11:15

## 2022-11-26 RX ADMIN — SODIUM CHLORIDE: 9 INJECTION, SOLUTION INTRAVENOUS at 17:05

## 2022-11-26 RX ADMIN — DILTIAZEM HYDROCHLORIDE 30 MG: 30 TABLET, FILM COATED ORAL at 05:39

## 2022-11-26 RX ADMIN — METOPROLOL TARTRATE 25 MG: 25 TABLET, FILM COATED ORAL at 20:03

## 2022-11-26 RX ADMIN — TAMSULOSIN HYDROCHLORIDE 0.4 MG: 0.4 CAPSULE ORAL at 17:07

## 2022-11-26 ASSESSMENT — PAIN DESCRIPTION - LOCATION
LOCATION: GENERALIZED
LOCATION: GENERALIZED

## 2022-11-26 ASSESSMENT — PAIN SCALES - GENERAL
PAINLEVEL_OUTOF10: 8
PAINLEVEL_OUTOF10: 0
PAINLEVEL_OUTOF10: 0
PAINLEVEL_OUTOF10: 7
PAINLEVEL_OUTOF10: 0

## 2022-11-26 ASSESSMENT — PAIN SCALES - WONG BAKER
WONGBAKER_NUMERICALRESPONSE: 0

## 2022-11-26 ASSESSMENT — PAIN DESCRIPTION - ONSET
ONSET: ON-GOING
ONSET: ON-GOING

## 2022-11-26 ASSESSMENT — PAIN DESCRIPTION - FREQUENCY
FREQUENCY: CONTINUOUS
FREQUENCY: CONTINUOUS

## 2022-11-26 ASSESSMENT — PAIN DESCRIPTION - PAIN TYPE
TYPE: CHRONIC PAIN
TYPE: CHRONIC PAIN

## 2022-11-26 ASSESSMENT — PAIN DESCRIPTION - DESCRIPTORS
DESCRIPTORS: ACHING;DISCOMFORT
DESCRIPTORS: ACHING;DISCOMFORT

## 2022-11-26 NOTE — PROGRESS NOTES
Occupational Therapy  Facility/Department: [place] [id number] [facility]  Occupational Therapy Initial Assessment    Name: Corrina Villagran  : 1935  MRN: [id number]  Date of Service: 2022    Discharge Recommendations:  Corrina Villagran scored a 10/24 on the AM-PAC ADL Inpatient form. Current research shows that an AM-PAC score of 17 or less is typically not associated with a discharge to the patient's home setting. Based on the patient's AM-PAC score and their current ADL deficits, it is recommended that the patient have 3-5 sessions per week of Occupational Therapy at d/c to increase the patient's independence. Please see assessment section for further patient specific details. If patient discharges prior to next session this note will serve as a discharge summary. Please see below for the latest assessment towards goals. OT Equipment Recommendations  Equipment Needed: No       Patient Diagnosis(es): The primary encounter diagnosis was Atrial fibrillation with RVR ([address] Diagnoses of Closed nondisplaced fracture of left calcaneus, unspecified portion of calcaneus, initial encounter, Urinary tract infection without hematuria, site unspecified, General weakness, Elevated troponin, RONNY (acute kidney injury) ([address] Urinary retention, and Intravenous infiltration, initial encounter were also pertinent to this visit.   Past Medical History:  has a past medical history of Acute deep vein thrombosis (DVT) of left lower extremity after procedure ([address] Acute renal failure (ARF) (HCC), Anterior corneal dystrophy, Asymptomatic postmenopausal status (age-related) (natural), Cervical polyp, Dry eye syndrome, Edema, Fuch's endothelial dystrophy, Gout, Hip fracture requiring operative repair, left, closed, initial encounter ([address] IBS (irritable bowel syndrome), Mild cognitive impairment, Osteoarthritis, Osteopenia, Papilloma of breast, Pedal edema, Screening mammogram, Symptomatic menopausal or female climacteric states, Urinary retention, and Vaginal candidiasis. Past Surgical History:  has a past surgical history that includes Appendectomy; eye surgery; and hip pinning (Left, 9/18/2019). Assessment   Performance deficits / Impairments: Decreased functional mobility ; Decreased ADL status; Decreased high-level IADLs;Decreased strength;Decreased endurance  Assessment: Patient is an 82 y/o female who presenting below her functional baseline with ADLs, functional transfers, and mobiltiy. Patient with calcaneal fracture on left (splinted at this time) which she has CAM boot for but boot is not present. Utilized NWB on LLE during transfer for safety. Patient is requiring increased physical assistance for all ADLs, bed mobility, and transfers this date secondary to pain and generalized deconditioning. Would benefit from cont. skilled OT services while inpatient to maximize therapeutic outcomes prior to d/c. Prognosis: Good  Decision Making: Medium Complexity  REQUIRES OT FOLLOW-UP: Yes  Activity Tolerance  Activity Tolerance: Patient limited by pain  Activity Tolerance Comments: pt very anxious throughout eval regarding pain, allowing this to limit participation in tasks        Plan   Occupational Therapy Plan  Times Per Week: 2-5x/week  Current Treatment Recommendations: Strengthening, ROM, Endurance training, Safety education & training, Equipment evaluation, education, & procurement, Patient/Caregiver education & training, Self-Care / ADL     Restrictions  Position Activity Restriction  Other position/activity restrictions: up with assistance; Per MD progress note \"Left calcaneal fracture  Patient presented with complaint of left leg pain, CT of the ankle showed left calcaneal fracture - Nondisplaced.  -Pain control for now\"    Subjective   General  Chart Reviewed: Yes  Patient assessed for rehabilitation services?: Yes  Additional Pertinent Hx: Pt admitted 11/24/22 with complaints of bilateral leg pain.    last week she had felt a snap on her left leg that had started causing her a lot of pain. CT ankle Lft= Nondisplaced posterior calcaneal insufficiency fracture. CT head=No acute intracranial process. 2. Moderate cerebral atrophy and mild chronic small vessel ischemic disease. Referring Practitioner: Dr. Yuval Snider  Diagnosis: Urinary retention  Subjective  Subjective: Pt supine in bed at OT arrival, attempting to refuse evaluation at start. With encouragement from staff pt willing to participate. General Comment  Comments: Pt reporting pain by yelling out \"no, no, no\" with functional movement, however, did not quantify.   Patient complaining of pain \"everywhere\" with all moblity, RN aware and present in room at end of session  Social/Functional History  Social/Functional History  Lives With: Alone  Type of Home: Apartment  Home Layout: One level  Home Access: Level entry  Bathroom Shower/Tub: Walk-in shower  Bathroom Toilet: Standard  Bathroom Equipment: Grab bars in shower, Hand-held shower, Grab bars around toilet, Tub transfer bench  Home Equipment: [address]  ADL Assistance: Needs assistance (has aide 24 hours/day for the last week)  Homemaking Assistance: Needs assistance (has aide 24 hours/day for the last week)  Ambulation Assistance: Independent (uses walker vs wheel chair recently)  Transfer Assistance: Independent  Active : No       Objective   Heart Rate: 72  Heart Rate Source: Monitor  BP: 98/65  BP Location: Left upper arm  BP Method: Automatic  Patient Position: Semi fowlers  MAP (Calculated): 76  Resp: 16  SpO2: 93 %  O2 Device: None (Room air)             Safety Devices  Type of Devices: Left in chair;Chair alarm in place;Call light within reach;Gait belt;Nurse notified  Balance  Sitting: With support (max A, progressing to min w/ increased time)  Standing: With support (max A)  Transfer Training  Transfer Training: Yes  Bed to Chair: Assist X2;Total assistance (mod + max) ADL  Grooming: Setup; Increased time to complete  Grooming Skilled Clinical Factors: seated in bedside chair to brush teeth  LE Dressing: Dependent/Total  LE Dressing Skilled Clinical Factors: to don socks seated EOB  Toileting: Dependent/Total  Toileting Skilled Clinical Factors: Burk catheter        Bed mobility  Supine to Sit: Dependent/Total;2 Person assistance (max assist x 2, head of bed elevated, increased time to complete with multiple rest breaks due to pain)  Scooting: Maximal assistance (total assist for positioning in chair)  Bed Mobility Comments: patient sitting up in bedside chair at end of session  Transfers  Sit to stand: Dependent/Total;2 Person assistance (mod + max)  Stand to sit: Dependent/Total;2 Person assistance (mod + max)  Vision  Vision: Within Functional Limits  Hearing  Hearing: Within functional limits  Cognition  Overall Cognitive Status: Exceptions  Arousal/Alertness: Appropriate responses to stimuli  Following Commands:  Follows one step commands with increased time  Safety Judgement: Decreased awareness of need for safety  Problem Solving: Assistance required to generate solutions  Insights: Decreased awareness of deficits  Initiation: Requires cues for all  Sequencing: Requires cues for some  Cognition Comment: patient anxious regarding mobility due to pain  Orientation  Overall Orientation Status: Within Functional Limits  Perception  Overall Perceptual Status: WFL               Education Given To: Patient  Education Provided: Role of Therapy;Plan of Care;Transfer Training  Education Method: Demonstration  Barriers to Learning: None  Education Outcome: Verbalized understanding  LUE AROM (degrees)  LUE AROM : WFL  Left Hand AROM (degrees)  Left Hand AROM: WFL  RUE AROM (degrees)  RUE AROM : WFL  Right Hand AROM (degrees)  Right Hand AROM: WFL                     G-Code     OutComes Score                                                  AM-PAC Score        AM-PAC Inpatient Daily Activity Raw Score: 10 (11/26/22 1450)  AM-PAC Inpatient ADL T-Scale Score : 27.31 (11/26/22 1450)  ADL Inpatient CMS 0-100% Score: 74.7 (11/26/22 1450)  ADL Inpatient CMS G-Code Modifier : CL (11/26/22 1450)    Tinneti Score       Goals  Short Term Goals  Time Frame for Short Term Goals: by discharge  Short Term Goal 1: Demo seated grooming EOB w/ set-up  Short Term Goal 2: Demo UB dressing seated EOB w/ set-up and CGA  Short Term Goal 3: Demo toilet transfer w/ mod A       Therapy Time   Individual Concurrent Group Co-treatment   Time In 1209         Time Out 1250         Minutes 41         Timed Code Treatment Minutes: 26 Minutes     26 minutes treatment + 15 minutes evaluation = 41 total treatment minutes    Dee Decker, OT

## 2022-11-26 NOTE — PROGRESS NOTES
Patient alert and orient x4  skin warm and dry  peripheral pulses palpable  remains atrial fibrillation heart rhythm  rate 70-90's  VSS  no shortness breath observed  continue mtelemtry monitoring  VS every 4hr and prn

## 2022-11-26 NOTE — PROGRESS NOTES
Cardiology Consult Service  Daily Progress Note        Admit Date:  11/24/2022  Primary cardiologist: Dr Gentry Lew, new    Reason for Consultation/Chief Complaint: AFRVR    Subjective:     Danielle Moody is a 81 y.o. female with a past medical history of code status DNR/I, h/o AF not on AC, HFpEF, poor historian. Echo 01/2020: Normal LV, EF 60%, indeterminate diastolic function due to A. fib, normal RV, mild to moderate MR, moderate TR. No recent ischemic evaluation    Patient was brought to the emergency room with complaints of left lower extremity pain, fatigue and lightheadedness, confusion. She was admitted for left calcaneal fracture, AF RVR, UTI (possible pyelonephritis due to bladder outlet obstruction), RONNY, hyponatremia. Cardiology was consulted for the atrial fibrillation. Patient remains off supplemental oxygen. Creatinine at 1.5 (baseline 0.8), troponin 0.05, repeat troponin 0.03, proBNP 8900. Albumin 2.7, normal TSH, WBC 14 with neutrophilic predominance, UA suggestive of UTI. CTA chest/abdomen/pelvis consistent with no evidence of PE, large hiatal hernia, bilateral hydronephrosis suggestive of bladder outlet obstruction. ECG11/24/22 c/w AFRVR 156 bpm with no ischemic changes. Interval history:  Low normal BP today. Telemetry personally reviewed, reveals AF with controlled ventricular response in the 70s. Patient remains off supplemental oxygen. Creatinine stable at 1.0.     Objective:     Medications:   ampicillin-sulbactam  3,000 mg IntraVENous Q6H    metoprolol tartrate  25 mg Oral BID    tamsulosin  0.4 mg Oral Daily    [Held by provider] diphenoxylate-atropine  1 tablet Oral 4x Daily    trospium  20 mg Oral Nightly    sodium chloride flush  5-40 mL IntraVENous 2 times per day    lidocaine  2 patch TransDERmal Daily       IV drips:   sodium chloride      heparin (PORCINE) Infusion 11 Units/kg/hr (11/26/22 1207)    dilTIAZem Stopped (11/25/22 1221)       PRN:  tiZANidine, sodium chloride flush, sodium chloride, ondansetron **OR** ondansetron, polyethylene glycol, acetaminophen **OR** acetaminophen, heparin (porcine), heparin (porcine)    Vitals:    11/26/22 0340 11/26/22 0740 11/26/22 1100 11/26/22 1230   BP: (!) 93/59 (!) 94/57 (!) 92/54 98/65   Pulse: 87 96 (!) 104 72   Resp: 18 18 16    Temp: 97.6 °F (36.4 °C) 97.3 °F (36.3 °C) 97.6 °F (36.4 °C)    TempSrc: Axillary Oral Oral    SpO2: 94% 94% 93%    Weight:       Height:           Intake/Output Summary (Last 24 hours) at 11/26/2022 1443  Last data filed at 11/26/2022 1022  Gross per 24 hour   Intake 1100 ml   Output 1350 ml   Net -250 ml     I/O last 3 completed shifts: In: 2068.6 [P.O.:1490; I.V.:30; IV Piggyback:548.6]  Out: 2400 [Urine:2400]  Wt Readings from Last 3 Encounters:   11/25/22 122 lb 5.7 oz (55.5 kg)   02/14/22 127 lb (57.6 kg)   11/30/21 117 lb (53.1 kg)       Admit Wt: Weight: 110 lb (49.9 kg)   Todays Wt: Weight: 122 lb 5.7 oz (55.5 kg)    TELEMETRY personally reviewed: Atrial fibrillation with CVR    Physical Exam:         General Appearance:  Alert, cooperative, no distress, appears stated age Appropriate weight   Head:  Normocephalic, without obvious abnormality, atraumatic   Eyes:  PERRL, conjunctiva/corneas clear EOM intact  Ears normal   Throat no lesions       Nose: Nares normal, no drainage or sinus tenderness   Throat: Lips, mucosa, and tongue normal   Neck: Supple, symmetrical, trachea midline, no adenopathy, thyroid: not enlarged, symmetric, no tenderness/mass/nodules, no carotid bruit. Lungs:   Normal respiratory rate, lungs clear to auscultation without any wheezes, rubs or ronchi bilaterally.     Chest Wall:  No tenderness or deformity   Heart:  Irregular rhythm, rate is controlled, S1, S2 normal, there is no murmur, there is no rub or gallop, cannot assess jvd, no bilateral lower extremity edema   Abdomen:   Soft, non-tender, bowel sounds active all four quadrants,  no masses, no organomegaly Extremities: Extremities normal, atraumatic, no cyanosis. Pulses: 2+ and symmetric   Skin: Skin color, texture, turgor normal, no rashes or lesions   Pysch: Normal mood and affect   Neurologic: Normal gross motor and sensory exam.  Cranial nerves intact       Labs:   Recent Labs     11/24/22 1904 11/25/22  1645 11/26/22  0008 11/26/22  0652   * 133*  --  133*   K 4.8 4.2  --  4.5   BUN 57* 44*  --  39*   CREATININE 1.5* 1.0  --  1.0   CL 98* 103  --  104   CO2 19* 21  --  20*   GLUCOSE 132* 131*  --  156*   CALCIUM 8.4 8.2*  --  8.4   MG  --   --  2.10  --      Recent Labs     11/24/22 1903 11/26/22  0652   WBC 14.7* 10.8   HGB 11.2* 10.1*   HCT 34.4* 31.2*    356   MCV 99.2 100.1*     No results for input(s): CHOLTOT, TRIG, HDL, CHOLHDL, LDL in the last 72 hours. Invalid input(s): [facility],[address], [address]  Recent Labs     11/24/22 1903   INR 1.21*     Recent Labs     11/24/22 1904 11/24/22 2118 11/25/22  0745   CKTOTAL 270*  --   --    TROPONINI 0.05* 0.05* 0.03*     No results for input(s): BNP in the last 72 hours. No results for input(s): NTPROBNP in the last 72 hours. No results for input(s): TSH in the last 72 hours. Imaging:       Assessment & Plan:     1. AF RVR. Atrial fibrillation is of unknown duration at this time. Patient has been off anticoagulation. She was given IV Cardizem with good response. 2.  UTI, possible pyelonephritis and sepsis, possible bladder outlet obstruction. On IV antibiotics, Burk and IV fluids per primary team.  3.  RONNY. Most likely due to UTI. 4.  Left calcaneal fracture, conservative approach  5. Hyponatremia, most likely due to cardiorenal syndrome  6. Chronic HFpEF. Patient is euvolemic and hemodynamically tenuous. - Will switch Cardizem to Lopressor for less effect on BP. Will start lopressor 25 mg bid  - Will give  bolus over 2 hours and continue with NS at 125 ml/hr x 1 liter.    - Consider switching to oral anticoagulation if contraindication is not present  - Agree with antibiotics and Burk               I have spent 35 minutes of face to face time with the patient with more than 50% spent counseling and coordinating care. I have personally reviewed the reports and images of labs, radiological studies, cardiac studies including ECG's and telemetry, current and old medical records. The note was completed using EMR and Dragon dictation system. Every effort was made to ensure accuracy; however, inadvertent computerized transcription errors may be present. All questions and concerns were addressed to the patient/family. Alternatives to my treatment were discussed. Thank you for allowing to us to participate in the care or Edwyna Goldberg. Please call our service with questions.     Richard Bourgeois MD, [facility], [address]  The [address]  [address] [place] [id number]  Ph: [phone]  Fax: [phone]

## 2022-11-26 NOTE — PROGRESS NOTES
Internal Medicine Progress Note    Date: 2022   Patient: Jl Perry   Patient : 1935     CC: Leg Pain       Interval Hx   Pt seen at bedside. No acute overnight events reported. Reports some loss of appetite  BP: 90s/50s however patient asymptomatic; no lightheadedness or dizziness. Pt declined PT/OT yesterday. Ucx growing E.faecalis. RONNY- resolved;Cr 1.5 -> 1.0    HPI:   This is a 81-year-old female PMHx IBS, A. fib not on anticoagulation, DVT, osteoarthritis, gout, who had presented to the ED due to complaint of left leg pain. The history was obtained by the patient who is from a senior living facility and is a poor historian. As per the patient, last week she had felt a snap on her left leg that had started causing her a lot of pain. She complains of some fatigue lightheadedness and lower abdominal pain with difficulty urinating. Patient states that she has constant urge to urinate however has decreased urine output for the past couple of weeks. She denies any fever, chills, nausea, vomiting, flank pain, diarrhea, constipation, loss of consciousness, hematemesis, hematuria or hematochezia. The patient has recently become dependent . She was independent a week ago. Chart review states that the son had shown concern for possible trauma to her leg with patient denies any recent trauma. She denies any changes in appetite decreased p.o. intake or significant weight loss. The patient's son states that she has had significant decrease in her p.o. intake since the past couple of days. According to him her last known well was on Saturday, 2022 1 his brother had visited their mom. Patient is currently ambulating via wheelchair. Of note, patient was seen on 2022 at an orthopedic clinic for concern of left leg pain at [facility]. She had x-ray of her left lower extremity done which at that time did not show any fracture.   She was diagnosed with sprain of her anterior talofibular ligament of left ankle. She was given a walking boot     In the ED, the patient complained of bilateral leg pain greater on left than right. Her vitals were significant for tachycardia and EKG was significant for A. fib with RVR. Her physical exam was significant for some ecchymosis and tenderness of the left ankle and calcaneus, had normal range of motion. She was AOx3. Labs were significant for sodium of 129, bicarb 19, BUN 57, creatinine 1.5 baseline -0.8, CK2 78, proBNP 8905. White blood cell 14.7 hemoglobin 11.2, UA significant for positive leukocyte esterase and positive nitrites. CT PE was negative for pulmonary embolism. CTA abdomen was significant for urinary retention. Troponins were elevated 0.05x2 CT of ankle was significant for left calcaneus fracture. Patient was started on diltiazem drip in the ED along with morphine injection for pain, normal saline bolus, ceftriaxone. She also had a Burk's placed while in the ED. Medication:  Medications Prior to Admission: diphenoxylate-atropine (LOMOTIL) 2.5-0.025 MG per tablet, Take 1 tablet by mouth 4 times daily for 180 days. mirabegron (MYRBETRIQ) 50 MG TB24, Take 50 mg by mouth daily  tiZANidine (ZANAFLEX) 2 MG tablet, Take 1 tablet by mouth 3 times daily as needed (muscle spasm) (Patient not taking: Reported on 11/25/2022)    Allergies:  Amoxapine and related, Lisinopril, and Clindamycin/lincomycin      Objective     Vital Signs:  Patient Vitals for the past 8 hrs:   BP Temp Temp src Pulse Resp SpO2   11/26/22 0340 (!) 93/59 97.6 °F (36.4 °C) Axillary 87 18 94 %   11/25/22 2350 (!) 90/58 97.5 °F (36.4 °C) Axillary 81 19 95 %       Physical Exam  Constitutional:       Appearance: She is ill-appearing. HENT:      Mouth/Throat:      Mouth: Mucous membranes are moist.      Pharynx: Oropharynx is clear. Eyes:      Pupils: Pupils are equal, round, and reactive to light.    Cardiovascular:      Rate and Rhythm: Normal rate. Pulses: Normal pulses. Pulmonary:      Effort: Pulmonary effort is normal. No respiratory distress. Breath sounds: No wheezing. Abdominal:      General: Abdomen is flat. Bowel sounds are normal. There is no distension. Tenderness: There is abdominal tenderness (mild tenderness to right lower abdomen in suprapubic region. No guarding). Musculoskeletal:         General: No swelling. Right lower leg: No edema. Left lower leg: No edema. Comments: Boot in left lower extremity. Skin:     General: Skin is warm. Capillary Refill: Capillary refill takes less than 2 seconds. Neurological:      General: No focal deficit present. Mental Status: She is alert and oriented to person, place, and time. Labs:  CBC:   Recent Labs     11/24/22 1903 11/26/22  0652   WBC 14.7* 10.8   HGB 11.2* 10.1*   HCT 34.4* 31.2*    356       BMP:   Recent Labs     11/24/22 1904 11/25/22  1645 11/26/22  0652   * 133* 133*   K 4.8 4.2 4.5   CL 98* 103 104   CO2 19* 21 20*   BUN 57* 44* 39*   CREATININE 1.5* 1.0 1.0   GLUCOSE 132* 131* 156*   PHOS 2.4*  --   --      Magnesium:   Recent Labs     11/26/22  0008   MG 2.10     LFT's:   Recent Labs     11/24/22 1904   *   ALT 37   BILITOT 0.4   ALKPHOS 83       Troponin:   Recent Labs     11/24/22 1904 11/24/22  2118 11/25/22  0745   TROPONINI 0.05* 0.05* 0.03*     Lactic acid: No results for input(s): LACTATE in the last 72 hours. BNP: No results for input(s): BNP in the last 72 hours. Pro-BNP:   Recent Labs     11/24/22 1904   PROBNP 8,905*       Procalcitonin: No results for input(s): PROCAL in the last 72 hours. CRP: No results for input(s): CRP in the last 72 hours. ESR: No results for input(s): ESR in the last 72 hours. ABGs: No results for input(s): PHART, [id number], PO2ART in the last 72 hours.   VBGs:   Recent Labs     11/24/22 2118   PHVEN 7.360   KEZ6IIA 41.9   PO2VEN <30.0       INR: Recent Labs     11/24/22  1903   INR 1.21*       COVID-19:   Recent Labs     11/24/22  1956   COVID19 NOT DETECTED       U/A:   Recent Labs     11/24/22  2225   COLORU Yellow   PHUR 6.0   WBCUA *   RBCUA >100*   BACTERIA 3+*   CLARITYU CLOUDY*   SPECGRAV 1.025   LEUKOCYTESUR LARGE*   UROBILINOGEN 0.2   BILIRUBINUR Negative   BLOODU LARGE*   GLUCOSEU Negative       Radiology:  CT HEAD WO CONTRAST   Final Result      1. No acute intracranial process. 2.  Moderate cerebral atrophy and mild chronic small vessel ischemic disease. CT CHEST PULMONARY EMBOLISM W CONTRAST   Final Result      Chest:   1. No evidence of pulmonary embolism. 2.  Moderate to large hiatal hernia. 3.  Trace right pleural effusion and mild bilateral lower lobe atelectasis. Abdomen and pelvis:   1. Severe bilateral hydroureteronephrosis and moderate urinary bladder distention. Recommend correlation for bladder outlet obstruction versus urinary retention. 2.  Bilateral nephrolithiasis and 2 small nonobstructing calculi within the urinary bladder. 3.  Age-indeterminate L1 compression fracture with 30% height loss. 4.  Gallbladder distention without evidence of wall thickening or gallstones may represent gallbladder hydrops. Mild intrahepatic biliary ductal dilatation without significant common bile duct dilatation. 5.  Moderate right inguinal hernia containing nondilated small bowel. CT ANKLE LEFT WO CONTRAST   Final Result   Nondisplaced posterior calcaneal insufficiency fracture. CT ABDOMEN PELVIS W IV CONTRAST Additional Contrast? None   Final Result      Chest:   1. No evidence of pulmonary embolism. 2.  Moderate to large hiatal hernia. 3.  Trace right pleural effusion and mild bilateral lower lobe atelectasis. Abdomen and pelvis:   1. Severe bilateral hydroureteronephrosis and moderate urinary bladder distention.  Recommend correlation for bladder outlet obstruction versus urinary retention. 2.  Bilateral nephrolithiasis and 2 small nonobstructing calculi within the urinary bladder. 3.  Age-indeterminate L1 compression fracture with 30% height loss. 4.  Gallbladder distention without evidence of wall thickening or gallstones may represent gallbladder hydrops. Mild intrahepatic biliary ductal dilatation without significant common bile duct dilatation. 5.  Moderate right inguinal hernia containing nondilated small bowel. XR CHEST PORTABLE   Final Result      No acute pulmonary disease. Moderate hiatal hernia. Assessment & Plan     Acute metabolic encephalopathy   2/2 complicated UTI and urinary retention - improving   Patient appears slightly confused and in acute distress on presentation. CT abdomen (11/24) w/ significant for severe bilateral hydronephrosis with moderate urinary bladder distention  Urine culture: + Enterococcus faecalis. Sensitivities pending   -Ceftriaxone discontinued  -Unasyn 3 g every 6 hours whilst awaiting sensitivities  -Burk in place  -Strict input and output  -Pending PT/OT  -Urology consulted. Appreciate recs      A. fib with RVR -improving  Patient has history of A. fib in the past however was not on any anticoagulation EKG in the ED was significant for A. fib with RVR. Patient is hemodynamically stable pulse: 123, possible proBNP elevation acute distress, heart due to A.  Fib Echo:01/21/2020: 55-60%, normal LV, normal DD fxn, mild-mod MR/TR, normal LA/RA size    [id number] Score for Atrial Fibrillation Stroke Risk   Risk   Factors  Component Value   C CHF No 0   H HTN Yes 1   A2 Age >= 76 Yes,  (81 y.o.) 2   D DM No 0   S2 Prior Stroke/TIA No 0   V Vascular Disease No 0   A Age 74-69 No,  (81 y.o.) 0   Sc Sex female 1    [id number]  Score  4   Score last updated 11/26/22 9:95 AM EST    Click here for a link to the UpToDate guideline \"Atrial Fibrillation: Anticoagulation therapy to prevent embolization    Disclaimer: Risk Score calculation is dependent on accuracy of patient problem list and past encounter diagnosis.   -On heparin drip   Will transition to Eliquis today  -Diltiazem drip discontinued patient switched over to 30 mg p.o. Hold if SBP less than 90 or heart rate less than 60  -Continuous telemetry  -Cardiology following     RONNY, likely multifactorial; prerenal  2/2 hypovolemia and postobstructive  Patient appears altered, concerns for decreased p.o. intake. Creatinine on presentation 1.5 last creatinine in 2020: 0.8, BUN to creatinine ratio 38:1  Creatinine: 1.5 on admission to 1.0 today  -FeNA: 1.1 suggesting likely intrinsic   -Monitoring I/O's for now  -Avoid nephrotoxic agents  -Urine protein/creatinine ratio 4.8     Hyponatremia  Patient's sodium on admission 129, possible due to decreased p.o. intake  -Serum osmolarity 300 therefore this is likely a pseudohyponatremia  Sodium: 133 today  -We will continue monitoring sodium every 12 hourly  -We will give IV fluids as needed     Troponinemia 2/2 possible demand ischemia (resolving)  Patient is currently asymptomatic, troponin times two 0.05  -f/u Trops - decr to 0.03 - stop trending     Left calcaneal fracture  Patient presented with complaint of left leg pain, CT of the ankle showed left calcaneal fracture - Nondisplaced.  -Pain control for now     Normocytic anemia  Likely secondary to anemia of chronic disease   Hemoglobin 10.1: stable  per iron studies: Ferritin: 231, iron: 23, iron saturation: 9, TIBC: 254  Hemoglobin: 14.7, MCV 99.2 MCHC 32.5  -Vitamin B12 and folate within normal limits     Chronic medical problems  IBS  -On Lomotil - hold given no BM recently     Chronic back pain  -On tizanidine     Mixed incontinence  -On mirabegron-dose adjusted as per creatinine clearance    DVT PPx: Heparin drip  Diet: ADULT DIET;  Regular   Code status:  Limited     ELOS:  Barriers to discharge:  Disposition  - Preadmission: Senior citizen [facility]  - Current: [facility]  - Upon discharge: pending PT/OT.  Pt declined     Will discuss with attending physician Dr. Concepcion Jordan MD     _____________________  Harriett Tolliver MD   Internal Medicine Resident, PGY-2

## 2022-11-26 NOTE — CONSULTS
Urology Attending Consult Note      Reason for Consultation: UTI, urinary retention and bilateral hydronephrosis    History: Very pleasant 60-year-old female that presented with urinary tract infection. CT scan shows distended bladder and bilateral hydronephrosis. Has a Burk catheter in place. She describes urinary frequency and urgency prior to Burk catheter placement. Family History, Social History, Review of Systems:  Reviewed and agreed to as per chart    Vitals:  BP 98/65   Pulse 72   Temp 97.6 °F (36.4 °C) (Oral)   Resp 16   Ht 5' 2\" (1.575 m)   Wt 122 lb 5.7 oz (55.5 kg)   SpO2 93%   BMI 22.38 kg/m²   Temp  Av.4 °F (36.3 °C)  Min: 96.8 °F (36 °C)  Max: 97.6 °F (36.4 °C)    Intake/Output Summary (Last 24 hours) at 2022 1436  Last data filed at 2022 1022  Gross per 24 hour   Intake 1100 ml   Output 1350 ml   Net -250 ml         Physical:  Well developed, well nourished in no acute distress  Mood indicates no abnormalities. Pt doesnt appear depressed  Orientated to time and place  Neck is supple, trachea is midline  Respiratory effort is normal  Cardiovascular show no extremity swelling  Abdomen no masses or hernias are palpated, there is no tenderness. Liver and Spleen appear normal.  Skin show no abnormal lesions  Lymph nodes are not palpated in the inguinal, neck, or axillary area.     Female :   External Genitalia show no irritation or erythema  Urethral Meatus appears to be normal in size and location  Urethra is normal with no tenderness or masses  Bladder is non tender  Vagina has no masses or discharge        Labs:  WBC:    Lab Results   Component Value Date/Time    WBC 10.8 2022 06:52 AM     Hemoglobin/Hematocrit:    Lab Results   Component Value Date/Time    HGB 10.1 2022 06:52 AM    HCT 31.2 2022 06:52 AM     BMP:    Lab Results   Component Value Date/Time     2022 06:52 AM    K 4.5 2022 06:52 AM     2022 06:52 AM CO2 20 11/26/2022 06:52 AM    BUN 39 11/26/2022 06:52 AM    LABALBU 2.7 11/24/2022 07:04 PM    CREATININE 1.0 11/26/2022 06:52 AM    CALCIUM 8.4 11/26/2022 06:52 AM    GFRAA >60 01/22/2020 06:45 AM    GFRAA >60 04/26/2013 03:18 PM    LABGLOM 55 11/26/2022 06:52 AM     PT/INR:    Lab Results   Component Value Date/Time    PROTIME 15.3 11/24/2022 07:03 PM    INR 1.21 11/24/2022 07:03 PM     PTT:    Lab Results   Component Value Date/Time    APTT >180.0 11/26/2022 06:52 AM   [APTT        Urine Culture: enterococcus      Antibiotic Therapy: Unasyn    Imaging: CT scan  Abdomen and pelvis:   1. Severe bilateral hydroureteronephrosis and moderate urinary bladder distention. Recommend correlation for bladder outlet obstruction versus urinary retention. 2.  Bilateral nephrolithiasis and 2 small nonobstructing calculi within the urinary bladder. 3.  Age-indeterminate L1 compression fracture with 30% height loss. 4.  Gallbladder distention without evidence of wall thickening or gallstones may represent gallbladder hydrops. Mild intrahepatic biliary ductal dilatation without significant common bile duct dilatation. 5.  Moderate right inguinal hernia containing nondilated small bowel. Impression/Plan: An 46-year-old female with voiding dysfunction, urinary tract infection with retention and bilateral hydronephrosis  -We will follow-up on urine culture she is now growing out Enterococcus  -We will start on Flomax. Consider voiding trial once stronger and UTI treated.   Continue Burk catheter for now    Keren Patel MD

## 2022-11-26 NOTE — PLAN OF CARE
Problem: Discharge Planning  Goal: Discharge to home or other facility with appropriate resources  11/26/2022 0209 by Wojciech Sprague RN  Outcome: Maxim Shearer (Taken 11/25/2022 1955)  Discharge to home or other facility with appropriate resources:   Identify barriers to discharge with patient and caregiver   Arrange for needed discharge resources and transportation as appropriate   Identify discharge learning needs (meds, wound care, etc)   Refer to discharge planning if patient needs post-hospital services based on physician order or complex needs related to functional status, cognitive ability or social support system  11/25/2022 1852 by Bailee Diaz, RN  Outcome: Progressing  Flowsheets (Taken 11/25/2022 1852)  Discharge to home or other facility with appropriate resources:   Identify barriers to discharge with patient and caregiver   Arrange for needed discharge resources and transportation as appropriate  Note: Pt transitioned off IV dilt drip to po. Bps soft throughout day, MD aware. Pt still on hep drip. Pt also still needs urology to see her.       Problem: Pain  Goal: Verbalizes/displays adequate comfort level or baseline comfort level  11/26/2022 0209 by Wojciech Sprague RN  Outcome: Progressing  Flowsheets (Taken 11/25/2022 1955)  Verbalizes/displays adequate comfort level or baseline comfort level:   Encourage patient to monitor pain and request assistance   Assess pain using appropriate pain scale   Administer analgesics based on type and severity of pain and evaluate response   Implement non-pharmacological measures as appropriate and evaluate response   Consider cultural and social influences on pain and pain management   Notify Licensed Independent Practitioner if interventions unsuccessful or patient reports new pain  11/25/2022 1852 by Bailee Diaz, RN  Outcome: Progressing  Flowsheets (Taken 11/25/2022 1852)  Verbalizes/displays adequate comfort level or baseline comfort level:   Encourage patient to monitor pain and request assistance   Assess pain using appropriate pain scale   Administer analgesics based on type and severity of pain and evaluate response  Note: Pt reports frequent pain in L ankle. Given prn tylenol, repositioned frequently for comfort. Problem: Skin/Tissue Integrity  Goal: Absence of new skin breakdown  Description: 1. Monitor for areas of redness and/or skin breakdown  2. Assess vascular access sites hourly  3. Every 4-6 hours minimum:  Change oxygen saturation probe site  4. Every 4-6 hours:  If on nasal continuous positive airway pressure, respiratory therapy assess nares and determine need for appliance change or resting period. 11/26/2022 0209 by Adal Mojica RN  Outcome: Progressing  11/25/2022 1852 by Daphnie Parker RN  Outcome: Progressing  Note: Pt w redenned R heel, heel floated on pillow, will cont, to assess. Turned q2 hrs and skin assessed frequently.

## 2022-11-26 NOTE — PROGRESS NOTES
Physical Therapy  Facility/Department: [facility] [address] [facility]  Physical Therapy Initial Assessment and treatment    Name: Chris Christina  : 1935  MRN: [id number]  Date of Service: 2022    Discharge Recommendations:    Chris Christina scored a 6/24 on the AM-PAC short mobility form. Current research shows that an AM-PAC score of 17 or less is typically not associated with a discharge to the patient's home setting. Based on the patient's AM-PAC score and their current functional mobility deficits, it is recommended that the patient have 3-5 sessions per week of Physical Therapy at d/c to increase the patient's independence. Please see assessment section for further patient specific details. PT Equipment Recommendations  Equipment Needed: No (defer to next level of care)      Patient Diagnosis(es): The primary encounter diagnosis was Atrial fibrillation with RVR ([address] Diagnoses of Closed nondisplaced fracture of left calcaneus, unspecified portion of calcaneus, initial encounter, Urinary tract infection without hematuria, site unspecified, General weakness, Elevated troponin, RONNY (acute kidney injury) ([address] Urinary retention, and Intravenous infiltration, initial encounter were also pertinent to this visit. Past Medical History:  has a past medical history of Acute deep vein thrombosis (DVT) of left lower extremity after procedure ([address] Acute renal failure (ARF) (HCC), Anterior corneal dystrophy, Asymptomatic postmenopausal status (age-related) (natural), Cervical polyp, Dry eye syndrome, Edema, Fuch's endothelial dystrophy, Gout, Hip fracture requiring operative repair, left, closed, initial encounter ([address] IBS (irritable bowel syndrome), Mild cognitive impairment, Osteoarthritis, Osteopenia, Papilloma of breast, Pedal edema, Screening mammogram, Symptomatic menopausal or female climacteric states, Urinary retention, and Vaginal candidiasis.   Past Surgical History:  has a past surgical history that includes Appendectomy; eye surgery; and hip pinning (Left, 9/18/2019). Assessment   Body Structures, Functions, Activity Limitations Requiring Skilled Therapeutic Intervention: Decreased functional mobility ; Decreased safe awareness;Decreased endurance; Increased pain;Decreased balance;Decreased strength  Assessment: Patient tolerated session fair with mobility being limited due to increased pain and limited participation. Patient needing maximal verbal cues for encouragement and completion of tasks. She requires assist x 2 for all bed mobility and transfers, yelling in pain with all movements. Patient with calcaneal fracture on left (splinted at this time) which she has CAM boot for but boot is not present. Utilized NWB on LLE during transfer for safety. Patient is from home alone but reports having 24 hour aide services for the last week or so. Unsure of full prior level of function up until then as patient gives multiple different explanations of mobility. She is now functioning below baseline level. Recommend continued inpatient PT upon discharge. Will follow while in acute care setting to improve functional mobility.   Treatment Diagnosis: impaired mobility associated with urinary retention  Therapy Prognosis: Fair  Decision Making: Medium Complexity  Requires PT Follow-Up: Yes     Plan   Physcial Therapy Plan  General Plan:  (2-5)  Current Treatment Recommendations: Strengthening, Balance training, Functional mobility training, Transfer training, Gait training, Endurance training, Safety education & training, Patient/Caregiver education & training, Therapeutic activities  Safety Devices  Type of Devices: Left in chair, Chair alarm in place, Call light within reach, Gait belt, Nurse notified     Restrictions  Position Activity Restriction  Other position/activity restrictions: up with assistance; Per MD progress note \"Left calcaneal fracture  Patient presented with complaint of left leg pain, CT of the ankle showed left calcaneal fracture - Nondisplaced.  -Pain control for now\"     Subjective   Pain: Patient complaining of pain \"everywhere\" with all moblity, RN aware and present in room at end of session  General  Chart Reviewed: Yes  Patient assessed for rehabilitation services?: Yes  Additional Pertinent Hx: This is a 69-year-old female PMHx IBS, A. fib not on anticoagulation, DVT, osteoarthritis, gout, who had presented to the ED due to complaint of left leg pain. CT of ankle was significant for left calcaneus fracture  Response To Previous Treatment: Not applicable  Family / Caregiver Present: No  Referring Practitioner: German Natarajan MD  Referral Date : 11/25/22  Diagnosis: urinary retention  Follows Commands: Within Functional Limits  General Comment  Comments: Patient supine in bed upon arrival.  Subjective  Subjective: Patient agreeable to PT evaluation with MAXIMAL encouragement. Social/Functional History  Social/Functional History  Lives With: Alone  Type of Home: Apartment  Home Layout: One level  Home Access: Level entry  Bathroom Shower/Tub: Walk-in shower  Bathroom Toilet: Standard  Bathroom Equipment: Grab bars in shower, Shower chair, Hand-held shower, Grab bars around toilet  Home Equipment: Walker, rolling, [address]  ADL Assistance: Needs assistance (has aide 24 hours/day for the last week)  Homemaking Assistance: Needs assistance (has aide 24 hours/day for the last week)  Ambulation Assistance: Independent (uses walker vs wheel chair recently)  Transfer Assistance: Independent  Active : No  Vision/Hearing  Vision  Vision: Within Functional Limits  Hearing  Hearing: Within functional limits    Cognition   Orientation  Overall Orientation Status: Within Functional Limits  Cognition  Overall Cognitive Status: Exceptions  Arousal/Alertness: Appropriate responses to stimuli  Following Commands:  Follows one step commands with increased time  Safety Judgement: Decreased awareness of need for safety  Problem Solving: Assistance required to generate solutions  Insights: Decreased awareness of deficits  Initiation: Requires cues for all  Sequencing: Requires cues for some  Cognition Comment: patient anxious regarding mobility due to pain     Objective   Heart Rate: 72  Heart Rate Source: Monitor  BP: 98/65  BP Location: Left upper arm  BP Method: Automatic  Patient Position: Semi fowlers  MAP (Calculated): 76  Resp: 16  SpO2: 93 %  O2 Device: None (Room air)              AROM RLE (degrees)  RLE AROM: Exceptions  RLE General AROM: unable to fully assess due to patient with minimal participation in evaluation - PROM WFL for mobility performed during session  AROM LLE (degrees)  LLE AROM : Exceptions  LLE General AROM: unable to fully assess due to patient with minimal participation in evaluation - PROM WFL for mobility performed during session - left foot/ankle not assessed - splint in place due to calcaneus fracture  Strength RLE  Strength RLE: Exception  Comment: minimal active movement noted throughout session, ? weakness vs pain limiting  Strength LLE  Strength LLE: Exception  Comment: minimal active movement noted throughout session, ? weakness vs pain limiting           Bed mobility  Supine to Sit: Dependent/Total;2 Person assistance (max assist x 2, head of bed elevated, increased time to complete with multiple rest breaks due to pain)  Scooting: Maximal assistance (to EOB; total assist x 2 for positioning in chair)  Bed Mobility Comments: patient sitting up in bedside chair at end of session  Transfers  Sit to Stand: Dependent/Total;2 Person Assistance (mod x 1 and max x 1)  Stand to Sit: Dependent/Total;2 Person Assistance (mod x 1 and max x 1)  Bed to Chair: Dependent/Total;2 Person Assistance (mod x 1 and max x 1)        Balance  Sitting - Static: Poor (max assist initially progressing to min assist; right sided trunk lean)  Standing - Static: Poor (max x 1 and mod x 1)  Standing - Dynamic: Poor (max x 1 and mod x 1 for stand pivot to bedside chair)           OutComes Score                                                  AM-PAC Score  AM-PAC Inpatient Mobility Raw Score : 6 (11/26/22 1352)  AM-PAC Inpatient T-Scale Score : 23.55 (11/26/22 1352)  Mobility Inpatient CMS 0-100% Score: 100 (11/26/22 1352)  Mobility Inpatient CMS G-Code Modifier : CN (11/26/22 1352)          T[place] Score       Goals  Short Term Goals  Time Frame for Short Term Goals: discharge  Short Term Goal 1: patient will perform bed mobility with moderate assistance  Short Term Goal 2: patient will perform transfers sit<>stand with moderate assistance  Short Term Goal 3: patient will perform transfers bed<>chair with moderate assistance  Short Term Goal 4: patient will sit EOB x 5 minutes with supervision  Patient Goals   Patient Goals : none stated       Education  Patient Education  Education Given To: Patient  Education Provided: Role of Therapy;Plan of Care; Fall Prevention Strategies;Transfer Training  Education Method: Verbal  Barriers to Learning: None  Education Outcome: Continued education needed      Therapy Time   Individual Concurrent Group Co-treatment   Time In 200         Time Out 1250         Minutes 41         Timed Code Treatment Minutes: 26 Minutes   Timed Code Treatment Minutes:  26 Minutes    Total Treatment Minutes:    26 minutes treatment + 15 minutes evaluation = 41 total treatment minutes  If patient discharges prior to next session this note will serve as a discharge summary. Please see below for the latest assessment towards goals.     [address]

## 2022-11-26 NOTE — PLAN OF CARE
Problem: Discharge Planning  Goal: Discharge to home or other facility with appropriate resources  Outcome: Progressing     Problem: Pain  Goal: Verbalizes/displays adequate comfort level or baseline comfort level  Outcome: Progressing  Flowsheets (Taken 11/26/2022 1610)  Verbalizes/displays adequate comfort level or baseline comfort level:   Encourage patient to monitor pain and request assistance   Administer analgesics based on type and severity of pain and evaluate response   Assess pain using appropriate pain scale   Implement non-pharmacological measures as appropriate and evaluate response     Problem: Skin/Tissue Integrity  Goal: Absence of new skin breakdown  Description: 1. Monitor for areas of redness and/or skin breakdown  2. Assess vascular access sites hourly  3. Every 4-6 hours minimum:  Change oxygen saturation probe site  4. Every 4-6 hours:  If on nasal continuous positive airway pressure, respiratory therapy assess nares and determine need for appliance change or resting period.   Note: Tissue integrity maintained  continue monitor and assess

## 2022-11-27 LAB
ANION GAP SERPL CALCULATED.3IONS-SCNC: 14 MMOL/L (ref 3–16)
APTT: 63.5 SEC (ref 23–34.3)
APTT: 76.1 SEC (ref 23–34.3)
BASOPHILS ABSOLUTE: 0 K/UL (ref 0–0.2)
BASOPHILS RELATIVE PERCENT: 0.1 %
BUN BLDV-MCNC: 29 MG/DL (ref 7–20)
CALCIUM SERPL-MCNC: 8.4 MG/DL (ref 8.3–10.6)
CHLORIDE BLD-SCNC: 105 MMOL/L (ref 99–110)
CO2: 17 MMOL/L (ref 21–32)
CREAT SERPL-MCNC: 1.1 MG/DL (ref 0.6–1.2)
EOSINOPHILS ABSOLUTE: 0.1 K/UL (ref 0–0.6)
EOSINOPHILS RELATIVE PERCENT: 0.9 %
GFR SERPL CREATININE-BSD FRML MDRD: 49 ML/MIN/{1.73_M2}
GLUCOSE BLD-MCNC: 121 MG/DL (ref 70–99)
HCT VFR BLD CALC: 30.8 % (ref 36–48)
HEMOGLOBIN: 9.8 G/DL (ref 12–16)
LYMPHOCYTES ABSOLUTE: 0.8 K/UL (ref 1–5.1)
LYMPHOCYTES RELATIVE PERCENT: 6.9 %
MAGNESIUM: 1.9 MG/DL (ref 1.8–2.4)
MCH RBC QN AUTO: 32.4 PG (ref 26–34)
MCHC RBC AUTO-ENTMCNC: 31.7 G/DL (ref 31–36)
MCV RBC AUTO: 102.1 FL (ref 80–100)
MONOCYTES ABSOLUTE: 1.2 K/UL (ref 0–1.3)
MONOCYTES RELATIVE PERCENT: 10.5 %
NEUTROPHILS ABSOLUTE: 9.3 K/UL (ref 1.7–7.7)
NEUTROPHILS RELATIVE PERCENT: 81.6 %
PDW BLD-RTO: 13.6 % (ref 12.4–15.4)
PLATELET # BLD: 363 K/UL (ref 135–450)
PMV BLD AUTO: 7.1 FL (ref 5–10.5)
POTASSIUM SERPL-SCNC: 4.1 MMOL/L (ref 3.5–5.1)
RBC # BLD: 3.01 M/UL (ref 4–5.2)
SODIUM BLD-SCNC: 136 MMOL/L (ref 136–145)
WBC # BLD: 11.4 K/UL (ref 4–11)

## 2022-11-27 PROCEDURE — 80048 BASIC METABOLIC PNL TOTAL CA: CPT

## 2022-11-27 PROCEDURE — 85730 THROMBOPLASTIN TIME PARTIAL: CPT

## 2022-11-27 PROCEDURE — 6370000000 HC RX 637 (ALT 250 FOR IP): Performed by: INTERNAL MEDICINE

## 2022-11-27 PROCEDURE — 6370000000 HC RX 637 (ALT 250 FOR IP): Performed by: UROLOGY

## 2022-11-27 PROCEDURE — 6370000000 HC RX 637 (ALT 250 FOR IP)

## 2022-11-27 PROCEDURE — 6360000002 HC RX W HCPCS

## 2022-11-27 PROCEDURE — 85025 COMPLETE CBC W/AUTO DIFF WBC: CPT

## 2022-11-27 PROCEDURE — 83735 ASSAY OF MAGNESIUM: CPT

## 2022-11-27 PROCEDURE — 2580000003 HC RX 258

## 2022-11-27 PROCEDURE — 2580000003 HC RX 258: Performed by: STUDENT IN AN ORGANIZED HEALTH CARE EDUCATION/TRAINING PROGRAM

## 2022-11-27 PROCEDURE — 2060000000 HC ICU INTERMEDIATE R&B

## 2022-11-27 PROCEDURE — 36415 COLL VENOUS BLD VENIPUNCTURE: CPT

## 2022-11-27 PROCEDURE — 99452 NTRPROF PH1/NTRNET/EHR RFRL: CPT | Performed by: INTERNAL MEDICINE

## 2022-11-27 PROCEDURE — 6360000002 HC RX W HCPCS: Performed by: STUDENT IN AN ORGANIZED HEALTH CARE EDUCATION/TRAINING PROGRAM

## 2022-11-27 RX ORDER — METOPROLOL TARTRATE 50 MG/1
50 TABLET, FILM COATED ORAL 2 TIMES DAILY
Status: DISCONTINUED | OUTPATIENT
Start: 2022-11-27 | End: 2022-11-29

## 2022-11-27 RX ADMIN — AMPICILLIN SODIUM AND SULBACTAM SODIUM 3000 MG: 2; 1 INJECTION, POWDER, FOR SOLUTION INTRAMUSCULAR; INTRAVENOUS at 01:37

## 2022-11-27 RX ADMIN — AMPICILLIN SODIUM AND SULBACTAM SODIUM 3000 MG: 2; 1 INJECTION, POWDER, FOR SOLUTION INTRAMUSCULAR; INTRAVENOUS at 14:17

## 2022-11-27 RX ADMIN — TROSPIUM CHLORIDE 20 MG: 20 TABLET, FILM COATED ORAL at 20:07

## 2022-11-27 RX ADMIN — TAMSULOSIN HYDROCHLORIDE 0.4 MG: 0.4 CAPSULE ORAL at 07:35

## 2022-11-27 RX ADMIN — APIXABAN 2.5 MG: 2.5 TABLET, FILM COATED ORAL at 10:11

## 2022-11-27 RX ADMIN — AMPICILLIN SODIUM AND SULBACTAM SODIUM 3000 MG: 2; 1 INJECTION, POWDER, FOR SOLUTION INTRAMUSCULAR; INTRAVENOUS at 20:10

## 2022-11-27 RX ADMIN — HEPARIN SODIUM 1500 UNITS: 1000 INJECTION INTRAVENOUS; SUBCUTANEOUS at 07:22

## 2022-11-27 RX ADMIN — APIXABAN 2.5 MG: 2.5 TABLET, FILM COATED ORAL at 20:07

## 2022-11-27 RX ADMIN — AMPICILLIN SODIUM AND SULBACTAM SODIUM 3000 MG: 2; 1 INJECTION, POWDER, FOR SOLUTION INTRAMUSCULAR; INTRAVENOUS at 07:47

## 2022-11-27 RX ADMIN — SODIUM CHLORIDE, PRESERVATIVE FREE 10 ML: 5 INJECTION INTRAVENOUS at 20:07

## 2022-11-27 RX ADMIN — SODIUM CHLORIDE, PRESERVATIVE FREE 10 ML: 5 INJECTION INTRAVENOUS at 07:35

## 2022-11-27 RX ADMIN — METOPROLOL TARTRATE 25 MG: 25 TABLET, FILM COATED ORAL at 07:35

## 2022-11-27 ASSESSMENT — PAIN SCALES - GENERAL
PAINLEVEL_OUTOF10: 0
PAINLEVEL_OUTOF10: 0

## 2022-11-27 NOTE — PLAN OF CARE
Problem: Discharge Planning  Goal: Discharge to home or other facility with appropriate resources  11/27/2022 0037 by Mayelin Espana RN  Outcome: Progressing   Patient to be discharged when medically stable. Problem: Pain  Goal: Verbalizes/displays adequate comfort level or baseline comfort level  11/27/2022 0037 by Mayelin Espana RN  Outcome: Progressing   C/o pain when repositioning but refuses repositioning most of shift. Repositioning encouraged to prevent skin breakdown. Denies need for pain medication. Continue to monitor. Problem: Skin/Tissue Integrity  Goal: Absence of new skin breakdown  Description: 1. Monitor for areas of redness and/or skin breakdown  2. Assess vascular access sites hourly  3. Every 4-6 hours minimum:  Change oxygen saturation probe site  4. Every 4-6 hours:  If on nasal continuous positive airway pressure, respiratory therapy assess nares and determine need for appliance change or resting period. 11/27/2022 0037 by Mayelin Espana RN  Outcome: Progressing  Encouraging repositioning q 2 hours but patient refusing frequently. Continue to encouraged repositioning. Problem: Cardiovascular - Adult  Goal: Maintains optimal cardiac output and hemodynamic stability  Outcome: Progressing   A-fib on tele. Currently on heparin gtt and po metoprolol.

## 2022-11-27 NOTE — PLAN OF CARE
Problem: Discharge Planning  Goal: Discharge to home or other facility with appropriate resources  11/27/2022 1039 by Garrett Fletcher RN  Outcome: Progressing  Flowsheets (Taken 11/27/2022 1038)  Discharge to home or other facility with appropriate resources: Identify barriers to discharge with patient and caregiver  Note: Pt transitioned off hep drip to eliquis, still receiving iv abx. Pt has not had a BM since last week and with very poor po. Not ready for dc at this time. Problem: Pain  Goal: Verbalizes/displays adequate comfort level or baseline comfort level  11/27/2022 1039 by Garrett Fletcher RN  Outcome: Progressing  Flowsheets (Taken 11/27/2022 1039)  Verbalizes/displays adequate comfort level or baseline comfort level:   Encourage patient to monitor pain and request assistance   Assess pain using appropriate pain scale  Note: Encouraged leg mobility. Offered frequent repositioning. Will cont to assess pain level. Problem: Safety - Adult  Goal: Free from fall injury  Outcome: Progressing  Flowsheets (Taken 11/27/2022 1039)  Free From Fall Injury: Instruct family/caregiver on patient safety  Note: Fall precautions in place. Encouraged to use call light for assistance. Free from injury and falls.

## 2022-11-27 NOTE — CONSULTS
Ortho Plan of Care    I reviewed the patient's ankle CT demonstrating a nondisplaced extraarticular calcaneus insufficiency fracture  Non operative management  WBAT  Recommend fitting the patient with a fracture boot to be worn when ambulating; ok to remove boot when not ambulating  PT/OT consult  Pain control and DVT prophylaxis per the patient's primary admitting team  Medical management  Discharge planning  Follow up outpatient 1-2 weeks after hospital discharge for repeat radiographs  Full consult to follow

## 2022-11-27 NOTE — PROGRESS NOTES
Pt w 2 back to back loose stools mixed w formed stool. Per pt this is first BM in over a week. Pt requesting we give limotil. Hola served MD who says to continue to hold.

## 2022-11-27 NOTE — PROGRESS NOTES
Cardiology Consult Service  Daily Progress Note           Admit Date:  11/24/2022  Primary cardiologist: Dr Joelle Valentin, new     Reason for Consultation/Chief Complaint: AFRVR     Subjective:      Juanita Richardson is a 81 y.o. female with a past medical history of code status DNR/I, h/o AF not on AC, HFpEF, poor historian. Echo 01/2020: Normal LV, EF 60%, indeterminate diastolic function due to A. fib, normal RV, mild to moderate MR, moderate TR. No recent ischemic evaluation    Patient was brought to the emergency room with complaints of left lower extremity pain, fatigue and lightheadedness, confusion. She was admitted for left calcaneal fracture, AF RVR, UTI (possible pyelonephritis due to bladder outlet obstruction), RONNY, hyponatremia. Cardiology was consulted for the atrial fibrillation. Patient remains off supplemental oxygen. Creatinine at 1.5 (baseline 0.8), troponin 0.05, repeat troponin 0.03, proBNP 8900. Albumin 2.7, normal TSH, WBC 14 with neutrophilic predominance, UA suggestive of UTI. CTA chest/abdomen/pelvis consistent with no evidence of PE, large hiatal hernia, bilateral hydronephrosis suggestive of bladder outlet obstruction. ECG11/24/22 c/w AFRVR 156 bpm with no ischemic changes. Interval history:  EMR was reviewed. Patient remains in A. fib with relatively controlled rate  bpm.  Creatinine stable at 1.1. She remains on heparin drip, hemoglobin relatively stable around 10. Assessment & Plan:      1. AF RVR. Atrial fibrillation is of unknown duration at this time. Patient has been off anticoagulation. She was given IV Cardizem with good response. 2.  UTI, possible pyelonephritis and sepsis, possible bladder outlet obstruction. On IV antibiotics, Burk and IV fluids per primary team.  3.  RONNY. Most likely due to UTI. 4.  Left calcaneal fracture, conservative approach  5. Hyponatremia, most likely due to cardiorenal syndrome  6. Chronic HFpEF.   Patient is euvolemic and hemodynamically tenuous. - Will increase lopressor to 50 bid  -We will stop heparin drip and place patient on Eliquis 2.5 mg p.o. twice daily.  - Agree with antibiotics and Burk      Patient may be discharged to home or facility (if hemo stable and ok with primary team) tomorrow on the above meds and follow up with me in 1 week with a BMP prior to visit. Please call me with any questions. I would like to thank you for providing me the opportunity to participate in the care of your patient. If you have any questions, please do not hesitate to contact me.      Rene Bella MD, [facility] [place], [address]  The [address]  [address] [address]  Ph: [phone]  Fax: [phone]

## 2022-11-27 NOTE — PROGRESS NOTES
Progress Note    Admit Date: 11/24/2022  Day: 1  Diet: ADULT DIET; Regular  ADULT ORAL NUTRITION SUPPLEMENT; Breakfast, Dinner; Standard High Calorie/High Protein Oral Supplement    Interval history:     Pt seen at bedside, YOANDY. Reports LLE pain is mild if immobile. Reports appetite is still poor, but is drinking a lot of fluids. Otherwise denies CP, SOB, abd pain, H/A, diarrhea. BP more stable 110s/60s overnight, VSS on RA. Labs show     Ucx Enterococcus faecalis, Unasyn x7d (11/26 - )    On Lopressor BID 25 > incr to 50, BP better  Switched to Eliquis 2.5 BID today    HPI:  This is a 44-year-old female PMHx IBS, A. fib not on anticoagulation, DVT, osteoarthritis, gout, who had presented to the ED due to complaint of left leg pain. The history was obtained by the patient who is from a senior living facility and is a poor historian. As per the patient, last week she had felt a snap on her left leg that had started causing her a lot of pain. She complains of some fatigue lightheadedness and lower abdominal pain with difficulty urinating. Patient states that she has constant urge to urinate however has decreased urine output for the past couple of weeks. She denies any fever, chills, nausea, vomiting, flank pain, diarrhea, constipation, loss of consciousness, hematemesis, hematuria or hematochezia. The patient has recently become dependent 24/7. She was independent a week ago. Chart review states that the son had shown concern for possible trauma to her leg with patient denies any recent trauma. She denies any changes in appetite decreased p.o. intake or significant weight loss. The patient's son states that she has had significant decrease in her p.o. intake since the past couple of days. According to him her last known well was on Saturday, 11/19/2022 1 his brother had visited their mom. Patient is currently ambulating via wheelchair.      Of note, patient was seen on 11/22/2022 at an orthopedic clinic for concern of left leg pain at [facility]. She had x-ray of her left lower extremity done which at that time did not show any fracture. She was diagnosed with sprain of her anterior talofibular ligament of left ankle. She was given a walking boot     In the ED, the patient complained of bilateral leg pain greater on left than right. Her vitals were significant for tachycardia and EKG was significant for A. fib with RVR. Her physical exam was significant for some ecchymosis and tenderness of the left ankle and calcaneus, had normal range of motion. She was AOx3. Labs were significant for sodium of 129, bicarb 19, BUN 57, creatinine 1.5 baseline -0.8, CK2 78, proBNP 8905. White blood cell 14.7 hemoglobin 11.2, UA significant for positive leukocyte esterase and positive nitrites. CT PE was negative for pulmonary embolism. CTA abdomen was significant for urinary retention. Troponins were elevated 0.05x2 CT of ankle was significant for left calcaneus fracture. Patient was started on diltiazem drip in the ED along with morphine injection for pain, normal saline bolus, ceftriaxone. She also had a Burk's placed while in the ED.          Medications:     Scheduled Meds:   metoprolol tartrate  50 mg Oral BID    apixaban  2.5 mg Oral BID    ampicillin-sulbactam  3,000 mg IntraVENous Q6H    tamsulosin  0.4 mg Oral Daily    [Held by provider] diphenoxylate-atropine  1 tablet Oral 4x Daily    trospium  20 mg Oral Nightly    sodium chloride flush  5-40 mL IntraVENous 2 times per day    lidocaine  2 patch TransDERmal Daily     Continuous Infusions:   sodium chloride      dilTIAZem Stopped (11/25/22 1221)     PRN Meds:tiZANidine, sodium chloride flush, sodium chloride, ondansetron **OR** ondansetron, polyethylene glycol, acetaminophen **OR** acetaminophen    Objective:   Vitals:   T-max:  Patient Vitals for the past 8 hrs:   BP Temp Temp src Pulse Resp SpO2 Weight   11/27/22 0727 109/69 98.4 °F (36.9 °C) Oral (!) 101 16 95 % --   11/27/22 0433 110/67 98.2 °F (36.8 °C) Oral 99 16 94 % 127 lb 6.8 oz (57.8 kg)       Intake/Output Summary (Last 24 hours) at 11/27/2022 1006  Last data filed at 11/27/2022 0455  Gross per 24 hour   Intake 840 ml   Output 1175 ml   Net -335 ml       Physical Exam  Constitutional:       General: She is not in acute distress. Appearance: She is not ill-appearing. HENT:      Head: Normocephalic and atraumatic. Eyes:      Extraocular Movements: Extraocular movements intact. Pupils: Pupils are equal, round, and reactive to light. Cardiovascular:      Rate and Rhythm: Tachycardia present. Rhythm irregular. Pulses: Normal pulses. Heart sounds: Normal heart sounds. Pulmonary:      Effort: Pulmonary effort is normal. No respiratory distress. Breath sounds: Normal breath sounds. No wheezing. Abdominal:      Palpations: Abdomen is soft. Tenderness: There is no abdominal tenderness. Musculoskeletal:         General: Signs of injury present. Cervical back: Normal range of motion. Right lower leg: No edema. Left lower leg: No edema. Comments: LLE tenderness, in ACE wrap   Skin:     General: Skin is warm and dry. Neurological:      General: No focal deficit present. Mental Status: She is alert and oriented to person, place, and time.       Comments: Motor strength UE 5/5, RLE 4/5, LLE not tested d/t pain         LABS:    CBC:   Recent Labs     11/24/22  1903 11/26/22  0652 11/27/22  0618   WBC 14.7* 10.8 11.4*   HGB 11.2* 10.1* 9.8*   HCT 34.4* 31.2* 30.8*    356 363   MCV 99.2 100.1* 102.1*     Renal:    Recent Labs     11/24/22  1904 11/25/22  1645 11/26/22  0008 11/26/22  0652 11/27/22  0617   * 133*  --  133* 136   K 4.8 4.2  --  4.5 4.1   CL 98* 103  --  104 105   CO2 19* 21  --  20* 17*   BUN 57* 44*  --  39* 29*   CREATININE 1.5* 1.0  --  1.0 1.1   GLUCOSE 132* 131*  --  156* 121*   CALCIUM 8.4 8.2*  --  8.4 8.4   MG  --   --  2.10  --  1.90   PHOS 2.4*  --   --   --   --    ANIONGAP 12 9  --  9 14     Hepatic:   Recent Labs     11/24/22 1904   *   ALT 37   BILITOT 0.4   BILIDIR <0.2   PROT 6.4   LABALBU 2.7*   ALKPHOS 83     Troponin:   Recent Labs     11/24/22 1904 11/24/22 2118 11/25/22  0745   TROPONINI 0.05* 0.05* 0.03*     BNP: No results for input(s): BNP in the last 72 hours. Lipids: No results for input(s): CHOL, HDL in the last 72 hours. Invalid input(s): LDLCALCU, TRIGLYCERIDE  ABGs:  No results for input(s): PHART, UDZ4YPZ, PO2ART, SMF4BCZ, BEART, THGBART, K3BSMOJD, LQJ8IUB in the last 72 hours. INR:   Recent Labs     11/24/22 1903   INR 1.21*     Lactate: No results for input(s): LACTATE in the last 72 hours. Cultures:  -----------------------------------------------------------------  RAD:   CT HEAD WO CONTRAST   Final Result      1. No acute intracranial process. 2.  Moderate cerebral atrophy and mild chronic small vessel ischemic disease. CT CHEST PULMONARY EMBOLISM W CONTRAST   Final Result      Chest:   1. No evidence of pulmonary embolism. 2.  Moderate to large hiatal hernia. 3.  Trace right pleural effusion and mild bilateral lower lobe atelectasis. Abdomen and pelvis:   1. Severe bilateral hydroureteronephrosis and moderate urinary bladder distention. Recommend correlation for bladder outlet obstruction versus urinary retention. 2.  Bilateral nephrolithiasis and 2 small nonobstructing calculi within the urinary bladder. 3.  Age-indeterminate L1 compression fracture with 30% height loss. 4.  Gallbladder distention without evidence of wall thickening or gallstones may represent gallbladder hydrops. Mild intrahepatic biliary ductal dilatation without significant common bile duct dilatation. 5.  Moderate right inguinal hernia containing nondilated small bowel.       CT ANKLE LEFT WO CONTRAST   Final Result   Nondisplaced posterior calcaneal insufficiency fracture. CT ABDOMEN PELVIS W IV CONTRAST Additional Contrast? None   Final Result      Chest:   1. No evidence of pulmonary embolism. 2.  Moderate to large hiatal hernia. 3.  Trace right pleural effusion and mild bilateral lower lobe atelectasis. Abdomen and pelvis:   1. Severe bilateral hydroureteronephrosis and moderate urinary bladder distention. Recommend correlation for bladder outlet obstruction versus urinary retention. 2.  Bilateral nephrolithiasis and 2 small nonobstructing calculi within the urinary bladder. 3.  Age-indeterminate L1 compression fracture with 30% height loss. 4.  Gallbladder distention without evidence of wall thickening or gallstones may represent gallbladder hydrops. Mild intrahepatic biliary ductal dilatation without significant common bile duct dilatation. 5.  Moderate right inguinal hernia containing nondilated small bowel. XR CHEST PORTABLE   Final Result      No acute pulmonary disease. Moderate hiatal hernia. Assessment/Plan:     Acute metabolic encephalopathy 2/2 complicated UTI and urinary retention (AMS improved)  Patient appears slightly confused and in acute distress on presentation. Patient endorses lower abdominal pain with complaint of increased frequency, leukocytosis white blood cell 14.7, UA significant for urine nitrites and leukocyte Estrace. CT abdomen significant for severe bilateral hydronephrosis with moderate urinary bladder distention  -Miranda in place  -Monitoring I's and O's  -f/u Urine Culture - +ve Enterococcus faecalis  - now on Unasyn x7d (11/26- )  -Urology consulted   - miranda for now, possible voiding trial once infx Tx     A. fib with RVR  Patient has history of A. fib in the past however was not on any anticoagulation EKG in the ED was significant for A. fib with RVR. Patient is hemodynamically stable pulse: 123, possible proBNP elevation acute distress, heart due to A.  Fib Echo:01/21/2020: 55-60%, normal LV, normal DD fxn, mild-mod MR/TR, normal LA/RA size. CHADSVASc: 4  - hep gtt - switched to Eliquis today. - now on Lopressor 25 BID, BP better  - monitor tele  - Cardiology consulted, appreciate recs     RONNY, likely prerenal 2/2 hypovolemia and post obstr (resolved)  Patient appears altered, concerns for decreased p.o. intake.   Creatinine on presentation 1.5 last creatinine in 2020: 0.8, BUN to creatinine ratio 38:1  -Monitoring I/O's for now - UOP good  -Avoid nephrotoxic agents  -Ordered urine protein/creatinine - 4.8  -Got contrast yesterday - will cont fluids for now     Hyponatremia (improving)  Patient's sodium on admission 129, possible due to decreased p.o. intake  -Ordered urine osmolality, serum osmolality, urine sodium  -We will continue monitoring sodium every 12 hourly  -IVF     Troponinemia 2/2 possible demand ischemia (resolved)  Patient is currently asymptomatic, troponin times two 0.05  -f/u Trops - decr to 0.03 - stop trending     Left calcaneal fracture  Patient presented with complaint of left leg pain, CT of the ankle showed left calcaneal fracture - Nondisplaced.  -Pain control for now  - LLE wrapped  - Ortho consulted     Normocytic anemia  Hemoglobin: 14.7, MCV 99.2 MCHC 32.5  -f/u iron studies  - f/u B12/Folate - WNL     Chronic medical problems  IBS  -On Lomotil - hold given no BM recently     Chronic back pain  -On tizanidine     Mixed incontinence  -On mirabegron-dose adjusted as per creatinine clearance    Code Status: Limited x4  FEN: Regular diet  PPX: heparin gtt  DISPO: Sean Huber MD, PGY-1  11/27/22  10:06 AM    This patient has been staffed and discussed with Bill Alvarado MD.

## 2022-11-28 ENCOUNTER — TELEPHONE (OUTPATIENT)
Dept: FAMILY MEDICINE CLINIC | Age: 87
End: 2022-11-28

## 2022-11-28 LAB
ANION GAP SERPL CALCULATED.3IONS-SCNC: 9 MMOL/L (ref 3–16)
BASOPHILS ABSOLUTE: 0 K/UL (ref 0–0.2)
BASOPHILS RELATIVE PERCENT: 0.2 %
BUN BLDV-MCNC: 21 MG/DL (ref 7–20)
CALCIUM SERPL-MCNC: 8.3 MG/DL (ref 8.3–10.6)
CHLORIDE BLD-SCNC: 106 MMOL/L (ref 99–110)
CO2: 22 MMOL/L (ref 21–32)
CREAT SERPL-MCNC: 0.7 MG/DL (ref 0.6–1.2)
EKG ATRIAL RATE: 96 BPM
EKG DIAGNOSIS: NORMAL
EKG Q-T INTERVAL: 268 MS
EKG QRS DURATION: 66 MS
EKG QTC CALCULATION (BAZETT): 376 MS
EKG R AXIS: -22 DEGREES
EKG T AXIS: 28 DEGREES
EKG VENTRICULAR RATE: 119 BPM
EOSINOPHILS ABSOLUTE: 0.2 K/UL (ref 0–0.6)
EOSINOPHILS RELATIVE PERCENT: 2.2 %
GFR SERPL CREATININE-BSD FRML MDRD: >60 ML/MIN/{1.73_M2}
GLUCOSE BLD-MCNC: 126 MG/DL (ref 70–99)
HCT VFR BLD CALC: 28.5 % (ref 36–48)
HEMOGLOBIN: 9.5 G/DL (ref 12–16)
LYMPHOCYTES ABSOLUTE: 0.9 K/UL (ref 1–5.1)
LYMPHOCYTES RELATIVE PERCENT: 11.8 %
MAGNESIUM: 1.9 MG/DL (ref 1.8–2.4)
MCH RBC QN AUTO: 32.7 PG (ref 26–34)
MCHC RBC AUTO-ENTMCNC: 33.4 G/DL (ref 31–36)
MCV RBC AUTO: 97.9 FL (ref 80–100)
MONOCYTES ABSOLUTE: 1.1 K/UL (ref 0–1.3)
MONOCYTES RELATIVE PERCENT: 14.1 %
NEUTROPHILS ABSOLUTE: 5.6 K/UL (ref 1.7–7.7)
NEUTROPHILS RELATIVE PERCENT: 71.7 %
PDW BLD-RTO: 13.2 % (ref 12.4–15.4)
PLATELET # BLD: 378 K/UL (ref 135–450)
PMV BLD AUTO: 7.3 FL (ref 5–10.5)
POTASSIUM SERPL-SCNC: 3.7 MMOL/L (ref 3.5–5.1)
RBC # BLD: 2.91 M/UL (ref 4–5.2)
SODIUM BLD-SCNC: 137 MMOL/L (ref 136–145)
WBC # BLD: 7.7 K/UL (ref 4–11)

## 2022-11-28 PROCEDURE — 2580000003 HC RX 258: Performed by: STUDENT IN AN ORGANIZED HEALTH CARE EDUCATION/TRAINING PROGRAM

## 2022-11-28 PROCEDURE — 93005 ELECTROCARDIOGRAM TRACING: CPT | Performed by: INTERNAL MEDICINE

## 2022-11-28 PROCEDURE — 6360000002 HC RX W HCPCS: Performed by: STUDENT IN AN ORGANIZED HEALTH CARE EDUCATION/TRAINING PROGRAM

## 2022-11-28 PROCEDURE — 6370000000 HC RX 637 (ALT 250 FOR IP): Performed by: INTERNAL MEDICINE

## 2022-11-28 PROCEDURE — 6370000000 HC RX 637 (ALT 250 FOR IP)

## 2022-11-28 PROCEDURE — 83735 ASSAY OF MAGNESIUM: CPT

## 2022-11-28 PROCEDURE — 36415 COLL VENOUS BLD VENIPUNCTURE: CPT

## 2022-11-28 PROCEDURE — 85025 COMPLETE CBC W/AUTO DIFF WBC: CPT

## 2022-11-28 PROCEDURE — 6370000000 HC RX 637 (ALT 250 FOR IP): Performed by: UROLOGY

## 2022-11-28 PROCEDURE — 6360000002 HC RX W HCPCS

## 2022-11-28 PROCEDURE — 2060000000 HC ICU INTERMEDIATE R&B

## 2022-11-28 PROCEDURE — 2580000003 HC RX 258

## 2022-11-28 PROCEDURE — 80048 BASIC METABOLIC PNL TOTAL CA: CPT

## 2022-11-28 PROCEDURE — 93010 ELECTROCARDIOGRAM REPORT: CPT | Performed by: INTERNAL MEDICINE

## 2022-11-28 RX ORDER — MAGNESIUM SULFATE IN WATER 40 MG/ML
2000 INJECTION, SOLUTION INTRAVENOUS ONCE
Status: COMPLETED | OUTPATIENT
Start: 2022-11-28 | End: 2022-11-28

## 2022-11-28 RX ORDER — METOPROLOL TARTRATE 50 MG/1
50 TABLET, FILM COATED ORAL 2 TIMES DAILY
Qty: 60 TABLET | Refills: 3 | Status: CANCELLED | OUTPATIENT
Start: 2022-11-28

## 2022-11-28 RX ADMIN — SODIUM CHLORIDE, PRESERVATIVE FREE 10 ML: 5 INJECTION INTRAVENOUS at 22:08

## 2022-11-28 RX ADMIN — TROSPIUM CHLORIDE 20 MG: 20 TABLET, FILM COATED ORAL at 22:08

## 2022-11-28 RX ADMIN — METOPROLOL TARTRATE 25 MG: 25 TABLET, FILM COATED ORAL at 08:55

## 2022-11-28 RX ADMIN — APIXABAN 2.5 MG: 2.5 TABLET, FILM COATED ORAL at 08:51

## 2022-11-28 RX ADMIN — AMPICILLIN SODIUM AND SULBACTAM SODIUM 3000 MG: 2; 1 INJECTION, POWDER, FOR SOLUTION INTRAMUSCULAR; INTRAVENOUS at 22:10

## 2022-11-28 RX ADMIN — MAGNESIUM SULFATE HEPTAHYDRATE 2000 MG: 40 INJECTION, SOLUTION INTRAVENOUS at 10:28

## 2022-11-28 RX ADMIN — AMPICILLIN SODIUM AND SULBACTAM SODIUM 3000 MG: 2; 1 INJECTION, POWDER, FOR SOLUTION INTRAMUSCULAR; INTRAVENOUS at 08:54

## 2022-11-28 RX ADMIN — ACETAMINOPHEN 650 MG: 325 TABLET ORAL at 08:51

## 2022-11-28 RX ADMIN — AMPICILLIN SODIUM AND SULBACTAM SODIUM 3000 MG: 2; 1 INJECTION, POWDER, FOR SOLUTION INTRAMUSCULAR; INTRAVENOUS at 02:14

## 2022-11-28 RX ADMIN — APIXABAN 2.5 MG: 2.5 TABLET, FILM COATED ORAL at 22:08

## 2022-11-28 RX ADMIN — TAMSULOSIN HYDROCHLORIDE 0.4 MG: 0.4 CAPSULE ORAL at 08:51

## 2022-11-28 RX ADMIN — AMPICILLIN SODIUM AND SULBACTAM SODIUM 3000 MG: 2; 1 INJECTION, POWDER, FOR SOLUTION INTRAMUSCULAR; INTRAVENOUS at 15:06

## 2022-11-28 RX ADMIN — SODIUM CHLORIDE, PRESERVATIVE FREE 10 ML: 5 INJECTION INTRAVENOUS at 08:56

## 2022-11-28 ASSESSMENT — PAIN SCALES - GENERAL
PAINLEVEL_OUTOF10: 0
PAINLEVEL_OUTOF10: 0

## 2022-11-28 NOTE — PROGRESS NOTES
Spoke with son,  Emelyn Francis, this afternoon. Discussed with him medical updates, current treatments as well as current clinical status. All questions answered, patient endorsed understanding discussion.     Gianfranco Schafer MD  PGY-1

## 2022-11-28 NOTE — DISCHARGE INSTRUCTIONS
Please make an appointment to follow up with Cardiology (Dr. Nikhil García) in 1 week. Please make an appointment to follow up with your PCP (Dr. Foley) in 2 week. Please make an appointment to follow up with Orthopedic surgery (Dr. Carlota Bonner) in 2 weeks. Please make an appointment to follow up with Urology (Dr. Daniela Soto) in 3 weeks. Please make an appointment to follow up with Hematology (Dr. Genny Closs) in 4 weeks     5. Note, you will be prescribed new medications upon discharge   A. You will now take Digoxin 0.625 mcg daily. Adelina Cr will now take Eliquis (Apaxiban, blood thinner) two times a day. For a full list of medications that you will be taking please refer to your medication list.  NOTE: your medications will be provided to you at [facility].

## 2022-11-28 NOTE — PROGRESS NOTES
Urology Attending Progress Note      Subjective: No  complaints    Vitals:  BP 99/71   Pulse (!) 128   Temp 97.4 °F (36.3 °C) (Axillary)   Resp 17   Ht 5' 2\" (1.575 m)   Wt 129 lb 13.6 oz (58.9 kg)   SpO2 98%   BMI 23.75 kg/m²   Temp  Av.9 °F (36.6 °C)  Min: 97.4 °F (36.3 °C)  Max: 98.5 °F (36.9 °C)    Intake/Output Summary (Last 24 hours) at 2022 0909  Last data filed at 2022 0641  Gross per 24 hour   Intake 1740 ml   Output 1425 ml   Net 315 ml       Exam: Miranda clear    Labs:  WBC:    Lab Results   Component Value Date/Time    WBC 7.7 2022 04:39 AM     Hemoglobin/Hematocrit:    Lab Results   Component Value Date/Time    HGB 9.5 2022 04:39 AM    HCT 28.5 2022 04:39 AM     BMP:    Lab Results   Component Value Date/Time     2022 04:39 AM    K 3.7 2022 04:39 AM    K 4.5 2022 06:52 AM     2022 04:39 AM    CO2 22 2022 04:39 AM    BUN 21 2022 04:39 AM    LABALBU 2.7 2022 07:04 PM    CREATININE 0.7 2022 04:39 AM    CALCIUM 8.3 2022 04:39 AM    GFRAA >60 2020 06:45 AM    GFRAA >60 2013 03:18 PM    LABGLOM >60 2022 04:39 AM     PT/INR:    Lab Results   Component Value Date/Time    PROTIME 15.3 2022 07:03 PM    INR 1.21 2022 07:03 PM     PTT:    Lab Results   Component Value Date/Time    APTT 63.5 2022 06:18 AM   [APTT    Urine Culture: Enterococcus    Imaging: Pending SUJEY today      Impression/Plan: 82 yo F with voiding dysfunction causing ARF and bilateral hydronephrosis.    -No  complaints, miranda clear  -continue flomax  -SUJEY today to check for resolution of hydronephrosis.  Likely can attempt VT tomorrow if ultrasound is normal  -Call with any questions     HENNY Fernando

## 2022-11-28 NOTE — TELEPHONE ENCOUNTER
Patient's sons say he is intending to have 24 hour in home care for his mom once she is discharged for [facility].. He is asking for assistance with this process. He is reaching out to her insurance to start the process and wants Dr. Domi Chan to be on the look out for any information that may come from the insurance for any forms.  If there any questions please call Nicanor Granados at [phone] or [phone]

## 2022-11-28 NOTE — PROGRESS NOTES
Physician Progress Note      PATIENTLimmie Ahumada  CSN #:                  [id number]  :                       1935  ADMIT DATE:       2022 6:12 PM  [address] [place] DATE:  RESPONDING  PROVIDER #:        DEJAN RENO          QUERY TEXT:    Pt admitted with UTI and insufficiency fx left ankle. Pt noted to have SIRS on   admit: WBC: 14.7, RR: 20- 24- 23, with RONNY & Metabolic encephalopathy. If   possible, please document in the progress notes and discharge summary if you   are evaluating and /or treating any of the following: The medical record reflects the following:  Risk Factors: UTI  Clinical Indicators:  SIRS on admit: WBC: 14.7, RR: 20- 24- 23, with RONNY &   Metabolic encephalopathy. CT Abd: Severe bilateral hydroureteronephrosis -   bladder outlet obstruction versus urinary retention. UA: Cloudy, + Nitrites,   Large Leuk Est. Urine cx: Enterococcus faecalis  Treatment: Rocephin, 2L NS bolus, UA w/ cx, Pan CT  Options provided:  -- Sepsis, present on admission due to UTI  -- UTI without Sepsis  -- Other - I will add my own diagnosis  -- Disagree - Not applicable / Not valid  -- Disagree - Clinically unable to determine / Unknown  -- Refer to Clinical Documentation Reviewer    PROVIDER RESPONSE TEXT:    This patient has sepsis which was present on admission due to UTI. Query created by:  Jazmyn Loyd on 2022 3:27 PM      Electronically signed by:  Denice Fernando 2022 1:24 PM

## 2022-11-28 NOTE — PROGRESS NOTES
Progress Note    Admit Date: 11/24/2022  Day: 1  Diet: ADULT DIET; Regular  ADULT ORAL NUTRITION SUPPLEMENT; Breakfast, Dinner; Standard High Calorie/High Protein Oral Supplement    Interval history:   Patient seen at bedside. NAEO. Feels better today, appetite improving. Still has LLE pain with movement as expected. Denies CP, palpitation, dizziness, H/A, N/V. Loose stools yesterday afteroonon, no BM overnight or this AM yet. BP normal to soft, HR elev to 110-120s, otherwise VSS. Given RVR with soft BP - parameters for Lopressor admin in place. Possible Voiding trial tomorrow per Urology    HPI:  This is a 79-year-old female PMHx IBS, A. fib not on anticoagulation, DVT, osteoarthritis, gout, who had presented to the ED due to complaint of left leg pain. The history was obtained by the patient who is from a senior living facility and is a poor historian. As per the patient, last week she had felt a snap on her left leg that had started causing her a lot of pain. She complains of some fatigue lightheadedness and lower abdominal pain with difficulty urinating. Patient states that she has constant urge to urinate however has decreased urine output for the past couple of weeks. She denies any fever, chills, nausea, vomiting, flank pain, diarrhea, constipation, loss of consciousness, hematemesis, hematuria or hematochezia. The patient has recently become dependent 24/7. She was independent a week ago. Chart review states that the son had shown concern for possible trauma to her leg with patient denies any recent trauma. She denies any changes in appetite decreased p.o. intake or significant weight loss. The patient's son states that she has had significant decrease in her p.o. intake since the past couple of days. According to him her last known well was on Saturday, 11/19/2022 1 his brother had visited their mom. Patient is currently ambulating via wheelchair.      Of note, patient was seen on 11/22/2022 at an orthopedic clinic for concern of left leg pain at [facility]. She had x-ray of her left lower extremity done which at that time did not show any fracture. She was diagnosed with sprain of her anterior talofibular ligament of left ankle. She was given a walking boot     In the ED, the patient complained of bilateral leg pain greater on left than right. Her vitals were significant for tachycardia and EKG was significant for A. fib with RVR. Her physical exam was significant for some ecchymosis and tenderness of the left ankle and calcaneus, had normal range of motion. She was AOx3. Labs were significant for sodium of 129, bicarb 19, BUN 57, creatinine 1.5 baseline -0.8, CK2 78, proBNP 8905. White blood cell 14.7 hemoglobin 11.2, UA significant for positive leukocyte esterase and positive nitrites. CT PE was negative for pulmonary embolism. CTA abdomen was significant for urinary retention. Troponins were elevated 0.05x2 CT of ankle was significant for left calcaneus fracture. Patient was started on diltiazem drip in the ED along with morphine injection for pain, normal saline bolus, ceftriaxone. She also had a Burk's placed while in the ED.          Medications:     Scheduled Meds:   magnesium sulfate  2,000 mg IntraVENous Once    metoprolol tartrate  50 mg Oral BID    apixaban  2.5 mg Oral BID    ampicillin-sulbactam  3,000 mg IntraVENous Q6H    tamsulosin  0.4 mg Oral Daily    [Held by provider] diphenoxylate-atropine  1 tablet Oral 4x Daily    trospium  20 mg Oral Nightly    sodium chloride flush  5-40 mL IntraVENous 2 times per day    lidocaine  2 patch TransDERmal Daily     Continuous Infusions:   sodium chloride      dilTIAZem Stopped (11/25/22 1221)     PRN Meds:tiZANidine, sodium chloride flush, sodium chloride, ondansetron **OR** ondansetron, polyethylene glycol, acetaminophen **OR** acetaminophen    Objective:   Vitals:   T-max:  Patient Vitals for the past 8 hrs:   BP Temp Temp src Pulse Resp SpO2 Weight   11/28/22 0823 99/71 -- -- (!) 128 -- -- --   11/28/22 0728 103/73 97.4 °F (36.3 °C) Axillary (!) 127 17 98 % --   11/28/22 0600 -- -- -- (!) 113 -- -- --   11/28/22 0400 -- -- -- (!) 113 -- -- --   11/28/22 0334 125/79 97.5 °F (36.4 °C) Oral (!) 114 18 94 % 129 lb 13.6 oz (58.9 kg)       Intake/Output Summary (Last 24 hours) at 11/28/2022 1008  Last data filed at 11/28/2022 0641  Gross per 24 hour   Intake 1740 ml   Output 1425 ml   Net 315 ml       Physical Exam  Constitutional:       General: She is not in acute distress. Appearance: She is not ill-appearing. HENT:      Head: Normocephalic and atraumatic. Eyes:      Extraocular Movements: Extraocular movements intact. Pupils: Pupils are equal, round, and reactive to light. Cardiovascular:      Rate and Rhythm: Tachycardia present. Rhythm irregular. Pulses: Normal pulses. Heart sounds: Normal heart sounds. Pulmonary:      Effort: Pulmonary effort is normal. No respiratory distress. Breath sounds: Normal breath sounds. No wheezing. Abdominal:      Palpations: Abdomen is soft. Tenderness: There is no abdominal tenderness. Musculoskeletal:         General: Signs of injury present. Cervical back: Normal range of motion. Right lower leg: No edema. Left lower leg: No edema. Comments: LLE tenderness, in ACE wrap   Skin:     General: Skin is warm and dry. Neurological:      General: No focal deficit present. Mental Status: She is alert and oriented to person, place, and time.       Comments: Motor strength UE 5/5, RLE 4/5, LLE not tested d/t pain         LABS:    CBC:   Recent Labs     11/26/22  0652 11/27/22  0618 11/28/22  0439   WBC 10.8 11.4* 7.7   HGB 10.1* 9.8* 9.5*   HCT 31.2* 30.8* 28.5*    363 378   .1* 102.1* 97.9     Renal:    Recent Labs     11/26/22  0008 11/26/22  0652 11/27/22  0617 11/28/22  0439   NA  --  133* 136 137   K  -- 4.5 4.1 3.7   CL  --  104 105 106   CO2  --  20* 17* 22   BUN  --  39* 29* 21*   CREATININE  --  1.0 1.1 0.7   GLUCOSE  --  156* 121* 126*   CALCIUM  --  8.4 8.4 8.3   MG 2.10  --  1.90 1.90   ANIONGAP  --  9 14 9     Hepatic:   No results for input(s): AST, ALT, BILITOT, BILIDIR, PROT, LABALBU, ALKPHOS in the last 72 hours. Troponin:   No results for input(s): TROPONINI in the last 72 hours. BNP: No results for input(s): BNP in the last 72 hours. Lipids: No results for input(s): CHOL, HDL in the last 72 hours. Invalid input(s): LDLCALCU, TRIGLYCERIDE  ABGs:  No results for input(s): PHART, ENX7MSA, PO2ART, AOK1IHW, BEART, THGBART, D9YRIJGG, ZSL9XAM in the last 72 hours. INR:   No results for input(s): INR in the last 72 hours. Lactate: No results for input(s): LACTATE in the last 72 hours. Cultures:  -----------------------------------------------------------------  RAD:   CT HEAD WO CONTRAST   Final Result      1. No acute intracranial process. 2.  Moderate cerebral atrophy and mild chronic small vessel ischemic disease. CT CHEST PULMONARY EMBOLISM W CONTRAST   Final Result      Chest:   1. No evidence of pulmonary embolism. 2.  Moderate to large hiatal hernia. 3.  Trace right pleural effusion and mild bilateral lower lobe atelectasis. Abdomen and pelvis:   1. Severe bilateral hydroureteronephrosis and moderate urinary bladder distention. Recommend correlation for bladder outlet obstruction versus urinary retention. 2.  Bilateral nephrolithiasis and 2 small nonobstructing calculi within the urinary bladder. 3.  Age-indeterminate L1 compression fracture with 30% height loss. 4.  Gallbladder distention without evidence of wall thickening or gallstones may represent gallbladder hydrops. Mild intrahepatic biliary ductal dilatation without significant common bile duct dilatation. 5.  Moderate right inguinal hernia containing nondilated small bowel.       CT ANKLE LEFT WO CONTRAST   Final Result   Nondisplaced posterior calcaneal insufficiency fracture. CT ABDOMEN PELVIS W IV CONTRAST Additional Contrast? None   Final Result      Chest:   1. No evidence of pulmonary embolism. 2.  Moderate to large hiatal hernia. 3.  Trace right pleural effusion and mild bilateral lower lobe atelectasis. Abdomen and pelvis:   1. Severe bilateral hydroureteronephrosis and moderate urinary bladder distention. Recommend correlation for bladder outlet obstruction versus urinary retention. 2.  Bilateral nephrolithiasis and 2 small nonobstructing calculi within the urinary bladder. 3.  Age-indeterminate L1 compression fracture with 30% height loss. 4.  Gallbladder distention without evidence of wall thickening or gallstones may represent gallbladder hydrops. Mild intrahepatic biliary ductal dilatation without significant common bile duct dilatation. 5.  Moderate right inguinal hernia containing nondilated small bowel. XR CHEST PORTABLE   Final Result      No acute pulmonary disease. Moderate hiatal hernia. US RENAL COMPLETE    (Results Pending)         Assessment/Plan:     Acute metabolic encephalopathy 2/2 complicated UTI and urinary retention (AMS improved)  Patient appears slightly confused and in acute distress on presentation. Patient endorses lower abdominal pain with complaint of increased frequency, leukocytosis white blood cell 14.7, UA significant for urine nitrites and leukocyte Estrace.  CT abdomen significant for severe bilateral hydronephrosis with moderate urinary bladder distention  -Miranda in place  -Monitoring I's and O's  -f/u Urine Culture - +ve Enterococcus faecalis  - now on Unasyn x7d (11/26- )   - plan for Augmentin on d/c to complete 10-d course  -Urology consulted   - miranda for now, voiding trial tomorrow  - PT 6, OT 10 - CM consulted for placement     A. fib with RVR  Patient has history of A. fib in the past however was not on any anticoagulation EKG in the ED was significant for A. fib with RVR. Patient is hemodynamically stable pulse: 123, possible proBNP elevation acute distress, heart due to A. Fib Echo:01/21/2020: 55-60%, normal LV, normal DD fxn, mild-mod MR/TR, normal LA/RA size. CHADSVASc: 4  - hep gtt - switched to Eliquis today. - now on Lopressor 50 - parameters in place for low BP   - if SBP<110 - give lopressor 25 instead  - monitor tele  - Cardiology consulted, appreciate recs   - follow up outpatient with Dr. Anai Danielle 1 week. RONNY, likely prerenal 2/2 hypovolemia and post obstr (resolved)  Patient appears altered, concerns for decreased p.o. intake.   Creatinine on presentation 1.5 last creatinine in 2020: 0.8, BUN to creatinine ratio 38:1  -Monitoring I/O's for now - UOP good  -Avoid nephrotoxic agents  -Ordered urine protein/creatinine - 4.8  -Got contrast yesterday - will cont fluids for now     Hyponatremia (improving)  Patient's sodium on admission 129, possible due to decreased p.o. intake  -Ordered urine osmolality, serum osmolality, urine sodium  -We will continue monitoring sodium every 12 hourly  -IVF     Troponinemia 2/2 possible demand ischemia (resolved)  Patient is currently asymptomatic, troponin times two 0.05  -f/u Trops - decr to 0.03 - stop trending     Left calcaneal fracture (nondisplaced, stable)  Patient presented with complaint of left leg pain, CT of the ankle showed left calcaneal fracture - Nondisplaced.  -Pain control for now  - LLE wrapped  - Ortho consulted - nonop mgmt, advise boot when ambulting   - outpatient f/u 2 weeks  - PT 6, OT 10 - CM c/s as above     Normocytic anemia  Hemoglobin: 14.7, MCV 99.2 MCHC 32.5  -f/u iron studies - folate elev  - f/u B12/Folate - WNL     Chronic medical problems  IBS  -On Lomotil - held  - having diarrhea (2 loose stools) - cont to hold Lomotil  - if diarrhea continues - may need C-diff test     Chronic back pain  -On tizanidine - d/c, pt reports not taking this at home     Mixed incontinence  -On mirabegron-dose adjusted as per creatinine clearance  -Tamsulosin added this admission    Code Status: Limited x4  FEN: Regular diet  PPX: Eliquis  DISPO: Kamron Gayatn MD, PGY-1  11/28/22  10:08 AM    This patient has been staffed and discussed with Maryjane Wilson MD.  Patient seen and examined, labs and imaging studies reviewed, agree with assessment and plan as outlined above. Continue with current care and plan. Discussed case with patients nurse, discussed case with care team, discussed plan.       MD Mandy Gregory

## 2022-11-28 NOTE — PLAN OF CARE
Problem: Discharge Planning  Goal: Discharge to home or other facility with appropriate resources  Outcome: Progressing     Problem: Pain  Goal: Verbalizes/displays adequate comfort level or baseline comfort level  Outcome: Progressing     Problem: Skin/Tissue Integrity  Goal: Absence of new skin breakdown  Description: 1. Monitor for areas of redness and/or skin breakdown  2. Assess vascular access sites hourly  3. Every 4-6 hours minimum:  Change oxygen saturation probe site  4. Every 4-6 hours:  If on nasal continuous positive airway pressure, respiratory therapy assess nares and determine need for appliance change or resting period.   Outcome: Progressing     Problem: Cardiovascular - Adult  Goal: Maintains optimal cardiac output and hemodynamic stability  Outcome: Progressing  Goal: Absence of cardiac dysrhythmias or at baseline  Outcome: Progressing     Problem: Safety - Adult  Goal: Free from fall injury  Outcome: Progressing     Problem: ABCDS Injury Assessment  Goal: Absence of physical injury  Outcome: Progressing

## 2022-11-28 NOTE — CONSULTS
Department of Orthopedic Surgery  Attending Consult Note        Reason for Consult:  Left calcaneus fracture  Requesting Physician:  Hospitalist    CHIEF COMPLAINT:  Left calcaneus fracture    History Obtained From:  patient    HISTORY OF PRESENT ILLNESS:                The patient is a 81 y.o. female who presents with a closed left nondisplaced extraarticular calcaneus fracture. The patient was recently getting up from a seated position and felt a crack and pain in her heel. She was recently seen by another orthopedic surgeon as an outpatient and he prescribed a fracture boot for her to wear. The patient was admitted to the [facility] for medical reasons and orthopedic surgery was consulted.     Past Medical History:        Diagnosis Date    Acute deep vein thrombosis (DVT) of left lower extremity after procedure ([address] 10/03/2019    Post hip rx repair    Acute renal failure (ARF) ([address] 01/17/2020    Anterior corneal dystrophy     Asymptomatic postmenopausal status (age-related) (natural)     Cervical polyp     Dry eye syndrome     Edema     Fuch's endothelial dystrophy     Gout     Hip fracture requiring operative repair, left, closed, initial encounter ([facility] 09/17/2019    IBS (irritable bowel syndrome)     Mild cognitive impairment     Osteoarthritis     Osteopenia     Papilloma of breast 05/2013    Pedal edema 05/08/2017    Screening mammogram     Symptomatic menopausal or female climacteric states 11/04/2013    Urinary retention 09/21/2019    Vaginal candidiasis      Past Surgical History:        Procedure Laterality Date    APPENDECTOMY      EYE SURGERY      Cataract Surgery    HIP PINNING Left 9/18/2019    LEFT HIP GAMMA NAIL performed by Jose Candelaria MD at [address] OR     Current Medications:   Current Facility-Administered Medications: metoprolol tartrate (LOPRESSOR) tablet 50 mg, 50 mg, Oral, BID  apixaban (ELIQUIS) tablet 2.5 mg, 2.5 mg, Oral, BID  ampicillin-sulbactam (UNASYN) 3,000 mg in sodium chloride 0.9 % 100 mL IVPB (mini-bag), 3,000 mg, IntraVENous, Q6H  tamsulosin (FLOMAX) capsule 0.4 mg, 0.4 mg, Oral, Daily  [Held by provider] diphenoxylate-atropine (LOMOTIL) 2.5-0.025 MG per tablet 1 tablet, 1 tablet, Oral, 4x Daily  trospium (SANCTURA) tablet 20 mg, 20 mg, Oral, Nightly  tiZANidine (ZANAFLEX) tablet 2 mg, 2 mg, Oral, TID PRN  sodium chloride flush 0.9 % injection 5-40 mL, 5-40 mL, IntraVENous, 2 times per day  sodium chloride flush 0.9 % injection 5-40 mL, 5-40 mL, IntraVENous, PRN  0.9 % sodium chloride infusion, , IntraVENous, PRN  ondansetron (ZOFRAN-ODT) disintegrating tablet 4 mg, 4 mg, Oral, Q8H PRN **OR** ondansetron (ZOFRAN) injection 4 mg, 4 mg, IntraVENous, Q6H PRN  polyethylene glycol (GLYCOLAX) packet 17 g, 17 g, Oral, Daily PRN  acetaminophen (TYLENOL) tablet 650 mg, 650 mg, Oral, Q6H PRN **OR** acetaminophen (TYLENOL) suppository 650 mg, 650 mg, Rectal, Q6H PRN  lidocaine 4 % external patch 2 patch, 2 patch, TransDERmal, Daily  [COMPLETED] dilTIAZem injection 10 mg, 10 mg, IntraVENous, Once **FOLLOWED BY** dilTIAZem 125 mg in dextrose 5 % 125 mL infusion, 2.5-15 mg/hr, IntraVENous, Continuous  Allergies:  Amoxapine and related, Lisinopril, and Clindamycin/lincomycin    Social History:   None  Family History:       Problem Relation Age of Onset    Breast Cancer Daughter         BRAC 2    Ovarian Cancer Daughter      REVIEW OF SYSTEMS:    Left calcaneus fracture    PHYSICAL EXAM:    VITALS:  BP (!) 87/51   Pulse 92   Temp 97.6 °F (36.4 °C) (Oral)   Resp 16   Ht 5' 2\" (1.575 m)   Wt 129 lb 13.6 oz (58.9 kg)   SpO2 96%   BMI 23.75 kg/m²   24HR INTAKE/OUTPUT:    Intake/Output Summary (Last 24 hours) at 11/28/2022 1441  Last data filed at 11/28/2022 0641  Gross per 24 hour   Intake 1500 ml   Output 900 ml   Net 600 ml     AAOx3  LLE splint clean, dry, intact with no skin breakdown/irritation  +TTP over left heel  LLE NV intact L1-S1 with motor/sensory function intact  All 5 toes of left foot are pink, warm, well-perfused    DATA:    CT scan of the left ankle without contrast was personally reviewed and demonstrates a nondisplaced extraarticular calcaneus insufficiency fracture    IMPRESSION/RECOMMENDATIONS:    Nonop/closed treatment of left calcaneus fracture  Patient will have a  family member obtain her fracture boot from home and this should be fitted on the patient by PT once it arrives  Patient should wear boot when ambulating but ok to remove when not ambulating  PT/OT consult  Pain control and DVT prophylaxis per the patient's primary admitting team  Medical management  Discharge planning  Outpatient follow up in 1-2 weeks

## 2022-11-29 ENCOUNTER — APPOINTMENT (OUTPATIENT)
Dept: ULTRASOUND IMAGING | Age: 87
DRG: 871 | End: 2022-11-29
Payer: MEDICARE

## 2022-11-29 LAB
ANION GAP SERPL CALCULATED.3IONS-SCNC: 10 MMOL/L (ref 3–16)
BASOPHILS ABSOLUTE: 0.1 K/UL (ref 0–0.2)
BASOPHILS RELATIVE PERCENT: 0.7 %
BUN BLDV-MCNC: 19 MG/DL (ref 7–20)
CALCIUM SERPL-MCNC: 8.1 MG/DL (ref 8.3–10.6)
CHLORIDE BLD-SCNC: 105 MMOL/L (ref 99–110)
CO2: 22 MMOL/L (ref 21–32)
CREAT SERPL-MCNC: 0.9 MG/DL (ref 0.6–1.2)
EOSINOPHILS ABSOLUTE: 0.2 K/UL (ref 0–0.6)
EOSINOPHILS RELATIVE PERCENT: 2.5 %
GFR SERPL CREATININE-BSD FRML MDRD: >60 ML/MIN/{1.73_M2}
GLUCOSE BLD-MCNC: 111 MG/DL (ref 70–99)
HCT VFR BLD CALC: 29.8 % (ref 36–48)
HCT VFR BLD CALC: 30.1 % (ref 36–48)
HEMOGLOBIN: 9.8 G/DL (ref 12–16)
IMMATURE RETIC FRACT: 0.59 (ref 0.21–0.37)
IRON SATURATION: 18 % (ref 15–50)
IRON: 26 UG/DL (ref 37–145)
LYMPHOCYTES ABSOLUTE: 1 K/UL (ref 1–5.1)
LYMPHOCYTES RELATIVE PERCENT: 12.1 %
MAGNESIUM: 2.2 MG/DL (ref 1.8–2.4)
MCH RBC QN AUTO: 32.6 PG (ref 26–34)
MCHC RBC AUTO-ENTMCNC: 32.7 G/DL (ref 31–36)
MCV RBC AUTO: 99.7 FL (ref 80–100)
MONOCYTES ABSOLUTE: 1.1 K/UL (ref 0–1.3)
MONOCYTES RELATIVE PERCENT: 12.9 %
NEUTROPHILS ABSOLUTE: 6.2 K/UL (ref 1.7–7.7)
NEUTROPHILS RELATIVE PERCENT: 71.8 %
PDW BLD-RTO: 13.3 % (ref 12.4–15.4)
PLATELET # BLD: 411 K/UL (ref 135–450)
PMV BLD AUTO: 7 FL (ref 5–10.5)
POTASSIUM SERPL-SCNC: 4.4 MMOL/L (ref 3.5–5.1)
RBC # BLD: 3.02 M/UL (ref 4–5.2)
RETICULOCYTE ABSOLUTE COUNT: 0.04 M/UL (ref 0.02–0.1)
RETICULOCYTE COUNT PCT: 1.26 % (ref 0.5–2.18)
SODIUM BLD-SCNC: 137 MMOL/L (ref 136–145)
TOTAL IRON BINDING CAPACITY: 146 UG/DL (ref 260–445)
WBC # BLD: 8.7 K/UL (ref 4–11)

## 2022-11-29 PROCEDURE — 2580000003 HC RX 258

## 2022-11-29 PROCEDURE — 6360000002 HC RX W HCPCS: Performed by: STUDENT IN AN ORGANIZED HEALTH CARE EDUCATION/TRAINING PROGRAM

## 2022-11-29 PROCEDURE — 84165 PROTEIN E-PHORESIS SERUM: CPT

## 2022-11-29 PROCEDURE — 6370000000 HC RX 637 (ALT 250 FOR IP): Performed by: INTERNAL MEDICINE

## 2022-11-29 PROCEDURE — 51798 US URINE CAPACITY MEASURE: CPT

## 2022-11-29 PROCEDURE — 85045 AUTOMATED RETICULOCYTE COUNT: CPT

## 2022-11-29 PROCEDURE — 85025 COMPLETE CBC W/AUTO DIFF WBC: CPT

## 2022-11-29 PROCEDURE — 6360000002 HC RX W HCPCS: Performed by: INTERNAL MEDICINE

## 2022-11-29 PROCEDURE — 76770 US EXAM ABDO BACK WALL COMP: CPT

## 2022-11-29 PROCEDURE — 84155 ASSAY OF PROTEIN SERUM: CPT

## 2022-11-29 PROCEDURE — 6370000000 HC RX 637 (ALT 250 FOR IP)

## 2022-11-29 PROCEDURE — 80048 BASIC METABOLIC PNL TOTAL CA: CPT

## 2022-11-29 PROCEDURE — 83735 ASSAY OF MAGNESIUM: CPT

## 2022-11-29 PROCEDURE — 36415 COLL VENOUS BLD VENIPUNCTURE: CPT

## 2022-11-29 PROCEDURE — 6370000000 HC RX 637 (ALT 250 FOR IP): Performed by: STUDENT IN AN ORGANIZED HEALTH CARE EDUCATION/TRAINING PROGRAM

## 2022-11-29 PROCEDURE — 2580000003 HC RX 258: Performed by: STUDENT IN AN ORGANIZED HEALTH CARE EDUCATION/TRAINING PROGRAM

## 2022-11-29 PROCEDURE — 6370000000 HC RX 637 (ALT 250 FOR IP): Performed by: UROLOGY

## 2022-11-29 PROCEDURE — 2060000000 HC ICU INTERMEDIATE R&B

## 2022-11-29 RX ORDER — DIGOXIN 0.25 MG/ML
500 INJECTION INTRAMUSCULAR; INTRAVENOUS ONCE
Status: COMPLETED | OUTPATIENT
Start: 2022-11-29 | End: 2022-11-29

## 2022-11-29 RX ORDER — DIGOXIN 125 MCG
62.5 TABLET ORAL DAILY
Status: DISCONTINUED | OUTPATIENT
Start: 2022-11-30 | End: 2022-12-01 | Stop reason: HOSPADM

## 2022-11-29 RX ORDER — DIGOXIN 125 MCG
125 TABLET ORAL DAILY
Status: DISCONTINUED | OUTPATIENT
Start: 2022-11-30 | End: 2022-11-29

## 2022-11-29 RX ORDER — AMOXICILLIN AND CLAVULANATE POTASSIUM 875; 125 MG/1; MG/1
1 TABLET, FILM COATED ORAL EVERY 12 HOURS SCHEDULED
Status: DISCONTINUED | OUTPATIENT
Start: 2022-11-29 | End: 2022-12-01 | Stop reason: HOSPADM

## 2022-11-29 RX ADMIN — TROSPIUM CHLORIDE 20 MG: 20 TABLET, FILM COATED ORAL at 19:55

## 2022-11-29 RX ADMIN — SODIUM CHLORIDE, PRESERVATIVE FREE 10 ML: 5 INJECTION INTRAVENOUS at 08:49

## 2022-11-29 RX ADMIN — SODIUM CHLORIDE, PRESERVATIVE FREE 10 ML: 5 INJECTION INTRAVENOUS at 19:56

## 2022-11-29 RX ADMIN — AMPICILLIN SODIUM AND SULBACTAM SODIUM 3000 MG: 2; 1 INJECTION, POWDER, FOR SOLUTION INTRAMUSCULAR; INTRAVENOUS at 04:19

## 2022-11-29 RX ADMIN — DIGOXIN 500 MCG: 0.25 INJECTION INTRAMUSCULAR; INTRAVENOUS at 13:00

## 2022-11-29 RX ADMIN — AMPICILLIN SODIUM AND SULBACTAM SODIUM 3000 MG: 2; 1 INJECTION, POWDER, FOR SOLUTION INTRAMUSCULAR; INTRAVENOUS at 10:46

## 2022-11-29 RX ADMIN — APIXABAN 2.5 MG: 2.5 TABLET, FILM COATED ORAL at 19:55

## 2022-11-29 RX ADMIN — AMOXICILLIN AND CLAVULANATE POTASSIUM 1 TABLET: 875; 125 TABLET, FILM COATED ORAL at 16:35

## 2022-11-29 RX ADMIN — METOPROLOL TARTRATE 25 MG: 50 TABLET, FILM COATED ORAL at 08:49

## 2022-11-29 RX ADMIN — APIXABAN 2.5 MG: 2.5 TABLET, FILM COATED ORAL at 08:42

## 2022-11-29 RX ADMIN — TAMSULOSIN HYDROCHLORIDE 0.4 MG: 0.4 CAPSULE ORAL at 08:42

## 2022-11-29 ASSESSMENT — PAIN SCALES - GENERAL
PAINLEVEL_OUTOF10: 0

## 2022-11-29 ASSESSMENT — PAIN SCALES - WONG BAKER: WONGBAKER_NUMERICALRESPONSE: 0

## 2022-11-29 NOTE — PROGRESS NOTES
Urology Attending Progress Note      Subjective: No  complaints    Vitals:  /73   Pulse (!) 115   Temp 98 °F (36.7 °C) (Oral)   Resp 18   Ht 5' 2\" (1.575 m)   Wt 130 lb 11.7 oz (59.3 kg)   SpO2 96%   BMI 23.91 kg/m²   Temp  Av °F (36.7 °C)  Min: 97.6 °F (36.4 °C)  Max: 98.3 °F (36.8 °C)    Intake/Output Summary (Last 24 hours) at 2022 0931  Last data filed at 2022 0639  Gross per 24 hour   Intake 375 ml   Output 1075 ml   Net -700 ml         Exam: Miranda clear    Labs:  WBC:    Lab Results   Component Value Date/Time    WBC 8.7 2022 04:43 AM     Hemoglobin/Hematocrit:    Lab Results   Component Value Date/Time    HGB 9.8 2022 04:43 AM    HCT 30.1 2022 04:43 AM    HCT 29.8 2022 04:43 AM     BMP:    Lab Results   Component Value Date/Time     2022 04:43 AM    K 4.4 2022 04:43 AM    K 4.5 2022 06:52 AM     2022 04:43 AM    CO2 22 2022 04:43 AM    BUN 19 2022 04:43 AM    LABALBU 2.7 2022 07:04 PM    CREATININE 0.9 2022 04:43 AM    CALCIUM 8.1 2022 04:43 AM    GFRAA >60 2020 06:45 AM    GFRAA >60 2013 03:18 PM    LABGLOM >60 2022 04:43 AM     PT/INR:    Lab Results   Component Value Date/Time    PROTIME 15.3 2022 07:03 PM    INR 1.21 2022 07:03 PM     PTT:    Lab Results   Component Value Date/Time    APTT 63.5 2022 06:18 AM   [APTT    Urine Culture: Enterococcus    Imaging:  Impression   1. Resolution of the hydronephrosis with minimal right-sided pelviectasis. 2. Benign cyst left kidney. 3. Nonobstructive renal calculi. Impression/Plan: 80 yo F with voiding dysfunction causing ARF and bilateral hydronephrosis.    -No  complaints, miranda clear  -continue flomax  -SUJEY showing resolution of hydronephrosis. Ok to remove miranda with PVR after first void. Would recommend ambulation and fluid intake after catheter is removed.  If PVR>500, needs catheter replaced.  -Call with any questions    HENNY Ray

## 2022-11-29 NOTE — DISCHARGE SUMMARY
INTERNAL MEDICINE DEPARTMENT AT [address] [place] [address]  DISCHARGE SUMMARY    Patient ID: Danielle Moody                                             Discharge Date: 12/1/2022   Patient's PCP: Marlo Tsang MD                                          Discharge Physician: Richard Chaudhry MD MD  Admit Date: 11/24/2022   Admitting Physician: Chelle Shay MD    PROBLEMS DURING HOSPITALIZATION:  Present on Admission:   Atrial fibrillation with RVR ([address]   RONNY (acute kidney injury) ([address]   UTI (urinary tract infection)   Hyponatremia   Essential hypertension, benign   Gout   Metabolic acidosis   Mild cognitive impairment   Acute metabolic encephalopathy      DISCHARGE DIAGNOSES:    HPI:  This is a 78-year-old female PMHx IBS, A. fib not on anticoagulation, DVT, osteoarthritis, gout, who had presented to the ED due to complaint of left leg pain. The history was obtained by the patient who is from a senior living facility and is a poor historian. As per the patient, last week she had felt a snap on her left leg that had started causing her a lot of pain. She complains of some fatigue lightheadedness and lower abdominal pain with difficulty urinating. Patient states that she has constant urge to urinate however has decreased urine output for the past couple of weeks. She denies any fever, chills, nausea, vomiting, flank pain, diarrhea, constipation, loss of consciousness, hematemesis, hematuria or hematochezia. The patient has recently become dependent 24/7. She was independent a week ago. Chart review states that the son had shown concern for possible trauma to her leg with patient denies any recent trauma. She denies any changes in appetite decreased p.o. intake or significant weight loss. The patient's son states that she has had significant decrease in her p.o. intake since the past couple of days. According to him her last known well was on Saturday, 11/19/2022 1 his brother had visited their mom. Patient is currently ambulating via wheelchair. Of note, patient was seen on 11/22/2022 at an orthopedic clinic for concern of left leg pain at [facility]. She had x-ray of her left lower extremity done which at that time did not show any fracture. She was diagnosed with sprain of her anterior talofibular ligament of left ankle. She was given a walking boot     In the ED, the patient complained of bilateral leg pain greater on left than right. Her vitals were significant for tachycardia and EKG was significant for A. fib with RVR. Her physical exam was significant for some ecchymosis and tenderness of the left ankle and calcaneus, had normal range of motion. She was AOx3. Labs were significant for sodium of 129, bicarb 19, BUN 57, creatinine 1.5 baseline -0.8, CK2 78, proBNP 8905. White blood cell 14.7 hemoglobin 11.2, UA significant for positive leukocyte esterase and positive nitrites. CT PE was negative for pulmonary embolism. CTA abdomen was significant for urinary retention. Troponins were elevated 0.05x2 CT of ankle was significant for left calcaneus fracture. Patient was started on diltiazem drip in the ED along with morphine injection for pain, normal saline bolus, ceftriaxone. She also had a Burk's placed while in the ED. The following issues were addressed during hospitalization:    Acute metabolic encephalopathy 2/2 complicated UTI and urinary retention (AMS improved)  Required Burk placement due to urinary retention as well as evidence of hydronephrosis on imaging. Started on Rocephin. AMS improved rapidly. BC negative, urine culture positive Enterococcus faecalis sensitive to penicillin, thus antibiotics transition to Unasyn. Urology on board, performed with ultrasound, showed resolution of hydronephrosis and nonobstructive calculi. Burk removed, PVR after first void was minimal/wnl.   Patient transition to Augmentin p.o., will take until 12/3 to complete 10-day course of antibiotics. Poor PT OT scores, will go to SNF. Follow-up PCP 1 week, Urology 3 weeks. A. fib with RVR  Patient on home Eliquis, discontinued herself due to previous hematuria. Presented in A. fib with RVR, put on diltiazem drip and heparin drip given unknown duration of abnormal rhythm. Later transition to diltiazem p.o. Started on Eliquis 2.5 twice daily. BP was soft during admission, however patient asymptomatic, diltiazem transition to Lopressor p.o. which did slightly improve pressures. Digoxin later added. BP was monitored during admission closely, as well as telemetry. Patient to continue Eliquis, Lopressor and digoxin upon discharge. Follow-up with PCP in 1 week, follow-up with outpatient cardiology in 1 week     RONNY, likely prerenal 2/2 hypovolemia and post obstr (resolved)  Present with RONNY, improved with fluids and likely resolution of urinary obstruction via Burk placement. Advised to avoid nephrotoxic agents in the future. Follow-up PCP 1 week     Hyponatremia (improving)  Urine studies reviewed, improved with fluids. Follow-up PCP 1 week     Troponinemia 2/2 possible demand ischemia (resolved)  Initially elevated, did downtrend. Patient having any chest pain no interventions required. Left calcaneal fracture (nondisplaced, stable)  Imaging showed nondisplaced left calcaneal fracture on admission. LLE was wrapped, Ortho consulted, nonoperative management recommended. Poor PT OT scores, patient agreeable to SNF placement. Follow-up with orthopedic surgery outpatient in 2 weeks, follow-up PCP outpatient 2 week. Macrocytic anemia  Stable this admission, no interventions required. Hematology consulted, work-up for possible multiple myeloma initiated. Follow up with Hematology/Oncology as outpatient in 4 weeks.      Chronic medical problems  IBS  Home Lomotil held on admission given lack of bowel movement, patient did have 2 loose stools during admission, however did not meet C. difficile testing protocol. Lamictal continued to be held. Bowel movements stable this admission. Follow-up PCP 2 week    Chronic back pain  Home meds continued, follow-up PCP 2 week     mixed incontinence  Home meds continued. Burk placed initially 2/2 retention as above, removed successfully and PVR was minimal. F/u with Urology as outpatient as above. Physical Exam:  Constitutional:       General: She is not in acute distress. Appearance: She is not ill-appearing. HENT:      Head: Normocephalic and atraumatic. Eyes:      Extraocular Movements: Extraocular movements intact. Pupils: Pupils are equal, round, and reactive to light. Cardiovascular:      Rate and Rhythm: Mild tachycardia present. Rhythm irregular. Pulses: Normal pulses. Heart sounds: Normal heart sounds. Pulmonary:      Effort: Pulmonary effort is normal. No respiratory distress. Breath sounds: Normal breath sounds. No wheezing. Abdominal:      Palpations: Abdomen is soft. Tenderness: There is no abdominal tenderness. Musculoskeletal:         General: Signs of injury present. Cervical back: Normal range of motion. Right lower leg: No edema. Left lower leg: No edema. Comments: LLE mild tenderness, in ACE wrap   Skin:     General: Skin is warm and dry. Neurological:      General: No focal deficit present. Mental Status: She is alert and oriented to person, place, and time.       Comments: Motor strength UE 5/5, RLE 5/5, LLE not tested d/t pain        Consults: Urology, orthopedic surgery, cardiology  Significant Diagnostic Studies: CXR, CT ankle, renal U/S  Treatments: ABX, Lopressor, digoxin, heparin drip, Eliquis, Burk placement  Disposition: SNF  Discharged Condition: Stable  Follow Up: Primary Care Physician in 1 week    DISCHARGE MEDICATION:       Medication List        START taking these medications      amoxicillin-clavulanate 875-125 MG per tablet  Commonly known as: AUGMENTIN  Take 1 tablet by mouth every 12 hours for 2 days     apixaban 2.5 MG Tabs tablet  Commonly known as: ELIQUIS  Take 1 tablet by mouth 2 times daily     Digoxin 62.5 MCG Tabs  Take 62.5 mcg by mouth daily     lidocaine 4 % external patch  Place 2 patches onto the skin daily     tamsulosin 0.4 MG capsule  Commonly known as: FLOMAX  Take 1 capsule by mouth daily            CONTINUE taking these medications      diphenoxylate-atropine 2.5-0.025 MG per tablet  Commonly known as: LOMOTIL  Take 1 tablet by mouth 4 times daily for 180 days. mirabegron 50 MG Tb24  Commonly known as: Myrbetriq  Take 50 mg by mouth daily            STOP taking these medications      tiZANidine 2 MG tablet  Commonly known as: Beverley Mathis               Where to Get Your Medications        These medications were sent to [facility] [phone]  [address] [place] [address]      Phone: [phone]   amoxicillin-clavulanate 875-125 MG per tablet  apixaban 2.5 MG Tabs tablet  Digoxin 62.5 MCG Tabs  lidocaine 4 % external patch  tamsulosin 0.4 MG capsule          Activity: activity as directed per SNF  Diet: Regular  Wound Care: as directed    Time Spent on discharge is more than 30 minutes    Patient was seen at bedside and has no further complaints. Patient agrees with the plan we created together and will follow up in 1 week outpatient. Patient denies any further symptoms that need further hospitalizations. Physical exam, vitals and labs were all appreciated and negative for any further inpatient hospitalization. Pt is stable and agrees to be discharge. Time was allotted to further  the patient based on his health issues and recent hospitalization. Patient understands the treatments and the next steps relating to making the patient relatively healthy again.      Signed:  Geni Mcintosh MD, PGY-1  12/1/2022  Patient seen and examined, labs and imaging reviewed, agree with assessment and plan as outlined above. patients condition continues to improve doing well, asked patient to monitor side effects of medications which i've discussed at length, recurrence of symptoms or new symptoms including but not limited to chest pain, shortness of breath, nausea, vomiting, fevers or chills and seek immediate medical attention or call 911. Greater than 30 minutes spent on case on day of discharge.      Cristina Rincon MD FACP

## 2022-11-29 NOTE — PROGRESS NOTES
11/29/22 1445   Encounter Summary   Encounter Overview/Reason  Initial Encounter   Service Provided For: Patient   Referral/Consult From: [facility]   Support System Unknown   Last Encounter  11/29/22  (  11/29)   Complexity of Encounter Moderate   Begin Time 1330   End Time  1345   Total Time Calculated 15 min   Encounter    Type Initial Screen/Assessment   Spiritual/Emotional needs   Type Spiritual Support   Assessment/Intervention/Outcome   Assessment Calm;Coping; Hopeful   Intervention Active listening;Discussed belief system/Mu-ism practices/yosi; Explored/Affirmed feelings, thoughts, concerns;Explored Coping Skills/Resources   Outcome Coping;Expressed Gratitude   Electronically signed by Pool Carrasco on 11/29/2022 at 2:49 PM

## 2022-11-29 NOTE — PROGRESS NOTES
Progress Note    Admit Date: 11/24/2022  Day: 1  Diet: ADULT DIET; Regular  ADULT ORAL NUTRITION SUPPLEMENT; Breakfast, Dinner; Standard High Calorie/High Protein Oral Supplement    Interval history:   Patient seen at bedside. Reports feeling great. Denies CP, SOB, abd pain, H/A, N/V/D. No diarrhea overnight,     BP soft, but stable. HR 110s-120. Satting well on RA. Cr and lytes stable, WBC wnl, Hgb baseline. Renal U/S - resolution of hydronephrosis. Remove Burk, Voiding trial today per Urology. On Unasyn (11/26 - ) - switch to PO Augmentin today (finish off 10d course on 12/5)    Medications:     Scheduled Meds:   metoprolol tartrate  25 mg Oral BID    apixaban  2.5 mg Oral BID    ampicillin-sulbactam  3,000 mg IntraVENous Q6H    tamsulosin  0.4 mg Oral Daily    [Held by provider] diphenoxylate-atropine  1 tablet Oral 4x Daily    trospium  20 mg Oral Nightly    sodium chloride flush  5-40 mL IntraVENous 2 times per day    lidocaine  2 patch TransDERmal Daily     Continuous Infusions:   sodium chloride      dilTIAZem Stopped (11/25/22 1221)     PRN Meds:tiZANidine, sodium chloride flush, sodium chloride, ondansetron **OR** ondansetron, polyethylene glycol, acetaminophen **OR** acetaminophen    Objective:   Vitals:   T-max:  Patient Vitals for the past 8 hrs:   BP Temp Temp src Pulse Resp SpO2 Weight   11/29/22 0756 106/73 98 °F (36.7 °C) Oral (!) 115 18 96 % --   11/29/22 0600 -- -- -- (!) 113 -- -- --   11/29/22 0400 -- -- -- (!) 115 -- -- --   11/29/22 0314 113/77 97.9 °F (36.6 °C) Oral (!) 119 18 96 % 130 lb 11.7 oz (59.3 kg)       Intake/Output Summary (Last 24 hours) at 11/29/2022 1008  Last data filed at 11/29/2022 1262  Gross per 24 hour   Intake 375 ml   Output 1075 ml   Net -700 ml       Physical Exam  Constitutional:       General: She is not in acute distress. Appearance: She is not ill-appearing. HENT:      Head: Normocephalic and atraumatic.    Eyes:      Extraocular Movements: Extraocular movements intact. Pupils: Pupils are equal, round, and reactive to light. Cardiovascular:      Rate and Rhythm: Tachycardia present. Rhythm irregular. Pulses: Normal pulses. Heart sounds: Normal heart sounds. Pulmonary:      Effort: Pulmonary effort is normal. No respiratory distress. Breath sounds: Normal breath sounds. No wheezing. Abdominal:      Palpations: Abdomen is soft. Tenderness: There is no abdominal tenderness. Musculoskeletal:         General: Signs of injury present. Cervical back: Normal range of motion. Right lower leg: No edema. Left lower leg: No edema. Comments: LLE tenderness, in ACE wrap   Skin:     General: Skin is warm and dry. Neurological:      General: No focal deficit present. Mental Status: She is alert and oriented to person, place, and time. Comments: Motor strength UE 5/5, RLE 4/5, LLE not tested d/t pain         LABS:    CBC:   Recent Labs     11/27/22  0618 11/28/22  0439 11/29/22  0443   WBC 11.4* 7.7 8.7   HGB 9.8* 9.5* 9.8*   HCT 30.8* 28.5* 29.8*  30.1*    378 411   .1* 97.9 99.7     Renal:    Recent Labs     11/27/22  0617 11/28/22  0439 11/29/22  0443    137 137   K 4.1 3.7 4.4    106 105   CO2 17* 22 22   BUN 29* 21* 19   CREATININE 1.1 0.7 0.9   GLUCOSE 121* 126* 111*   CALCIUM 8.4 8.3 8.1*   MG 1.90 1.90 2.20   ANIONGAP 14 9 10     Hepatic:   No results for input(s): AST, ALT, BILITOT, BILIDIR, PROT, LABALBU, ALKPHOS in the last 72 hours. Troponin:   No results for input(s): TROPONINI in the last 72 hours. BNP: No results for input(s): BNP in the last 72 hours. Lipids: No results for input(s): CHOL, HDL in the last 72 hours. Invalid input(s): LDLCALCU, TRIGLYCERIDE  ABGs:  No results for input(s): PHART, MJE9XGZ, PO2ART, HFC3SAX, BEART, THGBART, O2QQVDBF, XKP2EOO in the last 72 hours. INR:   No results for input(s): INR in the last 72 hours.     Lactate: No results for input(s): LACTATE in the last 72 hours. Cultures:  -----------------------------------------------------------------  RAD:   US RENAL COMPLETE   Final Result   1. Resolution of the hydronephrosis with minimal right-sided pelviectasis. 2. Benign cyst left kidney. 3. Nonobstructive renal calculi. CT HEAD WO CONTRAST   Final Result      1. No acute intracranial process. 2.  Moderate cerebral atrophy and mild chronic small vessel ischemic disease. CT CHEST PULMONARY EMBOLISM W CONTRAST   Final Result      Chest:   1. No evidence of pulmonary embolism. 2.  Moderate to large hiatal hernia. 3.  Trace right pleural effusion and mild bilateral lower lobe atelectasis. Abdomen and pelvis:   1. Severe bilateral hydroureteronephrosis and moderate urinary bladder distention. Recommend correlation for bladder outlet obstruction versus urinary retention. 2.  Bilateral nephrolithiasis and 2 small nonobstructing calculi within the urinary bladder. 3.  Age-indeterminate L1 compression fracture with 30% height loss. 4.  Gallbladder distention without evidence of wall thickening or gallstones may represent gallbladder hydrops. Mild intrahepatic biliary ductal dilatation without significant common bile duct dilatation. 5.  Moderate right inguinal hernia containing nondilated small bowel. CT ANKLE LEFT WO CONTRAST   Final Result   Nondisplaced posterior calcaneal insufficiency fracture. CT ABDOMEN PELVIS W IV CONTRAST Additional Contrast? None   Final Result      Chest:   1. No evidence of pulmonary embolism. 2.  Moderate to large hiatal hernia. 3.  Trace right pleural effusion and mild bilateral lower lobe atelectasis. Abdomen and pelvis:   1. Severe bilateral hydroureteronephrosis and moderate urinary bladder distention. Recommend correlation for bladder outlet obstruction versus urinary retention.    2.  Bilateral nephrolithiasis and 2 small nonobstructing calculi within the urinary bladder. 3.  Age-indeterminate L1 compression fracture with 30% height loss. 4.  Gallbladder distention without evidence of wall thickening or gallstones may represent gallbladder hydrops. Mild intrahepatic biliary ductal dilatation without significant common bile duct dilatation. 5.  Moderate right inguinal hernia containing nondilated small bowel. XR CHEST PORTABLE   Final Result      No acute pulmonary disease. Moderate hiatal hernia. Assessment/Plan:     Acute metabolic encephalopathy 2/2 complicated UTI and urinary retention (AMS improved)  Patient appears slightly confused and in acute distress on presentation. Patient endorses lower abdominal pain with complaint of increased frequency, leukocytosis white blood cell 14.7, UA significant for urine nitrites and leukocyte Estrace. CT abdomen significant for severe bilateral hydronephrosis with moderate urinary bladder distention  -Burk in place  -Monitoring I's and O's  -f/u Urine Culture - +ve Enterococcus faecalis  - now on Unasyn x7d (11/26- )   - plan for Augmentin on d/c to complete 10-d course  -Urology consulted   - Renal U/S shows hydropnephrosis resolution   - Remove Burk -> PVR, possible Voiding trial  - PT 6, OT 10 - CM consulted for placement     A. fib with RVR  Patient has history of A. fib in the past however was not on any anticoagulation EKG in the ED was significant for A. fib with RVR. Patient is hemodynamically stable pulse: 123, possible proBNP elevation acute distress, heart due to A. Fib Echo:01/21/2020: 55-60%, normal LV, normal DD fxn, mild-mod MR/TR, normal LA/RA size. CHADSVASc: 4  - on Eliquis 2.5 BID  - now on Lopressor 50 - parameters in place for low BP   - if SBP<110 - give lopressor 25 instead   - Hold for SBP < 100  - monitor tele  - Cardiology consulted, appreciate recs   - follow up outpatient with Dr. Amira Fisher 1 week.     RONNY, likely prerenal 2/2 hypovolemia and post obstr (resolved)  Patient appears altered, concerns for decreased p.o. intake. Creatinine on presentation 1.5 last creatinine in 2020: 0.8, BUN to creatinine ratio 38:1  -Monitoring I/O's for now - UOP good  -Avoid nephrotoxic agents  -Ordered urine protein/creatinine - 4.8  -Got contrast yesterday - will cont fluids for now     Hyponatremia (improving)  Patient's sodium on admission 129, possible due to decreased p.o. intake  -Ordered urine osmolality, serum osmolality, urine sodium  -We will continue monitoring sodium every 12 hourly  -IVF     Troponinemia 2/2 possible demand ischemia (resolved)  Patient is currently asymptomatic, troponin times two 0.05  -f/u Trops - decr to 0.03 - stop trending     Left calcaneal fracture (nondisplaced, stable)  Patient presented with complaint of left leg pain, CT of the ankle showed left calcaneal fracture - Nondisplaced.  -Pain control for now  - LLE wrapped  - Ortho consulted - nonop mgmt, advise boot when ambulting   - outpatient f/u 2 weeks  - PT 6, OT 10 - CM c/s as above     Normocytic anemia  Hemoglobin: 14.7, MCV 99.2 MCHC 32.5  -f/u iron studies - folate elev  - f/u B12/Folate - WNL     Chronic medical problems  IBS  -On Lomotil - held  - having diarrhea (2 loose stools) - cont to hold Lomotil  - if diarrhea continues - may need C-diff test     Chronic back pain  -On tizanidine - d/c, pt reports not taking this at home     Mixed incontinence  -On mirabegron-dose adjusted as per creatinine clearance  -Tamsulosin added this admission    Code Status: Limited x4  FEN: Regular diet  PPX: Eliquis  DISPO: Kamron Gaytan MD, PGY-1  11/29/22  10:08 AM    This patient has been staffed and discussed with Maryjane Wilson MD.  Patient seen and examined, labs and imaging studies reviewed, agree with assessment and plan as outlined above. Continue with current care and plan. Discussed case with patients nurse, discussed case with care team, discussed plan.   D/w cardiology and son in law at bedside.     MD Lana Matias

## 2022-11-29 NOTE — PLAN OF CARE
Problem: Discharge Planning  Goal: Discharge to home or other facility with appropriate resources  11/29/2022 0026 by Elizabeth West RN  Outcome: Progressing  11/28/2022 2044 by Angelica Ponce RN  Outcome: Progressing     Problem: Pain  Goal: Verbalizes/displays adequate comfort level or baseline comfort level  11/29/2022 0026 by Elizabeth West RN  Outcome: Progressing  11/28/2022 2044 by Angelica Ponce RN  Outcome: Progressing     Problem: Skin/Tissue Integrity  Goal: Absence of new skin breakdown  Description: 1. Monitor for areas of redness and/or skin breakdown  2. Assess vascular access sites hourly  3. Every 4-6 hours minimum:  Change oxygen saturation probe site  4. Every 4-6 hours:  If on nasal continuous positive airway pressure, respiratory therapy assess nares and determine need for appliance change or resting period.   11/29/2022 0026 by Elizabeth West RN  Outcome: Progressing  11/28/2022 2044 by Angelica Ponce RN  Outcome: Progressing     Problem: Cardiovascular - Adult  Goal: Maintains optimal cardiac output and hemodynamic stability  11/29/2022 0026 by Elizabeth West RN  Outcome: Progressing  11/28/2022 2044 by Angelica Ponce RN  Outcome: Progressing  Goal: Absence of cardiac dysrhythmias or at baseline  11/29/2022 0026 by Elizabeth West RN  Outcome: Progressing  11/28/2022 2044 by Angelica Ponce RN  Outcome: Progressing     Problem: Safety - Adult  Goal: Free from fall injury  11/29/2022 0026 by Elizabeth West RN  Outcome: Anastasiia Quiroz (Taken 11/28/2022 2247)  Free From Fall Injury: Instruct family/caregiver on patient safety  11/28/2022 2044 by Angelica Ponce RN  Outcome: Progressing     Problem: ABCDS Injury Assessment  Goal: Absence of physical injury  11/29/2022 0026 by Elizabeth West RN  Outcome: Progressing  Flowsheets (Taken 11/28/2022 2247)  Absence of Physical Injury: Implement safety measures based on patient assessment  11/28/2022 2044 by Angelica Ponce RN  Outcome: Progressing

## 2022-11-29 NOTE — PROGRESS NOTES
Attempted to call son, Dr. Carlotta Oliva, to update him regarding patient's clinical status and medical updates. Unable to reach him, voicemail with callback instructions left.     Marita Hartman MD  PGY-1

## 2022-11-29 NOTE — PLAN OF CARE
Problem: Discharge Planning  Goal: Discharge to home or other facility with appropriate resources  Outcome: Progressing     Problem: Pain  Goal: Verbalizes/displays adequate comfort level or baseline comfort level  Outcome: Progressing     Problem: Skin/Tissue Integrity  Goal: Absence of new skin breakdown  Description: 1. Monitor for areas of redness and/or skin breakdown  2. Assess vascular access sites hourly  3. Every 4-6 hours minimum:  Change oxygen saturation probe site  4. Every 4-6 hours:  If on nasal continuous positive airway pressure, respiratory therapy assess nares and determine need for appliance change or resting period. Outcome: Progressing     Problem: Safety - Adult  Goal: Free from fall injury  Outcome: Progressing    Bed alarm active, non skid footwear in place, hourly rounding.

## 2022-11-30 LAB
ALBUMIN SERPL-MCNC: 1.8 G/DL (ref 3.1–4.9)
ALPHA-1-GLOBULIN: 0.3 G/DL (ref 0.2–0.4)
ALPHA-2-GLOBULIN: 1 G/DL (ref 0.4–1.1)
ANION GAP SERPL CALCULATED.3IONS-SCNC: 5 MMOL/L (ref 3–16)
ANION GAP SERPL CALCULATED.3IONS-SCNC: 7 MMOL/L (ref 3–16)
BASOPHILS ABSOLUTE: 0.1 K/UL (ref 0–0.2)
BASOPHILS RELATIVE PERCENT: 0.6 %
BETA GLOBULIN: 0.8 G/DL (ref 0.9–1.6)
BLOOD SMEAR REVIEW: NORMAL
BUN BLDV-MCNC: 19 MG/DL (ref 7–20)
BUN BLDV-MCNC: 19 MG/DL (ref 7–20)
CALCIUM SERPL-MCNC: 8 MG/DL (ref 8.3–10.6)
CALCIUM SERPL-MCNC: 8.1 MG/DL (ref 8.3–10.6)
CHLORIDE BLD-SCNC: 101 MMOL/L (ref 99–110)
CHLORIDE BLD-SCNC: 101 MMOL/L (ref 99–110)
CO2: 26 MMOL/L (ref 21–32)
CO2: 26 MMOL/L (ref 21–32)
CREAT SERPL-MCNC: 1.1 MG/DL (ref 0.6–1.2)
CREAT SERPL-MCNC: 1.1 MG/DL (ref 0.6–1.2)
DIGOXIN LEVEL: 1.5 NG/ML (ref 0.8–2)
EKG ATRIAL RATE: 357 BPM
EKG DIAGNOSIS: NORMAL
EKG Q-T INTERVAL: 338 MS
EKG QRS DURATION: 66 MS
EKG QTC CALCULATION (BAZETT): 420 MS
EKG R AXIS: -16 DEGREES
EKG T AXIS: 50 DEGREES
EKG VENTRICULAR RATE: 93 BPM
EOSINOPHILS ABSOLUTE: 0.1 K/UL (ref 0–0.6)
EOSINOPHILS RELATIVE PERCENT: 1.2 %
GAMMA GLOBULIN: 1.2 G/DL (ref 0.6–1.8)
GFR SERPL CREATININE-BSD FRML MDRD: 49 ML/MIN/{1.73_M2}
GFR SERPL CREATININE-BSD FRML MDRD: 49 ML/MIN/{1.73_M2}
GLUCOSE BLD-MCNC: 109 MG/DL (ref 70–99)
GLUCOSE BLD-MCNC: 110 MG/DL (ref 70–99)
HCT VFR BLD CALC: 29 % (ref 36–48)
HCT VFR BLD CALC: 29.2 % (ref 36–48)
HEMOGLOBIN: 9.7 G/DL (ref 12–16)
IMMATURE RETIC FRACT: 0.62 (ref 0.21–0.37)
LACTATE DEHYDROGENASE: 244 U/L (ref 100–190)
LYMPHOCYTES ABSOLUTE: 0.9 K/UL (ref 1–5.1)
LYMPHOCYTES RELATIVE PERCENT: 7.2 %
MAGNESIUM: 2 MG/DL (ref 1.8–2.4)
MCH RBC QN AUTO: 33.1 PG (ref 26–34)
MCHC RBC AUTO-ENTMCNC: 33.3 G/DL (ref 31–36)
MCV RBC AUTO: 99.3 FL (ref 80–100)
MONOCYTES ABSOLUTE: 1.2 K/UL (ref 0–1.3)
MONOCYTES RELATIVE PERCENT: 9.8 %
NEUTROPHILS ABSOLUTE: 9.8 K/UL (ref 1.7–7.7)
NEUTROPHILS RELATIVE PERCENT: 81.2 %
PDW BLD-RTO: 12.9 % (ref 12.4–15.4)
PLATELET # BLD: 425 K/UL (ref 135–450)
PMV BLD AUTO: 7.1 FL (ref 5–10.5)
POTASSIUM SERPL-SCNC: 5 MMOL/L (ref 3.5–5.1)
POTASSIUM SERPL-SCNC: 5.3 MMOL/L (ref 3.5–5.1)
RBC # BLD: 2.94 M/UL (ref 4–5.2)
RETICULOCYTE ABSOLUTE COUNT: 0.05 M/UL (ref 0.02–0.1)
RETICULOCYTE COUNT PCT: 1.7 % (ref 0.5–2.18)
SARS-COV-2, NAAT: NOT DETECTED
SODIUM BLD-SCNC: 132 MMOL/L (ref 136–145)
SODIUM BLD-SCNC: 134 MMOL/L (ref 136–145)
SPE/IFE INTERPRETATION: NORMAL
TOTAL PROTEIN: 5 G/DL (ref 6.4–8.2)
WBC # BLD: 12.1 K/UL (ref 4–11)

## 2022-11-30 PROCEDURE — 84165 PROTEIN E-PHORESIS SERUM: CPT

## 2022-11-30 PROCEDURE — 82784 ASSAY IGA/IGD/IGG/IGM EACH: CPT

## 2022-11-30 PROCEDURE — 2060000000 HC ICU INTERMEDIATE R&B

## 2022-11-30 PROCEDURE — 84155 ASSAY OF PROTEIN SERUM: CPT

## 2022-11-30 PROCEDURE — 85025 COMPLETE CBC W/AUTO DIFF WBC: CPT

## 2022-11-30 PROCEDURE — 85045 AUTOMATED RETICULOCYTE COUNT: CPT

## 2022-11-30 PROCEDURE — 83883 ASSAY NEPHELOMETRY NOT SPEC: CPT

## 2022-11-30 PROCEDURE — 6370000000 HC RX 637 (ALT 250 FOR IP): Performed by: INTERNAL MEDICINE

## 2022-11-30 PROCEDURE — 83010 ASSAY OF HAPTOGLOBIN QUANT: CPT

## 2022-11-30 PROCEDURE — 6370000000 HC RX 637 (ALT 250 FOR IP): Performed by: UROLOGY

## 2022-11-30 PROCEDURE — 83615 LACTATE (LD) (LDH) ENZYME: CPT

## 2022-11-30 PROCEDURE — 2580000003 HC RX 258: Performed by: PHYSICIAN ASSISTANT

## 2022-11-30 PROCEDURE — 6370000000 HC RX 637 (ALT 250 FOR IP): Performed by: STUDENT IN AN ORGANIZED HEALTH CARE EDUCATION/TRAINING PROGRAM

## 2022-11-30 PROCEDURE — 83735 ASSAY OF MAGNESIUM: CPT

## 2022-11-30 PROCEDURE — 97530 THERAPEUTIC ACTIVITIES: CPT

## 2022-11-30 PROCEDURE — 82668 ASSAY OF ERYTHROPOIETIN: CPT

## 2022-11-30 PROCEDURE — 93010 ELECTROCARDIOGRAM REPORT: CPT | Performed by: INTERNAL MEDICINE

## 2022-11-30 PROCEDURE — 80162 ASSAY OF DIGOXIN TOTAL: CPT

## 2022-11-30 PROCEDURE — 80048 BASIC METABOLIC PNL TOTAL CA: CPT

## 2022-11-30 PROCEDURE — 6370000000 HC RX 637 (ALT 250 FOR IP)

## 2022-11-30 PROCEDURE — 36415 COLL VENOUS BLD VENIPUNCTURE: CPT

## 2022-11-30 PROCEDURE — 2580000003 HC RX 258

## 2022-11-30 PROCEDURE — 93005 ELECTROCARDIOGRAM TRACING: CPT

## 2022-11-30 PROCEDURE — 2500000003 HC RX 250 WO HCPCS: Performed by: PHYSICIAN ASSISTANT

## 2022-11-30 PROCEDURE — 87635 SARS-COV-2 COVID-19 AMP PRB: CPT

## 2022-11-30 RX ORDER — 0.9 % SODIUM CHLORIDE 0.9 %
250 INTRAVENOUS SOLUTION INTRAVENOUS ONCE
Status: COMPLETED | OUTPATIENT
Start: 2022-11-30 | End: 2022-11-30

## 2022-11-30 RX ORDER — 0.9 % SODIUM CHLORIDE 0.9 %
500 INTRAVENOUS SOLUTION INTRAVENOUS ONCE
Status: COMPLETED | OUTPATIENT
Start: 2022-11-30 | End: 2022-11-30

## 2022-11-30 RX ORDER — METOPROLOL TARTRATE 5 MG/5ML
2.5 INJECTION INTRAVENOUS ONCE
Status: COMPLETED | OUTPATIENT
Start: 2022-11-30 | End: 2022-11-30

## 2022-11-30 RX ORDER — METOPROLOL TARTRATE 5 MG/5ML
5 INJECTION INTRAVENOUS ONCE
Status: COMPLETED | OUTPATIENT
Start: 2022-11-30 | End: 2022-11-30

## 2022-11-30 RX ADMIN — SODIUM CHLORIDE 250 ML: 9 INJECTION, SOLUTION INTRAVENOUS at 02:37

## 2022-11-30 RX ADMIN — AMOXICILLIN AND CLAVULANATE POTASSIUM 1 TABLET: 875; 125 TABLET, FILM COATED ORAL at 20:05

## 2022-11-30 RX ADMIN — SODIUM CHLORIDE, PRESERVATIVE FREE 5 ML: 5 INJECTION INTRAVENOUS at 21:11

## 2022-11-30 RX ADMIN — SODIUM CHLORIDE 500 ML: 9 INJECTION, SOLUTION INTRAVENOUS at 04:45

## 2022-11-30 RX ADMIN — APIXABAN 2.5 MG: 2.5 TABLET, FILM COATED ORAL at 20:06

## 2022-11-30 RX ADMIN — TAMSULOSIN HYDROCHLORIDE 0.4 MG: 0.4 CAPSULE ORAL at 09:28

## 2022-11-30 RX ADMIN — SODIUM CHLORIDE, PRESERVATIVE FREE 10 ML: 5 INJECTION INTRAVENOUS at 09:29

## 2022-11-30 RX ADMIN — TROSPIUM CHLORIDE 20 MG: 20 TABLET, FILM COATED ORAL at 20:05

## 2022-11-30 RX ADMIN — METOPROLOL TARTRATE 2.5 MG: 1 INJECTION, SOLUTION INTRAVENOUS at 02:32

## 2022-11-30 RX ADMIN — METOPROLOL TARTRATE 5 MG: 1 INJECTION, SOLUTION INTRAVENOUS at 04:46

## 2022-11-30 RX ADMIN — APIXABAN 2.5 MG: 2.5 TABLET, FILM COATED ORAL at 09:28

## 2022-11-30 RX ADMIN — AMOXICILLIN AND CLAVULANATE POTASSIUM 1 TABLET: 875; 125 TABLET, FILM COATED ORAL at 09:28

## 2022-11-30 RX ADMIN — METOPROLOL TARTRATE 25 MG: 50 TABLET, FILM COATED ORAL at 09:28

## 2022-11-30 ASSESSMENT — PAIN SCALES - GENERAL
PAINLEVEL_OUTOF10: 0
PAINLEVEL_OUTOF10: 3
PAINLEVEL_OUTOF10: 0

## 2022-11-30 ASSESSMENT — PAIN DESCRIPTION - ORIENTATION: ORIENTATION: LEFT

## 2022-11-30 ASSESSMENT — PAIN SCALES - WONG BAKER
WONGBAKER_NUMERICALRESPONSE: 0

## 2022-11-30 ASSESSMENT — PAIN DESCRIPTION - LOCATION: LOCATION: ANKLE

## 2022-11-30 NOTE — DISCHARGE INSTR - COC
Continuity of Care Form    Patient Name: Chris Christina   :  1935  MRN:  [id number]    [facility] date:  2022  Discharge date:  2022    Code Status Order: Limited   Advance Directives:     Admitting Physician:  William Torres MD  PCP: Janet Lam MD    Discharging Nurse: [address] Unit/Room#: 0279/1448-11  Discharging Unit Phone Number: [phone]    Emergency Contact:   Extended Emergency Contact Information  Primary Emergency Contact: [address] [place] [address] Phone: [phone]  Mobile Phone: [phone]  Relation: Child  Secondary Emergency Contact: [address] Phone: [phone]  Mobile Phone: [phone]  Relation: Child    Past Surgical History:  Past Surgical History:   Procedure Laterality Date    APPENDECTOMY      EYE SURGERY      Cataract Surgery    HIP PINNING Left 2019    LEFT HIP GAMMA NAIL performed by Heavenly Griffin MD at [address] OR       Immunization History:   Immunization History   Administered Date(s) Administered    COVID-19, PFIZER Bivalent BOOSTER, (age 12y+), IM, 30 mcg/0.3 mL dose 10/26/2022    COVID-19, PFIZER GRAY top, DO NOT Dilute, (age 15 y+), IM, 30 mcg/0.3 mL 2022    COVID-19, PFIZER PURPLE top, DILUTE for use, (age 15 y+), 30mcg/0.3mL 2021, 2021, 2021    Influenza, FLUAD, (age 72 y+), Adjuvanted, 0.5mL 09/15/2021, 2022    Influenza, FLUZONE (age 72 y+), High Dose, 0.7mL 10/14/2020, 10/19/2020    Influenza, High Dose (Fluzone 65 yrs and older) 2013, 2015, 2016, 2018, 2019    Pneumococcal Conjugate 13-valent (Bbroyyx27) 2015    Pneumococcal Polysaccharide (Xxvixdjna53) 2009    Zoster Live (Zostavax) 2008       Active Problems:  Patient Active Problem List   Diagnosis Code    Osteoarthritis M19.90    IBS (irritable bowel syndrome) K58.9    Gout M10.9    Dry eye syndrome H04.129    Osteopenia with high risk of fracture M85.80    Essential hypertension, benign I10    Glucose intolerance (impaired glucose tolerance) R73.02    Primary osteoarthritis of both knees M17.0    Anemia D64.9    Inguinal hernia, right K40.90    RONNY (acute kidney injury) ([facility] N17.9    History of DVT (deep vein thrombosis) Z86.718    Chronic pain of left knee M25.562, G89.29    Chronic pain of right knee M25.561, G89.29    Swelling of both lower extremities M79.89    Atrial fibrillation with RVR ([facility] I48.91    UTI (urinary tract infection) N39.0    Hyponatremia N79.7    Metabolic acidosis E55.81    Mild cognitive impairment C62.53    Acute metabolic encephalopathy H75.30       Isolation/Infection:   Isolation            No Isolation          Patient Infection Status       Infection Onset Added Last Indicated Last Indicated By Review Planned Expiration Resolved Resolved By    None active    Resolved    C-diff Rule Out 11/27/22 11/27/22 11/27/22 Clostridium difficile toxin/antigen (Ordered)   11/27/22 Rule-Out Test Canceled    COVID-19 (Rule Out) 11/24/22 11/24/22 11/24/22 COVID-19 & Influenza Combo (Ordered)   11/24/22 Rule-Out Test Resulted            Nurse Assessment:  Last Vital Signs: /61   Pulse 91   Temp 98.4 °F (36.9 °C) (Oral)   Resp 18   Ht 5' 2\" (1.575 m)   Wt 134 lb 14.7 oz (61.2 kg)   SpO2 94%   BMI 24.68 kg/m²     Last documented pain score (0-10 scale): Pain Level: 0  Last Weight:   Wt Readings from Last 1 Encounters:   11/30/22 134 lb 14.7 oz (61.2 kg)     Mental Status:  oriented and alert    IV Access:  - None    Nursing Mobility/ADLs:  Walking   Assisted  Transfer  Assisted  Bathing  Assisted  Dressing  Assisted  Toileting  Assisted  Feeding  Assisted  Med Admin  Assisted  Med Delivery   whole    Wound Care Documentation and Therapy:  Wound 01/18/20 Coccyx Mid healing tunneled wound (Active)   Number of days: 1047       Incision 09/18/19 Hip Left; Anterior;Proximal (Active)   Number of days: 1168        Elimination:  Continence:    Bowel: No  Bladder: No  Urinary Catheter: None   Colostomy/Ileostomy/Ileal Conduit: No       Date of Last BM: 2022    Intake/Output Summary (Last 24 hours) at 2022 1430  Last data filed at 2022 1140  Gross per 24 hour   Intake 240 ml   Output 500 ml   Net -260 ml     I/O last 3 completed shifts: In: 0 [P.O.:855]  Out: [address] [Urine:1425]    Safety Concerns: At Risk for Falls    Impairments/Disabilities:      None    Nutrition Therapy:  Current Nutrition Therapy:   - Oral Diet:  General    Routes of Feeding: Oral  Liquids: No Restrictions  Daily Fluid Restriction: no  Last Modified Barium Swallow with Video (Video Swallowing Test): not done    Treatments at the Time of Hospital Discharge:   Respiratory Treatments:   Oxygen Therapy:  is not on home oxygen therapy. Ventilator:    - No ventilator support    Rehab Therapies: Physical Therapy and Occupational Therapy  Weight Bearing Status/Restrictions: [address]  Weight Bearin}  Other Medical Equipment (for information only, NOT a DME order):     Other Treatments:     Patient's personal belongings (please select all that are sent with patient):      RN SIGNATURE:  Electronically signed by Rahul Urrutia RN on 22 at 1:41 PM EST    CASE MANAGEMENT/SOCIAL WORK SECTION    Inpatient Status Date: 22    Readmission Risk Assessment Score:  Readmission Risk              Risk of Unplanned Readmission:  15           Discharging to Facility/ Agency   Name: William   Address:  Phone: [phone]  Fax:    Dialysis Facility (if applicable)   Name:  Address:  Dialysis Schedule:  Phone:  Fax:    / signature: Electronically signed by Javier Haney RN on 22 at 11:59 AM EST    PHYSICIAN SECTION    Prognosis: Good    Condition at Discharge: Stable    Rehab Potential (if transferring to Rehab): Good    Recommended Labs or Other Treatments After Discharge:   Please have a BMP, CBC, and Digoxin level performed in 5 days (prior to doctor's appointments), and then every Monday. Ideal digoxin level is 3.7-8.3     Physician Certification: I certify the above information and transfer of Mahin Basurto  is necessary for the continuing treatment of the diagnosis listed and that she requires Edgar Osorio for greater 30 days.      Update Admission H&P: No change in H&P    PHYSICIAN SIGNATURE:  Electronically signed by Marnette Nageotte, MD on 12/1/22 at 6:18 AM EST

## 2022-11-30 NOTE — CONSULTS
[facility] Consult Note       Attending Physician: Cecy Lea MD    Primary Care: Matiheu Tanner MD       Referring MD: No referring provider defined for this encounter. Name: Ian Alicea :  1935  MRN:  [id number]    Admission: 2022      Date: 2022    Reason for Admission: left leg pain    Reason for consult: anemia     History of Present Illness:   Patient is a 64-year-old female with history of atrial fibrillation, DVT who presented to the hospital with left leg pain. Patient lives at an assisted facility. On admission she was noted to have a left calcaneal fracture. She also was noted to be anemic with hemoglobin in the tens. Patient has chronic history of anemia. She denies any recent bleeding. She denies any weight loss, fevers or chills. Patient is a poor historian. Patient denies any history of dark urine. Hematology is consulted for anemia.       Past Surgical History:   Procedure Laterality Date    APPENDECTOMY      EYE SURGERY      Cataract Surgery    HIP PINNING Left 2019    LEFT HIP GAMMA NAIL performed by Elvis Decker MD at [facility] OR       Past Medical History:   Diagnosis Date    Acute deep vein thrombosis (DVT) of left lower extremity after procedure ([address] 10/03/2019    Post hip rx repair    Acute renal failure (ARF) ([address] 2020    Anterior corneal dystrophy     Asymptomatic postmenopausal status (age-related) (natural)     Cervical polyp     Dry eye syndrome     Edema     Fuch's endothelial dystrophy     Gout     Hip fracture requiring operative repair, left, closed, initial encounter ([facility] 2019    IBS (irritable bowel syndrome)     Mild cognitive impairment     Osteoarthritis     Osteopenia     Papilloma of breast 2013    Pedal edema 2017    Screening mammogram     Symptomatic menopausal or female climacteric states 2013    Urinary retention 2019    Vaginal candidiasis        Prior to Admission medications    Medication Sig Start Date End Date Taking? Authorizing Provider   diphenoxylate-atropine (LOMOTIL) 2.5-0.025 MG per tablet Take 1 tablet by mouth 4 times daily for 180 days. 9/14/22 3/13/23  MD aung Abrams [facility]) 50 MG TB24 Take 50 mg by mouth daily 2/14/22   Mackenzie Middleton MD   tiZANidine (ZANAFLEX) 2 MG tablet Take 1 tablet by mouth 3 times daily as needed (muscle spasm)  Patient not taking: Reported on 11/25/2022 2/14/22   Mackenzie Middleton MD       Allergies   Allergen Reactions    Amoxapine And Related Diarrhea    Lisinopril Swelling     Swelling tongue. Clindamycin/Lincomycin        Family History   Problem Relation Age of Onset    Breast Cancer Daughter         [facility] 2    Ovarian Cancer Daughter         Social History     Socioeconomic History    Marital status:       Spouse name: Not on file    Number of children: Not on file    Years of education: Not on file    Highest education level: Not on file   Occupational History    Not on file   Tobacco Use    Smoking status: Never    Smokeless tobacco: Never   Substance and Sexual Activity    Alcohol use: No    Drug use: No    Sexual activity: Not Currently   Other Topics Concern    Not on file   Social History Narrative    Not on file     Social Determinants of Health     Financial Resource Strain: Low Risk     Difficulty of Paying Living Expenses: Not hard at all   Food Insecurity: No Food Insecurity    Worried About Running Out of Food in the Last Year: Never true    Ran Out of Food in the Last Year: Never true   Transportation Needs: Not on file   Physical Activity: Not on file   Stress: Not on file   Social Connections: Not on file   Intimate Partner Violence: Not on file   Housing Stability: Not on file        ROS:  As noted above, otherwise remainder of 10-point ROS negative    Physical Exam:     Vital Signs:  /61   Pulse 91   Temp 98.4 °F (36.9 °C) (Oral)   Resp 18   Ht 5' 2\" (1.575 m)   Wt 134 lb 14.7 oz (61.2 kg) SpO2 94%   BMI 24.68 kg/m²     Weight:    Wt Readings from Last 3 Encounters:   11/30/22 134 lb 14.7 oz (61.2 kg)   02/14/22 127 lb (57.6 kg)   11/30/21 117 lb (53.1 kg)       General: Awake, alert and oriented. HEENT: normocephalic, PERRL, no scleral erythema or icterus, Oral mucosa moist and intact, throat clear  NECK: supple without palpable adenopathy  BACK: Straight negative CVAT  SKIN: warm dry and intact without lesions rashes or masses  CHEST: CTA bilaterally without use of accessory muscles  CV: Normal S1 S2, RRR, no MRG  ABD: NT ND normoactive BS, no palpable masses or hepatosplenomegaly  EXTREMITIES: left Le in cast  NEURO: CN II - XII grossly intact      Laboratory Data:   CBC:   Recent Labs     11/28/22 0439 11/29/22 0443 11/30/22  0407   WBC 7.7 8.7 12.1*   HGB 9.5* 9.8* 9.7*   HCT 28.5* 29.8*  30.1* 29.2*   MCV 97.9 99.7 99.3    411 425     BMP/Mag:  Recent Labs     11/28/22 0439 11/29/22 0443 11/30/22  0407    137 132*   K 3.7 4.4 5.3*    105 101   CO2 22 22 26   BUN 21* 19 19   CREATININE 0.7 0.9 1.1   MG 1.90 2.20 2.00     LIVP: No results for input(s): AST, ALT, LIPASE, BILIDIR, BILITOT, ALKPHOS in the last 72 hours. Invalid input(s): AMYLASE,  ALB  Coags: No results for input(s): PROTIME, INR, APTT in the last 72 hours. Uric Acid No results for input(s): LABURIC in the last 72 hours. ASSESSMENT AND PLAN:          Anemia  Atrial fibrillation  Calcaneal fx     Plan  Iron studies mildly low in iron sat 18% .  Normal B12 and folate   - Will check EPO, SPEP, JOLEEN, immunoglobulins and hemolysis labs   - If patient is discharged can be followed up in clinic    Rodrick Pratt DO

## 2022-11-30 NOTE — PLAN OF CARE
Problem: Discharge Planning  Goal: Discharge to home or other facility with appropriate resources  11/29/2022 2350 by Reema Sutton RN  Outcome: Progressing  11/29/2022 1806 by Kenya Sweeney RN  Outcome: Progressing     Problem: Pain  Goal: Verbalizes/displays adequate comfort level or baseline comfort level  11/29/2022 2350 by Reema Sutton RN  Outcome: Progressing  11/29/2022 1806 by Kenya Sweeney RN  Outcome: Progressing     Problem: Skin/Tissue Integrity  Goal: Absence of new skin breakdown  Description: 1. Monitor for areas of redness and/or skin breakdown  2. Assess vascular access sites hourly  3. Every 4-6 hours minimum:  Change oxygen saturation probe site  4. Every 4-6 hours:  If on nasal continuous positive airway pressure, respiratory therapy assess nares and determine need for appliance change or resting period.   11/29/2022 2350 by Reema Sutton RN  Outcome: Progressing  11/29/2022 1806 by Kenya Sweeney RN  Outcome: Progressing     Problem: Cardiovascular - Adult  Goal: Maintains optimal cardiac output and hemodynamic stability  Outcome: Progressing  Goal: Absence of cardiac dysrhythmias or at baseline  Outcome: Progressing     Problem: Safety - Adult  Goal: Free from fall injury  Outcome: Progressing  Flowsheets (Taken 11/29/2022 2141)  Free From Fall Injury: Instruct family/caregiver on patient safety     Problem: ABCDS Injury Assessment  Goal: Absence of physical injury  Outcome: Progressing  Flowsheets (Taken 11/29/2022 2141)  Absence of Physical Injury: Implement safety measures based on patient assessment

## 2022-11-30 NOTE — PROGRESS NOTES
Progress Note    Admit Date: 11/24/2022  Day: 1  Diet: ADULT DIET; Regular  ADULT ORAL NUTRITION SUPPLEMENT; Breakfast, Dinner; Standard High Calorie/High Protein Oral Supplement    Interval history:   Patient seen at bedside, reports feeling well, no complaints. Tolerating diet, denies N/V/D. Minimal retention on PVR yesterday, however remains incontinent, Purewick in place. BP soft, but stable. HR 110s-120. Satting well on RA. Cr slightly elev from baseline, but has been fluctuant this admission. Hyperkalemia (Dig held, get level), otherwise lytes stable. Wbc rising, Hgb stable. On Augmentin PO (last dose 12/6 to complete 10d course). Will repeat BMP in afternoon, holding dig for now. We will check dig level tomorrow as well, could be due to large loading dose.     Medications:     Scheduled Meds:   metoprolol tartrate  25 mg Oral BID    amoxicillin-clavulanate  1 tablet Oral 2 times per day    [Held by provider] digoxin  62.5 mcg Oral Daily    apixaban  2.5 mg Oral BID    tamsulosin  0.4 mg Oral Daily    [Held by provider] diphenoxylate-atropine  1 tablet Oral 4x Daily    trospium  20 mg Oral Nightly    sodium chloride flush  5-40 mL IntraVENous 2 times per day    lidocaine  2 patch TransDERmal Daily     Continuous Infusions:   sodium chloride       PRN Meds:tiZANidine, sodium chloride flush, sodium chloride, ondansetron **OR** ondansetron, polyethylene glycol, acetaminophen **OR** acetaminophen    Objective:   Vitals:   T-max:  Patient Vitals for the past 8 hrs:   BP Temp Temp src Pulse Resp SpO2 Weight   11/30/22 0900 103/64 98.2 °F (36.8 °C) Oral 98 16 96 % --   11/30/22 0623 115/71 -- -- 90 -- 94 % --   11/30/22 0600 -- -- -- 84 -- -- --   11/30/22 0446 95/61 -- -- (!) 119 -- -- --   11/30/22 0400 -- -- -- (!) 108 -- -- --   11/30/22 0311 108/65 99 °F (37.2 °C) Oral (!) 105 18 96 % 134 lb 14.7 oz (61.2 kg)       Intake/Output Summary (Last 24 hours) at 11/30/2022 1025  Last data filed at 11/30/2022 3180  Gross per 24 hour   Intake 240 ml   Output 700 ml   Net -460 ml       Physical Exam  Constitutional:       General: She is not in acute distress. Appearance: She is not ill-appearing. HENT:      Head: Normocephalic and atraumatic. Eyes:      Extraocular Movements: Extraocular movements intact. Pupils: Pupils are equal, round, and reactive to light. Cardiovascular:      Rate and Rhythm: Tachycardia present. Rhythm irregular. Pulses: Normal pulses. Heart sounds: Normal heart sounds. Pulmonary:      Effort: Pulmonary effort is normal. No respiratory distress. Breath sounds: Normal breath sounds. No wheezing. Abdominal:      Palpations: Abdomen is soft. Tenderness: There is no abdominal tenderness. Musculoskeletal:         General: Signs of injury present. Cervical back: Normal range of motion. Right lower leg: No edema. Left lower leg: No edema. Comments: LLE tenderness, in ACE wrap   Skin:     General: Skin is warm and dry. Neurological:      General: No focal deficit present. Mental Status: She is alert and oriented to person, place, and time. Comments: Motor strength UE 5/5, RLE 4/5, LLE not tested d/t pain         LABS:    CBC:   Recent Labs     11/28/22  0439 11/29/22  0443 11/30/22  0407   WBC 7.7 8.7 12.1*   HGB 9.5* 9.8* 9.7*   HCT 28.5* 29.8*  30.1* 29.2*    411 425   MCV 97.9 99.7 99.3     Renal:    Recent Labs     11/28/22  0439 11/29/22  0443 11/30/22  0407    137 132*   K 3.7 4.4 5.3*    105 101   CO2 22 22 26   BUN 21* 19 19   CREATININE 0.7 0.9 1.1   GLUCOSE 126* 111* 109*   CALCIUM 8.3 8.1* 8.0*   MG 1.90 2.20 2.00   ANIONGAP 9 10 5     Hepatic:   Recent Labs     11/29/22  1156   PROT 5.0*       Troponin:   No results for input(s): TROPONINI in the last 72 hours. BNP: No results for input(s): BNP in the last 72 hours. Lipids: No results for input(s): CHOL, HDL in the last 72 hours.     Invalid input(s): LDLCALCU, TRIGLYCERIDE  ABGs:  No results for input(s): PHART, TZR8VZL, PO2ART, BNN4KHA, BEART, THGBART, R4KGDHMH, PUM6JCF in the last 72 hours. INR:   No results for input(s): INR in the last 72 hours. Lactate: No results for input(s): LACTATE in the last 72 hours. Cultures:  -----------------------------------------------------------------  RAD:   US RENAL COMPLETE   Final Result   1. Resolution of the hydronephrosis with minimal right-sided pelviectasis. 2. Benign cyst left kidney. 3. Nonobstructive renal calculi. CT HEAD WO CONTRAST   Final Result      1. No acute intracranial process. 2.  Moderate cerebral atrophy and mild chronic small vessel ischemic disease. CT CHEST PULMONARY EMBOLISM W CONTRAST   Final Result      Chest:   1. No evidence of pulmonary embolism. 2.  Moderate to large hiatal hernia. 3.  Trace right pleural effusion and mild bilateral lower lobe atelectasis. Abdomen and pelvis:   1. Severe bilateral hydroureteronephrosis and moderate urinary bladder distention. Recommend correlation for bladder outlet obstruction versus urinary retention. 2.  Bilateral nephrolithiasis and 2 small nonobstructing calculi within the urinary bladder. 3.  Age-indeterminate L1 compression fracture with 30% height loss. 4.  Gallbladder distention without evidence of wall thickening or gallstones may represent gallbladder hydrops. Mild intrahepatic biliary ductal dilatation without significant common bile duct dilatation. 5.  Moderate right inguinal hernia containing nondilated small bowel. CT ANKLE LEFT WO CONTRAST   Final Result   Nondisplaced posterior calcaneal insufficiency fracture. CT ABDOMEN PELVIS W IV CONTRAST Additional Contrast? None   Final Result      Chest:   1. No evidence of pulmonary embolism. 2.  Moderate to large hiatal hernia. 3.  Trace right pleural effusion and mild bilateral lower lobe atelectasis. Abdomen and pelvis:   1. Severe bilateral hydroureteronephrosis and moderate urinary bladder distention. Recommend correlation for bladder outlet obstruction versus urinary retention. 2.  Bilateral nephrolithiasis and 2 small nonobstructing calculi within the urinary bladder. 3.  Age-indeterminate L1 compression fracture with 30% height loss. 4.  Gallbladder distention without evidence of wall thickening or gallstones may represent gallbladder hydrops. Mild intrahepatic biliary ductal dilatation without significant common bile duct dilatation. 5.  Moderate right inguinal hernia containing nondilated small bowel. XR CHEST PORTABLE   Final Result      No acute pulmonary disease. Moderate hiatal hernia. Assessment/Plan:     Acute metabolic encephalopathy 2/2 complicated UTI and urinary retention (AMS improved)  Patient appears slightly confused and in acute distress on presentation. Patient endorses lower abdominal pain with complaint of increased frequency, leukocytosis white blood cell 14.7, UA significant for urine nitrites and leukocyte Estrace. CT abdomen significant for severe bilateral hydronephrosis with moderate urinary bladder distention  -Burk in place  -Monitoring I's and O's  -f/u Urine Culture - +ve Enterococcus faecalis  - on Augmentin (last dose 12/6 to finish 10d course of abx)  -Urology consulted   - Renal U/S shows hydropnephrosis resolution   - minimal PVR -no need for another Burk. - Bladder scan before d/c   - f/u outpatient urology 3-4wks  - PT 6, OT 10 - CM consulted for placement     A. fib with RVR  Patient has history of A. fib in the past however was not on any anticoagulation EKG in the ED was significant for A. fib with RVR. Patient is hemodynamically stable pulse: 123, possible proBNP elevation acute distress, heart due to A. Fib Echo:01/21/2020: 55-60%, normal LV, normal DD fxn, mild-mod MR/TR, normal LA/RA size.  CHADSVASc: 4  - on Eliquis 2.5 BID  - On Lopressor 25 BID  - Digoxin added yesterday - K elev - will hold and get level  - monitor tele  - Cardiology consulted, appreciate recs   - follow up outpatient with Dr. Lyndle Lesch 1 week. Hyperkalemia  Elev to 5.3  - Digoxin started yesterday - will hold and get level as above  - will get EKG    RONNY, likely prerenal 2/2 hypovolemia and post obstr (resolved)  Patient appears altered, concerns for decreased p.o. intake. Creatinine on presentation 1.5 last creatinine in 2020: 0.8, BUN to creatinine ratio 38:1  -Monitoring I/O's for now - UOP good  -Avoid nephrotoxic agents  -Ordered urine protein/creatinine - 4.8  -Got contrast yesterday - will cont fluids for now     Hyponatremia (improving)  Patient's sodium on admission 129, possible due to decreased p.o. intake  -Ordered urine osmolality, serum osmolality, urine sodium  -We will continue monitoring sodium every 12 hourly  -IVF     Troponinemia 2/2 possible demand ischemia (resolved)  Patient is currently asymptomatic, troponin times two 0.05  -f/u Trops - decr to 0.03 - stop trending     Left calcaneal fracture (nondisplaced, stable)  Patient presented with complaint of left leg pain, CT of the ankle showed left calcaneal fracture - Nondisplaced.  -Pain control for now  - LLE wrapped  - Ortho consulted - nonop mgmt, advise boot when ambulting   - outpatient f/u 2 weeks  - PT 6, OT 10 - CM c/s as above     Macrocytic anemia  Hemoglobin: 14.7, MCV 99.2 MCHC 32.5  -f/u iron studies - folate elev  - f/u B12/Folate - WNL  - f/u SPEP/UPEP  - Heme/Onc consulted given chronic anemia - MM w/u?      Chronic medical problems  IBS  -On Lomotil - held  - having diarrhea (2 loose stools) - cont to hold Lomotil  - if diarrhea continues - may need C-diff test     Chronic back pain  -On tizanidine - d/c, pt reports not taking this at home     Mixed incontinence  -On mirabegron-dose adjusted as per creatinine clearance  -Tamsulosin added this admission    Code Status: Limited x4  FEN: Regular diet  PPX: Gayla  DISPO: Vishnu Figueroa MD, PGY-1  11/30/22  10:25 AM    This patient has been staffed and discussed with Abraham Flores MD.  Patient seen and examined, labs and imaging studies reviewed, agree with assessment and plan as outlined above. Continue with current care and plan. Discussed case with patients nurse, discussed case with care team, discussed plan.       Abraham Flores MD [address]

## 2022-11-30 NOTE — PLAN OF CARE
Problem: Discharge Planning  Goal: Discharge to home or other facility with appropriate resources  11/30/2022 1156 by Raymundo Nieto RN  Outcome: Progressing  Flowsheets (Taken 11/30/2022 1156)  Discharge to home or other facility with appropriate resources:   Identify barriers to discharge with patient and caregiver   Arrange for needed discharge resources and transportation as appropriate   Identify discharge learning needs (meds, wound care, etc)   Arrange for interpreters to assist at discharge as needed   Refer to discharge planning if patient needs post-hospital services based on physician order or complex needs related to functional status, cognitive ability or social support system     Problem: Pain  Goal: Verbalizes/displays adequate comfort level or baseline comfort level  11/30/2022 1156 by Raymundo Nieto RN  Outcome: Progressing  Flowsheets (Taken 11/30/2022 1156)  Verbalizes/displays adequate comfort level or baseline comfort level:   Encourage patient to monitor pain and request assistance   Assess pain using appropriate pain scale   Administer analgesics based on type and severity of pain and evaluate response   Implement non-pharmacological measures as appropriate and evaluate response   Consider cultural and social influences on pain and pain management   Notify Licensed Independent Practitioner if interventions unsuccessful or patient reports new pain     Problem: Skin/Tissue Integrity  Goal: Absence of new skin breakdown  Description: 1. Monitor for areas of redness and/or skin breakdown  2. Assess vascular access sites hourly  3. Every 4-6 hours minimum:  Change oxygen saturation probe site  4. Every 4-6 hours:  If on nasal continuous positive airway pressure, respiratory therapy assess nares and determine need for appliance change or resting period.   11/30/2022 1156 by Raymundo Nieto RN  Outcome: Progressing     Problem: Cardiovascular - Adult  Goal: Maintains optimal cardiac output and · Dietary and lifestyle modifications hemodynamic stability  11/30/2022 1156 by Raymundo Nieto RN  Outcome: Progressing  Flowsheets (Taken 11/30/2022 1156)  Maintains optimal cardiac output and hemodynamic stability:   Monitor blood pressure and heart rate   Monitor urine output and notify Licensed Independent Practitioner for values outside of normal range   Assess for signs of decreased cardiac output   Administer fluid and/or volume expanders as ordered   Administer vasoactive medications as ordered     Problem: Cardiovascular - Adult  Goal: Absence of cardiac dysrhythmias or at baseline  11/30/2022 1156 by Raymundo Nieto RN  Outcome: Progressing  Flowsheets (Taken 11/30/2022 1156)  Absence of cardiac dysrhythmias or at baseline:   Monitor cardiac rate and rhythm   Assess for signs of decreased cardiac output   Administer antiarrhythmia medication and electrolyte replacement as ordered     Problem: Safety - Adult  Goal: Free from fall injury  11/30/2022 1156 by Raymundo Nieto RN  Outcome: Progressing  Flowsheets (Taken 11/30/2022 1156)  Free From Fall Injury: Instruct family/caregiver on patient safety     Problem: ABCDS Injury Assessment  Goal: Absence of physical injury  11/30/2022 1156 by Raymundo Nieto RN  Outcome: Progressing  Flowsheets (Taken 11/30/2022 1156)  Absence of Physical Injury: Implement safety measures based on patient assessment

## 2022-11-30 NOTE — PROGRESS NOTES
Physical Therapy  Facility/Department: [place] [address] [facility]  Physical Therapy Daily Treatment Note    Name: Lesa Haney  : 1935  MRN: [id number]  Date of Service: 2022    Discharge Recommendations:   Lesa Haney scored a 9/24 on the AM-PAC short mobility form. Current research shows that an AM-PAC score of 17 or less is typically not associated with a discharge to the patient's home setting. Based on the patient's AM-PAC score and their current functional mobility deficits, it is recommended that the patient have 3-5 sessions per week of Physical Therapy at d/c to increase the patient's independence. Please see assessment section for further patient specific details. If patient discharges prior to next session this note will serve as a discharge summary. Please see below for the latest assessment towards goals. PT Equipment Recommendations  Other: Defer      Patient Diagnosis(es): The primary encounter diagnosis was Atrial fibrillation with RVR ([address] Diagnoses of Closed nondisplaced fracture of left calcaneus, unspecified portion of calcaneus, initial encounter, Urinary tract infection without hematuria, site unspecified, General weakness, Elevated troponin, RONNY (acute kidney injury) ([address] Urinary retention, and Intravenous infiltration, initial encounter were also pertinent to this visit.   Past Medical History:  has a past medical history of Acute deep vein thrombosis (DVT) of left lower extremity after procedure ([address] Acute renal failure (ARF) (HCC), Anterior corneal dystrophy, Asymptomatic postmenopausal status (age-related) (natural), Cervical polyp, Dry eye syndrome, Edema, Fuch's endothelial dystrophy, Gout, Hip fracture requiring operative repair, left, closed, initial encounter ([address] IBS (irritable bowel syndrome), Mild cognitive impairment, Osteoarthritis, Osteopenia, Papilloma of breast, Pedal edema, Screening mammogram, Symptomatic menopausal or female climacteric states, Urinary retention, and Vaginal candidiasis. Past Surgical History:  has a past surgical history that includes Appendectomy; eye surgery; and hip pinning (Left, 9/18/2019). Assessment   Assessment: Pt requiring heavy assist of 2 for mobility OOB. Pt with B knee flexion at rest due to knee OA, and receives B knee extension stretch. Pt incontinent and dependent in her pericare. Pt's L ankle boot not present, as ortho 11/28 progress note specifies. Rec continued inpt PT at d/c. Will follow and advance as able. Treatment Diagnosis: impaired mobility associated with urinary retention  Therapy Prognosis: Fair  Decision Making: Medium Complexity  Requires PT Follow-Up: Yes  Activity Tolerance  Activity Tolerance: Patient limited by fatigue     Plan   Physcial Therapy Plan  General Plan:  (2-5)  Current Treatment Recommendations: Strengthening, Balance training, Functional mobility training, Transfer training, Gait training, Endurance training, Safety education & training, Patient/Caregiver education & training, Therapeutic activities  Safety Devices  Type of Devices: Left in chair, Chair alarm in place, Call light within reach, Nurse notified     Restrictions  Position Activity Restriction  Other position/activity restrictions: up with assistance; Per MD progress note \"Left calcaneal fracture  Patient presented with complaint of left leg pain, CT of the ankle showed left calcaneal fracture - Nondisplaced.  -Pain control for now\"     Subjective   General  Chart Reviewed: Yes  Additional Pertinent Hx: This is a 60-year-old female PMHx IBS, A. fib not on anticoagulation, DVT, osteoarthritis, gout, who had presented to the ED due to complaint of left leg pain.   CT of ankle was significant for left calcaneus fracture  Family / Caregiver Present: No  Referring Practitioner: Catia Major MD  Referral Date : 11/25/22  Diagnosis: urinary retention  Subjective  Subjective: Pt supine in bed and agreeable to PT with nursing encouragement    Social/Functional History  Social/Functional History  Lives With: Alone  Type of Home[place]ment ([place])  Home Layout: One level  Home Access: Level entry  Bathroom Shower/Tub: Walk-in shower  Bathroom Toilet: Standard  Bathroom Equipment: Grab bars in shower, Hand-held shower, Grab bars around toilet, Tub transfer bench  Home Equipment: Sherl Arnel, rolling, Wheelchair-manual  ADL Assistance: Needs assistance (has aide 24 hours/day for the last week)  Homemaking Assistance: Needs assistance (has aide 24 hours/day for the last week)  Ambulation Assistance: Independent (uses walker vs wheel chair recently)  Transfer Assistance: Independent  Active : No    Vision/Hearing  Vision  Vision Exceptions: Wears glasses at all times  Hearing  Hearing: Within functional limits      Cognition   Orientation  Overall Orientation Status: Within Functional Limits     Objective   Bed mobility  Supine to Sit: Maximum assistance  Scooting: Maximal assistance (To EOB, dependent of 2 up in bed)    Transfers  Bed to Chair: 2 Person Assistance;Maximum assistance  Squat Pivot Transfers: 2 Person Assistance;Maximum Assistance  Comment: Sit pivot bed to chair. Pt moved bowels, assisted back to bed and received pericare from RN and clean brief.     Exercise Treatment: B knee extension stretch once each    Goals  Short Term Goals  Time Frame for Short Term Goals: discharge  Short Term Goal 1: patient will perform bed mobility with moderate assistance  ONGOING  Short Term Goal 2: patient will perform transfers sit<>stand with moderate assistance  ONGOING  Short Term Goal 3: patient will perform transfers bed<>chair with moderate assistance  ONGOING  Short Term Goal 4: patient will sit EOB x 5 minutes with supervision  ONGOING  Patient Goals   Patient Goals : none stated    Education  Patient Education  Education Given To: Patient  Education Provided: IADL Safety;Role of Therapy  Education Outcome: Continued education needed    Therapy Time   Individual Concurrent Group Co-treatment   Time In 1110         Time Out 1148         Minutes 38             Timed Code Treatment Minutes:  38 Minutes    Total Treatment Minutes:   1330 Carissa Olivo PT

## 2022-11-30 NOTE — PROGRESS NOTES
Spoke with both sons, Dr. Marc Ibrahim and Dr. Onalee Epley regarding currently clinical status, updates and discharge plan regarding this patient. Discussion was via separate phone calls with both children. Both endorsed understanding the discussion, all questions were answered.      Ada Awan MD  PGY-1

## 2022-11-30 NOTE — CARE COORDINATION
Case Management Assessment           Daily Note                 Date/ Time of Note: 11/30/2022 3:06 PM         Note completed by: Natalia Vazquez    Patient Name: Marlene Pugh  YOB: 1935    Diagnosis:Urinary retention [R33.9]  General weakness [R53.1]  Elevated troponin [R77.8]  RONNY (acute kidney injury) ([address] [N17.9]  Atrial fibrillation with RVR ([facility] [I48.91]  Intravenous infiltration, initial encounter [T80. 1XXA]  Urinary tract infection without hematuria, site unspecified [N39.0]  Closed nondisplaced fracture of left calcaneus, unspecified portion of calcaneus, initial encounter [S92.002A]  UTI (urinary tract infection) [N39.0]  Patient Admission Status: Inpatient    Date of Admission:11/24/2022  6:12 PM Length of Stay: 6 GLOS: GMLOS: 5    Current Plan of Care:   ________________________________________________________________________________________  PT AM-PAC: 9 / 24 per last evaluation on: 11//30    OT AM-PAC: 10 / 24 per last evaluation on: 11/30    DME Needs for discharge:   ________________________________________________________________________________________  Discharge Plan: [facility]: [facility]    Tentative discharge date: 12/1    Current barriers to discharge: medical clearance    Referrals completed: SNF: [facility]- accepted    Resources/ information provided: SNF List  ________________________________________________________________________________________  Case Management Notes:CM spoke with pt. Pt made aware she would be discharging to [facility] upon DC. Pt in agreement with plan. Likely DC tomorrow. Will need HENS and transport set up. Paola Echeverria and her family were provided with choice of provider; she and her family are in agreement with the discharge plan.     Care Transition Patient: Naila Vazquez  [facility]  Case Management Department  Ph: [phone]  Fax:

## 2022-11-30 NOTE — PROGRESS NOTES
Urology Attending Progress Note      Subjective: Catheter removed yesterday. She was voiding but reports incontinence     Vitals:  /71   Pulse 90   Temp 99 °F (37.2 °C) (Oral)   Resp 18   Ht 5' 2\" (1.575 m)   Wt 134 lb 14.7 oz (61.2 kg)   SpO2 94%   BMI 24.68 kg/m²   Temp  Av.3 °F (36.8 °C)  Min: 98 °F (36.7 °C)  Max: 99 °F (37.2 °C)    Intake/Output Summary (Last 24 hours) at 2022 0854  Last data filed at 2022 0624  Gross per 24 hour   Intake 240 ml   Output 700 ml   Net -460 ml         Exam: Urine clear    Labs:  WBC:    Lab Results   Component Value Date/Time    WBC 12.1 2022 04:07 AM     Hemoglobin/Hematocrit:    Lab Results   Component Value Date/Time    HGB 9.7 2022 04:07 AM    HCT 29.2 2022 04:07 AM     BMP:    Lab Results   Component Value Date/Time     2022 04:07 AM    K 5.3 2022 04:07 AM    K 4.5 2022 06:52 AM     2022 04:07 AM    CO2 26 2022 04:07 AM    BUN 19 2022 04:07 AM    LABALBU 2.7 2022 07:04 PM    CREATININE 1.1 2022 04:07 AM    CALCIUM 8.0 2022 04:07 AM    GFRAA >60 2020 06:45 AM    GFRAA >60 2013 03:18 PM    LABGLOM 49 2022 04:07 AM     PT/INR:    Lab Results   Component Value Date/Time    PROTIME 15.3 2022 07:03 PM    INR 1.21 2022 07:03 PM     PTT:    Lab Results   Component Value Date/Time    APTT 63.5 2022 06:18 AM   [APTT    Urine Culture: Enterococcus    Imaging:  Impression   1. Resolution of the hydronephrosis with minimal right-sided pelviectasis. 2. Benign cyst left kidney. 3. Nonobstructive renal calculi. Impression/Plan: 80 yo F with voiding dysfunction causing ARF and bilateral hydronephrosis. -VT yesterday showed low PVRs. She was incontinent throughout the day. Now using purewick to help with leakage.  -As long as bladder scans remain low, she is fine to be discharged without catheter in place.  Would scan her again before she leaves for SNF  -Can see us in office in 3-4 weeks to discuss further treatments if needed      HENNY Ling

## 2022-12-01 VITALS
HEART RATE: 110 BPM | WEIGHT: 135.58 LBS | OXYGEN SATURATION: 97 % | BODY MASS INDEX: 24.95 KG/M2 | RESPIRATION RATE: 16 BRPM | SYSTOLIC BLOOD PRESSURE: 100 MMHG | DIASTOLIC BLOOD PRESSURE: 63 MMHG | HEIGHT: 62 IN | TEMPERATURE: 98.2 F

## 2022-12-01 LAB
ANION GAP SERPL CALCULATED.3IONS-SCNC: 4 MMOL/L (ref 3–16)
BASOPHILS ABSOLUTE: 0 K/UL (ref 0–0.2)
BASOPHILS RELATIVE PERCENT: 0.5 %
BUN BLDV-MCNC: 19 MG/DL (ref 7–20)
CALCIUM SERPL-MCNC: 8 MG/DL (ref 8.3–10.6)
CHLORIDE BLD-SCNC: 102 MMOL/L (ref 99–110)
CO2: 27 MMOL/L (ref 21–32)
CREAT SERPL-MCNC: 1.1 MG/DL (ref 0.6–1.2)
DIGOXIN LEVEL: 0.9 NG/ML (ref 0.8–2)
EOSINOPHILS ABSOLUTE: 0.2 K/UL (ref 0–0.6)
EOSINOPHILS RELATIVE PERCENT: 2.4 %
GFR SERPL CREATININE-BSD FRML MDRD: 49 ML/MIN/{1.73_M2}
GLUCOSE BLD-MCNC: 121 MG/DL (ref 70–99)
HAPTOGLOBIN: 363 MG/DL (ref 30–200)
HCT VFR BLD CALC: 28.9 % (ref 36–48)
HEMOGLOBIN: 9.4 G/DL (ref 12–16)
IGA: 179 MG/DL (ref 70–400)
IGG: 1149 MG/DL (ref 700–1600)
IGM: 150 MG/DL (ref 40–230)
LYMPHOCYTES ABSOLUTE: 1 K/UL (ref 1–5.1)
LYMPHOCYTES RELATIVE PERCENT: 12.3 %
MAGNESIUM: 2 MG/DL (ref 1.8–2.4)
MCH RBC QN AUTO: 32.7 PG (ref 26–34)
MCHC RBC AUTO-ENTMCNC: 32.5 G/DL (ref 31–36)
MCV RBC AUTO: 100.8 FL (ref 80–100)
MONOCYTES ABSOLUTE: 0.9 K/UL (ref 0–1.3)
MONOCYTES RELATIVE PERCENT: 10.5 %
NEUTROPHILS ABSOLUTE: 6 K/UL (ref 1.7–7.7)
NEUTROPHILS RELATIVE PERCENT: 74.3 %
PDW BLD-RTO: 13.3 % (ref 12.4–15.4)
PLATELET # BLD: 419 K/UL (ref 135–450)
PMV BLD AUTO: 7 FL (ref 5–10.5)
POTASSIUM SERPL-SCNC: 5 MMOL/L (ref 3.5–5.1)
PROTEIN PROTEIN: 0.21 G/DL
PROTEIN PROTEIN: 209 MG/DL
RBC # BLD: 2.87 M/UL (ref 4–5.2)
SODIUM BLD-SCNC: 133 MMOL/L (ref 136–145)
WBC # BLD: 8.1 K/UL (ref 4–11)

## 2022-12-01 PROCEDURE — 6370000000 HC RX 637 (ALT 250 FOR IP): Performed by: UROLOGY

## 2022-12-01 PROCEDURE — 6370000000 HC RX 637 (ALT 250 FOR IP): Performed by: STUDENT IN AN ORGANIZED HEALTH CARE EDUCATION/TRAINING PROGRAM

## 2022-12-01 PROCEDURE — 2580000003 HC RX 258

## 2022-12-01 PROCEDURE — 6370000000 HC RX 637 (ALT 250 FOR IP): Performed by: INTERNAL MEDICINE

## 2022-12-01 PROCEDURE — 36415 COLL VENOUS BLD VENIPUNCTURE: CPT

## 2022-12-01 PROCEDURE — 84166 PROTEIN E-PHORESIS/URINE/CSF: CPT

## 2022-12-01 PROCEDURE — 85025 COMPLETE CBC W/AUTO DIFF WBC: CPT

## 2022-12-01 PROCEDURE — 83735 ASSAY OF MAGNESIUM: CPT

## 2022-12-01 PROCEDURE — 80162 ASSAY OF DIGOXIN TOTAL: CPT

## 2022-12-01 PROCEDURE — 84156 ASSAY OF PROTEIN URINE: CPT

## 2022-12-01 PROCEDURE — 80048 BASIC METABOLIC PNL TOTAL CA: CPT

## 2022-12-01 PROCEDURE — 6370000000 HC RX 637 (ALT 250 FOR IP)

## 2022-12-01 RX ORDER — AMOXICILLIN AND CLAVULANATE POTASSIUM 875; 125 MG/1; MG/1
1 TABLET, FILM COATED ORAL EVERY 12 HOURS SCHEDULED
Qty: 5 TABLET | Refills: 0 | Status: SHIPPED | OUTPATIENT
Start: 2022-12-01 | End: 2022-12-03

## 2022-12-01 RX ORDER — DIPHENOXYLATE HYDROCHLORIDE AND ATROPINE SULFATE 2.5; .025 MG/1; MG/1
1 TABLET ORAL ONCE
Status: COMPLETED | OUTPATIENT
Start: 2022-12-01 | End: 2022-12-01

## 2022-12-01 RX ORDER — DIGOXIN 0.06 MG/1
62.5 TABLET ORAL DAILY
Qty: 30 TABLET | Refills: 3 | Status: SHIPPED | OUTPATIENT
Start: 2022-12-01

## 2022-12-01 RX ORDER — LACTOBACILLUS RHAMNOSUS GG 10B CELL
1 CAPSULE ORAL DAILY
Status: DISCONTINUED | OUTPATIENT
Start: 2022-12-01 | End: 2022-12-01 | Stop reason: HOSPADM

## 2022-12-01 RX ORDER — LACTOBACILLUS RHAMNOSUS GG 10B CELL
1 CAPSULE ORAL DAILY
Qty: 30 CAPSULE | Refills: 1 | Status: SHIPPED | OUTPATIENT
Start: 2022-12-01

## 2022-12-01 RX ORDER — DIGOXIN 125 MCG
62.5 TABLET ORAL ONCE
Status: COMPLETED | OUTPATIENT
Start: 2022-12-01 | End: 2022-12-01

## 2022-12-01 RX ORDER — TAMSULOSIN HYDROCHLORIDE 0.4 MG/1
0.4 CAPSULE ORAL DAILY
Qty: 30 CAPSULE | Refills: 3 | Status: SHIPPED | OUTPATIENT
Start: 2022-12-01

## 2022-12-01 RX ORDER — LIDOCAINE 4 G/G
2 PATCH TOPICAL DAILY
Qty: 60 PATCH | Refills: 1 | Status: SHIPPED | OUTPATIENT
Start: 2022-12-01

## 2022-12-01 RX ADMIN — TAMSULOSIN HYDROCHLORIDE 0.4 MG: 0.4 CAPSULE ORAL at 09:00

## 2022-12-01 RX ADMIN — DIPHENOXYLATE HYDROCHLORIDE AND ATROPINE SULFATE 1 TABLET: 2.5; .025 TABLET ORAL at 14:25

## 2022-12-01 RX ADMIN — AMOXICILLIN AND CLAVULANATE POTASSIUM 1 TABLET: 875; 125 TABLET, FILM COATED ORAL at 09:00

## 2022-12-01 RX ADMIN — Medication 1 CAPSULE: at 12:19

## 2022-12-01 RX ADMIN — APIXABAN 2.5 MG: 2.5 TABLET, FILM COATED ORAL at 09:00

## 2022-12-01 RX ADMIN — DIGOXIN 62.5 MCG: 125 TABLET ORAL at 14:18

## 2022-12-01 RX ADMIN — SODIUM CHLORIDE, PRESERVATIVE FREE 10 ML: 5 INJECTION INTRAVENOUS at 09:00

## 2022-12-01 NOTE — PLAN OF CARE
Problem: Pain  Goal: Verbalizes/displays adequate comfort level or baseline comfort level  Outcome: Progressing- patient verbalized when their pain was better or worse. Problem: Skin/Tissue Integrity  Goal: Absence of new skin breakdown  Description: 1. Monitor for areas of redness and/or skin breakdown  2. Assess vascular access sites hourly  3. Every 4-6 hours minimum:  Change oxygen saturation probe site  4. Every 4-6 hours:  If on nasal continuous positive airway pressure, respiratory therapy assess nares and determine need for appliance change or resting period. Outcome: Progressing- patient turned regularly and assessed for any new or worsening skin breakdown.       Problem: Cardiovascular - Adult  Goal: Maintains optimal cardiac output and hemodynamic stability  Outcome: Progressing     Problem: Safety - Adult  Goal: Free from fall injury  Outcome: Progressing     Problem: ABCDS Injury Assessment  Goal: Absence of physical injury  Outcome: Progressing

## 2022-12-01 NOTE — PROGRESS NOTES
Occupational Therapy/Physical Therapy  Chart reviewed. Attempted to see pt for PT and OT. Pt in bed upon entry. Pt declined therapy stating she doesn't do therapy in the morning. Pt cont to decline all suggested activity. Will follow up later today as schedule permits. Of note, family has not yet brought in her \"boot\". Discussed with pt and encouraged them to bring in for therapy. Pt verbalized partial understanding  CHERY Pugh, OTR/L [address]

## 2022-12-01 NOTE — PROGRESS NOTES
Patient discharged to [facility]. AVS completed and taken with transport. IV and tele discontinued.  Electronically signed by Vincenzo Roman RN on 12/1/2022 at 2:31 PM

## 2022-12-01 NOTE — CARE COORDINATION
Case Management Assessment            Discharge Note                    Date / Time of Note: 12/1/2022 12:03 PM                  Discharge Note Completed by: Carolyn Garces RN    Patient Name: Jackie Decker   YOB: 1935  Diagnosis: Urinary retention [R33.9]  General weakness [R53.1]  Elevated troponin [R77.8]  RONNY (acute kidney injury) ([facility] [N17.9]  Atrial fibrillation with RVR ([facility] [I48.91]  Intravenous infiltration, initial encounter [T80. 1XXA]  Urinary tract infection without hematuria, site unspecified [N39.0]  Closed nondisplaced fracture of left calcaneus, unspecified portion of calcaneus, initial encounter [S92.002A]  UTI (urinary tract infection) [N39.0]   Date / Time: 11/24/2022  6:12 PM    Current PCP: Maurice Bull MD  Clinic patient: Yes    Hospitalization in the last 30 days: No    Advance Directives:  Code Status: Limited  [place] DNR form completed and on chart: Yes    Financial:  Payor: MEDICARE / Plan: MEDICARE PART A AND B / Product Type: *No Product type* /      Pharmacy:    [place] [address] [phone] - F [phone]  [address] [place] [address]  Phone: [phone] Fax: [phone]      Assistance purchasing medications?: Potential Assistance Purchasing Medications: No  Assistance provided by Case Management: None at this time    Does patient want to participate in local refill/ meds to beds program?:      Meds To Beds General Rules:  1. Can ONLY be done Monday- Friday between 8:30am-5pm  2. Prescription(s) must be in pharmacy by 3pm to be filled same day  3. Copy of patient's insurance/ prescription drug card and patient face sheet must be sent along with the prescription(s)  4. Cost of Rx cannot be added to hospital bill. If financial assistance is needed, please contact unit  or ;  or  CANNOT provide pharmacy voucher for patients co-pays  5.  Patients can then  the prescription on their way out of the hospital at discharge, or pharmacy can deliver to the bedside if staff is available. (payment due at time of pick-up or delivery - cash, check, or card accepted)     Able to afford home medications/ co-pay costs: Yes    ADLS:  Current PT AM-PAC Score: 9 /24  Current OT AM-PAC Score: 10 /24      DISCHARGE Disposition: Edgar Osorio (SNF): [facility] Phone: [phone] Fax:        report- [phone]- ask for [facility]      LOC at discharge: Skilled  CONSTANCE Completed: Yes    Notification completed in HENS/PAS?:  [id number]    IMM Completed:   Yes, Case management has presented and reviewed IMM letter #2 to the patient and/or family/ POA. Patient and/or family/POA verbalized understanding of their medicare rights and appeal process if needed. Patient and/or family/POA has signed, initialed and placed today's date (12/1/22) and time ([phone]) on IMM letter #2 on the the appropriate lines. Patient and/or family/POA, copy of letter offered and they are aware that this original copy of IMM letter #2 is available prior to discharge from the paper chart on the unit. Electronic documentation has been entered into epic for IMM letter #2 and original paper copy has been added to the paper chart at the nurses station. Transportation:  Transportation PLAN for discharge: EMS transportation   Mode of Transport: Ambulance stretcher - [facility]  Reason for medical transport: Severe muscular weakness and de-conditioned state due to UTI, A-fib and requires ambulance transport due to assist x2  Name of [address] [place] [address],[facility] [address]: [facility]  Phone: [phone]  Time of Transport: 2pm    Transport form completed: Yes      Additional CM Notes:  CM met with pt. Aware of dc today to [facility]. Pt has already notified son, Issac Horton.  Pt aware of 2pm pickup time    The Plan for Transition of Care is related to the following treatment goals of Urinary retention [R33.9]  General weakness [R53.1]  Elevated troponin [R77.8]  RONNY (acute kidney injury) ([facility] [N17.9]  Atrial fibrillation with RVR (HCC) [I48.91]  Intravenous infiltration, initial encounter [T80. 1XXA]  Urinary tract infection without hematuria, site unspecified [N39.0]  Closed nondisplaced fracture of left calcaneus, unspecified portion of calcaneus, initial encounter [S92.002A]  UTI (urinary tract infection) [N39.0]    The Patient and/or patient representative Amber and her family were provided with a choice of provider and agrees with the discharge plan Yes    Freedom of choice list was provided with basic dialogue that supports the patient's individualized plan of care/goals and shares the quality data associated with the providers.  Yes    Care Transitions patient: No    Ed Flores RN  [facility]  Case Management Department  Ph: [phone]

## 2022-12-02 ENCOUNTER — CARE COORDINATION (OUTPATIENT)
Dept: CASE MANAGEMENT | Age: 87
End: 2022-12-02

## 2022-12-02 LAB
ALBUMIN SERPL-MCNC: 1.9 G/DL (ref 3.1–4.9)
ALPHA-1-GLOBULIN: 0.3 G/DL (ref 0.2–0.4)
ALPHA-2-GLOBULIN: 1 G/DL (ref 0.4–1.1)
BETA GLOBULIN: 0.9 G/DL (ref 0.9–1.6)
ERYTHROPOIETIN: 34 MU/ML (ref 4–27)
GAMMA GLOBULIN: 1.3 G/DL (ref 0.6–1.8)
KAPPA, FREE LIGHT CHAINS, SERUM: 43.1 MG/L (ref 3.3–19.4)
KAPPA/LAMBDA RATIO: 1.03 (ref 0.26–1.65)
KAPPA/LAMBDA TEST COMMENT: ABNORMAL
LAMBDA, FREE LIGHT CHAINS, SERUM: 41.71 MG/L (ref 5.71–26.3)
SPE/IFE INTERPRETATION: NORMAL
TOTAL PROTEIN: 5.3 G/DL (ref 6.4–8.2)
URINE ELECTROPHORESIS INTERP: NORMAL

## 2022-12-02 NOTE — CARE COORDINATION
[facility] Update Call    2022    Patient: Huy Carlson Patient : 1935   MRN: <[id number]>  Reason for Admission: A fib with RVR, closed nondisplaced fracture left calcaneus, UTI, weakness, RONNY, urinary retention   Discharge Date: 22 RARS: Readmission Risk Score: 15       Sent secure email to [facility] with [facility] requesting admission orders and med list  She responds informing we can talk on Monday. She is out of the office today.    Will continue to follow    Care Transitions Post Acute Facility Update    Care Transitions Interventions  Post Acute Facility: [address] [facility] Update  Reported Nursing Issues: New admission

## 2022-12-05 ENCOUNTER — CARE COORDINATION (OUTPATIENT)
Dept: CASE MANAGEMENT | Age: 87
End: 2022-12-05

## 2022-12-05 NOTE — CARE COORDINATION
[facility] Update Call    2022    Patient: Chris Man Patient : 1935   MRN: <[id number]>  Reason for Admission:  A fib with RVR, closed nondisplaced fracture left calcaneus, UTI, weakness, RONNY, urinary retention   Discharge Date: 22 RARS: Readmission Risk Score: 15       Post Acute Facility Update    Care Transitions Post Acute Facility Update    Care Transitions Interventions  Post Acute Facility: [address] [facility] [address] Update  Reported Nursing Issues: GUSTAVO hose to BLE on in AM off at HS. CBC, BMP . Appt with Dr Yoni Hutchinson (ortho), appt with Dr Delmi Quinones ([facility]), and Dr Gerardo Aaron (transplant cardiology). Dr Latha Cardoza (heme-onc)- all need to be scheduled still. PT/ OT/ ST eval and treat. Magic cup with lunch and dinner. Monitor soft cast to LLE, endure clean and dry bid. Hot. Cold packs to left ankle. Prior Living Description: [facility] [address] [place] [address] [facility] Prior to  pt was independent w/c level. Pt was able to ambulate 10 ft with Rw. Pt t/f from w/c to toilet. stand for meal prep and function w/c level in apt. Assist with meds and dinner meal. Equipment Prior to Onset: walk-in shower, RW, w/c (manual), hand-held shower, grab bars around toilet, tub transfer bench    UB dressing max assist, standing balance- dependent, bed to chair or WC- dependent, stand supported <30 seconds. ADL/ self care- dependent, ADL;s dependent. WBAT with boot on left foot. ADLs: Dependent   Bed Mobility: Dependent   Transfer Assistance: Dependent   Ambulation Assistance: Dependent   Barriers to Discharge: Fall risk, weakness, dependence for ADL's and ambulation. Anticipated discharge services: Came from Independent living/ assisted living. Has HHA for bathing and cleaning. Has manual Wheelchair, RW, hand held shower, GB around toilet, tub transfer bench              Next IDT Planned Review:     No future appointments.     SN Notes:   Diet: - Oral Diet: General  Wounds: none  Medications:  Other: facility  Other:  [facility]: facility [facility] shows metoprolol tartrate 25 mg twice daily (hold if SBP< 100 or HR< 50. Norco 5-325 mg every 6 hours prn    Deepali Mooney RN

## 2022-12-07 ENCOUNTER — CARE COORDINATION (OUTPATIENT)
Dept: CASE MANAGEMENT | Age: 87
End: 2022-12-07

## 2022-12-07 NOTE — CARE COORDINATION
[facility] Update Call    2022    Patient: Funmilayo Zayas Patient : 1935   MRN: <[id number]>  Reason for Admission: A fib with RVR, closed nondisplaced fracture left calcaneus, UTI, weakness, RONNY, urinary retention   Discharge Date: 22 RARS: Readmission Risk Score: 15       Post Acute Facility Update    Care Transitions Post Acute Facility Update    Care Transitions Interventions  Post Acute Facility: [address] [facility] Update  Reported Nursing Issues: 13/15 BIMS, ortho appt Fri . Hoping to get splint off so she can wear boot which should help ambulation. Lots of pain- new order for norco yesterday- getting this every 6 hours for now. She has lidocaine patches to each knee daily, off at bedtime. Eating well, bowels normal. Incontinent urine. VS- 104/56, , 16, 98.2. Skin intact, GUSTAVO hose daily on in AM off at HS. sit to stand- max assist x 2. WBAT but unable to help due to splint,    ADLs: Moderate Assistance   Bed Mobility: Maximum Assistance   Transfer Assistance: Maximum Assistance   Ambulation Assistance: Maximum Assistance   How far (in feet) is the patient ambulating?: 0   Barriers to Discharge: Fall risk, weakness, max assist for transfers   Anticipated discharge services: [address] private pay. No set EDOD at this time. They are hoping for assist x 1 for ambulation, transfers and ADL's by . Will determine discharge date then              Next IDT Planned Review: 2022    Future Appointments   Date Time Provider Aurora Cheng   2022  3:30 PM MD Jan Medina [facility]       SN Notes:   Diet: - Oral Diet:  General  - Oral Nutrition Supplement:  Frozen Oral  twice a day  Wounds: none  Medications:  Other: facility  Other:    Post-acute CC Notes: call with Hill Huddleston from [facility] for UR/ IDT    Grisel Watkins RN

## 2022-12-12 ENCOUNTER — TELEPHONE (OUTPATIENT)
Dept: CARDIOLOGY CLINIC | Age: 87
End: 2022-12-12

## 2022-12-12 NOTE — TELEPHONE ENCOUNTER
Patient son, Leighton Post called and inquired if patient could receive a zoom call on the 12/16/22. Patient son stated that it's very difficult for patient to come in and be seen. She's currently doing rehab and is in a wheelchair. Son wants a call back at [phone].

## 2022-12-13 ENCOUNTER — CARE COORDINATION (OUTPATIENT)
Dept: CASE MANAGEMENT | Age: 87
End: 2022-12-13

## 2022-12-13 NOTE — TELEPHONE ENCOUNTER
Son states took > 4 hours for pt to attend ortho OV and pt prefers phone visit 12/16 w/dr bustamante. Pt at [facility], uses wheel chair. Request sent to [facility] for copy weight, VS and medication list prior to OV. Son advised if Dr Shonna Cedeno requests in person visit we will notify son and ECF.

## 2022-12-13 NOTE — CARE COORDINATION
[facility] Update Call    2022    Patient: Bhupinder Sawyer Patient : 1935   MRN: <[id number]>  Reason for Admission: A fib with RVR, closed nondisplaced fracture left calcaneus, UTI, weakness, RONNY, urinary retention   Discharge Date: 22 RARS: Readmission Risk Score: 15         Post Acute Facility Update    Care Transitions Post Acute Facility Update    Care Transitions Interventions  Post Acute Facility: [address] Update  Reported Nursing Issues: Care conference yesterday- discussed patient not wanting to eat (0-25%), she is getting magic cup with lunch and dinner and a milkshake at bedtime for calories. She has a lack of participation. No weight loss. She is not making functional gains. She has been encouraged to participate more with therapy. Pt is DNRCC. NP was in 45'G systolic but son would not let them send her to hospital. They are holding the metoprolol tartrate 2 mg bid. Writer explains this was not on this med previously- she is looking into this. Staff has not discussed hospice since they are not wanting hospitalization. This will be discussed with pt family. Bowels are moving normal (actually no BM in 3 days) but family wants pt to get her anti diarrheal med (they believe family is bringing it in to her). Labs for u/a came back normal. Pt family still wanted pt to have amoxicillin based on her history of UTI's. So amoxicillin was ordered. Blister on left heel. Pt is now out of splint on left foot, wearing boot now. Pt is hesitant to put weight on foot but she is now WBAT with the boto.  She saw ortho yesterday and will see cardiology    Bed Mobility: Moderate Assistance   Transfer Assistance: Dependent   Ambulation Assistance: Dependent   How far (in feet) is the patient ambulating?: 0   Barriers to Discharge: Fall risk, weakness   Anticipated date for discharge: 22    Anticipated discharge services: Was in independent living, will now go to assisted living              Next IDT Planned Review: 12/20/2022    Future Appointments   Date Time Provider Aurora Cheng   12/16/2022  3:30 PM Kike Michelle MD [place] [facility]       SN Notes:   Diet: - Oral Diet:  General  - Oral Nutrition Supplement:  Frozen Oral  twice a day and milkshake at bedtime   Wounds: left and foot blister on left heel   Medications:  Other: facility  Other:    Post-acute CC Notes: call with Kash Richey at [facility], RN

## 2022-12-15 ENCOUNTER — APPOINTMENT (OUTPATIENT)
Dept: CT IMAGING | Age: 87
DRG: 377 | End: 2022-12-15
Payer: MEDICARE

## 2022-12-15 ENCOUNTER — HOSPITAL ENCOUNTER (INPATIENT)
Age: 87
LOS: 5 days | Discharge: NURSING, REHAB OR LONG-TERM CARE | DRG: 377 | End: 2022-12-20
Attending: STUDENT IN AN ORGANIZED HEALTH CARE EDUCATION/TRAINING PROGRAM | Admitting: INTERNAL MEDICINE
Payer: MEDICARE

## 2022-12-15 DIAGNOSIS — N13.30 HYDRONEPHROSIS, UNSPECIFIED HYDRONEPHROSIS TYPE: ICD-10-CM

## 2022-12-15 DIAGNOSIS — K62.5 RECTAL BLEEDING: Primary | ICD-10-CM

## 2022-12-15 DIAGNOSIS — G89.29 CHRONIC PAIN OF RIGHT KNEE: ICD-10-CM

## 2022-12-15 DIAGNOSIS — R31.9 HEMATURIA, UNSPECIFIED TYPE: ICD-10-CM

## 2022-12-15 DIAGNOSIS — M25.561 CHRONIC PAIN OF RIGHT KNEE: ICD-10-CM

## 2022-12-15 DIAGNOSIS — N17.9 AKI (ACUTE KIDNEY INJURY) (HCC): ICD-10-CM

## 2022-12-15 PROBLEM — K92.2 LOWER GI BLEED: Status: ACTIVE | Noted: 2022-12-15

## 2022-12-15 LAB
A/G RATIO: 0.7 (ref 1.1–2.2)
ABO/RH: NORMAL
ALBUMIN SERPL-MCNC: 2.4 G/DL (ref 3.4–5)
ALBUMIN SERPL-MCNC: 2.8 G/DL (ref 3.4–5)
ALP BLD-CCNC: 111 U/L (ref 40–129)
ALT SERPL-CCNC: 11 U/L (ref 10–40)
ANION GAP SERPL CALCULATED.3IONS-SCNC: 11 MMOL/L (ref 3–16)
ANION GAP SERPL CALCULATED.3IONS-SCNC: 11 MMOL/L (ref 3–16)
ANISOCYTOSIS: ABNORMAL
ANTIBODY SCREEN: NORMAL
AST SERPL-CCNC: 11 U/L (ref 15–37)
ATYPICAL LYMPHOCYTE RELATIVE PERCENT: 2 % (ref 0–6)
BACTERIA: ABNORMAL /HPF
BASE EXCESS VENOUS: 2.4 MMOL/L (ref -2–3)
BASOPHILS ABSOLUTE: 0 K/UL (ref 0–0.2)
BASOPHILS RELATIVE PERCENT: 0 %
BILIRUB SERPL-MCNC: 0.4 MG/DL (ref 0–1)
BILIRUBIN URINE: NEGATIVE
BLOOD, URINE: ABNORMAL
BUN BLDV-MCNC: 52 MG/DL (ref 7–20)
BUN BLDV-MCNC: 52 MG/DL (ref 7–20)
CALCIUM SERPL-MCNC: 8.8 MG/DL (ref 8.3–10.6)
CALCIUM SERPL-MCNC: 9.1 MG/DL (ref 8.3–10.6)
CARBOXYHEMOGLOBIN: 1 % (ref 0–1.5)
CHLORIDE BLD-SCNC: 94 MMOL/L (ref 99–110)
CHLORIDE BLD-SCNC: 96 MMOL/L (ref 99–110)
CLARITY: ABNORMAL
CO2: 22 MMOL/L (ref 21–32)
CO2: 25 MMOL/L (ref 21–32)
COLOR: ABNORMAL
CREAT SERPL-MCNC: 2.3 MG/DL (ref 0.6–1.2)
CREAT SERPL-MCNC: 2.4 MG/DL (ref 0.6–1.2)
EKG ATRIAL RATE: 62 BPM
EKG DIAGNOSIS: NORMAL
EKG P AXIS: 78 DEGREES
EKG P-R INTERVAL: 178 MS
EKG Q-T INTERVAL: 366 MS
EKG QRS DURATION: 74 MS
EKG QTC CALCULATION (BAZETT): 371 MS
EKG R AXIS: -23 DEGREES
EKG T AXIS: 50 DEGREES
EKG VENTRICULAR RATE: 62 BPM
EOSINOPHILS ABSOLUTE: 0.1 K/UL (ref 0–0.6)
EOSINOPHILS RELATIVE PERCENT: 1 %
EPITHELIAL CELLS, UA: ABNORMAL /HPF (ref 0–5)
GFR SERPL CREATININE-BSD FRML MDRD: 19 ML/MIN/{1.73_M2}
GFR SERPL CREATININE-BSD FRML MDRD: 20 ML/MIN/{1.73_M2}
GLUCOSE BLD-MCNC: 111 MG/DL (ref 70–99)
GLUCOSE BLD-MCNC: 118 MG/DL (ref 70–99)
GLUCOSE URINE: NEGATIVE MG/DL
HCO3 VENOUS: 28.5 MMOL/L (ref 24–28)
HCT VFR BLD CALC: 30.1 % (ref 36–48)
HEMOGLOBIN, VEN, REDUCED: 86.4 %
HEMOGLOBIN: 9.6 G/DL (ref 12–16)
HYPOCHROMIA: ABNORMAL
KETONES, URINE: ABNORMAL MG/DL
LACTIC ACID: 2.1 MMOL/L (ref 0.4–2)
LEUKOCYTE ESTERASE, URINE: ABNORMAL
LYMPHOCYTES ABSOLUTE: 0.7 K/UL (ref 1–5.1)
LYMPHOCYTES RELATIVE PERCENT: 5 %
MCH RBC QN AUTO: 31.5 PG (ref 26–34)
MCHC RBC AUTO-ENTMCNC: 32 G/DL (ref 31–36)
MCV RBC AUTO: 98.6 FL (ref 80–100)
METAMYELOCYTES RELATIVE PERCENT: 1 %
METHEMOGLOBIN VENOUS: 0.8 % (ref 0–1.5)
MICROSCOPIC EXAMINATION: YES
MONOCYTES ABSOLUTE: 0.5 K/UL (ref 0–1.3)
MONOCYTES RELATIVE PERCENT: 5 %
MUCUS: ABNORMAL /LPF
NEUTROPHILS ABSOLUTE: 9.2 K/UL (ref 1.7–7.7)
NEUTROPHILS RELATIVE PERCENT: 86 %
NITRITE, URINE: POSITIVE
O2 SAT, VEN: 12 %
PCO2, VEN: 50.3 MMHG (ref 41–51)
PDW BLD-RTO: 14 % (ref 12.4–15.4)
PH UA: ABNORMAL (ref 5–8)
PH VENOUS: 7.36 (ref 7.35–7.45)
PHOSPHORUS: 3.2 MG/DL (ref 2.5–4.9)
PLATELET # BLD: 489 K/UL (ref 135–450)
PMV BLD AUTO: 7.6 FL (ref 5–10.5)
PO2, VEN: <30 MMHG (ref 25–40)
POIKILOCYTES: ABNORMAL
POLYCHROMASIA: ABNORMAL
POTASSIUM REFLEX MAGNESIUM: 6.2 MMOL/L (ref 3.5–5.1)
POTASSIUM SERPL-SCNC: 5.9 MMOL/L (ref 3.5–5.1)
PROTEIN UA: >=300 MG/DL
RBC # BLD: 3.05 M/UL (ref 4–5.2)
RBC UA: >100 /HPF (ref 0–4)
SCHISTOCYTES: ABNORMAL
SODIUM BLD-SCNC: 129 MMOL/L (ref 136–145)
SODIUM BLD-SCNC: 130 MMOL/L (ref 136–145)
SPECIFIC GRAVITY UA: 1.01 (ref 1–1.03)
STOMATOCYTES: ABNORMAL
TARGET CELLS: ABNORMAL
TCO2 CALC VENOUS: 30 MMOL/L
TOTAL PROTEIN: 7 G/DL (ref 6.4–8.2)
URINE REFLEX TO CULTURE: ABNORMAL
URINE TYPE: ABNORMAL
UROBILINOGEN, URINE: 0.2 E.U./DL
WBC # BLD: 10.6 K/UL (ref 4–11)
WBC UA: ABNORMAL /HPF (ref 0–5)

## 2022-12-15 PROCEDURE — 87086 URINE CULTURE/COLONY COUNT: CPT

## 2022-12-15 PROCEDURE — 99285 EMERGENCY DEPT VISIT HI MDM: CPT

## 2022-12-15 PROCEDURE — 86850 RBC ANTIBODY SCREEN: CPT

## 2022-12-15 PROCEDURE — 82803 BLOOD GASES ANY COMBINATION: CPT

## 2022-12-15 PROCEDURE — 87077 CULTURE AEROBIC IDENTIFY: CPT

## 2022-12-15 PROCEDURE — 86901 BLOOD TYPING SEROLOGIC RH(D): CPT

## 2022-12-15 PROCEDURE — P9016 RBC LEUKOCYTES REDUCED: HCPCS

## 2022-12-15 PROCEDURE — 84132 ASSAY OF SERUM POTASSIUM: CPT

## 2022-12-15 PROCEDURE — 51702 INSERT TEMP BLADDER CATH: CPT

## 2022-12-15 PROCEDURE — 85025 COMPLETE CBC W/AUTO DIFF WBC: CPT

## 2022-12-15 PROCEDURE — 86900 BLOOD TYPING SEROLOGIC ABO: CPT

## 2022-12-15 PROCEDURE — 74176 CT ABD & PELVIS W/O CONTRAST: CPT

## 2022-12-15 PROCEDURE — 96360 HYDRATION IV INFUSION INIT: CPT

## 2022-12-15 PROCEDURE — 87186 SC STD MICRODIL/AGAR DIL: CPT

## 2022-12-15 PROCEDURE — 80053 COMPREHEN METABOLIC PANEL: CPT

## 2022-12-15 PROCEDURE — 6370000000 HC RX 637 (ALT 250 FOR IP): Performed by: STUDENT IN AN ORGANIZED HEALTH CARE EDUCATION/TRAINING PROGRAM

## 2022-12-15 PROCEDURE — 93005 ELECTROCARDIOGRAM TRACING: CPT | Performed by: STUDENT IN AN ORGANIZED HEALTH CARE EDUCATION/TRAINING PROGRAM

## 2022-12-15 PROCEDURE — 36415 COLL VENOUS BLD VENIPUNCTURE: CPT

## 2022-12-15 PROCEDURE — 81003 URINALYSIS AUTO W/O SCOPE: CPT

## 2022-12-15 PROCEDURE — 86923 COMPATIBILITY TEST ELECTRIC: CPT

## 2022-12-15 PROCEDURE — 83605 ASSAY OF LACTIC ACID: CPT

## 2022-12-15 PROCEDURE — 2580000003 HC RX 258: Performed by: STUDENT IN AN ORGANIZED HEALTH CARE EDUCATION/TRAINING PROGRAM

## 2022-12-15 PROCEDURE — 1200000000 HC SEMI PRIVATE

## 2022-12-15 RX ORDER — AMOXICILLIN AND CLAVULANATE POTASSIUM 500; 125 MG/1; MG/1
1 TABLET, FILM COATED ORAL 2 TIMES DAILY
Status: ON HOLD | COMMUNITY
End: 2022-12-20 | Stop reason: HOSPADM

## 2022-12-15 RX ORDER — SODIUM CHLORIDE, SODIUM LACTATE, POTASSIUM CHLORIDE, AND CALCIUM CHLORIDE .6; .31; .03; .02 G/100ML; G/100ML; G/100ML; G/100ML
1000 INJECTION, SOLUTION INTRAVENOUS ONCE
Status: COMPLETED | OUTPATIENT
Start: 2022-12-15 | End: 2022-12-15

## 2022-12-15 RX ORDER — ACETAMINOPHEN 650 MG
TABLET, EXTENDED RELEASE ORAL 2 TIMES DAILY
COMMUNITY

## 2022-12-15 RX ORDER — HYDROCODONE BITARTRATE AND ACETAMINOPHEN 5; 325 MG/1; MG/1
1 TABLET ORAL EVERY 6 HOURS PRN
Status: ON HOLD | COMMUNITY
End: 2022-12-20 | Stop reason: SDUPTHER

## 2022-12-15 RX ORDER — CLOTRIMAZOLE AND BETAMETHASONE DIPROPIONATE 10; .64 MG/G; MG/G
CREAM TOPICAL 2 TIMES DAILY
Status: ON HOLD | COMMUNITY
End: 2022-12-20 | Stop reason: HOSPADM

## 2022-12-15 RX ORDER — ACETAMINOPHEN 325 MG/1
650 TABLET ORAL ONCE
Status: COMPLETED | OUTPATIENT
Start: 2022-12-15 | End: 2022-12-15

## 2022-12-15 RX ORDER — DIPHENOXYLATE HYDROCHLORIDE AND ATROPINE SULFATE 2.5; .025 MG/1; MG/1
1 TABLET ORAL 3 TIMES DAILY PRN
Status: ON HOLD | COMMUNITY
End: 2022-12-20 | Stop reason: HOSPADM

## 2022-12-15 RX ORDER — LIDOCAINE 4 G/G
1 PATCH TOPICAL DAILY
Status: ON HOLD | COMMUNITY
End: 2023-01-10

## 2022-12-15 RX ADMIN — SODIUM CHLORIDE, POTASSIUM CHLORIDE, SODIUM LACTATE AND CALCIUM CHLORIDE 1000 ML: 600; 310; 30; 20 INJECTION, SOLUTION INTRAVENOUS at 19:23

## 2022-12-15 RX ADMIN — ACETAMINOPHEN 650 MG: 325 TABLET, FILM COATED ORAL at 20:16

## 2022-12-15 ASSESSMENT — PAIN SCALES - GENERAL
PAINLEVEL_OUTOF10: 10
PAINLEVEL_OUTOF10: 5
PAINLEVEL_OUTOF10: 10

## 2022-12-15 ASSESSMENT — PAIN - FUNCTIONAL ASSESSMENT
PAIN_FUNCTIONAL_ASSESSMENT: ACTIVITIES ARE NOT PREVENTED
PAIN_FUNCTIONAL_ASSESSMENT: 0-10

## 2022-12-15 ASSESSMENT — PAIN DESCRIPTION - LOCATION
LOCATION: BUTTOCKS;BACK
LOCATION: BUTTOCKS;BACK

## 2022-12-15 ASSESSMENT — PAIN DESCRIPTION - ONSET: ONSET: ON-GOING

## 2022-12-15 ASSESSMENT — PAIN DESCRIPTION - ORIENTATION: ORIENTATION: MID

## 2022-12-15 ASSESSMENT — PAIN DESCRIPTION - PAIN TYPE: TYPE: ACUTE PAIN

## 2022-12-15 ASSESSMENT — PAIN DESCRIPTION - FREQUENCY: FREQUENCY: CONTINUOUS

## 2022-12-15 ASSESSMENT — PAIN DESCRIPTION - DESCRIPTORS
DESCRIPTORS: BURNING;SHARP
DESCRIPTORS: ACHING
DESCRIPTORS: ACHING

## 2022-12-15 NOTE — ED PROVIDER NOTES
ED Attending Attestation Note     Date of evaluation: 12/15/2022    This patient was seen by the resident. I have seen and examined the patient, agree with the workup, evaluation, management and diagnosis. The care plan has been discussed. I have reviewed the ECG and concur with the resident's interpretation. My assessment reveals a pleasant woman reporting BRBPR today. She had a prior hx of bleeding on eliquis (heamturia) 2 years ago and stopped eliquis. She was recently restarted on eliquis for AFib. She denies abd pain    On exam:  Eyes: Sclera clear, pupils equally round and reactive to light  Pulm: Lungs clear to auscultation bilaterally, no increased WOB  Cor: Regular rhythm, normal rate, no murmurs  Abd: Soft, nontender, nondistended, no masses  : Macerated, irritated skin diffusely to perirectal region without any true ulceration. No purulence or blisters. No warmth.   Please note that chaperone RN was present for this portion exam.  Extr: warm, well perfused       Redd Ga MD  12/15/22 [id number]

## 2022-12-15 NOTE — ED PROVIDER NOTES
[id number]          EM RESIDENT NOTE       Date of evaluation: 12/15/2022    Chief Complaint     Rectal Bleeding (Sent from NH for BM with clots)      of Present Illness     Chuy Carreon is a 81 y.o. female who presents for concerns of bright red blood per rectum. Patient recently restarted on Eliquis for A. fib with RVR after recent hospitalization. Patient today was at her nursing home when she had a bright red and clot-like stool prompting her nursing home to send her in. Patient otherwise has only had some complaints of some left lower quadrant pain. She otherwise feels well and like her normal self. Patient has not had issues like this before though she previously had self discontinued her Eliquis due to hematuria. She has no chest pain shortness of breath nausea vomiting diarrhea numbness tingling or weakness anywhere else. She is nonambulatory at baseline and uses a wheelchair due to chronic pain. Review of Systems     Review of Systems   Constitutional:         All other symptoms reviewed and otherwise negative except per HPI     Past Medical, Surgical, Family, and Social History     She has a past medical history of Acute deep vein thrombosis (DVT) of left lower extremity after procedure ([address] Acute renal failure (ARF) ([address] Anterior corneal dystrophy, Asymptomatic postmenopausal status (age-related) (natural), Cervical polyp, Dry eye syndrome, Edema, Fuch's endothelial dystrophy, Gout, Hip fracture requiring operative repair, left, closed, initial encounter ([address] IBS (irritable bowel syndrome), Mild cognitive impairment, Osteoarthritis, Osteopenia, Papilloma of breast, Pedal edema, Screening mammogram, Symptomatic menopausal or female climacteric states, Urinary retention, and Vaginal candidiasis. She has a past surgical history that includes Appendectomy; eye surgery; and hip pinning (Left, 9/18/2019).   Her family history includes Breast Cancer in her daughter; Ovarian Cancer in her daughter. She reports that she has never smoked. She has never used smokeless tobacco. She reports that she does not drink alcohol and does not use drugs. Medications     Previous Medications    APIXABAN (ELIQUIS) 2.5 MG TABS TABLET    Take 1 tablet by mouth 2 times daily    DIGOXIN 62.5 MCG TABS    Take 62.5 mcg by mouth daily    DIPHENOXYLATE-ATROPINE (LOMOTIL) 2.5-0.025 MG PER TABLET    Take 1 tablet by mouth 4 times daily for 180 days. LACTOBACILLUS (CULTURELLE) CAPS CAPSULE    Take 1 capsule by mouth daily    LIDOCAINE 4 % EXTERNAL PATCH    Place 2 patches onto the skin daily    MIRABEGRON (MYRBETRIQ) 50 MG TB24    Take 50 mg by mouth daily    TAMSULOSIN (FLOMAX) 0.4 MG CAPSULE    Take 1 capsule by mouth daily       Allergies     She is allergic to amoxapine and related, lisinopril, and clindamycin/lincomycin. Physical Exam     INITIAL VITALS: BP: (!) 100/57, Temp: 98.1 °F (36.7 °C), Heart Rate: 67, Resp: 15, SpO2: 95 %   Physical Exam  Vitals and nursing note reviewed. Constitutional:       General: She is not in acute distress. Appearance: Normal appearance. HENT:      Head: Normocephalic and atraumatic. Right Ear: External ear normal.      Left Ear: External ear normal.      Nose: Nose normal.      Mouth/Throat:      Mouth: Mucous membranes are moist.      Pharynx: No oropharyngeal exudate or posterior oropharyngeal erythema. Eyes:      General: No scleral icterus. Extraocular Movements: Extraocular movements intact. Pupils: Pupils are equal, round, and reactive to light. Cardiovascular:      Rate and Rhythm: Normal rate and regular rhythm. Pulses: Normal pulses. Heart sounds: Normal heart sounds. No murmur heard. Pulmonary:      Effort: Pulmonary effort is normal. No respiratory distress. Breath sounds: Normal breath sounds. No wheezing. Chest:      Chest wall: No tenderness.    Abdominal:      General: There is no distension. Palpations: Abdomen is soft. Tenderness: There is abdominal tenderness in the left lower quadrant. There is guarding. Genitourinary:     Comments: Right red blood per rectum small area of likely early sacral decubitus ulcer noted. Musculoskeletal:         General: No swelling or tenderness. Normal range of motion. Skin:     General: Skin is warm. Capillary Refill: Capillary refill takes 2 to 3 seconds. Findings: No rash. Neurological:      General: No focal deficit present. Mental Status: She is oriented to person, place, and time. Mental status is at baseline. Comments: Nonambulatory baseline secondary to lower extremity pain and injuries. DiagnosticResults     EKG   Interpreted in conjunction with emergencydepartment physician Zora Eric MD  Rate of 62, sinus rhythm, normal axis, narrow complex, good R wave progression, no ST segment T wave changes concerning for acute infarction, no pathologic Q waves noted, no significant ventricular ectopy, intervals appropriate with QTc 371, QRS 74, . Comparison EKG from 11/30/2022 shows that previous atrial fibrillation rhythm has been replaced with sinus rhythm.     RADIOLOGY:  CT ABDOMEN PELVIS WO CONTRAST Additional Contrast? None    (Results Pending)       LABS:   Results for orders placed or performed during the hospital encounter of 12/15/22   CBC with Auto Differential   Result Value Ref Range    WBC 10.6 4.0 - 11.0 K/uL    RBC 3.05 (L) 4.00 - 5.20 M/uL    Hemoglobin 9.6 (L) 12.0 - 16.0 g/dL    Hematocrit 30.1 (L) 36.0 - 48.0 %    MCV 98.6 80.0 - 100.0 fL    MCH 31.5 26.0 - 34.0 pg    MCHC 32.0 31.0 - 36.0 g/dL    RDW 14.0 12.4 - 15.4 %    Platelets 737 (H) 331 - 450 K/uL    MPV 7.6 5.0 - 10.5 fL    Neutrophils % 86.0 %    Lymphocytes % 5.0 %    Monocytes % 5.0 %    Eosinophils % 1.0 %    Basophils % 0.0 %    Neutrophils Absolute 9.2 (H) 1.7 - 7.7 K/uL    Lymphocytes Absolute 0.7 (L) 1.0 - 5.1 K/uL Monocytes Absolute 0.5 0.0 - 1.3 K/uL    Eosinophils Absolute 0.1 0.0 - 0.6 K/uL    Basophils Absolute 0.0 0.0 - 0.2 K/uL    Atypical Lymphocytes Relative 2 0 - 6 %    Metamyelocytes Relative 1 (A) %    Anisocytosis Occasional (A)     Polychromasia Occasional (A)     Hypochromia Occasional (A)     Poikilocytes Occasional (A)     Schistocytes Occasional (A)     Stomatocytes Occasional (A)     Target Cells Occasional (A)    CMP w/ Reflex to MG   Result Value Ref Range    Sodium 130 (L) 136 - 145 mmol/L    Potassium reflex Magnesium 6.2 (HH) 3.5 - 5.1 mmol/L    Chloride 94 (L) 99 - 110 mmol/L    CO2 25 21 - 32 mmol/L    Anion Gap 11 3 - 16    Glucose 118 (H) 70 - 99 mg/dL    BUN 52 (H) 7 - 20 mg/dL    Creatinine 2.4 (H) 0.6 - 1.2 mg/dL    Est, Glom Filt Rate 19 (A) >60    Calcium 9.1 8.3 - 10.6 mg/dL    Total Protein 7.0 6.4 - 8.2 g/dL    Albumin 2.8 (L) 3.4 - 5.0 g/dL    Albumin/Globulin Ratio 0.7 (L) 1.1 - 2.2    Total Bilirubin 0.4 0.0 - 1.0 mg/dL    Alkaline Phosphatase 111 40 - 129 U/L    ALT 11 10 - 40 U/L    AST 11 (L) 15 - 37 U/L   Blood gas, venous (Lab)   Result Value Ref Range    pH, Meet 7.362 7.350 - 7.450    pCO2, Meet 50.3 41.0 - 51.0 mmHg    pO2, Meet <30.0 25.0 - 40.0 mmHg    HCO3, Venous 28.5 (H) 24.0 - 28.0 mmol/L    Base Excess, Meet 2.4 -2.0 - 3.0 mmol/L    O2 Sat, Meet 12 Not established %    Carboxyhemoglobin 1.0 0.0 - 1.5 %    MetHgb, Meet 0.8 0.0 - 1.5 %    TC02 (Calc), Meet 30 mmol/L    Hemoglobin, Meet, Reduced 86.40 %   Lactic Acid   Result Value Ref Range    Lactic Acid 2.1 (H) 0.4 - 2.0 mmol/L   EKG 12 Lead   Result Value Ref Range    Ventricular Rate 62 BPM    Atrial Rate 62 BPM    P-R Interval 178 ms    QRS Duration 74 ms    Q-T Interval 366 ms    QTc Calculation (Bazett) 371 ms    P Axis 78 degrees    R Axis -23 degrees    T Axis 50 degrees    Diagnosis       EKG performed in ER and to be interpreted by ER physician. Confirmed by MD, ER ([id number]),  Jose Alberto Resendiz ([phone]) on 12/15/2022 7:01:29 PM TYPE AND SCREEN   Result Value Ref Range    ABO/Rh A POS     Antibody Screen NEG        ED BEDSIDE ULTRASOUND:  No results found. RECENT VITALS:  BP: (!) 108/54, Temp: 98.1 °F (36.7 °C), Heart Rate: 62,Resp: 14, SpO2: 100 %     Procedures         ED Course     Nursing Notes, Past Medical Hx, Past Surgical Hx, Social Hx, Allergies, and Family Hx were reviewed. The patient was given the followingmedications:  Orders Placed This Encounter   Medications    lactated ringers bolus       CONSULTS:  None    MEDICAL DECISION MAKING / ASSESSMENT / Alayna Clair is a 81 y.o. female presenting for bright red blood per rectum in the setting of recently restarting Eliquis. The patient on presentation is hemodynamically stable awake alert with no acute complaints except for some left lower quadrant pain on and on exam she does have some left lower quadrant tenderness to palpation with some guarding. She will have labs obtained and CT scan without contrast given the patient's concern for her creatinine and the family's concern for RONNY's. .  Patient's initial renal panel concern for her being hemolyzed and repeat will be obtained. No signs of hyperkalemia on EKG. Hemoglobin stable at 9.6-type and screen also sent. This time the patient is pending repeat renal panel, urine studies, and imaging will be signed out to Dr. Vicenta Yuen pending on final disposition. This patient was also evaluated by the attending physician. All care plans werediscussed and agreed upon. Clinical Impression     1. Rectal bleeding    2. RONNY (acute kidney injury) ([facility]        Disposition     PATIENT REFERRED TO:  No follow-up provider specified.     DISCHARGE MEDICATIONS:  New Prescriptions    No medications on file       Femi Davalos MD  Resident  12/15/22 3298

## 2022-12-16 ENCOUNTER — CARE COORDINATION (OUTPATIENT)
Dept: CASE MANAGEMENT | Age: 87
End: 2022-12-16

## 2022-12-16 ENCOUNTER — TELEPHONE (OUTPATIENT)
Dept: CARDIOLOGY CLINIC | Age: 87
End: 2022-12-16

## 2022-12-16 PROBLEM — N13.30 HYDRONEPHROSIS DUE TO OBSTRUCTION OF BLADDER: Status: ACTIVE | Noted: 2022-12-16

## 2022-12-16 PROBLEM — N32.0 HYDRONEPHROSIS DUE TO OBSTRUCTION OF BLADDER: Status: ACTIVE | Noted: 2022-12-16

## 2022-12-16 PROBLEM — E46 MALNUTRITION (HCC): Chronic | Status: ACTIVE | Noted: 2022-12-16

## 2022-12-16 PROBLEM — E87.5 HYPERKALEMIA: Status: ACTIVE | Noted: 2022-12-16

## 2022-12-16 LAB
ALBUMIN SERPL-MCNC: 2.2 G/DL (ref 3.4–5)
ANION GAP SERPL CALCULATED.3IONS-SCNC: 6 MMOL/L (ref 3–16)
ANISOCYTOSIS: ABNORMAL
BANDED NEUTROPHILS RELATIVE PERCENT: 3 % (ref 0–7)
BASOPHILS ABSOLUTE: 0 K/UL (ref 0–0.2)
BASOPHILS RELATIVE PERCENT: 0 %
BUN BLDV-MCNC: 46 MG/DL (ref 7–20)
CALCIUM SERPL-MCNC: 8.4 MG/DL (ref 8.3–10.6)
CHLORIDE BLD-SCNC: 101 MMOL/L (ref 99–110)
CHOLESTEROL, TOTAL: 95 MG/DL (ref 0–199)
CO2: 26 MMOL/L (ref 21–32)
CREAT SERPL-MCNC: 1.8 MG/DL (ref 0.6–1.2)
DIGOXIN LEVEL: 1.2 NG/ML (ref 0.8–2)
EOSINOPHILS ABSOLUTE: 0.1 K/UL (ref 0–0.6)
EOSINOPHILS RELATIVE PERCENT: 1 %
GFR SERPL CREATININE-BSD FRML MDRD: 27 ML/MIN/{1.73_M2}
GLUCOSE BLD-MCNC: 149 MG/DL (ref 70–99)
GLUCOSE BLD-MCNC: 190 MG/DL (ref 70–99)
GLUCOSE BLD-MCNC: 96 MG/DL (ref 70–99)
HCT VFR BLD CALC: 23.8 % (ref 36–48)
HCT VFR BLD CALC: 24.3 % (ref 36–48)
HCT VFR BLD CALC: 25.1 % (ref 36–48)
HCT VFR BLD CALC: 25.3 % (ref 36–48)
HDLC SERPL-MCNC: 30 MG/DL (ref 40–60)
HEMOGLOBIN: 7.7 G/DL (ref 12–16)
HEMOGLOBIN: 7.8 G/DL (ref 12–16)
HEMOGLOBIN: 8 G/DL (ref 12–16)
HEMOGLOBIN: 8.2 G/DL (ref 12–16)
HYPOCHROMIA: ABNORMAL
INR BLD: 2.34 (ref 0.87–1.14)
LDL CHOLESTEROL CALCULATED: 48 MG/DL
LYMPHOCYTES ABSOLUTE: 1.1 K/UL (ref 1–5.1)
LYMPHOCYTES RELATIVE PERCENT: 12 %
MACROCYTES: ABNORMAL
MAGNESIUM: 2.2 MG/DL (ref 1.8–2.4)
MCH RBC QN AUTO: 31.5 PG (ref 26–34)
MCHC RBC AUTO-ENTMCNC: 32.2 G/DL (ref 31–36)
MCV RBC AUTO: 98 FL (ref 80–100)
METAMYELOCYTES RELATIVE PERCENT: 1 %
MICROCYTES: ABNORMAL
MONOCYTES ABSOLUTE: 0.4 K/UL (ref 0–1.3)
MONOCYTES RELATIVE PERCENT: 4 %
MYELOCYTE PERCENT: 1 %
NEUTROPHILS ABSOLUTE: 7.9 K/UL (ref 1.7–7.7)
NEUTROPHILS RELATIVE PERCENT: 78 %
PDW BLD-RTO: 13.6 % (ref 12.4–15.4)
PERFORMED ON: ABNORMAL
PERFORMED ON: ABNORMAL
PHOSPHORUS: 3.1 MG/DL (ref 2.5–4.9)
PLATELET # BLD: 387 K/UL (ref 135–450)
PMV BLD AUTO: 7.3 FL (ref 5–10.5)
POLYCHROMASIA: ABNORMAL
POTASSIUM SERPL-SCNC: 4.7 MMOL/L (ref 3.5–5.1)
POTASSIUM SERPL-SCNC: 5.7 MMOL/L (ref 3.5–5.1)
POTASSIUM SERPL-SCNC: 6 MMOL/L (ref 3.5–5.1)
PROTHROMBIN TIME: 25.8 SEC (ref 11.7–14.5)
RBC # BLD: 2.48 M/UL (ref 4–5.2)
SODIUM BLD-SCNC: 133 MMOL/L (ref 136–145)
TARGET CELLS: ABNORMAL
TOTAL CK: 16 U/L (ref 26–192)
TRIGL SERPL-MCNC: 83 MG/DL (ref 0–150)
VLDLC SERPL CALC-MCNC: 17 MG/DL
WBC # BLD: 9.5 K/UL (ref 4–11)

## 2022-12-16 PROCEDURE — 85018 HEMOGLOBIN: CPT

## 2022-12-16 PROCEDURE — 93005 ELECTROCARDIOGRAM TRACING: CPT | Performed by: STUDENT IN AN ORGANIZED HEALTH CARE EDUCATION/TRAINING PROGRAM

## 2022-12-16 PROCEDURE — 36415 COLL VENOUS BLD VENIPUNCTURE: CPT

## 2022-12-16 PROCEDURE — 6370000000 HC RX 637 (ALT 250 FOR IP)

## 2022-12-16 PROCEDURE — 99222 1ST HOSP IP/OBS MODERATE 55: CPT | Performed by: NURSE PRACTITIONER

## 2022-12-16 PROCEDURE — 84132 ASSAY OF SERUM POTASSIUM: CPT

## 2022-12-16 PROCEDURE — 6360000002 HC RX W HCPCS

## 2022-12-16 PROCEDURE — 2580000003 HC RX 258

## 2022-12-16 PROCEDURE — 6370000000 HC RX 637 (ALT 250 FOR IP): Performed by: STUDENT IN AN ORGANIZED HEALTH CARE EDUCATION/TRAINING PROGRAM

## 2022-12-16 PROCEDURE — 6370000000 HC RX 637 (ALT 250 FOR IP): Performed by: INTERNAL MEDICINE

## 2022-12-16 PROCEDURE — 83735 ASSAY OF MAGNESIUM: CPT

## 2022-12-16 PROCEDURE — 85014 HEMATOCRIT: CPT

## 2022-12-16 PROCEDURE — 80162 ASSAY OF DIGOXIN TOTAL: CPT

## 2022-12-16 PROCEDURE — 82550 ASSAY OF CK (CPK): CPT

## 2022-12-16 PROCEDURE — 2580000003 HC RX 258: Performed by: STUDENT IN AN ORGANIZED HEALTH CARE EDUCATION/TRAINING PROGRAM

## 2022-12-16 PROCEDURE — 85610 PROTHROMBIN TIME: CPT

## 2022-12-16 PROCEDURE — 2500000003 HC RX 250 WO HCPCS

## 2022-12-16 PROCEDURE — 85025 COMPLETE CBC W/AUTO DIFF WBC: CPT

## 2022-12-16 PROCEDURE — 80069 RENAL FUNCTION PANEL: CPT

## 2022-12-16 PROCEDURE — C9113 INJ PANTOPRAZOLE SODIUM, VIA: HCPCS

## 2022-12-16 PROCEDURE — 1200000000 HC SEMI PRIVATE

## 2022-12-16 PROCEDURE — 80061 LIPID PANEL: CPT

## 2022-12-16 RX ORDER — SODIUM CHLORIDE 0.9 % (FLUSH) 0.9 %
5-40 SYRINGE (ML) INJECTION EVERY 12 HOURS SCHEDULED
Status: DISCONTINUED | OUTPATIENT
Start: 2022-12-16 | End: 2022-12-20 | Stop reason: HOSPADM

## 2022-12-16 RX ORDER — ONDANSETRON 2 MG/ML
4 INJECTION INTRAMUSCULAR; INTRAVENOUS EVERY 6 HOURS PRN
Status: DISCONTINUED | OUTPATIENT
Start: 2022-12-16 | End: 2022-12-20 | Stop reason: HOSPADM

## 2022-12-16 RX ORDER — SODIUM CHLORIDE 9 MG/ML
INJECTION, SOLUTION INTRAVENOUS PRN
Status: DISCONTINUED | OUTPATIENT
Start: 2022-12-16 | End: 2022-12-20 | Stop reason: HOSPADM

## 2022-12-16 RX ORDER — CASTOR OIL AND BALSAM, PERU 788; 87 MG/G; MG/G
OINTMENT TOPICAL 2 TIMES DAILY
Status: DISCONTINUED | OUTPATIENT
Start: 2022-12-16 | End: 2022-12-20 | Stop reason: HOSPADM

## 2022-12-16 RX ORDER — ACETAMINOPHEN 650 MG/1
650 SUPPOSITORY RECTAL EVERY 6 HOURS PRN
Status: DISCONTINUED | OUTPATIENT
Start: 2022-12-16 | End: 2022-12-20 | Stop reason: HOSPADM

## 2022-12-16 RX ORDER — LIDOCAINE 4 G/G
1 PATCH TOPICAL DAILY
Status: DISCONTINUED | OUTPATIENT
Start: 2022-12-16 | End: 2022-12-20 | Stop reason: HOSPADM

## 2022-12-16 RX ORDER — HYDROCODONE BITARTRATE AND ACETAMINOPHEN 5; 325 MG/1; MG/1
1 TABLET ORAL EVERY 6 HOURS PRN
Status: DISCONTINUED | OUTPATIENT
Start: 2022-12-16 | End: 2022-12-20 | Stop reason: HOSPADM

## 2022-12-16 RX ORDER — TAMSULOSIN HYDROCHLORIDE 0.4 MG/1
0.4 CAPSULE ORAL DAILY
Status: DISCONTINUED | OUTPATIENT
Start: 2022-12-16 | End: 2022-12-20 | Stop reason: HOSPADM

## 2022-12-16 RX ORDER — LACTOBACILLUS RHAMNOSUS GG 10B CELL
1 CAPSULE ORAL DAILY
Status: DISCONTINUED | OUTPATIENT
Start: 2022-12-16 | End: 2022-12-20 | Stop reason: HOSPADM

## 2022-12-16 RX ORDER — SODIUM CHLORIDE 0.9 % (FLUSH) 0.9 %
5-40 SYRINGE (ML) INJECTION PRN
Status: DISCONTINUED | OUTPATIENT
Start: 2022-12-16 | End: 2022-12-20 | Stop reason: HOSPADM

## 2022-12-16 RX ORDER — 0.9 % SODIUM CHLORIDE 0.9 %
500 INTRAVENOUS SOLUTION INTRAVENOUS ONCE
Status: COMPLETED | OUTPATIENT
Start: 2022-12-16 | End: 2022-12-16

## 2022-12-16 RX ORDER — TROSPIUM CHLORIDE 20 MG/1
20 TABLET, FILM COATED ORAL NIGHTLY
Status: DISCONTINUED | OUTPATIENT
Start: 2022-12-16 | End: 2022-12-16

## 2022-12-16 RX ORDER — ONDANSETRON 4 MG/1
4 TABLET, ORALLY DISINTEGRATING ORAL EVERY 8 HOURS PRN
Status: DISCONTINUED | OUTPATIENT
Start: 2022-12-16 | End: 2022-12-20 | Stop reason: HOSPADM

## 2022-12-16 RX ORDER — PANTOPRAZOLE SODIUM 40 MG/10ML
40 INJECTION, POWDER, LYOPHILIZED, FOR SOLUTION INTRAVENOUS DAILY
Status: DISCONTINUED | OUTPATIENT
Start: 2022-12-16 | End: 2022-12-17

## 2022-12-16 RX ORDER — ACETAMINOPHEN 325 MG/1
650 TABLET ORAL EVERY 6 HOURS PRN
Status: DISCONTINUED | OUTPATIENT
Start: 2022-12-16 | End: 2022-12-20 | Stop reason: HOSPADM

## 2022-12-16 RX ORDER — SODIUM CHLORIDE 9 MG/ML
INJECTION, SOLUTION INTRAVENOUS CONTINUOUS
Status: DISCONTINUED | OUTPATIENT
Start: 2022-12-16 | End: 2022-12-18

## 2022-12-16 RX ORDER — GABAPENTIN 100 MG/1
100 CAPSULE ORAL 3 TIMES DAILY
Status: DISCONTINUED | OUTPATIENT
Start: 2022-12-16 | End: 2022-12-20 | Stop reason: HOSPADM

## 2022-12-16 RX ORDER — DIGOXIN 125 MCG
62.5 TABLET ORAL DAILY
Status: DISCONTINUED | OUTPATIENT
Start: 2022-12-16 | End: 2022-12-19

## 2022-12-16 RX ORDER — DEXTROSE MONOHYDRATE 100 MG/ML
INJECTION, SOLUTION INTRAVENOUS CONTINUOUS PRN
Status: DISCONTINUED | OUTPATIENT
Start: 2022-12-16 | End: 2022-12-20 | Stop reason: HOSPADM

## 2022-12-16 RX ADMIN — NYSTATIN 500000 UNITS: 100000 SUSPENSION ORAL at 09:26

## 2022-12-16 RX ADMIN — GABAPENTIN 100 MG: 100 CAPSULE ORAL at 13:28

## 2022-12-16 RX ADMIN — SILVER SULFADIAZINE: 10 CREAM TOPICAL at 03:28

## 2022-12-16 RX ADMIN — INSULIN HUMAN 10 UNITS: 100 INJECTION, SOLUTION PARENTERAL at 09:27

## 2022-12-16 RX ADMIN — HYDROCODONE BITARTRATE AND ACETAMINOPHEN 1 TABLET: 5; 325 TABLET ORAL at 09:27

## 2022-12-16 RX ADMIN — SODIUM CHLORIDE 3000 MG: 900 INJECTION INTRAVENOUS at 13:29

## 2022-12-16 RX ADMIN — METOPROLOL TARTRATE 25 MG: 25 TABLET, FILM COATED ORAL at 09:27

## 2022-12-16 RX ADMIN — SODIUM CHLORIDE: 9 INJECTION, SOLUTION INTRAVENOUS at 22:23

## 2022-12-16 RX ADMIN — POLYETHYLENE GLYCOL-3350 AND ELECTROLYTES 4000 ML: 236; 6.74; 5.86; 2.97; 22.74 POWDER, FOR SOLUTION ORAL at 14:42

## 2022-12-16 RX ADMIN — TAMSULOSIN HYDROCHLORIDE 0.4 MG: 0.4 CAPSULE ORAL at 09:27

## 2022-12-16 RX ADMIN — PANTOPRAZOLE SODIUM 40 MG: 40 INJECTION, POWDER, FOR SOLUTION INTRAVENOUS at 02:54

## 2022-12-16 RX ADMIN — NYSTATIN 500000 UNITS: 100000 SUSPENSION ORAL at 21:03

## 2022-12-16 RX ADMIN — SODIUM CHLORIDE 3000 MG: 900 INJECTION INTRAVENOUS at 09:33

## 2022-12-16 RX ADMIN — NYSTATIN 500000 UNITS: 100000 SUSPENSION ORAL at 12:28

## 2022-12-16 RX ADMIN — NYSTATIN 500000 UNITS: 100000 SUSPENSION ORAL at 17:36

## 2022-12-16 RX ADMIN — GABAPENTIN 100 MG: 100 CAPSULE ORAL at 09:27

## 2022-12-16 RX ADMIN — SILVER SULFADIAZINE: 10 CREAM TOPICAL at 21:04

## 2022-12-16 RX ADMIN — Medication: at 21:03

## 2022-12-16 RX ADMIN — SODIUM CHLORIDE: 9 INJECTION, SOLUTION INTRAVENOUS at 00:50

## 2022-12-16 RX ADMIN — DEXTROSE MONOHYDRATE 250 ML: 100 INJECTION, SOLUTION INTRAVENOUS at 09:38

## 2022-12-16 RX ADMIN — SODIUM CHLORIDE 500 ML: 9 INJECTION, SOLUTION INTRAVENOUS at 12:30

## 2022-12-16 RX ADMIN — SILVER SULFADIAZINE: 10 CREAM TOPICAL at 09:26

## 2022-12-16 RX ADMIN — SODIUM ZIRCONIUM CYCLOSILICATE 5 G: 5 POWDER, FOR SUSPENSION ORAL at 21:02

## 2022-12-16 RX ADMIN — Medication: at 13:22

## 2022-12-16 RX ADMIN — GABAPENTIN 100 MG: 100 CAPSULE ORAL at 02:54

## 2022-12-16 RX ADMIN — HYDROCODONE BITARTRATE AND ACETAMINOPHEN 1 TABLET: 5; 325 TABLET ORAL at 02:34

## 2022-12-16 RX ADMIN — ONDANSETRON 4 MG: 4 TABLET, ORALLY DISINTEGRATING ORAL at 14:42

## 2022-12-16 RX ADMIN — GABAPENTIN 100 MG: 100 CAPSULE ORAL at 21:03

## 2022-12-16 RX ADMIN — HYDROCODONE BITARTRATE AND ACETAMINOPHEN 1 TABLET: 5; 325 TABLET ORAL at 15:53

## 2022-12-16 RX ADMIN — SODIUM CHLORIDE 3000 MG: 900 INJECTION INTRAVENOUS at 02:27

## 2022-12-16 RX ADMIN — Medication 1 CAPSULE: at 09:27

## 2022-12-16 ASSESSMENT — PAIN SCALES - GENERAL
PAINLEVEL_OUTOF10: 7
PAINLEVEL_OUTOF10: 10
PAINLEVEL_OUTOF10: 10

## 2022-12-16 ASSESSMENT — ENCOUNTER SYMPTOMS
ANAL BLEEDING: 1
NAUSEA: 0
ABDOMINAL DISTENTION: 0
ABDOMINAL PAIN: 0
ABDOMINAL PAIN: 1
RESPIRATORY NEGATIVE: 1
DIARRHEA: 1
BLOOD IN STOOL: 1
VOMITING: 0

## 2022-12-16 ASSESSMENT — PAIN DESCRIPTION - DESCRIPTORS
DESCRIPTORS: DISCOMFORT
DESCRIPTORS: ACHING
DESCRIPTORS: ACHING;DISCOMFORT

## 2022-12-16 ASSESSMENT — PAIN DESCRIPTION - LOCATION
LOCATION: LEG
LOCATION: HIP
LOCATION: BACK;BUTTOCKS

## 2022-12-16 ASSESSMENT — PAIN SCALES - WONG BAKER
WONGBAKER_NUMERICALRESPONSE: 0

## 2022-12-16 ASSESSMENT — PAIN DESCRIPTION - ORIENTATION: ORIENTATION: LEFT

## 2022-12-16 NOTE — PROGRESS NOTES
Internal Medicine Progress Note    Date: 12/16/2022   Patient: [place] [facility] Day: 1      CC: Rectal Bleeding (Sent from NH for BM with clots)       Interval Hx     NAEO  Patient seen at bedside. No complaints. Shes hungry wants to eat, no BM in hosp yet. Denies CP, SOB, abd pain, N/V/D, H/A.     BP low but stable (80s/50s), no symptoms. Otherwise VSS. Labs show K elev, Cr improving, Na baseline. WBC wnl, Hgb decreasing (dilutional? Other cbc components also dropping). UOP 2100 thus far (-ve 550cc)    On Unasyn (12/16 - )    Objective     Vital Signs:  Patient Vitals for the past 8 hrs:   BP Temp Temp src Pulse Resp SpO2 Height Weight   12/16/22 1154 (!) 81/49 97.3 °F (36.3 °C) Oral 54 18 -- -- --   12/16/22 0959 -- -- -- -- -- -- 5' 2\" (1.575 m) 109 lb (49.4 kg)   12/16/22 0731 97/61 97.3 °F (36.3 °C) Oral 68 18 98 % -- --   12/16/22 5107 -- -- -- -- -- -- -- 109 lb 5.6 oz (49.6 kg)       Physical Exam  Constitutional:       General: She is not in acute distress. Appearance: She is not ill-appearing. HENT:      Head: Atraumatic. Mouth/Throat:      Mouth: Mucous membranes are moist.      Pharynx: No oropharyngeal exudate or posterior oropharyngeal erythema. Eyes:      General: No scleral icterus. Extraocular Movements: Extraocular movements intact. Pupils: Pupils are equal, round, and reactive to light. Cardiovascular:      Rate and Rhythm: Normal rate and regular rhythm. Pulses: Normal pulses. Heart sounds: Normal heart sounds. Pulmonary:      Effort: Pulmonary effort is normal.      Breath sounds: Normal breath sounds. No wheezing, rhonchi or rales. Abdominal:      General: Bowel sounds are normal. There is no distension. Palpations: Abdomen is soft. Tenderness: There is no abdominal tenderness. Musculoskeletal:      Cervical back: Normal range of motion. Right lower leg: No edema. Left lower leg: No edema.    Neurological: General: No focal deficit present. Mental Status: She is alert and oriented to person, place, and time. Sensory: No sensory deficit. Motor: No weakness. Psychiatric:         Mood and Affect: Mood normal.         Behavior: Behavior normal.          Labs:  CBC:   Recent Labs     12/15/22  1748 12/16/22  0224 12/16/22  0602   WBC 10.6  --  9.5   HGB 9.6* 8.2* 7.8*   HCT 30.1* 25.3* 24.3*   *  --  387       BMP:   Recent Labs     12/15/22  1748 12/15/22  1926 12/15/22  1926 12/15/22  2341 12/16/22  0602 12/16/22  1037   * 129*  --   --  133*  --    K 6.2* 5.9*   < > 5.7* 6.0* 4.7   CL 94* 96*  --   --  101  --    CO2 25 22  --   --  26  --    BUN 52* 52*  --   --  46*  --    CREATININE 2.4* 2.3*  --   --  1.8*  --    GLUCOSE 118* 111*  --   --  96  --    PHOS  --  3.2  --   --  3.1  --     < > = values in this interval not displayed. Magnesium:   Recent Labs     12/16/22  0602   MG 2.20     LFT's:   Recent Labs     12/15/22  1748   AST 11*   ALT 11   BILITOT 0.4   ALKPHOS 111         U/A:   Recent Labs     12/15/22  1805   COLORU RED*   PHUR Color Interfer*   WBCUA see below*   RBCUA >100*   MUCUS 4+*   BACTERIA 4+*   CLARITYU TURBID*   SPECGRAV 1.010   LEUKOCYTESUR LARGE*   UROBILINOGEN 0.2   BILIRUBINUR Negative   BLOODU LARGE*   GLUCOSEU Negative       Radiology:  CT ABDOMEN PELVIS WO CONTRAST Additional Contrast? None   Final Result      1. Moderate bilateral hydroureteronephrosis (worse on the left) and grossly distended urinary bladder distention. Findings may relate to chronic bladder outlet obstruction. 2.  Bilateral nonobstructive nephrolithiasis and small nonobstructing calculi in the urinary bladder. 3.  Moderate size right inguinal hernia containing nondilated small bowel. Small fat-containing left inguinal hernia. 4.  Moderate to large hiatal hernia. 5.  Small bilateral pleural effusions.                Assessment & Plan     Lower GIB  Hematuria  HDS for now, Hgb 9.6 OA (last adm 9.4). - Hgb donwtrending - 8.2 > 7.8   - could be dilutional  - H/H q6 - transfuse for <7  - GI consulted - EGD/C-scope tomorrow - NPO at [facility]  - PPI  - Eliquis held    Complicated Recurrent UTI  B/L Hydronephrosis  B/L hydronephrosis on CT A/P, similar to last time. Required miranda. U/A shows UTI. Tx last for E. Faecalis (sens to vanc and ampicillin)  - Unasyn  - await Urine cx  - bladder scans q6hr given blood clots noticed  - Urology consult    RONNY  2.4 OA, baseline is 1.1. likely combo of prerenal and postrenal.   - IVF  - Miranda  - I/Os  - Cr improving    Hyperkalemia  Likely 2/2 Digoxin. 6.2 OA, repeat 5.2, repeat 6.0  - insulin push with D50  - hold Dig  - EKG nl  - monitor labs, tele    Chronic hyponatremia  At baseline  - monitor    Afib  Chadsvasc 3, rate controlled w Lopressor/Dig  - Dig started last admission, held for now givne hyperK  - Lopressor 25 - hold parameters in place   -pt baseline BP is low    DVT PPx:SCDs  Diet: ADULT DIET; Clear Liquid   Code status:  Limited     ELOS: >2 nights  Barriers to discharge: hematuria, hematochezia, compl UTI  Disposition  - Preadmission: SNF  - Current: [facility]  - Upon discharge: likely SNF    Will discuss with attending physician Dr. Mai Radford MD     _____________________  Marita Hartman MD   Internal Medicine Resident, PGY-1  Patient seen and examined, labs and imaging studies reviewed, agree with assessment and plan as outlined above. Continue with current care and plan. Discussed case with patients nurse, discussed case with care team, discussed plan. Im unsure if this is or isnt an upper gi bleed given hyperkalemia and renal dysfunction, gi is following endoscopy tomorrow.     Mai Radford MD [address]

## 2022-12-16 NOTE — CARE COORDINATION
Patient readmitted. PAC RN will follow for discharge disposition. Per previous conversation with Shekhar Reyes from [facility], she had stated family did not want her to return to the hospital (she had been hypotensive), and is [facility] (per their documents. Hospice had not been discussed with patient and family. Writer unsure if Shekhar Reyes or EDILBERTO had discussed this with patient/ family since our call on Tuesday.  Perhaps Palliative Care can see patient in hospital.   Will continue to follow

## 2022-12-16 NOTE — CONSULTS
Urology Attending Consult Note      Reason for Consultation: Gross hematuria    History: 80 yo F with h/o afib on eliquis. She was sent from the nursing home after the noticed BRBPR as well as gross hematuria. Her CT showed a distended bladder with bilateral hydronephrosis. Burk was placed and urine was grossly bloody. We also saw her for urinary retention with bilateral hydro 2 wks ago during her previous admission. F/u renal US showed hydro had resolved and she passed a voiding trial prior to discharge,. Family History, Social History, Review of Systems:  Reviewed and agreed to as per chart    Vitals:  BP 97/61   Pulse 68   Temp 97.3 °F (36.3 °C) (Oral)   Resp 18   Ht 5' 2\" (1.575 m)   Wt 109 lb (49.4 kg)   SpO2 98%   BMI 19.94 kg/m²   Temp  Av.5 °F (36.4 °C)  Min: 97.2 °F (36.2 °C)  Max: 98.1 °F (36.7 °C)    Intake/Output Summary (Last 24 hours) at 2022 1031  Last data filed at 2022 1030  Gross per 24 hour   Intake 1549.99 ml   Output 1800 ml   Net -250.01 ml         Physical:  Well developed, well nourished in no acute distress  Mood indicates no abnormalities. Pt doesnt appear depressed  Orientated to time and place  Neck is supple, trachea is midline  Respiratory effort is normal  Cardiovascular show no extremity swelling  Abdomen no masses or hernias are palpated, there is no tenderness. Liver and Spleen appear normal.  Skin show no abnormal lesions  Lymph nodes are not palpated in the inguinal, neck, or axillary area.         Labs:  WBC:    Lab Results   Component Value Date/Time    WBC 9.5 2022 06:02 AM     Hemoglobin/Hematocrit:    Lab Results   Component Value Date/Time    HGB 7.8 2022 06:02 AM    HCT 24.3 2022 06:02 AM     BMP:    Lab Results   Component Value Date/Time     2022 06:02 AM    K 6.0 2022 06:02 AM    K 6.2 12/15/2022 05:48 PM     2022 06:02 AM    CO2 26 2022 06:02 AM    BUN 46 2022 06:02 AM LABALBU 2.2 12/16/2022 06:02 AM    CREATININE 1.8 12/16/2022 06:02 AM    CALCIUM 8.4 12/16/2022 06:02 AM    GFRAA >60 01/22/2020 06:45 AM    GFRAA >60 04/26/2013 03:18 PM    LABGLOM 27 12/16/2022 06:02 AM     PT/INR:    Lab Results   Component Value Date/Time    PROTIME 25.8 12/16/2022 06:02 AM    INR 2.34 12/16/2022 06:02 AM     PTT:    Lab Results   Component Value Date/Time    APTT 63.5 11/27/2022 06:18 AM   [APTT      Impression/Plan: 82 yo F with gross hematuria, urinary retention and bilateral hydronephrosis    -Urine today is pink to light red and appears to be clearing  -Cr 2.3  -Leave miranda in place for now  -May attempt another voiding trial prior to discharge depending on her clinical status  -She also should have a cystoscopy at some but not urgent at this time  -Will follow    Lance Diggs PA-C

## 2022-12-16 NOTE — H&P
Internal Medicine  PGY 1  History & Physical      CC GI bleed, hematuria    History Obtained From:  patient, electronic medical record    HISTORY OF PRESENT ILLNESS:  Lang Quintanilla is an 44-year-old female with past medical history of A. fib (recently restarted on Eliquis), gout, IBS, chronic bilateral lower extremity pain, and urinary retention who presents to the ED from her SNF with complaint of bright red blood with clots per rectum and hematuria. The patient was recently admitted here on 11/14-12/1, at which time she presented with leg pain, and was treated for acute metabolic encephalopathy secondary to complicated UTI with urinary retention and hydronephrosis. The patient was also found to be in A. fib with RVR, and was restarted on her Eliquis. The patient required Burk catheter secondary to retention, was seen by urology, but was able to void without retention following removal of the Burk catheter, and was instructed to follow-up with urology as an outpatient. The patient has not been able to follow-up with urology since discharge. She was treated for the UTI with Unasyn (for E faecalis), and discharged home on Augmentin with end date of 12/22. The patient reports chronic incontinence, and notably takes mirabegron for this. She states that on the way to the restroom in her wheelchair, she noticed pink spotting on the floor about a week ago. The patient had been on Eliquis before for her A. fib, but stopped the medication secondary to hematuria in the past.  The nursing home staff then noticed bright red blood per rectum with clots today, which is why she was sent in here. The patient herself notes that she has had some diarrhea \"with clumps of feces in it\", but is unaware of hematochezia. She denies any chest pain, shortness of breath, nausea, or vomiting. She does state that she has had a very decreased appetite essentially since her discharge 2 weeks ago.   The patient is nonambulatory at baseline secondary to chronic lower extremity pain, and gets around on a wheelchair. She is complaining of bilateral lower extremity pain here, and states when asked if 1 leg is worse than the other \"they compete each other for pain\". The patient was seen in 2020 by GI for complaint of bright red blood per rectum, was asked by GI to clear the patient to start anticoagulation for A. fib at that time. Dr. Roge Carney recommended colonoscopy prior to starting [facility], but the patient declined. Notably, the patient has never had a colonoscopy in the past, and does have family history of colorectal cancer with her father. The patient accepted risks of GI bleed upon starting anticoagulation at that time. In the ED: The patient was afebrile and hemodynamically stable with rate controlled A. Fib. She did not have any leukocytosis, and hemoglobin was stable at 9.6 from 9.4 at the last admission. The patient also had hyponatremia of 130, which was also present on presentation last admission. Her potassium was elevated at 6.2 without any subsequent EKG changes, which went down to 5.7 upon recheck. She also had elevated creatinine at 2.4 with BUN of 52. Lactate was 2.1. The patient was given a liter bolus of LR. CT abdomen and pelvis was ordered for abdominal tenderness, which showed moderate bilateral hydroureteronephrosis, along with bladder distention. There was also a moderate-sized right inguinal hernia containing nondilated small bowel.     Past Medical History:        Diagnosis Date    Acute deep vein thrombosis (DVT) of left lower extremity after procedure ([facility] [address] 10/03/2019    Post hip rx repair    Acute renal failure (ARF) ([facility] [address] 01/17/2020    Anterior corneal dystrophy     Asymptomatic postmenopausal status (age-related) (natural)     Cervical polyp     Dry eye syndrome     Edema     Fuch's endothelial dystrophy     Gout     Hip fracture requiring operative repair, left, closed, initial encounter ([facility] [address] 09/17/2019    IBS (irritable bowel syndrome)     Mild cognitive impairment     Osteoarthritis     Osteopenia     Papilloma of breast 05/2013    Pedal edema 05/08/2017    Screening mammogram     Symptomatic menopausal or female climacteric states 11/04/2013    Urinary retention 09/21/2019    Vaginal candidiasis        Past Surgical History:        Procedure Laterality Date    APPENDECTOMY      EYE SURGERY      Cataract Surgery    HIP PINNING Left 9/18/2019    LEFT HIP GAMMA NAIL performed by Demond Cano MD at [facility] OR       Medications Priorto Admission:    Medications Prior to Admission: HYDROcodone-acetaminophen (NORCO) 5-325 MG per tablet, Take 1 tablet by mouth every 6 hours as needed for Pain.  amoxicillin-clavulanate (AUGMENTIN) 500-125 MG per tablet, Take 1 tablet by mouth in the morning and 1 tablet in the evening. clotrimazole-betamethasone (LOTRISONE) 1-0.05 % cream, Apply topically 2 times daily Apply topically 2 times daily. Apply to perineum and perirectal area  metoprolol tartrate (LOPRESSOR) 25 MG tablet, Take 25 mg by mouth 2 times daily Hold if SBP< 90  nystatin (MYCOSTATIN) 494047 UNIT/ML suspension, Take 500,000 Units by mouth 4 times daily  povidone-iodine (BETADINE) 10 % external solution, Apply topically 2 times daily Apply topically as needed -paint L heel w/ betadine  lidocaine 4 % external patch, Place 1 patch onto the skin daily Apply one patch to each knee.  Apply in AM and remove qHS  LACTOBACILLUS PROBIOTIC PO, Take 20 Billion Cells by mouth daily  diphenoxylate-atropine (LOMOTIL) 2.5-0.025 MG per tablet, Take 1 tablet by mouth 3 times daily as needed for Diarrhea.  silver sulfADIAZINE (SILVADENE) 1 % cream, Apply topically Apply to R buttocks BID & PRN  apixaban (ELIQUIS) 2.5 MG TABS tablet, Take 1 tablet by mouth 2 times daily  tamsulosin (FLOMAX) 0.4 MG capsule, Take 1 capsule by mouth daily  digoxin 62.5 MCG TABS, Take 62.5 mcg by mouth daily  mirabegron (MYRBETRIQ) 50 MG TB24, Take 50 mg by mouth daily    Allergies:  Amoxapine and related, Lisinopril, and Clindamycin/lincomycin    Social History:   TOBACCO:   reports that she has never smoked. She has never used smokeless tobacco.  ETOH:   reports no history of alcohol use. DRUGS : Denies  Patient currently lives in a nursing home    Family History:       Problem Relation Age of Onset    Breast Cancer Daughter         [facility] 2    Ovarian Cancer Daughter        Review of Systems   Constitutional:  Positive for appetite change and unexpected weight change (Over the past couple weeks, unsure of quantity). Negative for activity change, chills, fatigue and fever. Respiratory: Negative. Cardiovascular: Negative. Gastrointestinal:  Positive for anal bleeding, blood in stool and diarrhea. Negative for abdominal distention, abdominal pain, nausea and vomiting. Genitourinary:  Positive for dysuria, hematuria and urgency. Musculoskeletal:         Leg pain bilaterally   Neurological:  Negative for dizziness, syncope, numbness and headaches. Psychiatric/Behavioral:  Negative for confusion. ROS: A 10 point review of systems was conducted, significant findings as noted in HPI. Physical Exam  Constitutional:       General: She is not in acute distress. Appearance: She is not ill-appearing or toxic-appearing. Comments: Frail   HENT:      Mouth/Throat:      Mouth: Mucous membranes are dry. Eyes:      Extraocular Movements: Extraocular movements intact. Pupils: Pupils are equal, round, and reactive to light. Cardiovascular:      Rate and Rhythm: Normal rate. Rhythm irregular. Pulses: Normal pulses. Heart sounds: Murmur (Ejection murmur at the right sternal border and Erbs point left sternal border) heard. Pulmonary:      Effort: Pulmonary effort is normal.      Breath sounds: Normal breath sounds. No wheezing, rhonchi or rales. Abdominal:      General: Abdomen is flat. Palpations: Abdomen is soft. Tenderness: There is abdominal tenderness (Mild lower quadrant tenderness R>L). There is no guarding or rebound. Hernia: A hernia (Right inguinal hernia with bowel easily reducible, immediately protrudes, large defect) is present. Musculoskeletal:         General: Tenderness (Diffuse exquisite tenderness of bilateral lower extremities from thigh to foot throughout) present. Right lower leg: No edema. Left lower leg: No edema. Skin:     General: Skin is warm and dry. Capillary Refill: Capillary refill takes less than 2 seconds. Neurological:      General: No focal deficit present. Mental Status: She is oriented to person, place, and time. Psychiatric:         Mood and Affect: Mood normal.         Thought Content: Thought content normal.     Physical exam:       Vitals:    12/15/22 1830   BP: (!) 108/54   Pulse: 62   Resp: 14   Temp:    SpO2: 100%       DATA:    Labs:  CBC:   Recent Labs     12/15/22  1748   WBC 10.6   HGB 9.6*   HCT 30.1*   *       BMP:   Recent Labs     12/15/22  1748 12/15/22  1926 12/15/22  2341   * 129*  --    K 6.2* 5.9* 5.7*   CL 94* 96*  --    CO2 25 22  --    BUN 52* 52*  --    CREATININE 2.4* 2.3*  --    GLUCOSE 118* 111*  --    PHOS  --  3.2  --      LFT's:   Recent Labs     12/15/22  1748   AST 11*   ALT 11   BILITOT 0.4   ALKPHOS 111     Troponin: No results for input(s): TROPONINI in the last 72 hours. BNP:No results for input(s): BNP in the last 72 hours. ABGs: No results for input(s): PHART, ZVD5MIN, PO2ART in the last 72 hours. INR: No results for input(s): INR in the last 72 hours.     U/A:  Recent Labs     12/15/22  1805   COLORU RED*   PHUR Color Interfer*   WBCUA see below*   RBCUA >100*   MUCUS 4+*   BACTERIA 4+*   CLARITYU TURBID*   SPECGRAV 1.010   LEUKOCYTESUR LARGE*   UROBILINOGEN 0.2   BILIRUBINUR Negative   BLOODU LARGE*   GLUCOSEU Negative       CT ABDOMEN PELVIS WO CONTRAST Additional Contrast? None   Final Result 1.  Moderate bilateral hydroureteronephrosis (worse on the left) and grossly distended urinary bladder distention. Findings may relate to chronic bladder outlet obstruction. 2.  Bilateral nonobstructive nephrolithiasis and small nonobstructing calculi in the urinary bladder. 3.  Moderate size right inguinal hernia containing nondilated small bowel. Small fat-containing left inguinal hernia. 4.  Moderate to large hiatal hernia. 5.  Small bilateral pleural effusions. ASSESSMENT AND PLAN:  Erin Farah is an 60-year-old female with past medical history of A. fib (recently restarted on Eliquis), gout, IBS, chronic bilateral lower extremity pain, and urinary retention who presents to the ED from her SNF with complaint of bright red blood with clots per rectum and hematuria. #Lower GI bleed  Patient hemodynamically stable. Hemoglobin stable at 9.6 from 9.4 last admission.  -Hold Eliquis  -Start PPI  -GI consult  -NPO pending GI/urology consultations  -Follow hemoglobin, repeat H&H 6 hours following initial CBC    #González hematuria  #Complicated recurrent UTI  #Bladder distention  #Bilateral hydroureteronephrosis  Patient had Burk catheter placed in the ED, with thick, dark red blood with clots in the Burk catheter and bag. -UA with large amount of protein, blood, and leukocyte esterase with positive nitrate. Microscopy with >100 RBCs, obscured presence of WBCs, 4+ bacteria   -Prior urine culture 3 weeks ago grew E faecalis sensitive to ampicillin and vancomycin  -Bladder scans q6 hours to monitor for clot blockage of Burk, patient may need continuous bladder irrigation  -Will start the patient on Unasyn  -Urology consultation, patient will likely need a cystoscopy once UTI cleared  -Continue to hold Eliquis    #RONNY  #Hyponatremia  Creatinine 2.4, baseline around 0.9-1.1, with prior RONNY's of Max creatinine 1.5. Sodium 130, which also appears to be a recurrent issue.   Potassium on recheck 5.7 without EKG changes.  -Likely hypovolemic hyponatremia with RONNY secondary to poor p.o. intake with also picture of post-renal obstruction  -Glucose 111-118. No prior lipid panel performed to rule out pseudohyponatremia secondary to hypertriglyceridemia.    -Lipid panel ordered for the a.m.  -Gentle IV fluid hydration with 125/h normal saline. Patient received a 1L bolus of LR in the ED    #Hyperkalemia  Likely secondary to acute renal failure.  -Could consider digoxin toxicity  -Checked digoxin serum level: appropriate (1.2), will continue dig  -Continue to monitor    #A. Fib  [id number] risk score is 3, which places the patient at moderate to high risk for thromboembolism  -Patient rate controlled on 62.5 mcg of digoxin  -Continue Lopressor 25 mg twice daily  -Hold Eliquis. Given the recurrent GI bleeds, and very significant hematuria that also seems to be recurrent, the patient may not be the best candidate for anticoagulation      Chronic issues:  Chronic leg pain: Continue Norco every 6 as needed, and start 100 mg gabapentin 3 times daily.  Check CK       Will discuss with attending physician Dr. Ramirez Milner Status: Limited x4  FEN: N.p.o. pending consultations  PPX: SCDs  DISPO: CHUY Box DO  PGY1, Internal Medicine  12/16/22  12:02 AM

## 2022-12-16 NOTE — PROGRESS NOTES
4 Eyes Admission Assessment     I agree as the admission nurse that 2 RN's have performed a thorough Head to Toe Skin Assessment on the patient. ALL assessment sites listed below have been assessed on admission. Areas assessed by both nurses:   [x]   Head, Face, and Ears   [x]   Shoulders, Back, and Chest  [x]   Arms, Elbows, and Hands   [x]   Coccyx, Sacrum, and Ischium  [x]   Legs, Feet, and Heels        Does the Patient have Skin Breakdown?   Yes a wound was noted on the Admission Assessment and an LDA was Initiated documentation include the Shi-wound, Wound Assessment, Measurements, Dressing Treatment, Drainage, and Color\",     Patient with an unstagable on L heel, stage 2 on L buttocks and sacrum         Pelon Prevention initiated:  Yes   Wound Care Orders initiated:  Yes      [id number]  nurse consulted for Pressure Injury (Stage 3,4, Unstageable, DTI, NWPT, and Complex wounds) or Pelon score 18 or lower:  Yes      Nurse 1 eSignature: Electronically signed by Teo Wells RN on 12/16/22 at 4:10 AM EST    **SHARE this note so that the co-signing nurse is able to place an eSignature**    Nurse 2 eSignature: Electronically signed by Rachael Castro RN on 12/16/22 at 6:56 AM EST

## 2022-12-16 NOTE — CONSULTS
[id number] [facility] Wound Ostomy Continence Nurse  Consult Note       NAME:  [address] [place] [address] RECORD NUMBER:  [id number]  AGE: 81 y.o. GENDER: female  : 1935  TODAY'S DATE:  2022    Subjective   Reason for WOCN Evaluation and Assessment: L Heel - dry eschar  L Buttock - Stage 3  Sacrum - Stage 2      Bindu Rodrigues is a 81 y.o. female referred by:   [] Physician  [x] Nursing  [] Other:     Wound Identification:  Wound Type: pressure  Contributing Factors: chronic pressure, decreased mobility, shear force, and L Hip Fx    Wound History: Beau Caicedo is an 52-year-old female with past medical history of A. fib (recently restarted on Eliquis), gout, IBS, chronic bilateral lower extremity pain, and urinary retention who presents to the ED from her SNF with complaint of bright red blood with clots per rectum and hematuria. The patient was recently admitted here on -, at which time she presented with leg pain, and was treated for acute metabolic encephalopathy secondary to complicated UTI with urinary retention and hydronephrosis. The patient was also found to be in A. fib with RVR, and was restarted on her Eliquis. The patient required Burk catheter secondary to retention, was seen by urology, but was able to void without retention following removal of the Burk catheter, and was instructed to follow-up with urology as an outpatient. The patient has not been able to follow-up with urology since discharge. She was treated for the UTI with Unasyn (for E faecalis), and discharged home on Augmentin with end date of . The patient reports chronic incontinence, and notably takes mirabegron for this. She states that on the way to the restroom in her wheelchair, she noticed pink spotting on the floor about a week ago.   The patient had been on Eliquis before for her A. fib, but stopped the medication secondary to hematuria in the past.  The nursing home staff then noticed bright red blood per rectum with clots today, which is why she was sent in here. The patient herself notes that she has had some diarrhea \"with clumps of feces in it\", but is unaware of hematochezia. She denies any chest pain, shortness of breath, nausea, or vomiting. She does state that she has had a very decreased appetite essentially since her discharge 2 weeks ago. The patient is nonambulatory at baseline secondary to chronic lower extremity pain, and gets around on a wheelchair. She is complaining of bilateral lower extremity pain here, and states when asked if 1 leg is worse than the other \"they compete each other for pain\". The patient was seen in 2020 by GI for complaint of bright red blood per rectum, was asked by GI to clear the patient to start anticoagulation for A. fib at that time. Dr. Haider Mendez recommended colonoscopy prior to starting [facility], but the patient declined. Notably, the patient has never had a colonoscopy in the past, and does have family history of colorectal cancer with her father. The patient accepted risks of GI bleed upon starting anticoagulation at that time.   Current Wound Care Treatment:  L Heel - Betadine  L Buttock & Sacrum - Venelex, foam    Patient Goal of Care:  [x] Wound Healing  [] Odor Control  [] Palliative Care  [] Pain Control   [] Other:         PAST MEDICAL HISTORY        Diagnosis Date    Acute deep vein thrombosis (DVT) of left lower extremity after procedure ([facility] 10/03/2019    Post hip rx repair    Acute renal failure (ARF) ([facility] [address] [facility] 01/17/2020    Anterior corneal dystrophy     Asymptomatic postmenopausal status (age-related) (natural)     Cervical polyp     Dry eye syndrome     Edema     Fuch's endothelial dystrophy     Gout     Hip fracture requiring operative repair, left, closed, initial encounter ([facility] [address] 09/17/2019    IBS (irritable bowel syndrome)     Mild cognitive impairment     Osteoarthritis     Osteopenia     Papilloma of breast 05/2013    Pedal edema 05/08/2017 Screening mammogram     Symptomatic menopausal or female climacteric states 11/04/2013    Urinary retention 09/21/2019    Vaginal candidiasis        PAST SURGICAL HISTORY    Past Surgical History:   Procedure Laterality Date    APPENDECTOMY      EYE SURGERY      Cataract Surgery    HIP PINNING Left 9/18/2019    LEFT HIP GAMMA NAIL performed by Amadeo Edwards MD at [facility] OR       FAMILY HISTORY    Family History   Problem Relation Age of Onset    Breast Cancer Daughter         BRAC 2    Ovarian Cancer Daughter        SOCIAL HISTORY    Social History     Tobacco Use    Smoking status: Never    Smokeless tobacco: Never   Substance Use Topics    Alcohol use: No    Drug use: No       ALLERGIES    Allergies   Allergen Reactions    Amoxapine And Related Diarrhea    Lisinopril Swelling     Swelling tongue. Clindamycin/Lincomycin        MEDICATIONS    No current facility-administered medications on file prior to encounter. Current Outpatient Medications on File Prior to Encounter   Medication Sig Dispense Refill    HYDROcodone-acetaminophen (NORCO) 5-325 MG per tablet Take 1 tablet by mouth every 6 hours as needed for Pain.      amoxicillin-clavulanate (AUGMENTIN) 500-125 MG per tablet Take 1 tablet by mouth in the morning and 1 tablet in the evening. clotrimazole-betamethasone (LOTRISONE) 1-0.05 % cream Apply topically 2 times daily Apply topically 2 times daily. Apply to perineum and perirectal area      metoprolol tartrate (LOPRESSOR) 25 MG tablet Take 25 mg by mouth 2 times daily Hold if SBP< 90      nystatin (MYCOSTATIN) 411407 UNIT/ML suspension Take 500,000 Units by mouth 4 times daily      povidone-iodine (BETADINE) 10 % external solution Apply topically 2 times daily Apply topically as needed -paint L heel w/ betadine      lidocaine 4 % external patch Place 1 patch onto the skin daily Apply one patch to each knee.  Apply in AM and remove qHS      LACTOBACILLUS PROBIOTIC PO Take 20 Billion Cells by mouth daily      diphenoxylate-atropine (LOMOTIL) 2.5-0.025 MG per tablet Take 1 tablet by mouth 3 times daily as needed for Diarrhea.      silver sulfADIAZINE (SILVADENE) 1 % cream Apply topically Apply to R buttocks BID & PRN      apixaban (ELIQUIS) 2.5 MG TABS tablet Take 1 tablet by mouth 2 times daily 60 tablet 3    tamsulosin (FLOMAX) 0.4 MG capsule Take 1 capsule by mouth daily 30 capsule 3    digoxin 62.5 MCG TABS Take 62.5 mcg by mouth daily 30 tablet 3    mirabegron (MYRBETRIQ) 50 MG TB24 Take 50 mg by mouth daily 90 tablet 3       Objective    BP (!) 81/49   Pulse 54   Temp 97.3 °F (36.3 °C) (Oral)   Resp 18   Ht 5' 2\" (1.575 m)   Wt 109 lb (49.4 kg)   SpO2 98%   BMI 19.94 kg/m²     LABS:  WBC:    Lab Results   Component Value Date/Time    WBC 9.5 12/16/2022 06:02 AM     H/H:    Lab Results   Component Value Date/Time    HGB 7.8 12/16/2022 06:02 AM    HCT 24.3 12/16/2022 06:02 AM     PTT:    Lab Results   Component Value Date/Time    APTT 63.5 11/27/2022 06:18 AM   [APTT}  PT/INR:    Lab Results   Component Value Date/Time    PROTIME 25.8 12/16/2022 06:02 AM    INR 2.34 12/16/2022 06:02 AM     HgBA1c:    Lab Results   Component Value Date/Time    LABA1C 5.2 07/17/2017 03:48 PM       Assessment: L Heel - dry and intact black eschar  Sacrum - small wound with pink wound bed  L Buttock - wound bed is pink and yellow, scant bleeding noted  Sacrum - small area with pink wound bed   Pelon Risk Score: Pelon Scale Score: 13    Patient Active Problem List   Diagnosis Code    Osteoarthritis M19.90    IBS (irritable bowel syndrome) K58.9    Gout M10.9    Dry eye syndrome H04.129    Osteopenia with high risk of fracture M85.80    Essential hypertension, benign I10    Glucose intolerance (impaired glucose tolerance) R73.02    Primary osteoarthritis of both knees M17.0    Anemia D64.9    Inguinal hernia, right K40.90    RONNY (acute kidney injury) (HCC) N17.9    History of DVT (deep vein thrombosis) Z86.718 Chronic pain of left knee M25.562, G89.29    Chronic pain of right knee M25.561, G89.29    Swelling of both lower extremities M79.89    Atrial fibrillation with RVR (HCC) I48.91    Recurrent UTI (urinary tract infection) N39.0    Hyponatremia U74.2    Metabolic acidosis K71.89    Mild cognitive impairment D43.07    Acute metabolic encephalopathy R15.55    Lower GI bleed K92.2    Hematuria R31.9    Hydronephrosis due to obstruction of bladder N13.30, N32.0    Hyperkalemia E87.5       Measurements:  Wound 12/16/22 Heel Left Dry Eschar (Active)   Wound Image   12/16/22 1156   Wound Etiology Pressure Unstageable 12/16/22 1156   Dressing Status Dry; Intact 12/16/22 1156   Wound Cleansed Not Cleansed 12/16/22 1156   Dressing/Treatment Betadine swabs/povidone iodine 12/16/22 1156   Offloading for Diabetic Foot Ulcers Offloading boot 12/16/22 1156   Dressing Change Due 12/17/22 12/16/22 1156   Wound Length (cm) 5.7 cm 12/16/22 1156   Wound Width (cm) 3.5 cm 12/16/22 1156   Wound Depth (cm) 0 cm 12/16/22 1156   Wound Surface Area (cm^2) 19.95 cm^2 12/16/22 1156   Wound Volume (cm^3) 0 cm^3 12/16/22 1156   Wound Assessment Eschar dry 12/16/22 1156   Drainage Amount None 12/16/22 1156   Odor None 12/16/22 1156   Shi-wound Assessment Blanchable erythema;Dry/flaky; Intact 12/16/22 1156   Margins Attached edges; Defined edges 12/16/22 1156   Number of days: 0    L Heel:       Wound 12/16/22 Buttocks Left (Active)   Wound Image   12/16/22 1156   Wound Etiology Pressure Stage 3 12/16/22 1156   Dressing Status New dressing applied 12/16/22 1156   Wound Cleansed Not Cleansed 12/16/22 1156   Dressing/Treatment Pharmaceutical agent (see MAR); Foam 12/16/22 1156   Wound Length (cm) 2.5 cm 12/16/22 1156   Wound Width (cm) 2.5 cm 12/16/22 1156   Wound Depth (cm) 0.3 cm 12/16/22 1156   Wound Surface Area (cm^2) 6.25 cm^2 12/16/22 1156   Wound Volume (cm^3) 1.875 cm^3 12/16/22 1156   Wound Assessment Pink/red; Other (Comment) 12/16/22 1156 Drainage Amount Scant 12/16/22 1156   Drainage Description Serosanguinous 12/16/22 1156   Odor None 12/16/22 1156   Shi-wound Assessment Blanchable erythema;Fragile 12/16/22 1156   Margins Attached edges; Defined edges 12/16/22 1156   Number of days: 0    L Buttock:       Wound 12/16/22 Sacrum (Active)   Wound Image   12/16/22 1156   Wound Etiology Pressure Stage 2 12/16/22 1156   Dressing Status New dressing applied 12/16/22 1156   Wound Cleansed Not Cleansed 12/16/22 1156   Dressing/Treatment Pharmaceutical agent (see MAR); Foam 12/16/22 1156   Dressing Change Due 12/16/22 12/16/22 1156   Wound Length (cm) 0.5 cm 12/16/22 1156   Wound Width (cm) 0.5 cm 12/16/22 1156   Wound Depth (cm) 0.1 cm 12/16/22 1156   Wound Surface Area (cm^2) 0.25 cm^2 12/16/22 1156   Wound Volume (cm^3) 0.025 cm^3 12/16/22 1156   Wound Assessment Pink/red 12/16/22 1156   Drainage Amount Scant 12/16/22 1156   Drainage Description Serosanguinous 12/16/22 1156   Odor None 12/16/22 1156   Shi-wound Assessment Dry/flaky;Fragile; Intact 12/16/22 1156   Margins Attached edges; Defined edges 12/16/22 1156   Number of days: 0   Sacrum:      Response to treatment:  Well tolerated by patient.      Pain Assessment:  Severity:  0 / 10  Quality of pain: N/A  Wound Pain Timing/Severity: none  Premedicated: No    Plan   Plan of Care: Wound 12/16/22 Heel Left Dry Eschar-Dressing/Treatment: Betadine swabs/povidone iodine  Wound 12/16/22 Buttocks Left-Dressing/Treatment: Pharmaceutical agent (see MAR), Foam (Venelex)  Wound 12/16/22 Sacrum-Dressing/Treatment: Pharmaceutical agent (see MAR), Foam (Venelex)  Recommendation: Sacrum & L Buttock - clean with NS, apply Venelex BID, foam dressing  L Heel - clean with NS, paint with Betadine daily  Offloading boots at all times  Reposition pt every 2 hours  Call Wound Care for deterioration [phone]    Specialty Bed Required : Yes   [x] Low Air Loss   [x] Pressure Redistribution  [] Fluid Immersion  [] Bariatric  [] Total Pressure Relief  [] Other:     Current Diet: Diet NPO Exceptions are: Sips of Water with Meds  Dietician consult:  No    Discharge Plan:  Placement for patient upon discharge: skilled nursing    Patient appropriate for Outpatient [address]: No    Ref[facility]:  []   [] [facility]  [] Supplies  [] Other    Patient/Caregiver Teaching:  Level of patient/caregiver understanding able to:   [] Indicates understanding       [] Needs reinforcement  [] Unsuccessful      [] Verbal Understanding  [] Demonstrated understanding       [] No evidence of learning  [] Refused teaching         [] N/A       Electronically signed by Napoleon Amador RN, CWOCN on 12/16/2022 at 12:01 PM

## 2022-12-16 NOTE — ED PROVIDER NOTES
[facility]     Date of evaluation: 12/15/2022    ADDENDUM:      Care of this patient was assumed from Dr. Jemma Garcia. The patient was seen for Rectal Bleeding (Sent from NH for BM with clots)    The patient's initial evaluation and plan have been discussed with the prior provider who initially evaluated the patient. Nursing Notes, Past Medical Hx, Past Surgical Hx, Social Hx, Allergies, and Family Hx were all reviewed. In summary, this is a 67-year-old female who was recently restarted on Eliquis for atrial fibrillation, presenting with 1 day of bright red blood per rectum. Of note patient with prior history of bleeding on Eliquis presenting with hematuria 2 years ago for which she stopped the medication at that time. Per off going provider, patient with bright red blood per rectum on rectal exam and also with witnessed episode of bright red blood with bowel movements and clots in the toilet here in the ED. Patient has remained hemodynamically stable with blood pressure in the low 100s/50s, normal heart rate, unlabored respirations satting 100% on room air. Patient will require admission pending results of CT abdomen pelvis to medicine service and will need GI consult inpatient. Diagnostic Results       RADIOLOGY:  CT ABDOMEN PELVIS WO CONTRAST Additional Contrast? None   Final Result      1. Moderate bilateral hydroureteronephrosis (worse on the left) and grossly distended urinary bladder distention. Findings may relate to chronic bladder outlet obstruction. 2.  Bilateral nonobstructive nephrolithiasis and small nonobstructing calculi in the urinary bladder. 3.  Moderate size right inguinal hernia containing nondilated small bowel. Small fat-containing left inguinal hernia. 4.  Moderate to large hiatal hernia. 5.  Small bilateral pleural effusions.              LABS:   Results for orders placed or performed during the hospital encounter of 12/15/22   CBC with Auto Differential   Result Value Ref Range    WBC 10.6 4.0 - 11.0 K/uL    RBC 3.05 (L) 4.00 - 5.20 M/uL    Hemoglobin 9.6 (L) 12.0 - 16.0 g/dL    Hematocrit 30.1 (L) 36.0 - 48.0 %    MCV 98.6 80.0 - 100.0 fL    MCH 31.5 26.0 - 34.0 pg    MCHC 32.0 31.0 - 36.0 g/dL    RDW 14.0 12.4 - 15.4 %    Platelets 313 (H) 872 - 450 K/uL    MPV 7.6 5.0 - 10.5 fL    Neutrophils % 86.0 %    Lymphocytes % 5.0 %    Monocytes % 5.0 %    Eosinophils % 1.0 %    Basophils % 0.0 %    Neutrophils Absolute 9.2 (H) 1.7 - 7.7 K/uL    Lymphocytes Absolute 0.7 (L) 1.0 - 5.1 K/uL    Monocytes Absolute 0.5 0.0 - 1.3 K/uL    Eosinophils Absolute 0.1 0.0 - 0.6 K/uL    Basophils Absolute 0.0 0.0 - 0.2 K/uL    Atypical Lymphocytes Relative 2 0 - 6 %    Metamyelocytes Relative 1 (A) %    Anisocytosis Occasional (A)     Polychromasia Occasional (A)     Hypochromia Occasional (A)     Poikilocytes Occasional (A)     Schistocytes Occasional (A)     Stomatocytes Occasional (A)     Target Cells Occasional (A)    CMP w/ Reflex to MG   Result Value Ref Range    Sodium 130 (L) 136 - 145 mmol/L    Potassium reflex Magnesium 6.2 (HH) 3.5 - 5.1 mmol/L    Chloride 94 (L) 99 - 110 mmol/L    CO2 25 21 - 32 mmol/L    Anion Gap 11 3 - 16    Glucose 118 (H) 70 - 99 mg/dL    BUN 52 (H) 7 - 20 mg/dL    Creatinine 2.4 (H) 0.6 - 1.2 mg/dL    Est, Glom Filt Rate 19 (A) >60    Calcium 9.1 8.3 - 10.6 mg/dL    Total Protein 7.0 6.4 - 8.2 g/dL    Albumin 2.8 (L) 3.4 - 5.0 g/dL    Albumin/Globulin Ratio 0.7 (L) 1.1 - 2.2    Total Bilirubin 0.4 0.0 - 1.0 mg/dL    Alkaline Phosphatase 111 40 - 129 U/L    ALT 11 10 - 40 U/L    AST 11 (L) 15 - 37 U/L   Blood gas, venous (Lab)   Result Value Ref Range    pH, Meet 7.362 7.350 - 7.450    pCO2, Meet 50.3 41.0 - 51.0 mmHg    pO2, Meet <30.0 25.0 - 40.0 mmHg    HCO3, Venous 28.5 (H) 24.0 - 28.0 mmol/L    Base Excess, Meet 2.4 -2.0 - 3.0 mmol/L    O2 Sat, Meet 12 Not established %    Carboxyhemoglobin 1.0 0.0 - 1.5 %    MetHgb, Meet 0.8 0.0 - 1.5 %    TC02 (Calc), Meet 30 mmol/L    Hemoglobin, Meet, Reduced 86.40 %   Lactic Acid   Result Value Ref Range    Lactic Acid 2.1 (H) 0.4 - 2.0 mmol/L   Renal Function Panel   Result Value Ref Range    Sodium 129 (L) 136 - 145 mmol/L    Potassium 5.9 (H) 3.5 - 5.1 mmol/L    Chloride 96 (L) 99 - 110 mmol/L    CO2 22 21 - 32 mmol/L    Anion Gap 11 3 - 16    Glucose 111 (H) 70 - 99 mg/dL    BUN 52 (H) 7 - 20 mg/dL    Creatinine 2.3 (H) 0.6 - 1.2 mg/dL    Est, Glom Filt Rate 20 (A) >60    Calcium 8.8 8.3 - 10.6 mg/dL    Phosphorus 3.2 2.5 - 4.9 mg/dL    Albumin 2.4 (L) 3.4 - 5.0 g/dL   EKG 12 Lead   Result Value Ref Range    Ventricular Rate 62 BPM    Atrial Rate 62 BPM    P-R Interval 178 ms    QRS Duration 74 ms    Q-T Interval 366 ms    QTc Calculation (Bazett) 371 ms    P Axis 78 degrees    R Axis -23 degrees    T Axis 50 degrees    Diagnosis       EKG performed in ER and to be interpreted by ER physician. Confirmed by MD, ER ([id number]),  Mireya Vasquez ([phone]) on 12/15/2022 7:01:29 PM   TYPE AND SCREEN   Result Value Ref Range    ABO/Rh A POS     Antibody Screen NEG        RECENT VITALS:  BP: (!) 108/54, Temp: 98.1 °F (36.7 °C), Heart Rate: 62, Resp: 14, SpO2: 100 %       ED Course          The patient was given the following medications:  Orders Placed This Encounter   Medications    lactated ringers bolus    acetaminophen (TYLENOL) tablet 650 mg         CONSULTS:  IP CONSULT TO HOSPITALIST    [address] [facility] / [facility] / Flaca Tyrel is a 81 y.o. female who was recently restarted on Eliquis for atrial fibrillation, presenting with 1 day of bright red blood per rectum. Repeat renal function panel with improved potassium of 5.9 that is hemolyzed so likely lower than this. EKG without any hyperkalemic changes, patient NSR at a rate in the 60s.   BMP did show evidence of RONNY with creatinine 2.3 from a normal baseline of 1 and CT abdomen pelvis was obtained for patient's left lower quadrant pain without any acute intra-abdominal findings aside from bilateral hydronephrosis with dilated bladder concerning for bladder outlet obstruction. Burk catheter was placed with 900 cc hematuria returned and patient with some relief of her abdominal discomfort with this. Patient's blood pressure throughout her time in the ED has remained in the low 268T systolic and occasionally high 06E systolic with maps maintaining greater than or equal to 65. Patient received 1 L of LR and Tylenol. At this time, plan for admission for rectal bleeding, hematuria in the setting of recent restarting of Eliquis, RONNY in the setting of bladder outlet obstruction. This patient was also evaluated by the attending physician. All care plans were discussed and agreed upon. Clinical Impression     1. Rectal bleeding    2. RONNY (acute kidney injury) ([address]    3. Hydronephrosis, unspecified hydronephrosis type    4. Hematuria, unspecified type      Disposition     PATIENT REFERRED TO:  No follow-up provider specified.     DISCHARGE MEDICATIONS:  New Prescriptions    No medications on file       DISPOSITION Decision To Admit 12/15/2022 08:41:47 PM         Corie Quick MD  Resident  12/15/22 [id number]

## 2022-12-16 NOTE — PROGRESS NOTES
Patient's admission Dx: GI bleed, hematuria. Patient had miranda in ED. There are lots of small blood clots and a large blood clots in Miranda bag and tubing. Dr. Hazel Face aware.  BP (!) 95/48   Pulse 66   Temp 97.2 °F (36.2 °C) (Oral)   Resp 14   SpO2 99%

## 2022-12-16 NOTE — PROGRESS NOTES
Pharmacy  Note  - Admission Medication History    List of vaxyc-[id number]-aektndpti medications is complete. [facility]    Please Note:  1)  amoxicillin-clavulanate end date: 12/22/22       Current Outpatient Medications   Medication Instructions    amoxicillin-clavulanate (AUGMENTIN) 500-125 MG per tablet 1 tablet, Oral, 2 TIMES DAILY    apixaban (ELIQUIS) 2.5 mg, Oral, 2 TIMES DAILY    clotrimazole-betamethasone (LOTRISONE) 1-0.05 % cream Topical, 2 TIMES DAILY, Apply topically 2 times daily. <BR>Apply to perineum and perirectal area    Digoxin 62.5 mcg, Oral, DAILY    diphenoxylate-atropine (LOMOTIL) 2.5-0.025 MG per tablet 1 tablet, Oral, 3 TIMES DAILY PRN    HYDROcodone-acetaminophen (NORCO) 5-325 MG per tablet 1 tablet, Oral, EVERY 6 HOURS PRN    LACTOBACILLUS PROBIOTIC PO 20 Billion Cells, Oral, DAILY    lidocaine 4 % external patch 1 patch, TransDERmal, DAILY, Apply one patch to each knee.  Apply in AM and remove qHS    metoprolol tartrate (LOPRESSOR) 25 mg, Oral, 2 TIMES DAILY, Hold if SBP< 90    mirabegron (MYRBETRIQ) 50 mg, Oral, DAILY    nystatin (MYCOSTATIN) 500,000 Units, Oral, 4 TIMES DAILY    povidone-iodine (BETADINE) 10 % external solution Topical, 2 TIMES DAILY, Apply topically as needed -paint L heel w/ betadine    silver sulfADIAZINE (SILVADENE) 1 % cream Topical, Apply to R buttocks BID & PRN    tamsulosin (FLOMAX) 0.4 mg, Oral, DAILY            12/15/2022 10:05 PM  Carline Schmidt  PharmD Candidate   Class of 2023

## 2022-12-16 NOTE — ED NOTES
Patient requires a contrasted CT scan in the setting of significant abdominal pain and bleeding per rectum. This is in line with recommendations from [employer] of radiology and nephrology. Patient will also be given some hydration in light of her creatinine.     MD Ansley Staples MD  Resident  12/15/22 [id number]

## 2022-12-16 NOTE — PROGRESS NOTES
Comprehensive Nutrition Assessment    RECOMMENDATIONS:  PO Diet: ADAT per MD  ONS: Add ONS TID once diet advances  Nutrition Education: Education not indicated   Monitor nutrition adequacy, pertinent labs, bowel habits, wt changes, and clinical progress    NUTRITION ASSESSMENT:   Nutritional summary & status: Positive nutrition screen: Pt currently NPO for colonoscopy in the morning d/t concerns for lower GI bleed. Pt reports poor appetite/intakes for roughly a 2 week period. 12% wt loss in a 1 month period, suspected poor intakes for a longer period of time. Sacropenia present, pt w/ multiple PI to sacrum and heel. Pt is hungry and asking when she can eat. Will add ONS TID once pt diet is advanced. Will continue to monitor. Admission/PMH: Lower GI bleed, gout, IBS    MALNUTRITION ASSESSMENT  Context of Malnutrition: Chronic Illness   Malnutrition Status: Severe malnutrition  Findings of the 6 clinical characteristics of malnutrition (Minimum of 2 out of 6 clinical characteristics is required to make the diagnosis of moderate or severe Protein Calorie Malnutrition based on AND/ASPEN Guidelines):  Energy Intake:  75% or less estimated energy requirements for 1 month or longer  Weight Loss:  Greater than 5% over 1 month (12% in 1 month period)     Body Fat Loss:  Unable to assess     Muscle Mass Loss:  Mild muscle mass loss    Fluid Accumulation:  No significant fluid accumulation      NUTRITION DIAGNOSIS   Severe malnutrition related to inadequate protein-energy intake as evidenced by weight loss, poor intake prior to admission, Criteria as identified in malnutrition assessment, mild muscle loss    Nutrition Monitoring and Evaluation:   Food/Nutrient Intake Outcomes:  Diet Advancement/Tolerance  Physical Signs/Symptoms Outcomes:  Biochemical Data, Nutrition Focused Physical Findings, Skin, Weight, GI Status     OBJECTIVE DATA: Significant to nutrition assessment  Nutrition Related Findings: Na 133. .  Active bowel sounds, no BM since admission. Wounds: Pressure Injury, Stage II, Stage III  Nutrition Goals: Initiate PO diet, within 2 days     CURRENT NUTRITION THERAPIES  Diet NPO Exceptions are: Sips of Water with Meds  ADULT DIET; Clear Liquid  Diet NPO Exceptions are: Sips of Water with Meds  PO Intake: NPO   PO Supplement Intake:NPO  Additional Sources of Calories/IVF:N/A     ANTHROPOMETRICS  Current Height: 5' 2\" (157.5 cm)  Current Weight: 109 lb (49.4 kg)    Admission weight: 109 lb 5.6 oz (49.6 kg)  Ideal Body Weight (IBW): 110 lbs  (50 kg)   BMI: 20    COMPARATIVE STANDARDS  Energy (kcal):  1489-4230 (25-30 kcal/kg CBW)     Protein (g):  59-74 (1.2-1.5 g/kg CBW)       Fluid (mL/day):  1 mL/kcal or per MD    The patient will be monitored per nutrition standards of care. Consult dietitian if additional nutrition interventions are needed prior to RD reassessment.      Giana Larsen, [address], [address] [place] [address] Drive:  [phone]  Office:  [phone]

## 2022-12-16 NOTE — PLAN OF CARE
Problem: Skin/Tissue Integrity  Goal: Absence of new skin breakdown  Description: 1. Monitor for areas of redness and/or skin breakdown  2. Assess vascular access sites hourly  3. Every 4-6 hours minimum:  Change oxygen saturation probe site  4. Every 4-6 hours:  If on nasal continuous positive airway pressure, respiratory therapy assess nares and determine need for appliance change or resting period.   Outcome: Progressing  Note: PATIENT SPECIALITY MATTRESS ORDERED     Problem: ABCDS Injury Assessment  Goal: Absence of physical injury  Outcome: Progressing     Problem: Discharge Planning  Goal: Discharge to home or other facility with appropriate resources  Outcome: Progressing     Problem: Pain  Goal: Verbalizes/displays adequate comfort level or baseline comfort level  Outcome: Progressing

## 2022-12-16 NOTE — PROGRESS NOTES
Pts son remington called with questions about colonoscopy. Reviewed pros and cons of colonoscopy. He would talk with his mom some more. I will go ahead and order the prep based on what the patient told me earlier today, but she may of course refuse if she would like. Son agrees that she is of sound mind and can make decisions on her own.

## 2022-12-16 NOTE — PROGRESS NOTES
Pharmacy Note - Renal Dosing    Ampicillin/sulbactam ordered for treatment of UTI. Per [facility] Renal Dose Adjustment Policy, ampicillin/sulbactam 3g q6 will be changed to 3g q12h. Estimated Creatinine Clearance: Estimated Creatinine Clearance: 17 mL/min (A) (based on SCr of 1.8 mg/dL (H)). Dialysis Status, RONNY, CKD: RONNY  BMI: Body mass index is 19.94 kg/m². Rationale for Adjustment: Agent is renally eliminated. Pharmacy will continue to monitor renal function and adjust dose as necessary. Please call with any questions.     Magdiel Weston, PharmD, [address] [facility]

## 2022-12-16 NOTE — CONSULTS
[address] [place] [address]  GI Consultation    Patient: Huy Carlson  : 1935       Date:  2022    Subjective:       History of Present Illness  Patient is a 81 y.o.  female with PMH of Afib on Eliquis and IBS admitted with Rectal bleeding [K62.5]  Lower GI bleed [K92.2]  RONNY (acute kidney injury) ([facility] [N17.9]  Hydronephrosis, unspecified hydronephrosis type [N13.30]  Hematuria, unspecified type [R31.9] who is seen in consult for lower GI bleed. She presented to the ED around midnight this morning due to bright red blood with clots per rectum and hematuria. She was recently admitted - for leg pain and complicated UTI w/ urinary retention and hydronephrosis requiring miranda catheterization. She was restarted on Eliquis that admission as well for Afib with RVR. After her discharge back to SNF, her nursing home staff then noticed BRBPR with clots and sent her to ED. The patient herself notes that she has had some diarrhea \"with clumps of feces in it\", but is unaware of hematochezia. Today, she mainly complains of leg pain. She reports no abdominal pain and does not report any prior episodes of hematochezia and does not remember the episode from . She has been passing stools without constipation, but with some diarrhea since she stopped taking Lomotil. She denies chest pain, shortness of breath, nausea, or vomiting. In  she was seen by GI for BRBPR, was asked by GI to clear the patient to start anticoagulation for A. fib. Dr. Shiloh Bey [facility] colon[facility], but she declined. Notably, the patient has never had a colonoscopy and has a family history of colorectal cancer with her father. The patient accepted risks of GI bleed upon starting anticoagulation at that time. In the ED, her Hgb was 9.6 from prior 9.4 last admission. Na 130, K 6.2, no EKG changes. BUN 52, Cr 2.4.     Past Medical History:   Diagnosis Date    Acute deep vein thrombosis (DVT) of left lower extremity after procedure ([facility] 10/03/2019    Post hip rx repair    Acute renal failure (ARF) ([facility] 01/17/2020    Anterior corneal dystrophy     Asymptomatic postmenopausal status (age-related) (natural)     Cervical polyp     Dry eye syndrome     Edema     Fuch's endothelial dystrophy     Gout     Hip fracture requiring operative repair, left, closed, [facility] 09/17/2019    IBS (irritable bowel syndrome)     Mild cognitive impairment     Osteoarthritis     Osteopenia     Papilloma of breast 05/2013    Pedal edema 05/08/2017    Screening mammogram     Symptomatic menopausal or female climacteric states 11/04/2013    Urinary retention 09/21/2019    Vaginal candidiasis       Past Surgical History:   Procedure Laterality Date    APPENDECTOMY      EYE SURGERY      Cataract Surgery    HIP PINNING Left 9/18/2019    LEFT HIP GAMMA NAIL performed by Tylor Olivas MD at [address]      Past Endoscopic History: None    Admission Meds  No current facility-administered medications on file prior to encounter. Current Outpatient Medications on File Prior to Encounter   Medication Sig Dispense Refill    HYDROcodone-acetaminophen (NORCO) 5-325 MG per tablet Take 1 tablet by mouth every 6 hours as needed for Pain.      amoxicillin-clavulanate (AUGMENTIN) 500-125 MG per tablet Take 1 tablet by mouth in the morning and 1 tablet in the evening. clotrimazole-betamethasone (LOTRISONE) 1-0.05 % cream Apply topically 2 times daily Apply topically 2 times daily.   Apply to perineum and perirectal area      metoprolol tartrate (LOPRESSOR) 25 MG tablet Take 25 mg by mouth 2 times daily Hold if SBP< 90      nystatin (MYCOSTATIN) 113406 UNIT/ML suspension Take 500,000 Units by mouth 4 times daily      povidone-iodine (BETADINE) 10 % external solution Apply topically 2 times daily Apply topically as needed -paint L heel w/ betadine      lidocaine 4 % external patch Place 1 patch onto the skin daily Apply one patch to each knee. Apply in AM and remove qHS      LACTOBACILLUS PROBIOTIC PO Take 20 Billion Cells by mouth daily      diphenoxylate-atropine (LOMOTIL) 2.5-0.025 MG per tablet Take 1 tablet by mouth 3 times daily as needed for Diarrhea.      silver sulfADIAZINE (SILVADENE) 1 % cream Apply topically Apply to R buttocks BID & PRN      apixaban (ELIQUIS) 2.5 MG TABS tablet Take 1 tablet by mouth 2 times daily 60 tablet 3    tamsulosin (FLOMAX) 0.4 MG capsule Take 1 capsule by mouth daily 30 capsule 3    digoxin 62.5 MCG TABS Take 62.5 mcg by mouth daily 30 tablet 3    mirabegron (MYRBETRIQ) 50 MG TB24 Take 50 mg by mouth daily 90 tablet 3       Patient denies ASA, NSAID use. Allergies  Allergies   Allergen Reactions    Amoxapine And Related Diarrhea    Lisinopril Swelling     Swelling tongue. Clindamycin/Lincomycin       Social   Social History     Tobacco Use    Smoking status: Never    Smokeless tobacco: Never   Substance Use Topics    Alcohol use: No      Family History   Problem Relation Age of Onset    Breast Cancer Daughter         BRAC 2    Ovarian Cancer Daughter       Colon Cancer in father. Review of Systems  Pertinent items are noted in HPI. Physical Exam    BP 97/61   Pulse 68   Temp 97.3 °F (36.3 °C) (Oral)   Resp 18   Wt 109 lb 5.6 oz (49.6 kg)   SpO2 98%   BMI 20.00 kg/m²   General appearance: alert, cooperative, no distress, appears stated age  Anicteric, No Jaundice  Head: Normocephalic, without obvious abnormality  Lungs: clear to auscultation bilaterally, Normal Effort  Heart: regular rate and rhythm, normal S1 and S2, no murmurs or rubs  Abdomen: Moderate TTP in RUQ and LUQ. Soft, nondistended. No bruises or palpable masses.   Extremities: atraumatic, no cyanosis or edema  Skin: warm and dry  Neuro: intact  AAOX3    Data Review:    Recent Labs     12/15/22  1748 12/16/22  0224 12/16/22  0602   WBC 10.6  --  9.5   HGB 9.6* 8.2* 7.8*   HCT 30.1* 25.3* 24.3* MCV 98.6  --  98.0   *  --  387     Recent Labs     12/15/22  1748 12/15/22  1926 12/15/22  2341 12/16/22  0602   * 129*  --  133*   K 6.2* 5.9* 5.7* 6.0*   CL 94* 96*  --  101   CO2 25 22  --  26   PHOS  --  3.2  --  3.1   BUN 52* 52*  --  46*   CREATININE 2.4* 2.3*  --  1.8*     Recent Labs     12/15/22  1748   AST 11*   ALT 11   BILITOT 0.4   ALKPHOS 111     No results for input(s): LIPASE, AMYLASE in the last 72 hours. Recent Labs     12/16/22  0602   PROTIME 25.8*   INR 2.34*     No results for input(s): PTT in the last 72 hours. No results for input(s): OCCULTBLD in the last 72 hours. Imaging Studies:    CT-scan of abdomen and pelvis:   1. Moderate bilateral hydroureteronephrosis (worse on the left) and grossly distended urinary bladder distention. Findings may relate to chronic bladder outlet obstruction. 2.  Bilateral nonobstructive nephrolithiasis and small nonobstructing calculi in the urinary bladder. 3.  Moderate size right inguinal hernia containing nondilated small bowel. Small fat-containing left inguinal hernia. 4.  Moderate to large hiatal hernia. 5.  Small bilateral pleural effusions. Assessment:     Active Problems:    Gout    Recurrent UTI (urinary tract infection)    Hyponatremia    Mild cognitive impairment    Lower GI bleed    Hematuria    Hydronephrosis due to obstruction of bladder    Hyperkalemia    Anemia    RONNY (acute kidney injury) ([facility] [address]  Resolved Problems:    * No resolved hospital problems. *    Estefania Mccullough is an 81year old F with PMH of Afib on Eliquis, inflammatory bowel disease and laporoscopic appendectomy who presents with a lower GI bleed that may be due to diverticulosis, angioectasia or a malignancy. Ischemic colitis is unlikely given lack of abdominal pain, but TTP on exam in RUQ/LUQ and h/o Afib may point to a thromboembolic mesenteric ischemia. She is afebrile and has no abdominal pain so a hemorrhagic enterocolitis is unlikely. Recommendations:     Lower GI Bleed  - Continue to hold Eliquis  - Continue PPI  - Plan for colonoscopy  - Trend CBC. Transfuse for a Hgb < 7.0    Thank you for the opportunity to participate in [address] [facility].

## 2022-12-16 NOTE — TELEPHONE ENCOUNTER
Informed pt's son [employer] will notify [facility] pt is in the hospital, as of now the pt does not have a cardiac consult, so cardiology will come see the pt as soon as a cardiac consult is placed. Champ verbalized understanding.

## 2022-12-16 NOTE — CONSULTS
[facility]  Palliative Medicine Consultation Note      Date Of Admission:12/15/2022  Date of consult: 12/16/22  Seen by [facility] in the past:  No    Recommendations:        Reviewed HCPOA on chart which names son Jhonathan Lowery as pt's agent and fred Wylie as alternate agent. Spoke with pt at bedside, with son Shelton Wylie on the phone. Discussed pt's recent loss of appetite which pt and son report has improved significantly today. Pt's two sons and son-in-law are all physicians. Shelton Wylie acknowledges that the pt is having a sharp decline recently, was completely unable to work with PT/OT at the SNF, and has developed three bedsores. He says he might be \"grasping at straws\" but is hopeful her appetite improving today might make it possible for her to be more active. The pt and Shelton Wylie are understanding that she will go to a long term care bed at [facility] upon discharge, which the pt is relieved about since she found the PT/OT to be \"brutal.\" We discussed having hospice care for the pt alongside staff at [facility]. Shelton Wylie was receptive to this idea as was the pt, though Shelton Wylie would like to hold off on making a hospice referral today. He'd like to see how pt's appetite is over the coming days. 1. Goals of Care/Advanced Care planning/Code status: DNR/DNI (Limited- no to all interventions), confirmed with son today. The pt would like to proceed with endoscopy tomorrow. We discussed having hospice once she returns to [facility], and the pt and son are open to considering it, but son would want to hold off on making a referral at this time. He hopes her recent improvement in appetite will continue and help her overall status/prognosis. 2. Pain: pt c/o abdominal and back which is chronic and debilitating. She prefers to stay in the bed and refuses PT/OT due to the pain. She has norco prn here and has received 3 doses today. 3. SOB: denies sob and is breathing comfortably on room air.   4. GIB: pt presented with bright red blood per rectum with clots, and is scheduled for EGD and colonoscopy tomorrow. She is currently drinking prep. 5. Disposition: d/c to [facility] when medically ready    Reason for Consult:         [x]  Goals of Care  []  Code Status Discussion/Advanced Care Planning   []  Psychosocial/Family Support  []  Symptom Management  []  Other (Specify)    Requesting Physician: Dr. Farooq Mane:  GIB, hematuria    History Obtained From:  patient, family member - son, electronic medical record    History of Present Illness:         Jaime Alvarez is a 81 y.o. female with PMH of A. fib (recently restarted on Eliquis), gout, IBS, chronic bilateral lower extremity pain, and urinary retention who presented with GI bleed and hematuria. Her nursing facility reported bright red blood per rectum. She's admitted with lower GI bleed with hgb stable at 9.6 on admission, and complicated recurrent UTI. GI was consulted and have discussed colonoscopy and EGD, which are planned for tomorrow.     Subjective:         Past Medical History:        Diagnosis Date    Acute deep vein thrombosis (DVT) of left lower extremity after procedure ([facility] 10/03/2019    Post hip rx repair    Acute renal failure (ARF) ([facility] 01/17/2020    Anterior corneal dystrophy     Asymptomatic postmenopausal status (age-related) (natural)     Cervical polyp     Dry eye syndrome     Edema     Fuch's endothelial dystrophy     Gout     Hip fracture requiring operative repair, left, closed, initial encounter ([facility] 09/17/2019    IBS (irritable bowel syndrome)     Mild cognitive impairment     Osteoarthritis     Osteopenia     Papilloma of breast 05/2013    Pedal edema 05/08/2017    Screening mammogram     Symptomatic menopausal or female climacteric states 11/04/2013    Urinary retention 09/21/2019    Vaginal candidiasis        Past Surgical History:        Procedure Laterality Date    APPENDECTOMY      EYE SURGERY      Cataract Surgery    HIP PINNING Left 9/18/2019    LEFT HIP GAMMA NAIL performed by Cresencio Song MD at [address]       Current Medications:    Medications Prior to Admission: HYDROcodone-acetaminophen (NORCO) 5-325 MG per tablet, Take 1 tablet by mouth every 6 hours as needed for Pain.  amoxicillin-clavulanate (AUGMENTIN) 500-125 MG per tablet, Take 1 tablet by mouth in the morning and 1 tablet in the evening. clotrimazole-betamethasone (LOTRISONE) 1-0.05 % cream, Apply topically 2 times daily Apply topically 2 times daily. Apply to perineum and perirectal area  metoprolol tartrate (LOPRESSOR) 25 MG tablet, Take 25 mg by mouth 2 times daily Hold if SBP< 90  nystatin (MYCOSTATIN) 260516 UNIT/ML suspension, Take 500,000 Units by mouth 4 times daily  povidone-iodine (BETADINE) 10 % external solution, Apply topically 2 times daily Apply topically as needed -paint L heel w/ betadine  lidocaine 4 % external patch, Place 1 patch onto the skin daily Apply one patch to each knee. Apply in AM and remove qHS  LACTOBACILLUS PROBIOTIC PO, Take 20 Billion Cells by mouth daily  diphenoxylate-atropine (LOMOTIL) 2.5-0.025 MG per tablet, Take 1 tablet by mouth 3 times daily as needed for Diarrhea.  silver sulfADIAZINE (SILVADENE) 1 % cream, Apply topically Apply to R buttocks BID & PRN  apixaban (ELIQUIS) 2.5 MG TABS tablet, Take 1 tablet by mouth 2 times daily  tamsulosin (FLOMAX) 0.4 MG capsule, Take 1 capsule by mouth daily  digoxin 62.5 MCG TABS, Take 62.5 mcg by mouth daily  mirabegron (MYRBETRIQ) 50 MG TB24, Take 50 mg by mouth daily    Allergies:  Amoxapine and related, Lisinopril, and Clindamycin/lincomycin    Social History:    TOBACCO: reports that she has never smoked. She has never used smokeless tobacco.  ETOH:   reports no history of alcohol use. Patient currently lives in a nursing home    Review of Systems -  Review of Systems   Constitutional:  Positive for activity change and appetite change. HENT: Negative. Respiratory: Negative. Cardiovascular: Negative. Gastrointestinal:  Positive for abdominal pain and blood in stool. Genitourinary:  Positive for hematuria. Musculoskeletal: Negative. Neurological:  Positive for weakness. Psychiatric/Behavioral: Negative.   :     Objective:        Physical Exam  HENT:      Head: Normocephalic and atraumatic. Cardiovascular:      Rate and Rhythm: Regular rhythm. Bradycardia present. Pulmonary:      Effort: Pulmonary effort is normal.      Breath sounds: Normal breath sounds. Abdominal:      General: Bowel sounds are normal.      Palpations: Abdomen is soft. Musculoskeletal:         General: No swelling. Skin:     General: Skin is warm and dry. Neurological:      Mental Status: She is alert and oriented to person, place, and time. Palliative Performance Scale:  [] 60% Ambulation reduced; Significant disease; Can't do hobbies/housework; intake normal or reduced; occasional assist; LOC full/confusion  [] 50% Mainly sit/lie; Extensive disease; Can't do any work; Considerable assist; intake normal  Or reduced; LOC full/confusion  [x] 40% Mainly in bed; Extensive disease; Mainly assist; intake normal or reduced; occasional assist; LOC full/confusion  [] 30% Bed Bound; Extensive disease; Total care; intake reduced; LOC full/confusion  [] 20% Bed Bound; Extensive disease; Total care; intake minimal; Drowsy/coma  [] 10% Bed Bound; Extensive disease; Total care; Mouth care only; Drowsy/coma  [] 0% Death      Vitals:    BP (!) 82/47 Comment: notified RN.   Pulse 55   Temp 97.2 °F (36.2 °C) (Oral)   Resp 16   Ht 5' 2\" (1.575 m)   Wt 109 lb (49.4 kg)   SpO2 99%   BMI 19.94 kg/m²     Labs:    BMP:   Recent Labs     12/15/22  1748 12/15/22  1926 12/15/22  1926 12/15/22  2341 12/16/22  0602 12/16/22  1037   * 129*  --   --  133*  --    K 6.2* 5.9*   < > 5.7* 6.0* 4.7   CL 94* 96*  --   --  101  --    CO2 25 22  --   --  26  -- BUN 52* 52*  --   --  46*  --    CREATININE 2.4* 2.3*  --   --  1.8*  --    GLUCOSE 118* 111*  --   --  96  --     < > = values in this interval not displayed. CBC:   Recent Labs     12/15/22  1748 12/16/22  0224 12/16/22  0602   WBC 10.6  --  9.5   HGB 9.6* 8.2* 7.8*   HCT 30.1* 25.3* 24.3*   *  --  387       LFT's:   Recent Labs     12/15/22  1748   AST 11*   ALT 11   BILITOT 0.4   ALKPHOS 111     Troponin: No results for input(s): TROPONINI in the last 72 hours. BNP: No results for input(s): BNP in the last 72 hours. ABGs: No results for input(s): PHART, LIJ1SMJ, PO2ART in the last 72 hours. INR:   Recent Labs     12/16/22  0602   INR 2.34*       U/A:  Recent Labs     12/15/22  1805   COLORU RED*   PHUR Color Interfer*   WBCUA see below*   RBCUA >100*   MUCUS 4+*   BACTERIA 4+*   CLARITYU TURBID*   SPECGRAV 1.010   LEUKOCYTESUR LARGE*   UROBILINOGEN 0.2   BILIRUBINUR Negative   BLOODU LARGE*   GLUCOSEU Negative       CT ABDOMEN PELVIS WO CONTRAST Additional Contrast? None   Final Result      1. Moderate bilateral hydroureteronephrosis (worse on the left) and grossly distended urinary bladder distention. Findings may relate to chronic bladder outlet obstruction. 2.  Bilateral nonobstructive nephrolithiasis and small nonobstructing calculi in the urinary bladder. 3.  Moderate size right inguinal hernia containing nondilated small bowel. Small fat-containing left inguinal hernia. 4.  Moderate to large hiatal hernia. 5.  Small bilateral pleural effusions. Conclusion/Time spent:         Recommendations see above    Pt/family [id number]  Time spent with patient and/or family: 45 min  Time reviewing records: 10 min   Time communicating with staff: 5 min     A total of 60 minutes spent with the patient and family on unit greater than 50% in counseling regarding palliative care and in goals of care for the patient. Thank you to Dr. Lorenzo Madrid for this consultation.  We will continue to follow Ms. Vasquez's care as needed.     Carolin Cerrato NP  [facility]

## 2022-12-16 NOTE — PROGRESS NOTES
Patient is A&O. Patient BP has been soft this shift, Mds aware. Patient miranda continues with red urine throughout shift, miranda irrigated once due to clot. No BM as this time. Patient has several wounds noted on admission, Wound nurse consulted and recommendations placed. Patient being turned q2hs. Patient given bowel prep and states that she is unable to do it. Made GI aware but will continue to encourage patient to drink it as tolerated. Nausea medication given. Patient son has been updated several times this shift and this RN has reached out to primary team resident and GI resident to contact [place] to give updated.      Electronically signed by Rebecca Guzman RN on 12/16/2022 at 2:53 PM

## 2022-12-16 NOTE — CARE COORDINATION
Case Management Assessment  Initial Evaluation    Date/Time of Evaluation: 12/16/2022 2:27 PM  Assessment Completed by: Keisha Rosario RN    If patient is discharged prior to next notation, then this note serves as note for discharge by case management. Patient Name: Sharonda Ca                   YOB: 1935  Diagnosis: Rectal bleeding [K62.5]  Lower GI bleed [K92.2]  RONNY (acute kidney injury) ([facility] [N17.9]  Hydronephrosis, unspecified hydronephrosis type [N13.30]  Hematuria, unspecified type [R31.9]                   Date / Time: 12/15/2022  5:19 PM    Patient Admission Status: Inpatient   Readmission Risk (Low < 19, Mod (19-27), High > 27): Readmission Risk Score: 21.8    Current PCP: Wilbert Oswald MD  PCP verified by CM? Chart Reviewed: Yes      History Provided by: Patient  Patient Orientation: Alert and Oriented    Patient Cognition:      Hospitalization in the last 30 days (Readmission):  Yes    If yes, Readmission Assessment in  Navigator will be completed.   Readmission Assessment  Number of Days since last admission?: 8-30 days  Previous Disposition: SNF  Who is being Interviewed:  (son)  What was the patient's/caregiver's perception as to why they think they needed to return back to the hospital?:  (emergent admission)      Advance Directives:      Code Status: Limited   Patient's Primary Decision Maker is: Patient Declined (Legal Next of [address] [place] [address] as Decision Maker)    Primary Decision Maker: [employer] - Child - [phone]    Discharge Planning:    Patient lives with: Alone Type of Home: Skilled Nursing Facility  Primary Care Giver: Self  Patient Support Systems include: Children   Current Financial resources:    Current community resources:    Current services prior to admission: [facility]            Current DME:              Type of Home Care services:  None    ADLS  Prior functional level: Assistance with the following:, Bathing, Dressing, Toileting, Feeding, Cooking, Housework, Shopping, Mobility  Current functional level: Assistance with the following:, Bathing, Dressing, Toileting, Feeding, Cooking, Housework, Shopping, Mobility    PT AM-PAC:   /24  OT AM-PAC:   /24    Family can provide assistance at DC: No  Would you like Case Management to discuss the discharge plan with any other family members/significant others, and if so, who? Yes  Plans to Return to Present Housing: Yes  Other Identified Issues/Barriers to RETURNING to current housing:   Potential Assistance needed at discharge: N/A            Potential DME:    Patient expects to discharge to: [facility] [address] for transportation at discharge:      Financial    Payor: [address] [place] [address] / Plan: MEDICARE PART A AND B / Product Type: *No Product type* /     Does insurance require precert for SNF: No    Potential assistance Purchasing Medications: No  Meds-to-Beds request:        Patrick Palm [facility] - F [phone]  [address] [place] [address]. [address] [place] [address]  Phone: [phone] Fax: [phone]      Notes:    Factors facilitating achievement of predicted outcomes: Family support    Barriers to discharge: Medical complications    Additional Case Management Notes: Patient is from [facility]. However patient does not participate in therapy. TRAVIS spoke at length with son, Saul Swenson, about expectations for discharge. Son would like her to return to [facility] with LTC services. TRAVIS spoke with Beryl Gross at [facility] who stated that the patient ha been declining any and all therapy attempts. Beryl Gross agreeable to taking patient LTC, but not skilled as patient refuses to interact. Currently awaiting EDG decision from patient and son. Palliative care consult placed per telephone order.      The Plan for Transition of Care is related to the following treatment goals of Rectal bleeding [K62.5]  Lower GI bleed [K92.2]  RONNY (acute kidney injury) ([facility] [N17.9]  Hydronephrosis, unspecified hydronephrosis type [N13.30]  Hematuria, unspecified type [G47.0]    IF APPLICABLE: The Patient and/or patient representative Armando Davidson and her family were provided with a choice of provider and agrees with the discharge plan. Freedom of choice list with basic dialogue that supports the patient's individualized plan of care/goals and shares the quality data associated with the providers was provided to:     Patient Representative Name:       The Patient and/or Patient Representative Agree with the Discharge Plan?       Raeann Almanzar RN  Case Management Department  Ph: [phone]  Fax: [phone]

## 2022-12-16 NOTE — TELEPHONE ENCOUNTER
Patients son Patsi Morton calling to let Dr. Merle Spence know that his mother is currently in [facility]. and would like for him to visit her there. Ken Crews would like a call when possible.

## 2022-12-17 ENCOUNTER — ANESTHESIA (OUTPATIENT)
Dept: ENDOSCOPY | Age: 87
End: 2022-12-17
Payer: MEDICARE

## 2022-12-17 ENCOUNTER — ANESTHESIA EVENT (OUTPATIENT)
Dept: ENDOSCOPY | Age: 87
End: 2022-12-17
Payer: MEDICARE

## 2022-12-17 LAB
ALBUMIN SERPL-MCNC: 1.9 G/DL (ref 3.4–5)
ANION GAP SERPL CALCULATED.3IONS-SCNC: 7 MMOL/L (ref 3–16)
BANDED NEUTROPHILS RELATIVE PERCENT: 3 % (ref 0–7)
BASOPHILS ABSOLUTE: 0 K/UL (ref 0–0.2)
BASOPHILS RELATIVE PERCENT: 0 %
BUN BLDV-MCNC: 32 MG/DL (ref 7–20)
CALCIUM SERPL-MCNC: 7.8 MG/DL (ref 8.3–10.6)
CHLORIDE BLD-SCNC: 103 MMOL/L (ref 99–110)
CO2: 24 MMOL/L (ref 21–32)
CREAT SERPL-MCNC: 1.3 MG/DL (ref 0.6–1.2)
EOSINOPHILS ABSOLUTE: 0.2 K/UL (ref 0–0.6)
EOSINOPHILS RELATIVE PERCENT: 2 %
GFR SERPL CREATININE-BSD FRML MDRD: 40 ML/MIN/{1.73_M2}
GLUCOSE BLD-MCNC: 79 MG/DL (ref 70–99)
GLUCOSE BLD-MCNC: 82 MG/DL (ref 70–99)
GLUCOSE BLD-MCNC: 84 MG/DL (ref 70–99)
GLUCOSE BLD-MCNC: 93 MG/DL (ref 70–99)
HCT VFR BLD CALC: 25.2 % (ref 36–48)
HEMOGLOBIN: 8 G/DL (ref 12–16)
LYMPHOCYTES ABSOLUTE: 0.6 K/UL (ref 1–5.1)
LYMPHOCYTES RELATIVE PERCENT: 7 %
MAGNESIUM: 1.9 MG/DL (ref 1.8–2.4)
MCH RBC QN AUTO: 31.5 PG (ref 26–34)
MCHC RBC AUTO-ENTMCNC: 31.9 G/DL (ref 31–36)
MCV RBC AUTO: 98.6 FL (ref 80–100)
METAMYELOCYTES RELATIVE PERCENT: 3 %
MONOCYTES ABSOLUTE: 0.6 K/UL (ref 0–1.3)
MONOCYTES RELATIVE PERCENT: 7 %
MYELOCYTE PERCENT: 2 %
NEUTROPHILS ABSOLUTE: 7.5 K/UL (ref 1.7–7.7)
NEUTROPHILS RELATIVE PERCENT: 76 %
PDW BLD-RTO: 14 % (ref 12.4–15.4)
PERFORMED ON: NORMAL
PHOSPHORUS: 3.2 MG/DL (ref 2.5–4.9)
PLATELET # BLD: 410 K/UL (ref 135–450)
PMV BLD AUTO: 6.9 FL (ref 5–10.5)
POTASSIUM SERPL-SCNC: 4.2 MMOL/L (ref 3.5–5.1)
RBC # BLD: 2.55 M/UL (ref 4–5.2)
SODIUM BLD-SCNC: 134 MMOL/L (ref 136–145)
WBC # BLD: 8.9 K/UL (ref 4–11)

## 2022-12-17 PROCEDURE — 3609017100 HC EGD: Performed by: INTERNAL MEDICINE

## 2022-12-17 PROCEDURE — 85025 COMPLETE CBC W/AUTO DIFF WBC: CPT

## 2022-12-17 PROCEDURE — 0DJ08ZZ INSPECTION OF UPPER INTESTINAL TRACT, VIA NATURAL OR ARTIFICIAL OPENING ENDOSCOPIC: ICD-10-PCS | Performed by: INTERNAL MEDICINE

## 2022-12-17 PROCEDURE — 6360000002 HC RX W HCPCS: Performed by: INTERNAL MEDICINE

## 2022-12-17 PROCEDURE — 3609010200 HC COLONOSCOPY ABLATION TUMOR POLYP/OTHER LES: Performed by: INTERNAL MEDICINE

## 2022-12-17 PROCEDURE — 80069 RENAL FUNCTION PANEL: CPT

## 2022-12-17 PROCEDURE — 2580000003 HC RX 258

## 2022-12-17 PROCEDURE — 2580000003 HC RX 258: Performed by: NURSE ANESTHETIST, CERTIFIED REGISTERED

## 2022-12-17 PROCEDURE — 7100000011 HC PHASE II RECOVERY - ADDTL 15 MIN: Performed by: INTERNAL MEDICINE

## 2022-12-17 PROCEDURE — 1200000000 HC SEMI PRIVATE

## 2022-12-17 PROCEDURE — 6360000002 HC RX W HCPCS

## 2022-12-17 PROCEDURE — C9113 INJ PANTOPRAZOLE SODIUM, VIA: HCPCS

## 2022-12-17 PROCEDURE — 7100000010 HC PHASE II RECOVERY - FIRST 15 MIN: Performed by: INTERNAL MEDICINE

## 2022-12-17 PROCEDURE — 0W3P8ZZ CONTROL BLEEDING IN GASTROINTESTINAL TRACT, VIA NATURAL OR ARTIFICIAL OPENING ENDOSCOPIC: ICD-10-PCS | Performed by: INTERNAL MEDICINE

## 2022-12-17 PROCEDURE — 2500000003 HC RX 250 WO HCPCS: Performed by: NURSE ANESTHETIST, CERTIFIED REGISTERED

## 2022-12-17 PROCEDURE — 83735 ASSAY OF MAGNESIUM: CPT

## 2022-12-17 PROCEDURE — 2580000003 HC RX 258: Performed by: INTERNAL MEDICINE

## 2022-12-17 PROCEDURE — 3700000001 HC ADD 15 MINUTES (ANESTHESIA): Performed by: INTERNAL MEDICINE

## 2022-12-17 PROCEDURE — 6360000002 HC RX W HCPCS: Performed by: NURSE ANESTHETIST, CERTIFIED REGISTERED

## 2022-12-17 PROCEDURE — 6370000000 HC RX 637 (ALT 250 FOR IP)

## 2022-12-17 PROCEDURE — 2720000010 HC SURG SUPPLY STERILE: Performed by: INTERNAL MEDICINE

## 2022-12-17 PROCEDURE — 0DJD8ZZ INSPECTION OF LOWER INTESTINAL TRACT, VIA NATURAL OR ARTIFICIAL OPENING ENDOSCOPIC: ICD-10-PCS | Performed by: INTERNAL MEDICINE

## 2022-12-17 PROCEDURE — 3700000000 HC ANESTHESIA ATTENDED CARE: Performed by: INTERNAL MEDICINE

## 2022-12-17 PROCEDURE — 2709999900 HC NON-CHARGEABLE SUPPLY: Performed by: INTERNAL MEDICINE

## 2022-12-17 PROCEDURE — 36415 COLL VENOUS BLD VENIPUNCTURE: CPT

## 2022-12-17 RX ORDER — GLYCOPYRROLATE 0.2 MG/ML
INJECTION INTRAMUSCULAR; INTRAVENOUS PRN
Status: DISCONTINUED | OUTPATIENT
Start: 2022-12-17 | End: 2022-12-17 | Stop reason: SDUPTHER

## 2022-12-17 RX ORDER — PANTOPRAZOLE SODIUM 40 MG/1
40 TABLET, DELAYED RELEASE ORAL
Status: DISCONTINUED | OUTPATIENT
Start: 2022-12-18 | End: 2022-12-20 | Stop reason: HOSPADM

## 2022-12-17 RX ORDER — LIDOCAINE HYDROCHLORIDE 20 MG/ML
INJECTION, SOLUTION INFILTRATION; PERINEURAL PRN
Status: DISCONTINUED | OUTPATIENT
Start: 2022-12-17 | End: 2022-12-17 | Stop reason: SDUPTHER

## 2022-12-17 RX ORDER — SODIUM CHLORIDE 9 MG/ML
INJECTION, SOLUTION INTRAVENOUS CONTINUOUS PRN
Status: DISCONTINUED | OUTPATIENT
Start: 2022-12-17 | End: 2022-12-17 | Stop reason: SDUPTHER

## 2022-12-17 RX ORDER — PROPOFOL 10 MG/ML
INJECTION, EMULSION INTRAVENOUS PRN
Status: DISCONTINUED | OUTPATIENT
Start: 2022-12-17 | End: 2022-12-17 | Stop reason: SDUPTHER

## 2022-12-17 RX ORDER — PROPOFOL 10 MG/ML
INJECTION, EMULSION INTRAVENOUS CONTINUOUS PRN
Status: DISCONTINUED | OUTPATIENT
Start: 2022-12-17 | End: 2022-12-17 | Stop reason: SDUPTHER

## 2022-12-17 RX ADMIN — NYSTATIN 500000 UNITS: 100000 SUSPENSION ORAL at 13:54

## 2022-12-17 RX ADMIN — LIDOCAINE HYDROCHLORIDE 100 MG: 20 INJECTION, SOLUTION INFILTRATION; PERINEURAL at 09:46

## 2022-12-17 RX ADMIN — PROPOFOL 20 MG: 10 INJECTION, EMULSION INTRAVENOUS at 09:55

## 2022-12-17 RX ADMIN — Medication: at 14:04

## 2022-12-17 RX ADMIN — PROPOFOL 30 MG: 10 INJECTION, EMULSION INTRAVENOUS at 09:46

## 2022-12-17 RX ADMIN — PROPOFOL 30 MG: 10 INJECTION, EMULSION INTRAVENOUS at 10:02

## 2022-12-17 RX ADMIN — Medication 1 CAPSULE: at 14:10

## 2022-12-17 RX ADMIN — PROPOFOL 20 MG: 10 INJECTION, EMULSION INTRAVENOUS at 10:24

## 2022-12-17 RX ADMIN — GABAPENTIN 100 MG: 100 CAPSULE ORAL at 13:54

## 2022-12-17 RX ADMIN — PANTOPRAZOLE SODIUM 40 MG: 40 INJECTION, POWDER, FOR SOLUTION INTRAVENOUS at 13:00

## 2022-12-17 RX ADMIN — PHENYLEPHRINE HYDROCHLORIDE 100 MCG: 10 INJECTION, SOLUTION INTRAMUSCULAR; INTRAVENOUS; SUBCUTANEOUS at 10:05

## 2022-12-17 RX ADMIN — SODIUM CHLORIDE 3000 MG: 900 INJECTION INTRAVENOUS at 14:14

## 2022-12-17 RX ADMIN — TAMSULOSIN HYDROCHLORIDE 0.4 MG: 0.4 CAPSULE ORAL at 13:54

## 2022-12-17 RX ADMIN — PHENYLEPHRINE HYDROCHLORIDE 100 MCG: 10 INJECTION, SOLUTION INTRAMUSCULAR; INTRAVENOUS; SUBCUTANEOUS at 10:13

## 2022-12-17 RX ADMIN — PHENYLEPHRINE HYDROCHLORIDE 50 MCG: 10 INJECTION, SOLUTION INTRAMUSCULAR; INTRAVENOUS; SUBCUTANEOUS at 09:50

## 2022-12-17 RX ADMIN — PROPOFOL 75 MCG/KG/MIN: 10 INJECTION, EMULSION INTRAVENOUS at 09:46

## 2022-12-17 RX ADMIN — NYSTATIN 500000 UNITS: 100000 SUSPENSION ORAL at 19:11

## 2022-12-17 RX ADMIN — PROPOFOL 30 MG: 10 INJECTION, EMULSION INTRAVENOUS at 10:17

## 2022-12-17 RX ADMIN — HYDROCODONE BITARTRATE AND ACETAMINOPHEN 1 TABLET: 5; 325 TABLET ORAL at 20:11

## 2022-12-17 RX ADMIN — HYDROCODONE BITARTRATE AND ACETAMINOPHEN 1 TABLET: 5; 325 TABLET ORAL at 13:54

## 2022-12-17 RX ADMIN — SILVER SULFADIAZINE: 10 CREAM TOPICAL at 14:20

## 2022-12-17 RX ADMIN — GABAPENTIN 100 MG: 100 CAPSULE ORAL at 20:11

## 2022-12-17 RX ADMIN — PHENYLEPHRINE HYDROCHLORIDE 100 MCG: 10 INJECTION, SOLUTION INTRAMUSCULAR; INTRAVENOUS; SUBCUTANEOUS at 10:26

## 2022-12-17 RX ADMIN — SODIUM CHLORIDE: 9 INJECTION, SOLUTION INTRAVENOUS at 18:14

## 2022-12-17 RX ADMIN — GLYCOPYRROLATE 0.1 MG: 0.2 INJECTION INTRAMUSCULAR; INTRAVENOUS at 09:44

## 2022-12-17 RX ADMIN — HYDROCODONE BITARTRATE AND ACETAMINOPHEN 1 TABLET: 5; 325 TABLET ORAL at 04:17

## 2022-12-17 RX ADMIN — PHENYLEPHRINE HYDROCHLORIDE 100 MCG: 10 INJECTION, SOLUTION INTRAMUSCULAR; INTRAVENOUS; SUBCUTANEOUS at 10:18

## 2022-12-17 RX ADMIN — PHENYLEPHRINE HYDROCHLORIDE 50 MCG: 10 INJECTION, SOLUTION INTRAMUSCULAR; INTRAVENOUS; SUBCUTANEOUS at 09:55

## 2022-12-17 RX ADMIN — SODIUM CHLORIDE 3000 MG: 900 INJECTION INTRAVENOUS at 01:22

## 2022-12-17 RX ADMIN — SODIUM CHLORIDE: 9 INJECTION, SOLUTION INTRAVENOUS at 09:33

## 2022-12-17 RX ADMIN — SODIUM CHLORIDE: 9 INJECTION, SOLUTION INTRAVENOUS at 07:33

## 2022-12-17 RX ADMIN — PROPOFOL 20 MG: 10 INJECTION, EMULSION INTRAVENOUS at 10:09

## 2022-12-17 RX ADMIN — Medication: at 21:00

## 2022-12-17 RX ADMIN — PHENYLEPHRINE HYDROCHLORIDE 100 MCG: 10 INJECTION, SOLUTION INTRAMUSCULAR; INTRAVENOUS; SUBCUTANEOUS at 10:00

## 2022-12-17 ASSESSMENT — PAIN SCALES - WONG BAKER
WONGBAKER_NUMERICALRESPONSE: 0

## 2022-12-17 ASSESSMENT — PAIN - FUNCTIONAL ASSESSMENT: PAIN_FUNCTIONAL_ASSESSMENT: NONE - DENIES PAIN

## 2022-12-17 ASSESSMENT — PAIN SCALES - GENERAL
PAINLEVEL_OUTOF10: 10
PAINLEVEL_OUTOF10: 6

## 2022-12-17 ASSESSMENT — PAIN DESCRIPTION - LOCATION
LOCATION: LEG
LOCATION: BUTTOCKS;LEG

## 2022-12-17 ASSESSMENT — PAIN DESCRIPTION - ORIENTATION: ORIENTATION: LEFT

## 2022-12-17 NOTE — ANESTHESIA POSTPROCEDURE EVALUATION
Department of Anesthesiology  Postprocedure Note    Patient: Mahin Basurto  MRN: [id number]  YOB: 1935  Date of evaluation: 12/17/2022      Procedure Summary     Date: 12/17/22 Room / Location: [facility]    Anesthesia Start: 4017 Anesthesia Stop: 1045    Procedures:       EGD DIAGNOSTIC ONLY      COLONOSCOPY ABLATION Diagnosis:       Gastrointestinal hemorrhage, unspecified gastrointestinal hemorrhage type      (Gastrointestinal hemorrhage, unspecified gastrointestinal hemorrhage type [K92.2])    Surgeons: Elijha Alfaro MD Responsible Provider: Audie Sargent MD    Anesthesia Type: general ASA Status: 3          Anesthesia Type: No value filed.     Mone Phase I: Moen Score: 10    Mone Phase II: Mone Score: 10      Anesthesia Post Evaluation    Patient location during evaluation: PACU  Patient participation: complete - patient participated  Level of consciousness: awake and alert  Pain score: 0  Airway patency: patent  Nausea & Vomiting: no nausea and no vomiting  Complications: no  Cardiovascular status: hemodynamically stable  Respiratory status: acceptable  Hydration status: euvolemic

## 2022-12-17 NOTE — PROGRESS NOTES
Patient alert and oriented x4. BP remains soft today, IV fluids infusing as ordered. Burk draining red urine at this time. Having adequate output. Poor input this shift after her Scope, New orders for ensure (Chocolate) this shift per attending. Son at the bedside and updated.

## 2022-12-17 NOTE — ANESTHESIA PRE PROCEDURE
Department of Anesthesiology  Preprocedure Note       Name:  Nadine Allen   Age:  81 y.o.  :  1935                                          MRN:  [id number]         Date:  2022      Surgeon: Kevin Terrell):  Oliva Castrejon MD    Procedure: Procedure(s):  EGD DIAGNOSTIC ONLY  COLONOSCOPY DIAGNOSTIC    Medications prior to admission:   Prior to Admission medications    Medication Sig Start Date End Date Taking? Authorizing Provider   HYDROcodone-acetaminophen (NORCO) 5-325 MG per tablet Take 1 tablet by mouth every 6 hours as needed for Pain. Yes Historical Provider, MD   amoxicillin-clavulanate (AUGMENTIN) 500-125 MG per tablet Take 1 tablet by mouth in the morning and 1 tablet in the evening. Yes Historical Provider, MD   clotrimazole-betamethasone (LOTRISONE) 1-0.05 % cream Apply topically 2 times daily Apply topically 2 times daily. Apply to perineum and perirectal area   Yes Historical Provider, MD   metoprolol tartrate (LOPRESSOR) 25 MG tablet Take 25 mg by mouth 2 times daily Hold if SBP< 90   Yes Historical Provider, MD   nystatin (MYCOSTATIN) 859878 UNIT/ML suspension Take 500,000 Units by mouth 4 times daily   Yes Historical Provider, MD   povidone-iodine (BETADINE) 10 % external solution Apply topically 2 times daily Apply topically as needed -paint L heel w/ betadine   Yes Historical Provider, MD   lidocaine 4 % external patch Place 1 patch onto the skin daily Apply one patch to each knee. Apply in AM and remove qHS   Yes Historical Provider, MD   LACTOBACILLUS PROBIOTIC PO Take 20 Billion Cells by mouth daily   Yes Historical Provider, MD   diphenoxylate-atropine (LOMOTIL) 2.5-0.025 MG per tablet Take 1 tablet by mouth 3 times daily as needed for Diarrhea.    Yes Historical Provider, MD   silver sulfADIAZINE (SILVADENE) 1 % cream Apply topically Apply to R buttocks BID & PRN 12/15/22   Historical Provider, MD   apixaban (ELIQUIS) 2.5 MG TABS tablet Take 1 tablet by mouth 2 times daily 12/1/22   Marella Saint, MD   tamsulosin (FLOMAX) 0.4 MG capsule Take 1 capsule by mouth daily 12/1/22   Marella Saint, MD   digoxin 62.5 MCG TABS Take 62.5 mcg by mouth daily 12/1/22   Marella Saint, MD   mirabegron (MYRBETRIQ) 50 MG TB24 Take 50 mg by mouth daily 2/14/22   Chari Petersen MD       Current medications:    Current Facility-Administered Medications   Medication Dose Route Frequency Provider Last Rate Last Admin    [Held by provider] digoxin (LANOXIN) tablet 62.5 mcg  62.5 mcg Oral Daily Delonte Polanco, DO        HYDROcodone-acetaminophen (NORCO) 5-325 MG per tablet 1 tablet  1 tablet Oral Q6H PRN Delonte Polanco, DO   1 tablet at 12/17/22 0417    lactobacillus (CULTURELLE) capsule 1 capsule  1 capsule Oral Daily Delonte Polanco, DO   1 capsule at 12/16/22 3572    lidocaine 4 % external patch 1 patch  1 patch TransDERmal Daily Delonte Polanco DO        metoprolol tartrate (LOPRESSOR) tablet 25 mg  25 mg Oral BID Alfredo Boswell MD   25 mg at 12/16/22 5193    nystatin (MYCOSTATIN) 053625 UNIT/ML suspension 500,000 Units  500,000 Units Oral 4x Daily Delonte Polanco, DO   500,000 Units at 12/16/22 2103    silver sulfADIAZINE (SILVADENE) 1 % cream   Topical BID Delonte Ploanco, DO   Given at 12/16/22 2104    tamsulosin (FLOMAX) capsule 0.4 mg  0.4 mg Oral Daily Delonte Polanco, DO   0.4 mg at 12/16/22 5918    sodium chloride flush 0.9 % injection 5-40 mL  5-40 mL IntraVENous 2 times per day Delonte Polanco, DO        sodium chloride flush 0.9 % injection 5-40 mL  5-40 mL IntraVENous PRN Delonte Polanco, DO        0.9 % sodium chloride infusion   IntraVENous PRN Delonte Polanco, DO        ondansetron (ZOFRAN-ODT) disintegrating tablet 4 mg  4 mg Oral Q8H PRN Delonte Polanco, DO   4 mg at 12/16/22 1442    Or    ondansetron (ZOFRAN) injection 4 mg  4 mg IntraVENous Q6H PRN Delonte Polanco DO        acetaminophen (TYLENOL) tablet 650 mg  650 mg Oral Q6H PRN Lonne Gum Spring Spritzer, DO        Or    acetaminophen (TYLENOL) suppository 650 mg  650 mg Rectal Q6H PRN Lonne Ciro Spritzer, DO        pantoprazole (PROTONIX) injection 40 mg  40 mg IntraVENous Daily Lonne Gum Spring Spritzer, DO   40 mg at 12/16/22 0254    gabapentin (NEURONTIN) capsule 100 mg  100 mg Oral TID Lonne Gum Spring Spritzer, DO   100 mg at 12/16/22 2103    0.9 % sodium chloride infusion   IntraVENous Continuous Lonne Ciro Spritzer,  mL/hr at 12/17/22 0733 New Bag at 12/17/22 0733    sodium zirconium cyclosilicate (LOKELMA) oral suspension 5 g  5 g Oral TID Kayla Munoz MD   5 g at 12/16/22 2102    glucose chewable tablet 16 g  4 tablet Oral PRN Cresencio Pizarro MD        dextrose bolus 10% 125 mL  125 mL IntraVENous PRN Cresencio Pizarro MD        Or    dextrose bolus 10% 250 mL  250 mL IntraVENous PRN Cresencio Pizarro MD        glucagon (rDNA) injection 1 mg  1 mg SubCUTAneous PRN Cresencio Pizarro MD        dextrose 10 % infusion   IntraVENous Continuous PRKIRK Pizarro MD        Venelex ointment   Topical BID Lincoln Cullen MD   Given at 12/16/22 2103    ampicillin-sulbactam (UNASYN) 3,000 mg in sodium chloride 0.9 % 100 mL IVPB (mini-bag)  3,000 mg IntraVENous Q12H Lincoln uCllen MD   Stopped at 12/17/22 0152       Allergies: Allergies   Allergen Reactions    Amoxapine And Related Diarrhea    Lisinopril Swelling     Swelling tongue.      Clindamycin/Lincomycin        Problem List:    Patient Active Problem List   Diagnosis Code    Osteoarthritis M19.90    IBS (irritable bowel syndrome) K58.9    Gout M10.9    Dry eye syndrome H04.129    Osteopenia with high risk of fracture M85.80    Essential hypertension, benign I10    Glucose intolerance (impaired glucose tolerance) R73.02    Primary osteoarthritis of both knees M17.0    Anemia D64.9    Inguinal hernia, right K40.90    RONNY (acute kidney injury) ([facility] N17.9    History of DVT (deep vein thrombosis) Z86.718    Chronic pain of left knee M25.562, G89.29    Chronic pain of right knee M25.561, G89.29    Swelling of both lower extremities M79.89    Atrial fibrillation with RVR ([facility]) I48.91    Recurrent UTI (urinary tract infection) N39.0    Hyponatremia U97.3    Metabolic acidosis U75.92    Mild cognitive impairment G31.84    Acute metabolic encephalopathy V11.69    Lower GI bleed K92.2    Hematuria R31.9    Hydronephrosis due to obstruction of bladder N13.30, N32.0    Hyperkalemia E87.5    Malnutrition (HCC) E46       Past Medical History:        Diagnosis Date    Acute deep vein thrombosis (DVT) of left lower extremity after procedure ([facility] 10/03/2019    Post hip rx repair    [facility] 01/17/2020    Anterior corneal dystrophy     Asymptomatic postmenopausal status (age-related) (natural)     Cervical polyp     Dry eye syndrome     Edema     Fuch's endothelial dystrophy     Gout     Hip fracture requiring operative repair, left, closed, initial encounter ([facility] 09/17/2019    IBS (irritable bowel syndrome)     Mild cognitive impairment     Osteoarthritis     Osteopenia     Papilloma of breast 05/2013    Pedal edema 05/08/2017    Screening mammogram     Symptomatic menopausal or female climacteric states 11/04/2013    Urinary retention 09/21/2019    Vaginal candidiasis        Past Surgical History:        Procedure Laterality Date    APPENDECTOMY      EYE SURGERY      Cataract Surgery    HIP PINNING Left 9/18/2019    LEFT HIP GAMMA NAIL performed by Maggie Dubin, MD at [address]  History:    Social History     Tobacco Use    Smoking status: Never    Smokeless tobacco: Never   Substance Use Topics    Alcohol use:  No                                Counseling given: Not Answered      Vital Signs (Current):   Vitals:    12/17/22 0130 12/17/22 0315 12/17/22 0600 12/17/22 0730   BP:  101/63  (!) 93/56   Pulse:  71  74   Resp:  17  18   Temp: (!) 95 °F (35 °C) 97.2 °F (36.2 °C)  97.2 °F (36.2 °C)   TempSrc: Rectal Oral  Oral   SpO2:  95%  92%   Weight:   112 lb 14 oz (51.2 kg)    Height:                                                  BP Readings from Last 3 Encounters:   12/17/22 (!) 93/56   12/01/22 100/63   09/14/22 120/70       NPO Status:                                                                                 BMI:   Wt Readings from Last 3 Encounters:   12/17/22 112 lb 14 oz (51.2 kg)   12/01/22 135 lb 9.3 oz (61.5 kg)   02/14/22 127 lb (57.6 kg)     Body mass index is 20.65 kg/m². CBC:   Lab Results   Component Value Date/Time    WBC 8.9 12/17/2022 05:49 AM    RBC 2.55 12/17/2022 05:49 AM    HGB 8.0 12/17/2022 05:49 AM    HCT 25.2 12/17/2022 05:49 AM    MCV 98.6 12/17/2022 05:49 AM    RDW 14.0 12/17/2022 05:49 AM     12/17/2022 05:49 AM       CMP:   Lab Results   Component Value Date/Time     12/17/2022 05:49 AM    K 4.2 12/17/2022 05:49 AM    K 6.2 12/15/2022 05:48 PM     12/17/2022 05:49 AM    CO2 24 12/17/2022 05:49 AM    BUN 32 12/17/2022 05:49 AM    CREATININE 1.3 12/17/2022 05:49 AM    GFRAA >60 01/22/2020 06:45 AM    GFRAA >60 04/26/2013 03:18 PM    AGRATIO 0.7 12/15/2022 05:48 PM    LABGLOM 40 12/17/2022 05:49 AM    GLUCOSE 84 12/17/2022 05:49 AM    PROT 7.0 12/15/2022 05:48 PM    CALCIUM 7.8 12/17/2022 05:49 AM    BILITOT 0.4 12/15/2022 05:48 PM    ALKPHOS 111 12/15/2022 05:48 PM    AST 11 12/15/2022 05:48 PM    ALT 11 12/15/2022 05:48 PM       POC Tests:   Recent Labs     12/16/22  1042   POCGLU 149*       Coags:   Lab Results   Component Value Date/Time    PROTIME 25.8 12/16/2022 06:02 AM    INR 2.34 12/16/2022 06:02 AM    APTT 63.5 11/27/2022 06:18 AM       HCG (If Applicable): No results found for: PREGTESTUR, PREGSERUM, HCG, HCGQUANT     ABGs: No results found for: PHART, PO2ART, LIQ7UVD, ZXU4GJE, BEART, S0HGEVVT     Type & Screen (If Applicable):  No results found for: LABABO, LABRH    Drug/Infectious Status (If Applicable):   No results found for: HIV, HEPCAB    COVID-19 Screening (If Applicable):   Lab Results   Component Value Date/Time    COVID19 Not Detected 11/30/2022 12:52 PM    COVID19 NOT DETECTED 11/24/2022 07:56 PM           Anesthesia Evaluation  Patient summary reviewed and Nursing notes reviewed no history of anesthetic complications:   Airway: Mallampati: II  TM distance: >3 FB   Neck ROM: full  Mouth opening: > = 3 FB   Dental:          Pulmonary:                              Cardiovascular:    (+) hypertension:, dysrhythmias: atrial fibrillation,                   Neuro/Psych:   Negative Neuro/Psych ROS              GI/Hepatic/Renal: Neg GI/Hepatic/Renal ROS            Endo/Other:    (+) : arthritis: OA., .                 Abdominal:             Vascular:   + DVT, . Other Findings:           Anesthesia Plan      general     ASA 1    (50-year-old female presents for esophagogastroduodenoscopy and colonoscopy. Plan general anesthesia with ASA standard monitors. Questions answered. Patient agreeable with anesthetic plan.  )  Induction: intravenous. Anesthetic plan and risks discussed with patient. Plan discussed with CRNA.     Attending anesthesiologist reviewed and agrees with Roge Mcpherson MD   12/17/2022

## 2022-12-17 NOTE — PROGRESS NOTES
Pt temperature reassessed as 97.2 F. Warming blanket held at this time with more warm blankets and heating pads provided. Door shut to insulate heat.      Electronically signed by Urvashi Costa RN on 12/17/2022 at 7:02 AM

## 2022-12-17 NOTE — PROGRESS NOTES
Patient back in room. Son Hrenan Rod) at the bedside.  Message sent to Dr Percy Ramos (urology) asking if he can call son about plan of care per family request.

## 2022-12-17 NOTE — PLAN OF CARE
Problem: Skin/Tissue Integrity  Goal: Absence of new skin breakdown  Description: 1. Monitor for areas of redness and/or skin breakdown  2. Assess vascular access sites hourly  3. Every 4-6 hours minimum:  Change oxygen saturation probe site  4. Every 4-6 hours:  If on nasal continuous positive airway pressure, respiratory therapy assess nares and determine need for appliance change or resting period. Outcome: Progressing  No new skin issues      Problem: ABCDS Injury Assessment  Goal: Absence of physical injury  Outcome: Progressing   Patient at risk for falls. Patient resting quietly in bed. Side rails up x 2. Bed locked in lowest position. Bed alarm on. Bedside table and call light within reach. Patient instructed to call for assistance. Patient verbalized understanding. Will continue to monitor.

## 2022-12-17 NOTE — PROCEDURES
EGD/COLONOSCOPY     Patient: Corrina Villagran MRN: [id number]   YOB: 1935 Age: 81 y.o. Sex: female   Unit: [facility] ENDOSCOPY Room/Bed: Endo Pool/NONE Location: [address] [place] [address]    Admitting Physician: Henrietta Amaro     Primary Care Physician: Randa Khanna MD      DATE OF PROCEDURE: 12/17/2022  PROCEDURE: EGD/Colonoscopy    PREOPERATIVE DIAGNOSIS: Gastrointestinal hemorrhage, unspecified gastrointestinal hemorrhage type [K92.2]  HPI: This is a 81y.o. year old female who was admitted with painless rectal bleeding, acute on chronic anemia in the context of recently restarting Eliquis last week for recurrent A. fib with RVR. No previous EGD or colonoscopy. She is also having hematuria. ENDOSCOPIST: Disha Diego MD    POSTOPERATIVE DIAGNOSIS:    -Tubular esophagus was tortuous. -7 cm hiatal hernia sliding-type, possibly with a component of paraesophageal hernia. Otherwise unremarkable stomach. -Duodenum unremarkable to the distal third portion.  -Terminal ileum is normal in the distal 3 cm.  -Single small cecal AVM, and a single small ascending colon AVM ablated with APC. -7 mm distal ascending colon AVM, which appears to be at risk for intermittent bleeding, ablated with APC. -Moderate left-sided diverticulosis with severely tortuous colon, likely anatomic variation.  -Small internal and external hemorrhoids, which do not appear high risk for bleeding. In summary, I suspect the 7 mm ascending colonic AVM may have been the source of recent bleeding, though I cannot rule out internal hemorrhoids. In addition, she obviously has a component of hematuria, and it is impossible for us to determine whether the bright red blood per rectum noted at the nursing home was actually hematuria. Of note, the patient does have bilateral inguinal hernia, with small bowel on the right without obstruction per CT scan 2 days ago.   Also on EGD today, she may have a component of paraesophageal hernia, though she is asymptomatic and family favors conservative management. PLAN:   -resume diet. Recommend High-fiber diet to keep stool stoft.    -Consider urologic work-up for hematuria. -Reasonable to start Eliquis from GI perspective in 1 week if indicated  -No further GI work-up planned at this time.  -We will sign off, please call with questions. Spoke with son Tino Daigle, reviewing these findings in detail. INFORMED CONSENT:  Informed consent for colonoscopy was obtained. The benefits and risks including adverse medicine reaction and perforation have been explained. The patient's questions were answered and the patient agreed to proceed. ASA: ASA 3 - Patient with moderate systemic disease with functional limitations     SEDATION: MAC    The patient's vital signs, cardiac status, pulmonary status, abdominal status and mental status were stable for the procedure. The patient's vital signs and respiratory function as monitored by oxygen saturation remained stable. COLON PREPARATION:  The patient was given a split colon preparation and the preparation was adequate. EGD/Colonoscopy Procedure Details:      Procedure Details: The Olympus videoendoscope was inserted into the mouth and carefully passed into the esophagus, through the stomach and to the distal duodenum. Retroflexion in the cardia and fundus was performed. Next, a digital rectal exam was performed. The Olympus videocolonoscope  was inserted in the rectum and carefully advanced to the cecum as identified by IC valve, crow's foot appearance and appendix. Retroflexion in the right colon was performed more than the endoscope was straightened and advanced back to the cecum. The colonoscope was slowly withdrawn with careful inspection around and between folds. Retroflexion in the rectum was performed. Cecum Intubated: Yes    Findings: The esophagus is tortuous, but otherwise unremarkable.   The GE junction, squamocolumnar line are at about 30 cm from the incisors, with the diaphragm at about 37 cm. There appears to be a sliding-type and perhaps also paraesophageal component, though difficult to determine endoscopically. The rest of the stomach including the cardia, fundus, gastric body and antrum are unremarkable. The duodenum was an unremarkable in the first second and third portion. The terminal ileum was unremarkable in the distal 3 cm. .  The cecum including the appendix and ileocecal valve were grossly normal, aside from a single small AVM ablated with APC on right colon settings. In the proximal ascending colon, another small AVM was seen and ablated with APC on right colon settings. In the proximal transverse colon, there was a 7 mm brilliant red AVM which appears to be at risk for intermittent bleeding, perhaps contributing factor her to her recent anemia. This was also ablated with APC on small bowel settings with hemostasis. The transverse colon was unremarkable. The descending and sigmoid colon contained some diverticulosis and significant tortuosity, likely just anatomical variation but it did limit some of the views. In the rectum, there were internal and external hemorrhoids though without any high risk stigmata for recent bleeding. There also seem to be a component of rectal prolapse. No anal fissures.       Estimated Blood Loss:  Minimal  Complications: None    Signed By: Cipriano Oppenheim, MD

## 2022-12-17 NOTE — PROGRESS NOTES
Pt temperature unable to be read via oral or axillary route. 95 degree F rectal temperature obtained. Dr. Katherine Song at bedside and put in order for warming blanket. Warm blankets and heating packs applied. Will reassess.

## 2022-12-17 NOTE — PROGRESS NOTES
Internal Medicine Progress Note    Date: 12/17/2022   Patient: [facility] Day: 2      CC: Rectal Bleeding (Sent from NH for BM with clots)       Interval Hx   Pt was seen at bedside prior to scope. No acute overnight events reported. She complained about the bowel prep and BMS. Bps on the Lower side but she had remained asymptomatic. EGD and Colonoscopy       Objective     Vital Signs:  Patient Vitals for the past 8 hrs:   BP Temp Temp src Pulse Resp SpO2 Weight   12/17/22 1103 122/64 -- -- 88 16 96 % --   12/17/22 1056 (!) 124/59 -- -- 85 16 96 % --   12/17/22 1053 118/60 -- -- 84 16 97 % --   12/17/22 1047 (!) 96/55 -- -- 81 16 99 % --   12/17/22 1042 (!) 105/57 -- -- 54 16 99 % --   12/17/22 1037 (!) 103/56 97.2 °F (36.2 °C) Temporal 83 16 99 % --   12/17/22 0918 (!) 102/51 97.8 °F (36.6 °C) Temporal 88 16 96 % --   12/17/22 0730 (!) 93/56 97.2 °F (36.2 °C) Oral 74 18 92 % --   12/17/22 0600 -- -- -- -- -- -- 112 lb 14 oz (51.2 kg)       Physical Exam  HENT:      Mouth/Throat:      Pharynx: Oropharynx is clear. Eyes:      Pupils: Pupils are equal, round, and reactive to light. Cardiovascular:      Rate and Rhythm: Normal rate. Rhythm irregular. Pulmonary:      Effort: Pulmonary effort is normal.   Abdominal:      General: Abdomen is flat. Bowel sounds are normal.      Palpations: Abdomen is soft. Musculoskeletal:      Right lower leg: No edema. Left lower leg: No edema. Skin:     General: Skin is warm. Capillary Refill: Capillary refill takes less than 2 seconds. Neurological:      Mental Status: She is alert and oriented to person, place, and time.           Labs:  CBC:   Recent Labs     12/15/22  1748 12/16/22  0224 12/16/22  0602 12/16/22  1408 12/16/22  2255 12/17/22  0549   WBC 10.6  --  9.5  --   --  8.9   HGB 9.6*   < > 7.8* 7.7* 8.0* 8.0*   HCT 30.1*   < > 24.3* 23.8* 25.1* 25.2*   *  --  387  --   --  410    < > = values in this interval not displayed. BMP:   Recent Labs     12/15/22  1926 12/15/22  2341 12/16/22  0602 12/16/22  1037 12/17/22  0549   *  --  133*  --  134*   K 5.9*   < > 6.0* 4.7 4.2   CL 96*  --  101  --  103   CO2 22  --  26  --  24   BUN 52*  --  46*  --  32*   CREATININE 2.3*  --  1.8*  --  1.3*   GLUCOSE 111*  --  96  --  84   PHOS 3.2  --  3.1  --  3.2    < > = values in this interval not displayed. Magnesium:   Recent Labs     12/16/22  0602 12/17/22  0549   MG 2.20 1.90     LFT's:   Recent Labs     12/15/22  1748   AST 11*   ALT 11   BILITOT 0.4   ALKPHOS 111         U/A:   Recent Labs     12/15/22  1805   COLORU RED*   PHUR Color Interfer*   WBCUA see below*   RBCUA >100*   MUCUS 4+*   BACTERIA 4+*   CLARITYU TURBID*   SPECGRAV 1.010   LEUKOCYTESUR LARGE*   UROBILINOGEN 0.2   BILIRUBINUR Negative   BLOODU LARGE*   GLUCOSEU Negative       Radiology:  CT ABDOMEN PELVIS WO CONTRAST Additional Contrast? None   Final Result      1. Moderate bilateral hydroureteronephrosis (worse on the left) and grossly distended urinary bladder distention. Findings may relate to chronic bladder outlet obstruction. 2.  Bilateral nonobstructive nephrolithiasis and small nonobstructing calculi in the urinary bladder. 3.  Moderate size right inguinal hernia containing nondilated small bowel. Small fat-containing left inguinal hernia. 4.  Moderate to large hiatal hernia. 5.  Small bilateral pleural effusions. Assessment & Plan     Lower GIB  Hematuria  HDS for now, Hgb 9.6 OA (last adm 9.4). - Hgb donwtrending - 8.2 > 7.8 -> 8.0 today   - H/H q6 - transfuse for <7  - GI following   EGD revealing hiatal hernia   Colonoscopy revealing AVMs, 7 mm distal ascending colon AVM ablated.      + moderate size diverticulitis   - Urology following   For voiding trial   Hematuria improving.   - PPI  - Eliquis held   Per GI okay to start after 1 week however will continue monitoring patient and discuss this with cardiology Complicated Recurrent UTI  B/L Hydronephrosis  B/L hydronephrosis on CT A/P, similar to last time. Required miranda. U/A shows UTI. Tx last for E. Faecalis (sens to vanc and ampicillin)  - Unasyn  - await Urine cx  - bladder scans q6hr given blood clots noticed  - Urology consult     RONNY  Improving   2.4 OA, baseline is 1.1. likely combo of prerenal and postrenal.   - IVF  - Miranda  - I/Os  - Cr improving     Hyperkalemia  Resolved issue   Likely 2/2 Digoxin. 6.2 OA, repeat 5.2, repeat 6.0  - insulin push with D50  - hold Dig  - EKG nl  - monitor labs, tele     Chronic hyponatremia  At baseline  - monitor     Afib  Chadsvasc 3, rate controlled w Lopressor/Dig  - Dig started last admission, held for now givne hyperK  - Lopressor 25 - hold parameters in place              -pt baseline BP is low    DVT PPx: SCDs  Diet: ADULT DIET; Regular   Code status:  Limited     ELOS: 2 nights  Barriers to discharge: Clearing of urine.  Monitoring post scope   Disposition  - Preadmission: SNF  - Current: IP  - Upon discharge: SNF    Will discuss with attending physician Dr. Romel York MD     _____________________  Jose Garcia MD   Internal Medicine Resident, PGY-2

## 2022-12-17 NOTE — PROGRESS NOTES
Patient off the floor for colonoscopy, bowel prep was completed around 0300 per report. Patient has been incontinent of clear green tinged stool without solids this shift. Spoke with Endoscopy department and they are aware of her consistant low BP. Did not give BP meds this morning and fluids have been infusing. Patient stated that she felt ready for procedure. Fredi Valencia and Juan [facility]) are both aware of there going for test at this time.

## 2022-12-17 NOTE — PROGRESS NOTES
Urology Attending Progress Note      Subjective: patient denies pain    Vitals:  BP (!) 93/56   Pulse 74   Temp 97.2 °F (36.2 °C) (Oral)   Resp 18   Ht 5' 2\" (1.575 m)   Wt 112 lb 14 oz (51.2 kg)   SpO2 92%   BMI 20.65 kg/m²   Temp  Av.9 °F (36.1 °C)  Min: 95 °F (35 °C)  Max: 97.5 °F (36.4 °C)    Intake/Output Summary (Last 24 hours) at 2022 7441  Last data filed at 2022 0315  Gross per 24 hour   Intake 550 ml   Output 975 ml   Net -425 ml       Exam: miranda - urine light red    Labs:  WBC:    Lab Results   Component Value Date/Time    WBC 8.9 2022 05:49 AM     Hemoglobin/Hematocrit:    Lab Results   Component Value Date/Time    HGB 8.0 2022 05:49 AM    HCT 25.2 2022 05:49 AM     BMP:    Lab Results   Component Value Date/Time     2022 05:49 AM    K 4.2 2022 05:49 AM    K 6.2 12/15/2022 05:48 PM     2022 05:49 AM    CO2 24 2022 05:49 AM    BUN 32 2022 05:49 AM    LABALBU 1.9 2022 05:49 AM    CREATININE 1.3 2022 05:49 AM    CALCIUM 7.8 2022 05:49 AM    GFRAA >60 2020 06:45 AM    GFRAA >60 2013 03:18 PM    LABGLOM 40 2022 05:49 AM     PT/INR:    Lab Results   Component Value Date/Time    PROTIME 25.8 2022 06:02 AM    INR 2.34 2022 06:02 AM     PTT:    Lab Results   Component Value Date/Time    APTT 63.5 2022 06:18 AM   [APTT        Impression/Plan: 82 yo F with gross hematuria, urinary retention and bilateral hydronephrosis     -Urine today is pink to light red and appears to be clearing  -Cr 1.3  -Leave miranda in place for now  -May attempt another voiding trial prior to discharge depending on her clinical status  -She also should have a cystoscopy at some but not urgent at this time  -Will follow    Angelo Mcfadden MD

## 2022-12-17 NOTE — PROGRESS NOTES
Pt consumed more than 60% of bowel prep with patient producing watery stools.      Electronically signed by Tommie Cat RN on 12/17/2022 at 8:03 AM

## 2022-12-18 ENCOUNTER — APPOINTMENT (OUTPATIENT)
Dept: CT IMAGING | Age: 87
DRG: 377 | End: 2022-12-18
Payer: MEDICARE

## 2022-12-18 LAB
ABO/RH: NORMAL
ALBUMIN SERPL-MCNC: 1.9 G/DL (ref 3.4–5)
ANION GAP SERPL CALCULATED.3IONS-SCNC: 9 MMOL/L (ref 3–16)
ANTIBODY SCREEN: NORMAL
BASOPHILS ABSOLUTE: 0 K/UL (ref 0–0.2)
BASOPHILS RELATIVE PERCENT: 0.4 %
BLOOD BANK DISPENSE STATUS: NORMAL
BLOOD BANK PRODUCT CODE: NORMAL
BPU ID: NORMAL
BUN BLDV-MCNC: 26 MG/DL (ref 7–20)
CALCIUM SERPL-MCNC: 7.7 MG/DL (ref 8.3–10.6)
CHLORIDE BLD-SCNC: 109 MMOL/L (ref 99–110)
CO2: 19 MMOL/L (ref 21–32)
CREAT SERPL-MCNC: 1.3 MG/DL (ref 0.6–1.2)
DESCRIPTION BLOOD BANK: NORMAL
EOSINOPHILS ABSOLUTE: 0.3 K/UL (ref 0–0.6)
EOSINOPHILS RELATIVE PERCENT: 2.7 %
GFR SERPL CREATININE-BSD FRML MDRD: 40 ML/MIN/{1.73_M2}
GLUCOSE BLD-MCNC: 103 MG/DL (ref 70–99)
GLUCOSE BLD-MCNC: 111 MG/DL (ref 70–99)
GLUCOSE BLD-MCNC: 122 MG/DL (ref 70–99)
GLUCOSE BLD-MCNC: 134 MG/DL (ref 70–99)
GLUCOSE BLD-MCNC: 210 MG/DL (ref 70–99)
HCT VFR BLD CALC: 24.7 % (ref 36–48)
HCT VFR BLD CALC: 25.3 % (ref 36–48)
HCT VFR BLD CALC: 25.6 % (ref 36–48)
HEMOGLOBIN: 8.1 G/DL (ref 12–16)
HEMOGLOBIN: 8.1 G/DL (ref 12–16)
HEMOGLOBIN: 8.7 G/DL (ref 12–16)
LYMPHOCYTES ABSOLUTE: 1.3 K/UL (ref 1–5.1)
LYMPHOCYTES RELATIVE PERCENT: 13.9 %
MAGNESIUM: 1.8 MG/DL (ref 1.8–2.4)
MCH RBC QN AUTO: 32 PG (ref 26–34)
MCHC RBC AUTO-ENTMCNC: 32.6 G/DL (ref 31–36)
MCV RBC AUTO: 98.3 FL (ref 80–100)
MONOCYTES ABSOLUTE: 0.4 K/UL (ref 0–1.3)
MONOCYTES RELATIVE PERCENT: 4.6 %
NEUTROPHILS ABSOLUTE: 7.5 K/UL (ref 1.7–7.7)
NEUTROPHILS RELATIVE PERCENT: 78.4 %
PDW BLD-RTO: 14.2 % (ref 12.4–15.4)
PERFORMED ON: ABNORMAL
PHOSPHORUS: 3.4 MG/DL (ref 2.5–4.9)
PLATELET # BLD: 464 K/UL (ref 135–450)
PMV BLD AUTO: 7.3 FL (ref 5–10.5)
POTASSIUM SERPL-SCNC: 4 MMOL/L (ref 3.5–5.1)
RBC # BLD: 2.51 M/UL (ref 4–5.2)
SODIUM BLD-SCNC: 137 MMOL/L (ref 136–145)
WBC # BLD: 9.5 K/UL (ref 4–11)

## 2022-12-18 PROCEDURE — 2580000003 HC RX 258

## 2022-12-18 PROCEDURE — 85014 HEMATOCRIT: CPT

## 2022-12-18 PROCEDURE — 85025 COMPLETE CBC W/AUTO DIFF WBC: CPT

## 2022-12-18 PROCEDURE — 36430 TRANSFUSION BLD/BLD COMPNT: CPT

## 2022-12-18 PROCEDURE — 6360000002 HC RX W HCPCS

## 2022-12-18 PROCEDURE — 6360000002 HC RX W HCPCS: Performed by: INTERNAL MEDICINE

## 2022-12-18 PROCEDURE — 80069 RENAL FUNCTION PANEL: CPT

## 2022-12-18 PROCEDURE — 6370000000 HC RX 637 (ALT 250 FOR IP)

## 2022-12-18 PROCEDURE — 2580000003 HC RX 258: Performed by: INTERNAL MEDICINE

## 2022-12-18 PROCEDURE — 86850 RBC ANTIBODY SCREEN: CPT

## 2022-12-18 PROCEDURE — 86901 BLOOD TYPING SEROLOGIC RH(D): CPT

## 2022-12-18 PROCEDURE — 86900 BLOOD TYPING SEROLOGIC ABO: CPT

## 2022-12-18 PROCEDURE — 85018 HEMOGLOBIN: CPT

## 2022-12-18 PROCEDURE — 70450 CT HEAD/BRAIN W/O DYE: CPT

## 2022-12-18 PROCEDURE — 2500000003 HC RX 250 WO HCPCS

## 2022-12-18 PROCEDURE — 36415 COLL VENOUS BLD VENIPUNCTURE: CPT

## 2022-12-18 PROCEDURE — 1200000000 HC SEMI PRIVATE

## 2022-12-18 PROCEDURE — 6370000000 HC RX 637 (ALT 250 FOR IP): Performed by: STUDENT IN AN ORGANIZED HEALTH CARE EDUCATION/TRAINING PROGRAM

## 2022-12-18 PROCEDURE — 83735 ASSAY OF MAGNESIUM: CPT

## 2022-12-18 RX ORDER — MAGNESIUM SULFATE IN WATER 40 MG/ML
2000 INJECTION, SOLUTION INTRAVENOUS ONCE
Status: COMPLETED | OUTPATIENT
Start: 2022-12-18 | End: 2022-12-18

## 2022-12-18 RX ORDER — SODIUM CHLORIDE 9 MG/ML
INJECTION, SOLUTION INTRAVENOUS PRN
Status: DISCONTINUED | OUTPATIENT
Start: 2022-12-18 | End: 2022-12-20 | Stop reason: HOSPADM

## 2022-12-18 RX ORDER — SODIUM CHLORIDE 9 MG/ML
INJECTION, SOLUTION INTRAVENOUS PRN
Status: DISCONTINUED | OUTPATIENT
Start: 2022-12-18 | End: 2022-12-19

## 2022-12-18 RX ADMIN — SILVER SULFADIAZINE: 10 CREAM TOPICAL at 06:14

## 2022-12-18 RX ADMIN — SILVER SULFADIAZINE: 10 CREAM TOPICAL at 21:01

## 2022-12-18 RX ADMIN — SILVER SULFADIAZINE: 10 CREAM TOPICAL at 09:18

## 2022-12-18 RX ADMIN — Medication: at 09:25

## 2022-12-18 RX ADMIN — NYSTATIN 500000 UNITS: 100000 SUSPENSION ORAL at 21:00

## 2022-12-18 RX ADMIN — NYSTATIN 500000 UNITS: 100000 SUSPENSION ORAL at 15:00

## 2022-12-18 RX ADMIN — MAGNESIUM SULFATE HEPTAHYDRATE 2000 MG: 40 INJECTION, SOLUTION INTRAVENOUS at 09:43

## 2022-12-18 RX ADMIN — TAMSULOSIN HYDROCHLORIDE 0.4 MG: 0.4 CAPSULE ORAL at 09:13

## 2022-12-18 RX ADMIN — PANTOPRAZOLE SODIUM 40 MG: 40 TABLET, DELAYED RELEASE ORAL at 07:02

## 2022-12-18 RX ADMIN — SODIUM CHLORIDE 3000 MG: 900 INJECTION INTRAVENOUS at 02:09

## 2022-12-18 RX ADMIN — METOPROLOL TARTRATE 25 MG: 25 TABLET, FILM COATED ORAL at 21:00

## 2022-12-18 RX ADMIN — Medication: at 21:01

## 2022-12-18 RX ADMIN — SODIUM CHLORIDE, PRESERVATIVE FREE 10 ML: 5 INJECTION INTRAVENOUS at 21:00

## 2022-12-18 RX ADMIN — SODIUM CHLORIDE 3000 MG: 900 INJECTION INTRAVENOUS at 14:55

## 2022-12-18 RX ADMIN — SODIUM CHLORIDE: 9 INJECTION, SOLUTION INTRAVENOUS at 09:42

## 2022-12-18 RX ADMIN — Medication 1 CAPSULE: at 09:13

## 2022-12-18 RX ADMIN — NYSTATIN 500000 UNITS: 100000 SUSPENSION ORAL at 16:58

## 2022-12-18 RX ADMIN — NYSTATIN 500000 UNITS: 100000 SUSPENSION ORAL at 09:48

## 2022-12-18 ASSESSMENT — PAIN SCALES - GENERAL
PAINLEVEL_OUTOF10: 0

## 2022-12-18 ASSESSMENT — PAIN SCALES - WONG BAKER
WONGBAKER_NUMERICALRESPONSE: 0

## 2022-12-18 NOTE — PROGRESS NOTES
Internal Medicine Progress Note    Date: 12/18/2022   Patient: [facility] Day: 3      CC: Rectal Bleeding (Sent from NH for BM with clots)       Interval Hx   Pt reported blurry vision overnight, given unknown chronicity, CT head taken, no acute findings. Spoke with son ( Ct Ricki) at bedside to discuss results and plan. Pt reports dry mouth and slight mouth discomfort this AM, but is manageable. Otherwise feels okay. Denies CP, SOB, abd pain, H/A, dysuria. BP soft but stable, HR stable, mid 90s on RA. Labs show Cr remaining 1.3 this AM, lytes stable, Wbc wnl, Hgb stable 8.1 (7.8>8>8.1)    UOP 3.8L since admission, Burk draining light red/pink urine, color lighter then OA. Unnasyn (12/15- ) - plan to d/c if Ucx neg    Objective     Vital Signs:  Patient Vitals for the past 8 hrs:   BP Temp Temp src Pulse Resp SpO2   12/18/22 0913 (!) 95/55 -- -- -- -- --   12/18/22 0715 94/64 97.1 °F (36.2 °C) Oral 82 16 94 %   12/18/22 0542 (!) 102/59 (!) 96 °F (35.6 °C) Axillary 82 16 94 %       Physical Exam  HENT:      Mouth/Throat:      Pharynx: Oropharynx is clear. Eyes:      Pupils: Pupils are equal, round, and reactive to light. Cardiovascular:      Rate and Rhythm: Normal rate. Rhythm irregular. Pulmonary:      Effort: Pulmonary effort is normal.   Abdominal:      General: Abdomen is flat. Bowel sounds are normal.      Palpations: Abdomen is soft. Musculoskeletal:      Right lower leg: No edema. Left lower leg: No edema. Skin:     General: Skin is warm. Capillary Refill: Capillary refill takes less than 2 seconds. Neurological:      Mental Status: She is alert and oriented to person, place, and time.           Labs:  CBC:   Recent Labs     12/16/22  0602 12/16/22  1408 12/16/22  2255 12/17/22  0549 12/18/22  0543   WBC 9.5  --   --  8.9 9.5   HGB 7.8*   < > 8.0* 8.0* 8.1*  8.1*   HCT 24.3*   < > 25.1* 25.2* 24.7*  25.3*     --   --  410 464*    < > = values in this interval not displayed. BMP:   Recent Labs     12/16/22  0602 12/16/22  1037 12/17/22  0549 12/18/22  0543   *  --  134* 137   K 6.0* 4.7 4.2 4.0     --  103 109   CO2 26  --  24 19*   BUN 46*  --  32* 26*   CREATININE 1.8*  --  1.3* 1.3*   GLUCOSE 96  --  84 111*   PHOS 3.1  --  3.2 3.4     Magnesium:   Recent Labs     12/16/22  0602 12/17/22  0549 12/18/22  0543   MG 2.20 1.90 1.80     LFT's:   Recent Labs     12/15/22  1748   AST 11*   ALT 11   BILITOT 0.4   ALKPHOS 111         U/A:   Recent Labs     12/15/22  1805   COLORU RED*   PHUR Color Interfer*   WBCUA see below*   RBCUA >100*   MUCUS 4+*   BACTERIA 4+*   CLARITYU TURBID*   SPECGRAV 1.010   LEUKOCYTESUR LARGE*   UROBILINOGEN 0.2   BILIRUBINUR Negative   BLOODU LARGE*   GLUCOSEU Negative       Radiology:  CT HEAD WO CONTRAST   Final Result   1. Stable exam from prior study   2. Atrophic changes and mild periventricular microangiopathic ischemic changes, chronic small vessel disease   3. No evidence of acute intracranial hemorrhage       CT ABDOMEN PELVIS WO CONTRAST Additional Contrast? None   Final Result      1. Moderate bilateral hydroureteronephrosis (worse on the left) and grossly distended urinary bladder distention. Findings may relate to chronic bladder outlet obstruction. 2.  Bilateral nonobstructive nephrolithiasis and small nonobstructing calculi in the urinary bladder. 3.  Moderate size right inguinal hernia containing nondilated small bowel. Small fat-containing left inguinal hernia. 4.  Moderate to large hiatal hernia. 5.  Small bilateral pleural effusions. Assessment & Plan     Lower GIB  Hematuria  HDS for now, Hgb 9.6 OA (last adm 9.4).   - Hgb stable in low 8s - trend q12, transfuse <7  - GI following  - EGD revealing hiatal hernia  - C-scope revealing AVMs, 7 mm distal asc colon - ablated   - + moderate size diverticulitis   - Urology following   For voiding trial, scope likely as outpt, non-urgent   - PPI  - Eliquis held   Per GI okay to start after 1 week however will continue monitoring patient and discuss this with cardiology      Complicated Recurrent UTI  B/L Hydronephrosis  B/L hydronephrosis on CT A/P, similar to last time. Required miranda. U/A shows UTI. Tx last for E. Faecalis (sens to vanc and ampicillin)  - Unasyn (12/15 - ) - d/c if Ucx neg.  - bladder scans q6hr given blood clots noticed  - Urology consult - TOV prior to d/c, likely scope as outpt     RONNY (improving)  2.4 OA, baseline is 1.1. likely combo of prerenal and postrenal.   - IVF  - Miranda  - I/Os  - Cr plateau 1.3 this AM, has improved since adm however. Hyperkalemia (Resolved)  Likely 2/2 Digoxin. 6.2 OA, repeat 5.2, repeat 6.0  - insulin push with D50  - hold Dig  - EKG nl  - monitor labs, tele     Chronic hyponatremia  At baseline  - monitor     Afib  Chadsvasc 3, rate controlled w Lopressor/Dig  - Dig started last admission, held for now givne hyperK  - Lopressor 25 - hold parameters in place              -pt baseline BP is low    DVT PPx: SCDs  Diet: ADULT DIET; Regular; High Fiber  ADULT ORAL NUTRITION SUPPLEMENT; Breakfast, Lunch, Dinner; Standard High Calorie/High Protein Oral Supplement   Code status:  Limited     ELOS: 2 nights  Barriers to discharge: Clearing of urine.  Monitoring post scope   Disposition  - Preadmission: SNF  - Current: IP  - Upon discharge: SNF    Will discuss with attending physician Dr. Agueda Padilla MD     _____________________  Lizzeth Enamorado MD   Internal Medicine Resident, PGY-1

## 2022-12-18 NOTE — PROGRESS NOTES
Urology Attending Progress Note      Subjective: patient resting comfortably    Vitals:  BP (!) 95/55   Pulse 82   Temp 97.1 °F (36.2 °C) (Oral)   Resp 16   Ht 5' 2\" (1.575 m)   Wt 112 lb 14 oz (51.2 kg)   SpO2 94%   BMI 20.65 kg/m²   Temp  Av.9 °F (36.1 °C)  Min: 96 °F (35.6 °C)  Max: 98.2 °F (36.8 °C)    Intake/Output Summary (Last 24 hours) at 2022 0943  Last data filed at 2022 0554  Gross per 24 hour   Intake 1995 ml   Output 1050 ml   Net 945 ml       Exam: miranda to drainage - light pink currently with few small clots    Labs:  WBC:    Lab Results   Component Value Date/Time    WBC 9.5 2022 05:43 AM     Hemoglobin/Hematocrit:    Lab Results   Component Value Date/Time    HGB 8.1 2022 05:43 AM    HGB 8.1 2022 05:43 AM    HCT 24.7 2022 05:43 AM    HCT 25.3 2022 05:43 AM     BMP:    Lab Results   Component Value Date/Time     2022 05:43 AM    K 4.0 2022 05:43 AM    K 6.2 12/15/2022 05:48 PM     2022 05:43 AM    CO2 19 2022 05:43 AM    BUN 26 2022 05:43 AM    LABALBU 1.9 2022 05:43 AM    CREATININE 1.3 2022 05:43 AM    CALCIUM 7.7 2022 05:43 AM    GFRAA >60 2020 06:45 AM    GFRAA >60 2013 03:18 PM    LABGLOM 40 2022 05:43 AM     PT/INR:    Lab Results   Component Value Date/Time    PROTIME 25.8 2022 06:02 AM    INR 2.34 2022 06:02 AM     PTT:    Lab Results   Component Value Date/Time    APTT 63.5 2022 06:18 AM   [APTT        Impression/Plan: 80 yo F with gross hematuria, urinary retention and bilateral hydronephrosis     -Urine today is pink to light red and appears to be clearing  -Cr 1.3  -Leave miranda in place for now  -May attempt another voiding trial prior to discharge depending on her clinical status  -She also should have a cystoscopy at some but not urgent at this time  -Will follow    Angelo Mcfadden MD

## 2022-12-18 NOTE — PROGRESS NOTES
Pt comfortable this evening, during rounds, offered to reposition patient to reduce pressure and pt refused stating she was fine.   Will apply wound care in the early am to allow for pt to rest.    Electronically signed by Pilar Rogers RN on 12/18/22 at 3:06 AM EST

## 2022-12-18 NOTE — PROGRESS NOTES
Pt resistant to care overnight, but allowed me to perform woundcare to her coccyx and buttocks. Urine still bloody with clots. Pt complains of worsening blurred vision this am.  MD on call messaged and saw at the bedside. Pt temp low at 96 ax this am, warm blankets appllied and up to 96.3 after 30 mins. Reapplied warm blankets and will recheck. Pt states she feels fine. Son updated.     Electronically signed by Shaneka Piña RN on 12/18/22 at 6:20 AM EST

## 2022-12-19 LAB
ALBUMIN SERPL-MCNC: 2 G/DL (ref 3.4–5)
ANION GAP SERPL CALCULATED.3IONS-SCNC: 8 MMOL/L (ref 3–16)
BASOPHILS ABSOLUTE: 0 K/UL (ref 0–0.2)
BASOPHILS RELATIVE PERCENT: 0.3 %
BUN BLDV-MCNC: 21 MG/DL (ref 7–20)
CALCIUM SERPL-MCNC: 7.8 MG/DL (ref 8.3–10.6)
CHLORIDE BLD-SCNC: 109 MMOL/L (ref 99–110)
CO2: 21 MMOL/L (ref 21–32)
CREAT SERPL-MCNC: 1.2 MG/DL (ref 0.6–1.2)
EKG ATRIAL RATE: 61 BPM
EKG DIAGNOSIS: NORMAL
EKG P AXIS: 67 DEGREES
EKG P-R INTERVAL: 184 MS
EKG Q-T INTERVAL: 372 MS
EKG QRS DURATION: 78 MS
EKG QTC CALCULATION (BAZETT): 374 MS
EKG R AXIS: 9 DEGREES
EKG T AXIS: 59 DEGREES
EKG VENTRICULAR RATE: 61 BPM
EOSINOPHILS ABSOLUTE: 0.2 K/UL (ref 0–0.6)
EOSINOPHILS RELATIVE PERCENT: 2.8 %
GFR SERPL CREATININE-BSD FRML MDRD: 44 ML/MIN/{1.73_M2}
GLUCOSE BLD-MCNC: 103 MG/DL (ref 70–99)
GLUCOSE BLD-MCNC: 117 MG/DL (ref 70–99)
GLUCOSE BLD-MCNC: 118 MG/DL (ref 70–99)
GLUCOSE BLD-MCNC: 198 MG/DL (ref 70–99)
GLUCOSE BLD-MCNC: 97 MG/DL (ref 70–99)
HCT VFR BLD CALC: 26.5 % (ref 36–48)
HEMOGLOBIN: 8.8 G/DL (ref 12–16)
LYMPHOCYTES ABSOLUTE: 1.4 K/UL (ref 1–5.1)
LYMPHOCYTES RELATIVE PERCENT: 17.6 %
MCH RBC QN AUTO: 31.8 PG (ref 26–34)
MCHC RBC AUTO-ENTMCNC: 33.2 G/DL (ref 31–36)
MCV RBC AUTO: 95.9 FL (ref 80–100)
MONOCYTES ABSOLUTE: 0.5 K/UL (ref 0–1.3)
MONOCYTES RELATIVE PERCENT: 6.9 %
NEUTROPHILS ABSOLUTE: 5.6 K/UL (ref 1.7–7.7)
NEUTROPHILS RELATIVE PERCENT: 72.4 %
ORGANISM: ABNORMAL
PDW BLD-RTO: 16 % (ref 12.4–15.4)
PERFORMED ON: ABNORMAL
PERFORMED ON: NORMAL
PHOSPHORUS: 2.9 MG/DL (ref 2.5–4.9)
PLATELET # BLD: 412 K/UL (ref 135–450)
PMV BLD AUTO: 6.8 FL (ref 5–10.5)
POTASSIUM SERPL-SCNC: 3.7 MMOL/L (ref 3.5–5.1)
RBC # BLD: 2.76 M/UL (ref 4–5.2)
SODIUM BLD-SCNC: 138 MMOL/L (ref 136–145)
URINE CULTURE, ROUTINE: ABNORMAL
WBC # BLD: 7.7 K/UL (ref 4–11)

## 2022-12-19 PROCEDURE — 97530 THERAPEUTIC ACTIVITIES: CPT

## 2022-12-19 PROCEDURE — 36415 COLL VENOUS BLD VENIPUNCTURE: CPT

## 2022-12-19 PROCEDURE — 80069 RENAL FUNCTION PANEL: CPT

## 2022-12-19 PROCEDURE — 6360000002 HC RX W HCPCS: Performed by: INTERNAL MEDICINE

## 2022-12-19 PROCEDURE — 97535 SELF CARE MNGMENT TRAINING: CPT

## 2022-12-19 PROCEDURE — 97162 PT EVAL MOD COMPLEX 30 MIN: CPT

## 2022-12-19 PROCEDURE — 97166 OT EVAL MOD COMPLEX 45 MIN: CPT

## 2022-12-19 PROCEDURE — 2580000003 HC RX 258

## 2022-12-19 PROCEDURE — 85025 COMPLETE CBC W/AUTO DIFF WBC: CPT

## 2022-12-19 PROCEDURE — 2580000003 HC RX 258: Performed by: INTERNAL MEDICINE

## 2022-12-19 PROCEDURE — 6370000000 HC RX 637 (ALT 250 FOR IP): Performed by: STUDENT IN AN ORGANIZED HEALTH CARE EDUCATION/TRAINING PROGRAM

## 2022-12-19 PROCEDURE — 6360000002 HC RX W HCPCS

## 2022-12-19 PROCEDURE — 1200000000 HC SEMI PRIVATE

## 2022-12-19 PROCEDURE — 6370000000 HC RX 637 (ALT 250 FOR IP)

## 2022-12-19 PROCEDURE — 93010 ELECTROCARDIOGRAM REPORT: CPT | Performed by: INTERNAL MEDICINE

## 2022-12-19 RX ADMIN — TAMSULOSIN HYDROCHLORIDE 0.4 MG: 0.4 CAPSULE ORAL at 09:26

## 2022-12-19 RX ADMIN — Medication 1 CAPSULE: at 09:27

## 2022-12-19 RX ADMIN — SILVER SULFADIAZINE: 10 CREAM TOPICAL at 08:49

## 2022-12-19 RX ADMIN — SODIUM CHLORIDE, PRESERVATIVE FREE 10 ML: 5 INJECTION INTRAVENOUS at 08:50

## 2022-12-19 RX ADMIN — PIPERACILLIN AND TAZOBACTAM 4500 MG: 4; .5 INJECTION, POWDER, FOR SOLUTION INTRAVENOUS at 17:59

## 2022-12-19 RX ADMIN — SODIUM CHLORIDE 3000 MG: 900 INJECTION INTRAVENOUS at 14:15

## 2022-12-19 RX ADMIN — NYSTATIN 500000 UNITS: 100000 SUSPENSION ORAL at 14:15

## 2022-12-19 RX ADMIN — PANTOPRAZOLE SODIUM 40 MG: 40 TABLET, DELAYED RELEASE ORAL at 06:43

## 2022-12-19 RX ADMIN — NYSTATIN 500000 UNITS: 100000 SUSPENSION ORAL at 10:14

## 2022-12-19 RX ADMIN — SODIUM CHLORIDE, PRESERVATIVE FREE 10 ML: 5 INJECTION INTRAVENOUS at 22:02

## 2022-12-19 RX ADMIN — Medication: at 08:49

## 2022-12-19 RX ADMIN — SILVER SULFADIAZINE: 10 CREAM TOPICAL at 22:02

## 2022-12-19 RX ADMIN — Medication: at 22:02

## 2022-12-19 RX ADMIN — NYSTATIN 500000 UNITS: 100000 SUSPENSION ORAL at 17:58

## 2022-12-19 RX ADMIN — SODIUM CHLORIDE 3000 MG: 900 INJECTION INTRAVENOUS at 02:28

## 2022-12-19 ASSESSMENT — PAIN SCALES - GENERAL
PAINLEVEL_OUTOF10: 0
PAINLEVEL_OUTOF10: 0

## 2022-12-19 NOTE — PROGRESS NOTES
[facility]:  Met briefly with the pt and then spoke with son Debi Blanco on the phone. He states he will be in town tomorrow and will be at the hospital.  He requests to meet tomorrow to review hospice services and asks to call this nurse when he is able to meet.       Thank you,  Ghazala Parker RN [address] [facility] [phone]

## 2022-12-19 NOTE — PROGRESS NOTES
Pt seen and examined  She is doing well however she seems more tired today  No further signs or symptoms of bleeding, no chest pain or shortness of breath    Vitals:    12/19/22 0011 12/19/22 0230 12/19/22 0846 12/19/22 1200   BP: (!) 94/59 (!) 95/52 118/60 104/63   Pulse: 57 55 57 60   Resp:  14 16 16   Temp:  97.6 °F (36.4 °C) 97.5 °F (36.4 °C) 97.5 °F (36.4 °C)   TempSrc:  Oral Oral Oral   SpO2: 95% 96% 96% 95%   Weight:       Height:          Gen: pleasant alert and oriented  Neck: no jvd  Ariel: clear bilaterally  Cvs: s2s1 no murmurs  Abd: soft nd nt bs normal active  Ext: no edema positive pulses    H/h is 8.8 and 26.5  Urine culture growing morganella    Bun and creatinine are 21 and 1.2    80 yo admitted with gi bleed, uti, urinary retention and chronic bladder outlet obstruction  Anemia secondary to acute blood loss is stable, afib is rate controlled, she has not received or required much of either her digoxin or metoprolol, I will hold off on metoprolol since her bp is low, continue with close monitoring, heme consulted as well, labs have been sent off, tsh last month was 0.34.

## 2022-12-19 NOTE — PROGRESS NOTES
Internal Medicine Progress Note    Date: 12/19/2022   Patient: [facility] Day: 5      CC: Rectal Bleeding (Sent from NH for BM with clots)       Interval Hx     Patient was seen and examined at bedside, she was sleepy but opening eyes to voice. She was a bit disoriented and did not participate much in the conversation and refused to answer questions regarding her health overnight. She however permitted physical examination. She has been hypothermic intermittently to 99M  Systolic blood pressure has been in the 90s which is patient's baseline  UOP 850ml in the last 24 hours with net I's and O of -527  Received 1 unit of PRBC with posttransfusion H&H of 8.8  RONNY has resolved  She is not any anticoagulation due to GI bleed and hematuria. Objective     Vital Signs:  Patient Vitals for the past 8 hrs:   BP Temp Temp src Pulse Resp SpO2   12/19/22 1200 104/63 97.5 °F (36.4 °C) Oral 60 16 95 %   12/19/22 0846 118/60 97.5 °F (36.4 °C) Oral 57 16 96 %       Physical Exam  Constitutional:       General: She is sleeping. Appearance: She is normal weight. She is ill-appearing. She is not toxic-appearing. HENT:      Mouth/Throat:      Pharynx: Oropharynx is clear. Eyes:      Pupils: Pupils are equal, round, and reactive to light. Cardiovascular:      Rate and Rhythm: Bradycardia present. Rhythm irregular. Heart sounds: Normal heart sounds, S1 normal and S2 normal.   Pulmonary:      Effort: Pulmonary effort is normal. No tachypnea, accessory muscle usage or respiratory distress. Breath sounds: Normal breath sounds and air entry. Abdominal:      General: Abdomen is flat. Bowel sounds are normal.      Palpations: Abdomen is soft. Tenderness: There is no abdominal tenderness. Musculoskeletal:      Left lower leg: No edema. Skin:     General: Skin is warm. Capillary Refill: Capillary refill takes less than 2 seconds.    Neurological:      Mental Status: She is oriented to person, place, and time and easily aroused. Psychiatric:         Attention and Perception: Attention and perception normal.          Labs:  CBC:   Recent Labs     12/17/22  0549 12/18/22  0543 12/18/22  2107 12/19/22  0653   WBC 8.9 9.5  --  7.7   HGB 8.0* 8.1*  8.1* 8.7* 8.8*   HCT 25.2* 24.7*  25.3* 25.6* 26.5*    464*  --  412       BMP:   Recent Labs     12/17/22  0549 12/18/22  0543 12/19/22  0653   * 137 138   K 4.2 4.0 3.7    109 109   CO2 24 19* 21   BUN 32* 26* 21*   CREATININE 1.3* 1.3* 1.2   GLUCOSE 84 111* 103*   PHOS 3.2 3.4 2.9     Magnesium:   Recent Labs     12/17/22  0549 12/18/22  0543   MG 1.90 1.80     LFT's:   No results for input(s): AST, ALT, ALB, BILITOT, ALKPHOS in the last 72 hours. U/A:   No results for input(s): NITRITE, COLORU, PHUR, LABCAST, WBCUA, RBCUA, MUCUS, TRICHOMONAS, YEAST, BACTERIA, CLARITYU, SPECGRAV, LEUKOCYTESUR, UROBILINOGEN, BILIRUBINUR, BLOODU, GLUCOSEU, KETONES, AMORPHOUS in the last 72 hours. Radiology:  CT HEAD WO CONTRAST   Final Result   1. Stable exam from prior study   2. Atrophic changes and mild periventricular microangiopathic ischemic changes, chronic small vessel disease   3. No evidence of acute intracranial hemorrhage       CT ABDOMEN PELVIS WO CONTRAST Additional Contrast? None   Final Result      1. Moderate bilateral hydroureteronephrosis (worse on the left) and grossly distended urinary bladder distention. Findings may relate to chronic bladder outlet obstruction. 2.  Bilateral nonobstructive nephrolithiasis and small nonobstructing calculi in the urinary bladder. 3.  Moderate size right inguinal hernia containing nondilated small bowel. Small fat-containing left inguinal hernia. 4.  Moderate to large hiatal hernia. 5.  Small bilateral pleural effusions.                Assessment & Plan   Lower GIB  Hematuria  Likely secondary to Eliquis use  Hematuria and hematochezia have resolved at this time  Patient has been seen by urologist who recommends patient remaining on miranda and will do voiding trial inpatient and cystoscopy outpatient  HDS for now, Hgb 8.8  (last adm 9.4). Transfused 1 unit PRBC this morning  Transfuse with hemoglobin < 7  Follow-up H&H every 24 hours  - GI following: EGD revealing hiatal hernia  - C-scope revealing AVMs, 7 mm distal asc colon - ablated   - + moderate size diverticulitis   Continue on Protonix 40 mg p.o. daily    Complicated Recurrent UTI  B/L Hydronephrosis  B/L hydronephrosis on CT A/P, similar to last time. Required miranda. U/A shows UTI. Tx last for E. Faecalis (sens to vanc and ampicillin)  Urine culture this admission positive for Morganella Morgagni  Start on Zosyn q 12 for 7 days. - bladder scans q6hr given blood clots noticed  Patient has been seen by urologist who recommends patient remaining on miranda and will try voiding trial inpatient and  cystoscopy outpatient     Afib  Chadsvasc 3, rate controlled w Lopressor/Dig  - Dig started last admission, held for now givne hyperK  - Lopressor 25 - hold parameters in place  Will restart Eliquis in a week on GI recommendations    DVT PPx: SCDs  Diet: ADULT DIET; Regular; High Fiber  ADULT ORAL NUTRITION SUPPLEMENT; Breakfast, Lunch, Dinner; Standard High Calorie/High Protein Oral Supplement   Code status:  Limited       Barriers to discharge: Patient's son is having conversation with hospice [facility] about possible hospice admission for patient, will follow up. - Preadmission: SNF  - Current: IP  - Upon discharge: SNF    Will discuss with attending physician Dr. Jarrod Roberts MD     _____________________  Elisa Walter MD   Internal Medicine Resident, PGY-1   Patient seen and examined, labs and imaging studies reviewed, agree with assessment and plan as outlined above. Continue with current care and plan. Discussed case with patients nurse, discussed case with care team, discussed plan.       Jarrod Roberts MD [address]

## 2022-12-19 NOTE — PROGRESS NOTES
Occupational Therapy  Facility/Department: [address] [place] [address] Initial Assessment/ Treatment    Name: Radha Zelaya  : 1935  MRN: [id number]  Date of Service: 2022    Discharge Recommendations:     OT Equipment Recommendations  Equipment Needed: No  Other: defer   Radha Zelaya scored a  on the AM-PAC ADL Inpatient form. Current research shows that an AM-PAC score of 17 or less is typically not associated with a discharge to the patient's home setting. Based on the patient's AM-PAC score and their current ADL deficits, it is recommended that the patient have 3-5 sessions per week of Occupational Therapy at d/c to increase the patient's independence. Please see assessment section for further patient specific details. If patient discharges prior to next session this note will serve as a discharge summary. Please see below for the latest assessment towards goals. Patient Diagnosis(es): The primary encounter diagnosis was Rectal bleeding. Diagnoses of RONNY (acute kidney injury) ([facility] [address] Hydronephrosis, unspecified hydronephrosis type, and Hematuria, unspecified type were also pertinent to this visit. Past Medical History:  has a past medical history of Acute deep vein thrombosis (DVT) of left lower extremity after procedure ([address] Acute renal failure (ARF) (HCC), Anterior corneal dystrophy, Asymptomatic postmenopausal status (age-related) (natural), Cervical polyp, Dry eye syndrome, Edema, Fuch's endothelial dystrophy, Gout, Hip fracture requiring operative repair, left, closed, initial encounter ([facility] [address] IBS (irritable bowel syndrome), Mild cognitive impairment, Osteoarthritis, Osteopenia, Papilloma of breast, Pedal edema, Screening mammogram, Symptomatic menopausal or female climacteric states, Urinary retention, and Vaginal candidiasis. Past Surgical History:  has a past surgical history that includes Appendectomy; eye surgery; hip pinning (Left, 2019);  Upper gastrointestinal endoscopy (N/A, 12/17/2022); and Colonoscopy (N/A, 12/17/2022). Treatment Diagnosis: decreased ADLs and transfers secondary to rectal bleed      Assessment   Performance deficits / Impairments: Decreased functional mobility ; Decreased endurance;Decreased coordination;Decreased ADL status; Decreased balance;Decreased strength;Decreased high-level IADLs  Assessment: Pt presented to ED with rectal bleed from nursing home. Pt had previously been in independent living at the [facility], was performing transfers from her bed to her wheelchair independently approximately 3 weeks ago. Pt had a hospitalization, then was discharged to SNF at that time. Pt reporting she was receiving SNF but was not tolerating therapy well there. Completed therapy evaluation today, and discussed role/ purpose of OT. Pt did report she wants to be able to complete bed to chair transfers. At this time pt is significantly below her baseline, requiring dependent A x 2 for attempted transfer from bed to chair. Pt completed unsupported sitting balance tasks EOB x 15 mins, including grooming to comb hair with setup. Pt with limited tolerance for therapy during evaluation, declining further attempts to transfer from bed to chair. Discussed POC with family, who reported they will continue to discuss goals with pt. At this time, feel pt would benefit from discharge to LTC with trial OT services. Continue OT POC. Treatment Diagnosis: decreased ADLs and transfers secondary to rectal bleed  Prognosis: Fair  Decision Making: Medium Complexity  REQUIRES OT FOLLOW-UP: Yes  Activity Tolerance  Activity Tolerance: Patient Tolerated treatment well;Patient limited by fatigue  Activity Tolerance Comments: Pt reported increased fatigue/ dizziness seated EOB. /57 seated EOB. Pt attempted sit to stand transfer however unable to tolerate/ lift buttocks off the bed.  Pt then reported she did not wish to attempt further to get from bed to chair \"I just feel so disoriented and out of my element\"        Plan   Occupational Therapy Plan  Times Per Week: 1-2x trial OT  Current Treatment Recommendations: Strengthening, Balance training, Functional mobility training, Cognitive reorientation, Endurance training, Patient/Caregiver education & training, Safety education & training, Self-Care / ADL, Equipment evaluation, education, & procurement, Coordination training     Restrictions  Position Activity Restriction  Other position/activity restrictions: up in chair    Subjective   General  Chart Reviewed: Yes  Additional Pertinent Hx: Pt admitted to ED from SNF with c/o bright red blood per rectum. Pt had previously been admitted to the hospital with L calcaneous fracture and had been at SNF for the past few weeks. PMHx includes: acute DVT, renal failure, edema, gout, hip fx L, OA, osteopenia, pedal edema, urinary retention. Family / Caregiver Present: Yes (LAY/ DIL in room during session)  Referring Practitioner: Waylon Kehr, MD  Diagnosis: rectal bleed  Subjective  Subjective: Pt semi supine in bed upon arrival, agreeable to OT eval and treat with encouragement. Social/Functional History  Social/Functional History  Lives With: Alone  Type of Home: Apartment ([facility])  Home Layout: One level  Home Access: Level entry  Bathroom Shower/Tub: Walk-in shower  Bathroom Toilet: Standard  Bathroom Equipment: Grab bars in shower, Shower chair, Hand-held shower, Grab bars around toilet  Home Equipment: Walker, rolling, Alie Thayer  ADL Assistance: Needs assistance (HHA 24hrs/day)  Homemaking Assistance: Needs assistance  Ambulation Assistance: Independent (walker vs w/c)  Transfer Assistance: Independent  Additional Comments: above info obtained per previous therapy notes. Per discussion with pt/family this admission, pt was admitted from SNF - tolerated therapy poorly. Had assist x 2 for pivot transfer to w/c. Did not tolerate sitting >30 min.        Objective Heart Rate: 60  Heart Rate Source: Monitor  BP: 104/63  BP Location: Right upper arm  BP Method: Automatic  Patient Position: Semi fowlers  MAP (Calculated): 77  Resp: 16  SpO2: 95 %  O2 Device: None (Room air)             Safety Devices  Type of Devices: Bed alarm in place;Call light within reach;Nurse notified;Gait belt;Left in bed           ADL  Feeding: Beverage management;Setup  Grooming: Setup  Grooming Skilled Clinical Factors: assist to comb back of hair, pt able to comb front with setup seated EOB  Toileting Skilled Clinical Factors: miranda catheter; pt did not have urge to have BM        Bed mobility  Supine to Sit: 2 Person assistance; Moderate assistance  Sit to Supine: 2 Person assistance; Moderate assistance  Bed Mobility Comments: sitting EOB x 15 mins wtih SBA  Transfers  Transfer Comments: attempted sit to stand transfer from bed however unable to stand enough to clear buttocks wtih dependent A x 2. Noted pt lifting L LE off floor when attempting to stand. Vision  Vision: Within Functional Limits  Hearing  Hearing: Within functional limits  Orientation  Overall Orientation Status: Within Functional Limits                  Education Given To: Patient; Family  Education Provided: Role of Therapy;Plan of Care  Education Provided Comments: Spoke with pt regarding her goals for therapy- pt reporting she does wish to be able to transfer from her bed to the chair again, however upon further questioning pt reporting \"maybe in a year\" Unclear if pt will be able to put forth the effort to achieve goal. Pt putting forth minimal effort during transfers and family reporting pt likely will not want to transfer to her chair- was unhappy with therapy at the nursing home for assisting her to the chair and having her sit in the wheelchair for 30 mins at a time. Pt would benefit from continued education.   Education Method: Verbal  Barriers to Learning: Cognition  Education Outcome: Verbalized understanding;Continued education needed      Treatment included functional transfer training, ADLs, and patient education. AM-PAC Score        AM-PAC Inpatient Daily Activity Raw Score: 11 (12/19/22 1535)  AM-PAC Inpatient ADL T-Scale Score : 29.04 (12/19/22 1535)  ADL Inpatient CMS 0-100% Score: 70.42 (12/19/22 1535)  ADL Inpatient CMS G-Code Modifier : CL (12/19/22 1535)    Tinneti Score       Goals  Short Term Goals  Time Frame for Short Term Goals: by dc  Short Term Goal 1: Pt will complete multi-step grooming routine seated EOB with setup  Short Term Goal 2: Pt will complete B UE HEP seated EOB for improved activity tolerance  Short Term Goal 3: Pt will complete UB dressing with setup  Patient Goals   Patient goals : to go to [facility]; \"Be able to get from the bed to the chair again in a year\"       Therapy Time   Individual Concurrent Group Co-treatment   Time In 1416         Time Out 1510         Minutes 54         Timed Code Treatment Minutes: 39 Minutes (+15 min eval)     Bela SWEENEY  [address] [facility], OTR/L [id number]

## 2022-12-19 NOTE — PROGRESS NOTES
Physician Progress Note      Ashish Antonio  [facility] #:                  [id number]  :                       1935  ADMIT DATE:       12/15/2022 5:19 PM  [address] [place] DATE:  RESPONDING  PROVIDER #:        Candace Borwn MD          QUERY TEXT:    Patient admitted with LGIB. Per Wound RN, noted to also have unstageable   pressure ulcer left heel, Stage 3 pressure ulcer left buttock, Stage 2   pressure ulcer sacrum. If possible, please document in progress notes and   discharge summary the type of ulcer, site of the ulcer and present on   admission status of the ulcer: The medical record reflects the following:  Risk Factors: decreased mobility  Clinical Indicators: Per Wound RN c/s  \"unstageable pressure ulcer left   heel, Stage 3 pressure ulcer left buttock, Stage 2 pressure ulcer sacrum\"  Treatment: Venelex, Betadine, Offloading boots, Reposition pt every 2 hours  Options provided:  -- Decubitus Pressure Ulcer to left heel, left buttock and sacrum present on   admission  -- Other - I will add my own diagnosis  -- Disagree - Not applicable / Not valid  -- Disagree - Clinically unable to determine / Unknown  -- Refer to Clinical Documentation Reviewer    PROVIDER RESPONSE TEXT:    This patient has a decubitus pressure ulcer to left heel, left buttock and   sacrum that was present on admission. Query created by: Derek Leigh on 2022 2:49 PM      QUERY TEXT:    Pt admitted with LGIB. Noted documentation of Severe malnutrition by ordered   Dietary consultant. If possible, please document in progress notes and   discharge summary:    The medical record reflects the following:  Risk Factors: 81 y.o., decreased mobility  Clinical Indicators: HT: 5'2\", WT: 109 lb, BMI: 19.94. Per RD & ASPEN   guidelines: Intake:  75% or less estimated energy requirements for 1 month or   longer, Weight Loss:  Greater than 5% over 1 month (12% in 1 month period),    Mild muscle mass loss. Multiple pressure ulcers  Treatment: RD c/s, ONS TID once diet advances  Options provided:  -- Severe malnutrition confirmed present on admission  -- Severe malnutrition ruled out  -- Other - I will add my own diagnosis  -- Disagree - Not applicable / Not valid  -- Disagree - Clinically unable to determine / Unknown  -- Refer to Clinical Documentation Reviewer    PROVIDER RESPONSE TEXT:    The diagnosis of Severe malnutrition was confirmed as present on admission. Query created by:  Prema Duncan on 12/16/2022 2:52 PM      Electronically signed by:  Radha Daniel MD 12/19/2022 3:20 PM

## 2022-12-19 NOTE — CARE COORDINATION
1:02 PM  Spoke at length with son Bethany Woods about discharge. Updated him on plan of care and dispo. Per MD; one more day and will dc tomorrow. OK per son to send referral to [address] [place] [address] to follow along with patient and her care at [facility]. Cara cardenas [facility] updated on situation as well. Contact information for [address] [place] [address] sent to [facility] via email ([email or web address]).       Electronically signed by Ramon Prieto RN, CM on 12/19/2022 at 1:11 PM.  Phone: [phone]  Fax: [phone]

## 2022-12-19 NOTE — PROGRESS NOTES
Bp was 88/56 w HR in the 50s. Pt AO4, denied pain, n/v, sob. Pt was turned and repositioned, venelex and silvadene was applied as ordered. Repeated Bp was 94/59 w HR still in 50s. Burk output was 350 pink clear. Resident Dr Pablo Reilly was made aware.  Will monitor

## 2022-12-19 NOTE — PROGRESS NOTES
Palliative Care Chart Review  and Check in Note:     NAME:  [address] Date: 12/15/2022  Hospital Day:  Hospital Day: 5   Current Code status: Limited    Palliative care is continuing to following Ms. Ciera Subramanian for symptom management,  and goals of care discussion as needed. Patient's chart reviewed today 12/19/22. Pt is sitting up in bed eating a sandwich. Overall, her appetite is not great. Son-in-law Dr. Kody Gentile and a daughter-in-law are at the bedside. D/w TRAVIS Rosas who made a referral to [address] [place] [address] today. The following are the currently established goals/code status, and Symptom management. Goals of care: Pt/family are considering hospice.     Code status: DNR/DNI (Limited- no to all interventions)    Discharge plan: d/c to [facility] when medically ready    Mendy Alba NP  [facility] [address]

## 2022-12-19 NOTE — PROGRESS NOTES
Urology Attending Progress Note      Subjective: patient resting comfortably    Vitals:  /60   Pulse 57   Temp 97.5 °F (36.4 °C) (Oral)   Resp 16   Ht 5' 2\" (1.575 m)   Wt 112 lb 14 oz (51.2 kg)   SpO2 96%   BMI 20.65 kg/m²   Temp  Av.4 °F (36.3 °C)  Min: 97 °F (36.1 °C)  Max: 97.6 °F (36.4 °C)    Intake/Output Summary (Last 24 hours) at 2022 1104  Last data filed at 2022 5370  Gross per 24 hour   Intake 562.5 ml   Output 600 ml   Net -37.5 ml         Exam: miranda to drainage - urine pink tinged    Labs:  WBC:    Lab Results   Component Value Date/Time    WBC 7.7 2022 06:53 AM     Hemoglobin/Hematocrit:    Lab Results   Component Value Date/Time    HGB 8.8 2022 06:53 AM    HCT 26.5 2022 06:53 AM     BMP:    Lab Results   Component Value Date/Time     2022 06:53 AM    K 3.7 2022 06:53 AM    K 6.2 12/15/2022 05:48 PM     2022 06:53 AM    CO2 21 2022 06:53 AM    BUN 21 2022 06:53 AM    LABALBU 2.0 2022 06:53 AM    CREATININE 1.2 2022 06:53 AM    CALCIUM 7.8 2022 06:53 AM    GFRAA >60 2020 06:45 AM    GFRAA >60 2013 03:18 PM    LABGLOM 44 2022 06:53 AM     PT/INR:    Lab Results   Component Value Date/Time    PROTIME 25.8 2022 06:02 AM    INR 2.34 2022 06:02 AM     PTT:    Lab Results   Component Value Date/Time    APTT 63.5 2022 06:18 AM   [APTT        Impression/Plan: 82 yo F with gross hematuria, urinary retention and bilateral hydronephrosis     -Urine today is light pink, no clots seen  -Cr 1.3  -Leave miranda in place for now  -May attempt another voiding trial prior to discharge but may also just leave in place for now given issues with recurrent retention  -She also should have a cystoscopy at some but not urgent at this time  -Will follow    Lily Gary PA-C

## 2022-12-19 NOTE — PROGRESS NOTES
Physical Therapy  Facility/Department: [facility]  Physical Therapy Initial Assessment / Treatment    Name: Jl Perry  : 1935  MRN: [id number]  Date of Service: 2022    Discharge Recommendations: Jl Perry scored a 7/24 on the AM-PAC short mobility form. Current research shows that an AM-PAC score of 17 or less is typically not associated with a discharge to the patient's home setting. Based on the patient's AM-PAC score and their current functional mobility deficits, it is recommended that the patient have 3-5 sessions per week of Physical Therapy at d/c to increase the patient's independence. Please see assessment section for further patient specific details. If patient discharges prior to next session this note will serve as a discharge summary. Please see below for the latest assessment towards goals. PT Equipment Recommendations  Equipment Needed: No  Other: defer to next level of care      Patient Diagnosis(es): The primary encounter diagnosis was Rectal bleeding. Diagnoses of RONNY (acute kidney injury) ([address] Hydronephrosis, unspecified hydronephrosis type, and Hematuria, unspecified type were also pertinent to this visit. Past Medical History:  has a past medical history of Acute deep vein thrombosis (DVT) of left lower extremity after procedure ([address] Acute renal failure (ARF) (HCC), Anterior corneal dystrophy, Asymptomatic postmenopausal status (age-related) (natural), Cervical polyp, Dry eye syndrome, Edema, Fuch's endothelial dystrophy, Gout, Hip fracture requiring operative repair, left, closed, initial encounter ([address] IBS (irritable bowel syndrome), Mild cognitive impairment, Osteoarthritis, Osteopenia, Papilloma of breast, Pedal edema, Screening mammogram, Symptomatic menopausal or female climacteric states, Urinary retention, and Vaginal candidiasis.   Past Surgical History:  has a past surgical history that includes Appendectomy; eye surgery; hip pinning (Left, 9/18/2019); Upper gastrointestinal endoscopy (N/A, 12/17/2022); and Colonoscopy (N/A, 12/17/2022). Assessment   Body Structures, Functions, Activity Limitations Requiring Skilled Therapeutic Intervention: Decreased functional mobility ; Decreased strength  Assessment: Pt is 81 y.o. female admit from SNF with GI bleed. Pt reports poor tolerance of therapy at SNF. Family confirms. States pt was able to transfer to w/c a few weeks ago. Palliative care following. Discussed goals with both pt and family today. Pt states her goal is to be able to transfer to w/c. Pt would like to participate in therapy here at the hospital.  Family prefers PT trial working with pt per pt's tolerance. Pt will likely need placement at d/c as pt requires heavy 2 person assist for bed mobility and is currently unable to transfer OOB to chair due to weakness. Rec LTC with trial PT. Will trial PT while in hospital.  Treatment Diagnosis: impaired functional mobility  Therapy Prognosis: Fair;Guarded  Decision Making: Medium Complexity        Plan   Physcial Therapy Plan  General Plan:  (1-2x/week trial PT)  Current Treatment Recommendations: Strengthening, Balance training, Functional mobility training, Transfer training, Therapeutic activities, Patient/Caregiver education & training  Safety Devices  Type of Devices: Bed alarm in place, Call light within reach, Nurse notified, Gait belt, Left in bed     Restrictions  Position Activity Restriction  Other position/activity restrictions: up in chair     Subjective   General  Chart Reviewed: Yes  Additional Pertinent Hx: Admit 12/15 from SNF with GI bleed; palliative care consulted; PMHx: DVT, gout, hip fx, L calcaneal fx, IBS, OA  Referring Practitioner: Trini Livingston MD  Diagnosis: GI bleed  Subjective  Subjective: Pt found supine in bed. Agrees to PT. Denies pain.          Social/Functional History  Social/Functional History  Lives With: Alone  Type of Home: Apartment ([place]  Home Layout: One level  Home Access: Level entry  Bathroom Shower/Tub: Walk-in shower  Bathroom Toilet: Standard  Bathroom Equipment: Grab bars in shower, Shower chair, Hand-held shower, Grab bars around toilet  Home Equipment: Walker, rolling, [employer]  ADL Assistance: Needs assistance ([facility]HA 24hrs/day)  Homemaking Assistance: Needs assistance  Ambulation Assistance: Independent (walker vs w/c)  Transfer Assistance: Independent  Additional Comments: above info obtained per previous therapy notes. Per discussion with pt/family this admission, pt was admitted from SNF - tolerated therapy poorly. Had assist x 2 for pivot transfer to w/c. Did not tolerate sitting >30 min. Vision/Hearing  Vision  Vision: Within Functional Limits  Hearing  Hearing: Within functional limits    Cognition   Orientation  Overall Orientation Status: Within Functional Limits     Objective               AROM RLE (degrees)  RLE AROM: WFL  AROM LLE (degrees)  LLE AROM : WFL  Strength RLE  Comment: grossly decreased, demos 3-/5 knee ext, needs assist to move LEs in/out of bed  Strength LLE  Comment: grossly decreased, demos 3-/5 knee ext, needs assist to move LEs in/out of bed           Bed mobility  Rolling to Left: Maximum assistance  Rolling to Right: Maximum assistance  Supine to Sit: 2 Person assistance; Moderate assistance  Sit to Supine: 2 Person assistance; Moderate assistance  Scooting: Dependent/Total (max A x 2 to scoot laterally at EOB towards HOB)  Transfers  Sit to Stand: Dependent/Total;2 Person Assistance  Stand to Sit: Dependent/Total;2 Person Assistance  Comment: achieved partial stance only - limited 2* pt requesting to stop        Balance  Sitting - Static: Fair  Sitting - Dynamic: Fair  Comments: pt sat EOB with SBA for static and dynamic sitting balance           OutComes Score                                                  AM-PAC Score  AM-PAC Inpatient Mobility Raw Score : 7 (12/19/22 3671)  AM-PAC Inpatient T-Scale Score : 26.42 (12/19/22 1530)  Mobility Inpatient CMS 0-100% Score: 92.36 (12/19/22 1530)  Mobility Inpatient CMS G-Code Modifier : CM (12/19/22 1530)          Tinneti Score       Goals  Short Term Goals  Time Frame for Short Term Goals: discharge  Short Term Goal 1: Pt will transfer sit <--> supine with min A x 1  Short Term Goal 2: Pt will transfer sit <--> stand with min A x 1 and achieve full stance  Short Term Goal 3: pt will participate in bed <--> chair transfer per pt tolerance  Patient Goals   Patient Goals : eventually transfer to w/c again       Education  Patient Education  Education Given To: Patient  Education Provided: Role of Therapy  Education Provided Comments: pt's goals for therapy  Education Method: Demonstration  Barriers to Learning: None  Education Outcome: Verbalized understanding;Continued education needed      Therapy Time   Individual Concurrent Group Co-treatment   Time In 1416         Time Out 1510         Minutes 54               Timed Code Treatment Minutes:  39    Total Treatment Minutes:  54    If patient is discharged prior to next treatment, this note will serve as the discharge summary.   Lazarus Gleason, PT, DPT  [id number]

## 2022-12-19 NOTE — PROGRESS NOTES
Pt ao4. Blood administration is done, VSS. Pt denied any pain, n/v, sob. Currently in RA w SpO2 of 96%.  Will cont to monitor

## 2022-12-20 ENCOUNTER — CARE COORDINATION (OUTPATIENT)
Dept: CASE MANAGEMENT | Age: 87
End: 2022-12-20

## 2022-12-20 VITALS
RESPIRATION RATE: 16 BRPM | WEIGHT: 112.88 LBS | HEART RATE: 68 BPM | HEIGHT: 62 IN | TEMPERATURE: 97.4 F | OXYGEN SATURATION: 97 % | BODY MASS INDEX: 20.77 KG/M2 | DIASTOLIC BLOOD PRESSURE: 62 MMHG | SYSTOLIC BLOOD PRESSURE: 104 MMHG

## 2022-12-20 PROBLEM — N39.0 COMPLICATED UTI (URINARY TRACT INFECTION): Status: ACTIVE | Noted: 2022-12-20

## 2022-12-20 LAB
ALBUMIN SERPL-MCNC: 2 G/DL (ref 3.4–5)
ANION GAP SERPL CALCULATED.3IONS-SCNC: 8 MMOL/L (ref 3–16)
ANISOCYTOSIS: ABNORMAL
BASOPHILS ABSOLUTE: 0 K/UL (ref 0–0.2)
BASOPHILS RELATIVE PERCENT: 0 %
BUN BLDV-MCNC: 23 MG/DL (ref 7–20)
CALCIUM SERPL-MCNC: 8.1 MG/DL (ref 8.3–10.6)
CHLORIDE BLD-SCNC: 108 MMOL/L (ref 99–110)
CO2: 21 MMOL/L (ref 21–32)
CREAT SERPL-MCNC: 1.3 MG/DL (ref 0.6–1.2)
EOSINOPHILS ABSOLUTE: 0.7 K/UL (ref 0–0.6)
EOSINOPHILS RELATIVE PERCENT: 9 %
GFR SERPL CREATININE-BSD FRML MDRD: 40 ML/MIN/{1.73_M2}
GLUCOSE BLD-MCNC: 104 MG/DL (ref 70–99)
GLUCOSE BLD-MCNC: 106 MG/DL (ref 70–99)
HCT VFR BLD CALC: 26.9 % (ref 36–48)
HEMOGLOBIN: 9 G/DL (ref 12–16)
LYMPHOCYTES ABSOLUTE: 1.7 K/UL (ref 1–5.1)
LYMPHOCYTES RELATIVE PERCENT: 23 %
MCH RBC QN AUTO: 32 PG (ref 26–34)
MCHC RBC AUTO-ENTMCNC: 33.4 G/DL (ref 31–36)
MCV RBC AUTO: 96 FL (ref 80–100)
METAMYELOCYTES RELATIVE PERCENT: 1 %
MONOCYTES ABSOLUTE: 0.1 K/UL (ref 0–1.3)
MONOCYTES RELATIVE PERCENT: 2 %
NEUTROPHILS ABSOLUTE: 4.9 K/UL (ref 1.7–7.7)
NEUTROPHILS RELATIVE PERCENT: 65 %
PDW BLD-RTO: 15.5 % (ref 12.4–15.4)
PERFORMED ON: ABNORMAL
PHOSPHORUS: 2.7 MG/DL (ref 2.5–4.9)
PLATELET # BLD: 378 K/UL (ref 135–450)
PMV BLD AUTO: 6.7 FL (ref 5–10.5)
POTASSIUM SERPL-SCNC: 3.7 MMOL/L (ref 3.5–5.1)
RBC # BLD: 2.8 M/UL (ref 4–5.2)
SODIUM BLD-SCNC: 137 MMOL/L (ref 136–145)
WBC # BLD: 7.4 K/UL (ref 4–11)

## 2022-12-20 PROCEDURE — 85025 COMPLETE CBC W/AUTO DIFF WBC: CPT

## 2022-12-20 PROCEDURE — 6370000000 HC RX 637 (ALT 250 FOR IP): Performed by: STUDENT IN AN ORGANIZED HEALTH CARE EDUCATION/TRAINING PROGRAM

## 2022-12-20 PROCEDURE — 99223 1ST HOSP IP/OBS HIGH 75: CPT | Performed by: INTERNAL MEDICINE

## 2022-12-20 PROCEDURE — 2580000003 HC RX 258

## 2022-12-20 PROCEDURE — 6360000002 HC RX W HCPCS: Performed by: INTERNAL MEDICINE

## 2022-12-20 PROCEDURE — 80069 RENAL FUNCTION PANEL: CPT

## 2022-12-20 PROCEDURE — 6360000002 HC RX W HCPCS

## 2022-12-20 PROCEDURE — 36415 COLL VENOUS BLD VENIPUNCTURE: CPT

## 2022-12-20 PROCEDURE — 6370000000 HC RX 637 (ALT 250 FOR IP)

## 2022-12-20 RX ORDER — PANTOPRAZOLE SODIUM 40 MG/1
40 TABLET, DELAYED RELEASE ORAL
Qty: 30 TABLET | Refills: 3
Start: 2022-12-21

## 2022-12-20 RX ORDER — CIPROFLOXACIN 2 MG/ML
200 INJECTION, SOLUTION INTRAVENOUS EVERY 12 HOURS
Status: DISCONTINUED | OUTPATIENT
Start: 2022-12-20 | End: 2022-12-20 | Stop reason: HOSPADM

## 2022-12-20 RX ORDER — HYDROCODONE BITARTRATE AND ACETAMINOPHEN 5; 325 MG/1; MG/1
1 TABLET ORAL EVERY 6 HOURS PRN
Qty: 12 TABLET | Refills: 0 | Status: SHIPPED | OUTPATIENT
Start: 2022-12-20 | End: 2022-12-23

## 2022-12-20 RX ORDER — HYDROCODONE BITARTRATE AND ACETAMINOPHEN 5; 325 MG/1; MG/1
1 TABLET ORAL EVERY 6 HOURS PRN
Qty: 12 TABLET | Refills: 0 | Status: SHIPPED | OUTPATIENT
Start: 2022-12-20 | End: 2022-12-20 | Stop reason: SDUPTHER

## 2022-12-20 RX ORDER — CIPROFLOXACIN 500 MG/1
500 TABLET, FILM COATED ORAL 2 TIMES DAILY
Qty: 14 TABLET | Refills: 0
Start: 2022-12-20 | End: 2022-12-27

## 2022-12-20 RX ADMIN — ACETAMINOPHEN 650 MG: 325 TABLET ORAL at 15:58

## 2022-12-20 RX ADMIN — SILVER SULFADIAZINE: 10 CREAM TOPICAL at 08:29

## 2022-12-20 RX ADMIN — CIPROFLOXACIN 200 MG: 2 INJECTION, SOLUTION INTRAVENOUS at 12:17

## 2022-12-20 RX ADMIN — NYSTATIN 500000 UNITS: 100000 SUSPENSION ORAL at 08:37

## 2022-12-20 RX ADMIN — NYSTATIN 500000 UNITS: 100000 SUSPENSION ORAL at 13:31

## 2022-12-20 RX ADMIN — Medication: at 08:29

## 2022-12-20 RX ADMIN — PIPERACILLIN AND TAZOBACTAM 3375 MG: 3; .375 INJECTION, POWDER, LYOPHILIZED, FOR SOLUTION INTRAVENOUS at 02:31

## 2022-12-20 RX ADMIN — TAMSULOSIN HYDROCHLORIDE 0.4 MG: 0.4 CAPSULE ORAL at 08:29

## 2022-12-20 RX ADMIN — Medication 1 CAPSULE: at 08:29

## 2022-12-20 RX ADMIN — SODIUM CHLORIDE, PRESERVATIVE FREE 10 ML: 5 INJECTION INTRAVENOUS at 08:30

## 2022-12-20 RX ADMIN — PANTOPRAZOLE SODIUM 40 MG: 40 TABLET, DELAYED RELEASE ORAL at 08:29

## 2022-12-20 RX ADMIN — ACETAMINOPHEN 650 MG: 325 TABLET ORAL at 09:47

## 2022-12-20 RX ADMIN — PIPERACILLIN AND TAZOBACTAM 3375 MG: 3; .375 INJECTION, POWDER, LYOPHILIZED, FOR SOLUTION INTRAVENOUS at 09:04

## 2022-12-20 ASSESSMENT — PAIN DESCRIPTION - LOCATION
LOCATION: KNEE
LOCATION: KNEE

## 2022-12-20 ASSESSMENT — PAIN SCALES - GENERAL
PAINLEVEL_OUTOF10: 9
PAINLEVEL_OUTOF10: 5
PAINLEVEL_OUTOF10: 10

## 2022-12-20 ASSESSMENT — PAIN DESCRIPTION - DESCRIPTORS: DESCRIPTORS: ACHING

## 2022-12-20 ASSESSMENT — PAIN DESCRIPTION - ORIENTATION
ORIENTATION: RIGHT;LEFT
ORIENTATION: LEFT

## 2022-12-20 NOTE — CARE COORDINATION
[facility] Update Call    2022    Patient: Jl Perry Patient : 1935   MRN: <[id number]>  Reason for Admission:   Discharge Date: 22 RARS: Readmission Risk Score: 19.7       Patient still readmitted. Will continue to follow for discharge disposition. Writer finds Palliative Care note indicating referral for hospice.      Care Transitions Post Acute Facility Update    Care Transitions Interventions  Post Acute Facility: [address] [place] [address] Facility Update

## 2022-12-20 NOTE — DISCHARGE SUMMARY
INTERNAL MEDICINE DEPARTMENT AT [address] [place] [address]  DISCHARGE SUMMARY    Patient ID: Rosa Florida                                             Discharge Date: 12/20/2022   Patient's PCP: Letitia Cordoba MD                                          Discharge Physician: Nathalia Nava MD  Admit Date: 12/15/2022   Admitting Physician: Jerrica Trujillo MD    PROBLEMS DURING HOSPITALIZATION:  Present on Admission:   RONNY (acute kidney injury) ([address]   Anemia   Gout   Hyponatremia   Mild cognitive impairment   Lower GI bleed   Hematuria   Recurrent UTI (urinary tract infection)   Hydronephrosis due to obstruction of bladder   Hyperkalemia   Complicated UTI (urinary tract infection)        The following issues were addressed during hospitalization:  Patient presented with hematochezia and hematuria. Gastroenterology was consulted performed EGD that revealed hiatal hernia. C-scope was done and found moderate size diverticulitis and 7 mm AVMs  in the distal ascending colon which were ablated. CT abdomen and pelvis found nonobstructing bilateral renal calculi with moderate bilateral hydronephrosis. Patient has history of recurrent urinary retention. Urology was consulted, performed a voiding trial and recommended patient keep Burk in place and do cystoscopy outpatient when discharged. Patient had a complicated UTI,  Morganella Morganii was positive on urine culture. She was started on ciprofloxacin twice daily for 7 days. Patient received 1 unit of packed red blood cells for anemia and her hemoglobin during discharge was 9.0 similar to her baseline.     Patient was medically cleared for discharge to [facility] with follow-up with her PCP within 1 week of discharge and urology in 2 weeks, cardiology to evaluate her Afib medications which were stopped due to bradycardia and hyperkalemia (Both issues have resolved during this discharge.)    Physical Exam  Constitutional:       General: She is awake. She is not in acute distress. Appearance: She is not ill-appearing, toxic-appearing or diaphoretic. Cardiovascular:      Rate and Rhythm: Normal rate. Heart sounds: Normal heart sounds, S1 normal and S2 normal.   Pulmonary:      Effort: Pulmonary effort is normal. No tachypnea or respiratory distress. Abdominal:      General: Abdomen is flat. Bowel sounds are normal. There is no distension. Tenderness: There is no abdominal tenderness. Genitourinary:     Comments: Burk in place  Neurological:      Mental Status: She is alert and oriented to person, place, and time. Psychiatric:         Attention and Perception: Attention and perception normal.         Behavior: Behavior is cooperative. Consults: ID, GI, and urology  Significant Diagnostic Studies:  CT scan abdomen  Treatments: IV hydration and antibiotics: Unasyn  Disposition: long term care facility  Discharged Condition: Stable  Follow Up: Primary Care Physician in one week    DISCHARGE MEDICATION:       Medication List        START taking these medications      ciprofloxacin 500 MG tablet  Commonly known as: CIPRO  Take 1 tablet by mouth 2 times daily for 7 days     pantoprazole 40 MG tablet  Commonly known as: PROTONIX  Take 1 tablet by mouth every morning (before breakfast)  Start taking on: December 21, 2022            CONTINUE taking these medications      HYDROcodone-acetaminophen 5-325 MG per tablet  Commonly known as: NORCO  Take 1 tablet by mouth every 6 hours as needed for Pain for up to 3 days.      LACTOBACILLUS PROBIOTIC PO     lidocaine 4 % external patch     nystatin 308376 UNIT/ML suspension  Commonly known as: MYCOSTATIN     povidone-iodine 10 % external solution  Commonly known as: BETADINE     tamsulosin 0.4 MG capsule  Commonly known as: FLOMAX  Take 1 capsule by mouth daily            STOP taking these medications      amoxicillin-clavulanate 500-125 MG per tablet  Commonly known as: AUGMENTIN apixaban 2.5 MG Tabs tablet  Commonly known as: ELIQUIS     clotrimazole-betamethasone 1-0.05 % cream  Commonly known as: LOTRISONE     Digoxin 62.5 MCG Tabs     diphenoxylate-atropine 2.5-0.025 MG per tablet  Commonly known as: LOMOTIL     metoprolol tartrate 25 MG tablet  Commonly known as: LOPRESSOR     mirabegron 50 MG Tb24  Commonly known as: Myrbetriq     silver sulfADIAZINE 1 % cream  Commonly known as: SILVADENE               Where to Get Your Medications        You can get these medications from any pharmacy    Bring a paper prescription for each of these medications  HYDROcodone-acetaminophen 5-325 MG per tablet       Information about where to get these medications is not yet available    Ask your nurse or doctor about these medications  ciprofloxacin 500 MG tablet  pantoprazole 40 MG tablet          Activity: activity as tolerated  Diet: regular diet  Wound Care: none needed    Time Spent on discharge is more than 15 minutes    Signed:  Toribio Coburn MD,  MD, PGY-1  12/20/2022     Patient seen and examined, labs and imaging reviewed, agree with assessment and plan as outlined above. patients condition continues to improve doing well, asked patient to monitor side effects of medications which i've discussed at length, recurrence of symptoms or new symptoms including but not limited to chest pain, shortness of breath, nausea, vomiting, fevers or chills and seek immediate medical attention or call 911. Greater than 30 minutes spent on case on day of discharge. Discussed plan with michel son at bedside, discussed voiding trial at [facility] hoping by discontinuing some of her medications and treating uti appropriately patient will hopefully continue to improve. Patient and family agreeable to this.       Beata Strange MD FACP

## 2022-12-20 NOTE — DISCHARGE INSTR - COC
Continuity of Care Form    Patient Name: Radha Zelaya   :  1935  MRN:  [id number]    Admit date:  12/15/2022  Discharge date:  ***    Code Status Order: Limited   Advance Directives:   [facility] Documentation       Date/Time Healthcare Directive Type of Healthcare Directive Copy in [facility] [address] Agent's Name Healthcare Agent's Phone Number    22 [phone] Other (Comment)  PATIENT IS UNSURE  -- -- -- -- --            Admitting Physician:  Disha Mattson MD  PCP: Anthony Antony MD    Discharging Nurse: [facility] Unit/Room#: 2043/3363-02  Discharging Unit Phone Number: ***    Emergency Contact:   Extended Emergency Contact Information  Primary Emergency Contact:  [employer] [place] Phone: [phone]  Mobile Phone: [phone]  Relation: Child  Secondary Emergency Contact: [address] Phone: [phone]  Mobile Phone: [phone]  Relation: Child    Past Surgical History:  Past Surgical History:   Procedure Laterality Date    APPENDECTOMY      COLONOSCOPY N/A 2022    COLONOSCOPY ABLATION performed by Sandra Blanco MD at [facility] Surgery    HIP PINNING Left 2019    LEFT HIP GAMMA NAIL performed by Ronald Miller MD at [address] [place] [address] N/A 2022    EGD DIAGNOSTIC ONLY performed by Sandra Blanco MD at [facility] ENDOSCOPY       Immunization History:   Immunization History   Administered Date(s) Administered    COVID-19, PFIZER Bivalent BOOSTER, DO NOT Dilute, (age 12y+), IM, 30 mcg/0.3 mL 10/26/2022    COVID-19, PFIZER GRAY top, DO NOT Dilute, (age 15 y+), IM, 30 mcg/0.3 mL 2022    COVID-19, PFIZER PURPLE top, DILUTE for use, (age 15 y+), 30mcg/0.3mL 2021, 2021, 2021    Influenza, FLUAD, (age 72 y+), Adjuvanted, 0.5mL 09/15/2021, 2022    Influenza, FLUZONE (age 72 y+), High Dose, 0.7mL 10/14/2020, 10/19/2020    Influenza, High Dose (Fluzone 65 yrs and older) 11/04/2013, 11/13/2015, 11/04/2016, 11/16/2018, 12/16/2019    Pneumococcal Conjugate 13-valent ([id number]) 11/13/2015    Pneumococcal Polysaccharide (Eqxzxrcol32) 01/05/2009    Zoster Live (Zostavax) 02/03/2008       Active Problems:  Patient Active Problem List   Diagnosis Code    Osteoarthritis M19.90    IBS (irritable bowel syndrome) K58.9    Gout M10.9    Dry eye syndrome H04.129    Osteopenia with high risk of fracture M85.80    Essential hypertension, benign I10    Glucose intolerance (impaired glucose tolerance) R73.02    Primary osteoarthritis of both knees M17.0    Anemia D64.9    Inguinal hernia, right K40.90    RONNY (acute kidney injury) ([facility] N17.9    History of DVT (deep vein thrombosis) Z86.718    Chronic pain of left knee M25.562, G89.29    Chronic pain of right knee M25.561, G89.29    Swelling of both lower extremities M79.89    Atrial fibrillation with RVR ([address] I48.91    Recurrent UTI (urinary tract infection) N39.0    Hyponatremia D68.7    Metabolic acidosis C42.28    Mild cognitive impairment X34.28    Acute metabolic encephalopathy M19.82    Lower GI bleed K92.2    Hematuria R31.9    Hydronephrosis due to obstruction of bladder N13.30, N32.0    Hyperkalemia E87.5    Malnutrition ([address] E46       Isolation/Infection:   Isolation            No Isolation          Patient Infection Status       Infection Onset Added Last Indicated Last Indicated By Review Planned Expiration Resolved Resolved By    None active    Resolved    C-diff Rule Out 11/27/22 11/27/22 11/27/22 Clostridium difficile toxin/antigen (Ordered)   11/27/22 Rule-Out Test Canceled    COVID-19 (Rule Out) 11/24/22 11/24/22 11/24/22 COVID-19 & Influenza Combo (Ordered)   11/24/22 Rule-Out Test Resulted            Nurse Assessment:  Last Vital Signs: /62   Pulse 71   Temp 97.3 °F (36.3 °C) (Oral)   Resp 16   Ht 5' 2\" (1.575 m)   Wt 112 lb 14 oz (51.2 kg)   SpO2 97%   BMI 20.65 kg/m²     Last documented pain score (0-10 scale): Pain Level: 5  Last Weight:   Wt Readings from Last 1 Encounters:   12/17/22 112 lb 14 oz (51.2 kg)     Mental Status:  oriented and alert    IV Access:  - None    Nursing Mobility/ADLs:  Walking   Dependent  Transfer  Dependent  Bathing  Dependent  Dressing  Dependent  Toileting  Dependent  Feeding  Assisted  Med Admin  Assisted  Med Delivery   whole    Wound Care Documentation and Therapy:  Wound 12/16/22 Heel Left Dry Eschar (Active)   Wound Image   12/16/22 1156   Wound Etiology Pressure Unstageable 12/20/22 0905   Dressing Status Dry; Intact 12/19/22 2004   Wound Cleansed Betadine/povidone iodine 12/19/22 2004   Dressing/Treatment Betadine swabs/povidone iodine 12/19/22 2004   Offloading for Diabetic Foot Ulcers Offloading boot 12/20/22 0905   Dressing Change Due 12/17/22 12/17/22 0315   Wound Length (cm) 5.7 cm 12/16/22 1156   Wound Width (cm) 3.5 cm 12/16/22 1156   Wound Depth (cm) 0 cm 12/16/22 1156   Wound Surface Area (cm^2) 19.95 cm^2 12/16/22 1156   Wound Volume (cm^3) 0 cm^3 12/16/22 1156   Wound Assessment Eschar dry 12/19/22 2004   Drainage Amount None 12/19/22 2004   Odor None 12/19/22 2004   Shi-wound Assessment Blanchable erythema;Dry/flaky; Intact 12/19/22 2004   Margins Attached edges; Defined edges 12/19/22 2004   Number of days: 4       Wound 12/16/22 Buttocks Left (Active)   Wound Image   12/16/22 1156   Wound Etiology Pressure Stage 2 12/20/22 0905   Dressing Status New dressing applied 12/18/22 0543   Wound Cleansed Not Cleansed 12/16/22 1156   Dressing/Treatment Pharmaceutical agent (see MAR) 12/20/22 0905   Wound Length (cm) 2.5 cm 12/16/22 1156   Wound Width (cm) 2.5 cm 12/16/22 1156   Wound Depth (cm) 0.3 cm 12/16/22 1156   Wound Surface Area (cm^2) 6.25 cm^2 12/16/22 1156   Wound Volume (cm^3) 1.875 cm^3 12/16/22 1156   Wound Assessment Pink/red 12/19/22 2004   Drainage Amount Scant 12/19/22 2004   Drainage Description Serosanguinous; Serous 12/19/22 2004   Odor None 12/19/22 2004   Shi-wound Assessment Blanchable erythema;Fragile 12/19/22 2004   Margins Attached edges; Defined edges 12/19/22 2004   Number of days: 4       Wound 12/16/22 Sacrum (Active)   Wound Image   12/16/22 1156   Wound Etiology Pressure Stage 2 12/20/22 0905   Dressing Status New dressing applied 12/18/22 0543   Wound Cleansed Not Cleansed 12/18/22 0543   Dressing/Treatment Pharmaceutical agent (see MAR) 12/20/22 0905   Dressing Change Due 12/16/22 12/16/22 1156   Wound Length (cm) 0.5 cm 12/16/22 1156   Wound Width (cm) 0.5 cm 12/16/22 1156   Wound Depth (cm) 0.1 cm 12/16/22 1156   Wound Surface Area (cm^2) 0.25 cm^2 12/16/22 1156   Wound Volume (cm^3) 0.025 cm^3 12/16/22 1156   Wound Assessment Pink/red 12/19/22 2004   Drainage Amount Scant 12/19/22 0401   Drainage Description Serosanguinous 12/19/22 0401   Odor None 12/19/22 2004   Shi-wound Assessment Dry/flaky;Fragile; Intact 12/19/22 2004   Margins Attached edges; Defined edges 12/19/22 0401   Number of days: 4        Elimination:  Continence: Bowel: No  Bladder: No  Urinary Catheter: Insertion Date: 12/15 and Indication for Use of Catheter: [facility] CONSTANCE Indwelling LVZB:[id number]}   Colostomy/Ileostomy/Ileal Conduit: No       Date of Last BM: 12/17    Intake/Output Summary (Last 24 hours) at 12/20/2022 1228  Last data filed at 12/20/2022 1118  Gross per 24 hour   Intake 120 ml   Output 900 ml   Net -780 ml     I/O last 3 completed shifts: In: 562.5 [P.O.:240; Blood:322.5]  Out: 1500 [Urine:1500]    Safety Concerns: At Risk for Falls    Impairments/Disabilities:      Hearing    Nutrition Therapy:  Current Nutrition Therapy:   - Oral Diet:  General    Routes of Feeding: Oral  Liquids:  Thin Liquids  Daily Fluid Restriction: no  Last Modified Barium Swallow with Video (Video Swallowing Test): not done    Treatments at the Time of Hospital Discharge:   Respiratory Treatments: ***  Oxygen Therapy:  is not on home oxygen therapy. Ventilator:    - No ventilator support    Rehab Therapies: {THERAPEUTIC INTERVENTION:[id number]}  Weight Bearing Status/Restrictions: No weight bearing restrictions  Other Medical Equipment (for information only, NOT a DME order):  walker  Other Treatments: ***    Patient's personal belongings (please select all that are sent with patient):  {[facility] DME Belongings:[id number]}    RN SIGNATURE:  Electronically signed by Kathy Grimm RN on 12/20/22 at 12:32 PM EST    CASE MANAGEMENT/SOCIAL WORK SECTION    Inpatient Status Date: ***    Readmission Risk Assessment Score:  Readmission Risk              Risk of Unplanned Readmission:  23           Discharging to Facility/ Agency   Name:   Address:  Phone:  Fax:    Dialysis Facility (if applicable)   Name:  Address:  Dialysis Schedule:  Phone:  Fax:    / signature: {Esignature:[id number]}    PHYSICIAN SECTION    Prognosis: Good    Condition at Discharge: Stable    Rehab Potential (if transferring to Rehab): Good    Recommended Labs or Other Treatments After Discharge: please check renal panel and cbc on Monday and Thursday for two weeks to ensure electrolytes are normal.  Please attempt voiding trial on Thursday 12/22 remove miranda catheter and monitor for urinary retention. Patient has a history of urinary retention however many of her medications have been adjusted and she is now on appropriate antibiotics for urinary tract infection. If possible, please check for Post void residual; if post void residual is greater than 400 cc please call MD and consider either straight catheter or patient may need miranda catheter again. Physician Certification: I certify the above information and transfer of Radha Zelaya  is necessary for the continuing treatment of the diagnosis listed and that she requires [facility] for greater 30 days.      Update Admission H&P: Changes in H&P as follows - adjustment in medications    PHYSICIAN SIGNATURE:  Electronically signed by Damaso Schilling MD on 12/20/22 at 1:12 PM EST

## 2022-12-20 NOTE — PROGRESS NOTES
Report called to Ashkan at [facility]. Family in room, aware of discharge. New orders for Cipro infused. Waiting for transport, scheduled at 1630.

## 2022-12-20 NOTE — CONSULTS
Infectious Diseases Inpatient Consult Note    Reason for Consult:   Recurrent UTI  Requesting Physician:   Dr Josey Trinh  Primary Care Physician:  Elba Castle MD  History Obtained From:   Pt, EPIC    Admit Date: 12/15/2022  Hospital Day: 10    CHIEF COMPLAINT:       Chief Complaint   Patient presents with    Rectal Bleeding     Sent from NH for BM with clots       HISTORY OF PRESENT ILLNESS:      80 yo woman   Hx AF (on eliqis),  HTN, OA, IBS, DVT, chronic pain  Hx hip fx, ORIF 9/2019    Admit 11/25 - 12/1  L leg pain, urinary retention, AF, RONNY, L calcaneous fx  dx UTI (cult - E faecalis)    Presented with rectal bleeding, hematuria  Assoc with lower abd pain  No fever / cillis    In ED, 12/15 afeb, WBC 10.6, Cr 1.8  UA large LE, blood; micro + bacteria  CT with bilateral hydro, distended bladder   Seen by GI, , Wound care, Palliative Care  12/17 - EGD/colonoscopy - ascending colon AVM    Today 12/20, afeb, WBC 7.4, Cr 1.3  Has miranda in place  No complaint       Past Medical History:    Past Medical History:   Diagnosis Date    Acute deep vein thrombosis (DVT) of left lower extremity after procedure ([address] 10/03/2019    Post hip rx repair    Acute renal failure (ARF) ([address] 01/17/2020    Anterior corneal dystrophy     Asymptomatic postmenopausal status (age-related) (natural)     Cervical polyp     Dry eye syndrome     Edema     Fuch's endothelial dystrophy     Gout     Hip fracture requiring operative repair, left, closed, initial encounter ([facility] [address] 09/17/2019    IBS (irritable bowel syndrome)     Mild cognitive impairment     Osteoarthritis     Osteopenia     Papilloma of breast 05/2013    Pedal edema 05/08/2017    Screening mammogram     Symptomatic menopausal or female climacteric states 11/04/2013    Urinary retention 09/21/2019    Vaginal candidiasis        Past Surgical History:    Past Surgical History:   Procedure Laterality Date    APPENDECTOMY      COLONOSCOPY N/A 12/17/2022    COLONOSCOPY ABLATION performed by Jhony Hobson MD at [facility]    HIP PINNING Left 9/18/2019    LEFT HIP GAMMA NAIL performed by Susan Nichols MD at [facility] N/A 12/17/2022    EGD DIAGNOSTIC ONLY performed by Jhony Hobson MD at [address] ENDOSCOPY       Current Medications:     ciprofloxacin  200 mg IntraVENous Q12H    pantoprazole  40 mg Oral QAM AC    lactobacillus  1 capsule Oral Daily    lidocaine  1 patch TransDERmal Daily    nystatin  500,000 Units Oral 4x Daily    silver sulfADIAZINE   Topical BID    tamsulosin  0.4 mg Oral Daily    sodium chloride flush  5-40 mL IntraVENous 2 times per day    [Held by provider] gabapentin  100 mg Oral TID    Venelex   Topical BID       Allergies:  Amoxapine and related, Lisinopril, and Clindamycin/lincomycin    Social History:    TOBACCO:    None   ETOH:    None   DRUGS:   None   MARITAL STATUS:          Family History:   No immunodeficiency    REVIEW OF SYSTEMS:    No fever / chills / sweats. No weight loss. No visual change, eye pain, eye discharge. No oral lesion, sore throat, dysphagia. Denies cough / sputum. Denies chest pain, palpitations. Denies n / v / abd pain. No diarrhea. Denies dysuria or change in urinary function. Denies joint swelling or pain. No myalgia, arthralgia. Denies skin changes, itching  Denies focal weakness, sensory change or other neurologic symptom    Denies new / worse depression, psychiatric symptoms    PHYSICAL EXAM:      Vitals:    /62   Pulse 71   Temp 97.3 °F (36.3 °C) (Oral)   Resp 16   Ht 5' 2\" (1.575 m)   Wt 112 lb 14 oz (51.2 kg)   SpO2 97%   BMI 20.65 kg/m²     GENERAL: No apparent distress.   RA  HEENT: Membranes moist, no oral lesion, PERRL  NECK:  Supple, no lymphadenopathy  LUNGS: Clear b/l, no rales, no dullness  CARDIAC: RRR, no murmur appreciated  ABD:  + BS, soft / NT  EXT:  No rash, no edema, no lesions  NEURO: No focal neurologic findings  PSYCH: Orientation, sensorium, mood normal  LINES:  Peripheral iv    DATA:    Lab Results   Component Value Date    WBC 7.4 2022    HGB 9.0 (L) 2022    HCT 26.9 (L) 2022    MCV 96.0 2022     2022     Lab Results   Component Value Date    CREATININE 1.3 (H) 2022    BUN 23 (H) 2022     2022    K 3.7 2022     2022    CO2 21 2022       Hepatic Function Panel:   Lab Results   Component Value Date/Time    ALKPHOS 111 12/15/2022 05:48 PM    ALT 11 12/15/2022 05:48 PM    AST 11 12/15/2022 05:48 PM    PROT 7.0 12/15/2022 05:48 PM    BILITOT 0.4 12/15/2022 05:48 PM    BILIDIR <0.2 2022 07:04 PM    IBILI see below 2022 07:04 PM    LABALBU 2.0 2022 05:40 AM       Micro:   Urine cult E faecalis    12/15 Urine cult >100k M morganii  Morganella morganii   Antibiotic Interpretation Microscan    amoxicillin-clavulanate Resistant >16/8 mcg/mL   ampicillin Resistant >16 mcg/mL   ampicillin-sulbactam Resistant >16/8 mcg/mL   ceFAZolin Resistant >16 mcg/mL   cefepime Sensitive <=2 mcg/mL   cefTRIAXone Resistant 8 mcg/mL   cefuroxime Resistant >16 mcg/mL   ciprofloxacin Sensitive <=1 mcg/mL   ertapenem Sensitive <=0.5 mcg/mL   gentamicin Sensitive <=4 mcg/mL   meropenem Sensitive <=1 mcg/mL   nitrofurantoin Resistant >64 mcg/mL   piperacillin-tazobactam Sensitive <=16 mcg/mL   trimethoprim-sulfamethoxazole Sensitive <=2/38 mcg/mL       Imagin/15 Abd / Pelvic CT   1. Moderate bilateral hydroureteronephrosis (worse on the left) and grossly distended urinary bladder distention. Findings may relate to chronic bladder outlet obstruction. 2.  Bilateral nonobstructive nephrolithiasis and small nonobstructing calculi in the urinary bladder. 3.  Moderate size right inguinal hernia containing nondilated small bowel. Small fat-containing left inguinal hernia. 4.  Moderate to large hiatal hernia.    5.  Small bilateral pleural effusions    IMPRESSION:      Patient Active Problem List   Diagnosis    Osteoarthritis    IBS (irritable bowel syndrome)    Gout    Dry eye syndrome    Osteopenia with high risk of fracture    Essential hypertension, benign    Glucose intolerance (impaired glucose tolerance)    Primary osteoarthritis of both knees    Anemia    Inguinal hernia, right    RONNY (acute kidney injury) ([facility] [address]    History of DVT (deep vein thrombosis)    Chronic pain of left knee    Chronic pain of right knee    Swelling of both lower extremities    Atrial fibrillation with RVR (HCC)    Recurrent UTI (urinary tract infection)    Hyponatremia    Metabolic acidosis    Mild cognitive impairment    Acute metabolic encephalopathy    Lower GI bleed    Hematuria    Hydronephrosis due to obstruction of bladder    Hyperkalemia    Malnutrition (HCC)       Hx AF (on eliqis),  HTN, OA, IBS, DVT, chronic pain  Hx hip fx, ORIF 9/2019    Admit 11/25 - 12/1  L leg pain, urinary retention, AF, RONNY, L calcaneous fx  Dx UTI (cult - E faecalis),    Admit 12/13  GI bleed - AVM on colonoscopy  Hematuria - stone    Sacral / L ischial wound    Complicated UTI   - has stones, retention    RECOMMENDATIONS:    Cont Ciprofloxacin  Remove miranda as possible / voiding trias, check bladder scan  F/u  'should have a cystoscopy at some but not urgent at this time' per note 12/16     Medical Decision Making: The following items were considered in medical decision making:  Discussion of patient care with other providers  Reviewed clinical lab tests  Reviewed radiology tests  Reviewed other diagnostic tests/interventions  Independent review of radiologic images  Microbiology cultures and other micro tests reviewed      Risk of Complications/Morbidity: High   Illness(es)/ Infection present that pose threat to bodily function. There is potential for severe exacerbation of infection/side effects of treatment.   Therapy requires intensive monitoring for antimicrobial agent toxicity    Discussed with pt, son, daughter in law  Maddi Bass MD

## 2022-12-20 NOTE — PROGRESS NOTES
VSS. Pt has been sleeping for intervals. Burk in place;urine remains blood tinged with sediment. Pt assisted with repositioning in bed. Prevalon boots remain in place. Call light in reach.   Electronically signed by Milad Ortega RN on 12/20/22 at 2:08 AM EST

## 2022-12-20 NOTE — PROGRESS NOTES
Comprehensive Nutrition Assessment    RECOMMENDATIONS:  PO Diet: continue regular, high fiber diet  ONS: continue Ensure TID, add boost pudding & magic cup TID d/t poor intakes since admit (<50% ave)  Nutrition Education: Education not indicated   To rec'd initiating EN if PO intake does not improve within 3 days    NUTRITION ASSESSMENT:   Nutritional summary & status: Follow up. Multiple P/I's noted; increased pro needs r/t wound healing. Diet has been advanced to Regular, high fiber diet w/ Ensure Enlive TID. PO intake 1-25% & 26-50% ave. Pt w/ poor appetite & intakes, no improvement shown since admit. Noted pt being followed by palliative care & hospice. Rec'd adding magic cups in addt to current ONS to promote adequate intakes during LOS. Pt agreeable. To note, previous wt 108 lbs;  lbs (~4 lb wt gain). If PO intakes do not improve within the next 3 days, rec'd initiating enteral nutrition. BMI remains healthy for age; however, suspect pt will begin losing weight if intakes continue to be <50% & ONS intake does not improve. RD following for wts/intakes.    Admission/PMH: Lower GI bleed, gout, IBS    MALNUTRITION ASSESSMENT  Context of Malnutrition: Chronic Illness   Malnutrition Status: Severe malnutrition  Findings of the 6 clinical characteristics of malnutrition (Minimum of 2 out of 6 clinical characteristics is required to make the diagnosis of moderate or severe Protein Calorie Malnutrition based on AND/ASPEN Guidelines):  Energy Intake:  75% or less estimated energy requirements for 1 month or longer  Weight Loss:  Greater than 5% over 1 month (12% in 1 month period)     Body Fat Loss:  Unable to assess     Muscle Mass Loss:  Mild muscle mass loss    Fluid Accumulation:  No significant fluid accumulation     Strength:  Not Performed    NUTRITION DIAGNOSIS   Severe malnutrition related to inadequate protein-energy intake as evidenced by weight loss, poor intake prior to admission, Criteria as identified in malnutrition assessment, mild muscle loss    Nutrition Monitoring and Evaluation:   Food/Nutrient Intake Outcomes:  Food and Nutrient Intake, Supplement Intake  Physical Signs/Symptoms Outcomes:  Biochemical Data, Nutrition Focused Physical Findings, Skin, Weight, GI Status     OBJECTIVE DATA: Significant to nutrition assessment  Nutrition Related Findings: BUN 23, Cr 1.3, GFR 40, . BM+ 12/17  Wounds: Pressure Injury, Stage II, Stage III, Unstageable  Nutrition Goals: PO intake 50% or greater, within 2 days     CURRENT NUTRITION THERAPIES  ADULT DIET; Regular; High Fiber  ADULT ORAL NUTRITION SUPPLEMENT; Breakfast, Lunch, Dinner; Standard High Calorie/High Protein Oral Supplement  PO Intake: 1-25%, 26-50%   PO Supplement Intake:1-25%, 26-50%  Additional Sources of Calories/IVF:N/A     ANTHROPOMETRICS  Current Height: 5' 2\" (157.5 cm)  Current Weight: 112 lb 14 oz (51.2 kg)    Admission weight: 109 lb 5.6 oz (49.6 kg)  Ideal Body Weight (IBW): 110 lbs  (50 kg)    BMI: 20.7    COMPARATIVE STANDARDS  Energy (kcal):  3910-8292 (25-30 kcal/kg CBW)     Protein (g):  59-74 (1.2-1.5 g/kg CBW)       Fluid (mL/day):  1 mL/kcal or per MD    The patient will be monitored per nutrition standards of care. Consult dietitian if additional nutrition interventions are needed prior to RD reassessment.      Ben Long, [address] [place] [address] Street:  [phone]  Office:  [phone]

## 2022-12-20 NOTE — PROGRESS NOTES
Patient alert and oriented. Vitals stable. Denies pain. Burk present for urinary retention, urine red with sediment. IV right forearm for intermittent antibiotics. Tolerating general diet, small amounts. Deep tissue injury left heel, off loading boots on bilaterally. Turned and reposition q 2 hours using pillows to alleviate pressure areas. Report given to pm nurse to monitor.

## 2022-12-20 NOTE — PROGRESS NOTES
Patient alert and oriented. Vitals stable. Burk remains due to urinary retention. Urine with blood, and sediment. IV RFA saline locked, intermittent infusion of Zozyn given. Patient turned and repositioned, using pillows to alleviate pressure areas. Appropriate creams used as ordered. Stage 2-3 on left buttocks. DTI left heel. Tolerating general diet, minimal intake. Patient complained of knee pain, tylenol given with benefit. Bed alarm on, call light in reach. Will monitor.

## 2022-12-20 NOTE — PROGRESS NOTES
Urology Attending Progress Note      Subjective: No  complaints, ready to go home    Vitals:  /69   Pulse 64   Temp 97.3 °F (36.3 °C) (Oral)   Resp 16   Ht 5' 2\" (1.575 m)   Wt 112 lb 14 oz (51.2 kg)   SpO2 97%   BMI 20.65 kg/m²   Temp  Av.4 °F (36.3 °C)  Min: 97.1 °F (36.2 °C)  Max: 97.8 °F (36.6 °C)    Intake/Output Summary (Last 24 hours) at 2022 0916  Last data filed at 2022 0313  Gross per 24 hour   Intake 240 ml   Output 900 ml   Net -660 ml         Exam: miranda to drainage - urine pink tinged    Labs:  WBC:    Lab Results   Component Value Date/Time    WBC 7.4 2022 05:40 AM     Hemoglobin/Hematocrit:    Lab Results   Component Value Date/Time    HGB 9.0 2022 05:40 AM    HCT 26.9 2022 05:40 AM     BMP:    Lab Results   Component Value Date/Time     2022 05:40 AM    K 3.7 2022 05:40 AM    K 6.2 12/15/2022 05:48 PM     2022 05:40 AM    CO2 21 2022 05:40 AM    BUN 23 2022 05:40 AM    LABALBU 2.0 2022 05:40 AM    CREATININE 1.3 2022 05:40 AM    CALCIUM 8.1 2022 05:40 AM    GFRAA >60 2020 06:45 AM    GFRAA >60 2013 03:18 PM    LABGLOM 40 2022 05:40 AM     PT/INR:    Lab Results   Component Value Date/Time    PROTIME 25.8 2022 06:02 AM    INR 2.34 2022 06:02 AM     PTT:    Lab Results   Component Value Date/Time    APTT 63.5 2022 06:18 AM   [APTT        Impression/Plan: 82 yo F with gross hematuria, urinary retention and bilateral hydronephrosis     -Urine appearance very light pink tinge  -Discussed removal of miranda vs keeping in place and she wants miranda to stay. -Can arrange outpatient cystoscopy in a few weeks to better evaluate gross hematuria  -Ok to be discharged from our standpoint.  Call with any questions    HENNY Maria

## 2022-12-20 NOTE — DISCHARGE INSTRUCTIONS
You may restart your Eliquis after 12/24/2022. Recommend a high-fiber diet and staying well-hydrated. We have stopped your metoprolol and your digoxin because you were having issues with low heart rate and high potassium respectively. Please discuss this with your cardiologist at your follow-up appointment. Please schedule an appointment with urology in a few weeks regarding doing a cystoscopy to further evaluate your urinary retention.

## 2022-12-20 NOTE — PROGRESS NOTES
Pharmacy Note - Renal Dosing    Cipro 400mg IV q12h ordered for treatment of Morganella UTI. Per [facility] Renal Dose Adjustment Policy, Cipro dose will be reduced to 200 mg IV q12h due to est CrCl < 30 mL/min. Estimated Creatinine Clearance: Estimated Creatinine Clearance: 25 mL/min (A) (based on SCr of 1.3 mg/dL (H)). Dialysis Status, RONNY, CKD: Slight RONNY; baseline SCr 1.1 per notes. BMI: Body mass index is 20.65 kg/m². Rationale for Adjustment: Agent is renally eliminated. Pharmacy will continue to monitor renal function and adjust dose as necessary.       Please call with questions--  Thanks--  Claudine Serrano, PharmD, [facility], [facility])   12/20/2022 11:23 AM

## 2022-12-22 ENCOUNTER — CARE COORDINATION (OUTPATIENT)
Dept: CASE MANAGEMENT | Age: 87
End: 2022-12-22

## 2022-12-22 NOTE — CARE COORDINATION
[facility] Update Call    2022    Patient: Mahendra Wray Patient : 1935   MRN: <[id number]>  Reason for Admission:   Discharge Date: 22 RARS: Readmission Risk Score: 18.5       Secure email sent to [facility] [place] with [facility] to confirm if pt was in LTC and/ or with hospice services there. Will await return call to confirm.    Will continue to follow     No response from [facility] [place], will try again tomorrow    Care Transitions Post Acute Facility Update    Care Transitions Interventions  Post Acute Facility: [facility],[address] Update

## 2022-12-23 ENCOUNTER — CARE COORDINATION (OUTPATIENT)
Dept: CASE MANAGEMENT | Age: 87
End: 2022-12-23

## 2022-12-23 NOTE — CARE COORDINATION
[facility] Update Call    2022    Patient: Bindu Rodrigues Patient : 1935   MRN: <[id number]>  Reason for Admission:   Discharge Date: 22 RARS: Readmission Risk Score: 18.5       Secure email sent to Jacquelyn Marquez with [facility] requesting admission orders and med list. Will continue to follow     Voicemail left with Jacquelyn Marquez requesting call back with admission information and/ or confirm if patient is in LTC and/or hospice as mentioned in inpatient    Voicemail left on main facility number requesting call back and confirm if patient is LTC and/ or hospice    Care Transitions Post Acute Facility Update    Care Transitions Interventions  Post Acute Facility: [address] [facility],[address] Update

## 2022-12-27 ENCOUNTER — CARE COORDINATION (OUTPATIENT)
Dept: CASE MANAGEMENT | Age: 87
End: 2022-12-27

## 2022-12-27 NOTE — CARE COORDINATION
[facility] Update Call    2022    Patient: Nadine Allen Patient : 1935   MRN: <[id number]>  Reason for Admission:   Discharge Date: 22 RARS: Readmission Risk Score: 18.5       Sent secure email to [facility] requesting call back to review pt admission from hospital . Will continue to follow     Writer to confirm if pt is in LTC with or without hospice. Writer received email confirmation pt is in LTC on hospice.  Final call, episode closed    Care Transitions Post Acute Facility Update    Care Transitions Interventions  Post Acute Facility: [address] [facility],[address] Update

## 2023-01-07 ENCOUNTER — APPOINTMENT (OUTPATIENT)
Dept: CT IMAGING | Age: 88
DRG: 175 | End: 2023-01-07
Payer: MEDICARE

## 2023-01-07 ENCOUNTER — APPOINTMENT (OUTPATIENT)
Dept: GENERAL RADIOLOGY | Age: 88
DRG: 175 | End: 2023-01-07
Payer: MEDICARE

## 2023-01-07 ENCOUNTER — HOSPITAL ENCOUNTER (INPATIENT)
Age: 88
LOS: 7 days | Discharge: NURSING, REHAB OR LONG-TERM CARE | DRG: 175 | End: 2023-01-14
Attending: EMERGENCY MEDICINE | Admitting: INTERNAL MEDICINE
Payer: MEDICARE

## 2023-01-07 DIAGNOSIS — R07.9 CHEST PAIN, UNSPECIFIED TYPE: ICD-10-CM

## 2023-01-07 DIAGNOSIS — I26.93 SINGLE SUBSEGMENTAL PULMONARY EMBOLISM WITHOUT ACUTE COR PULMONALE (HCC): Primary | ICD-10-CM

## 2023-01-07 PROBLEM — I26.99 ACUTE PULMONARY EMBOLISM WITHOUT ACUTE COR PULMONALE, UNSPECIFIED PULMONARY EMBOLISM TYPE (HCC): Status: ACTIVE | Noted: 2023-01-07

## 2023-01-07 PROBLEM — I26.94 MULTIPLE SUBSEGMENTAL PULMONARY EMBOLI WITHOUT ACUTE COR PULMONALE (HCC): Status: ACTIVE | Noted: 2023-01-07

## 2023-01-07 PROBLEM — I26.90 ACUTE SEPTIC PULMONARY EMBOLUS WITHOUT ACUTE COR PULMONALE (HCC): Status: ACTIVE | Noted: 2023-01-07

## 2023-01-07 LAB
A/G RATIO: 0.6 (ref 1.1–2.2)
ALBUMIN SERPL-MCNC: 2.5 G/DL (ref 3.4–5)
ALP BLD-CCNC: 113 U/L (ref 40–129)
ALT SERPL-CCNC: 8 U/L (ref 10–40)
ANION GAP SERPL CALCULATED.3IONS-SCNC: 8 MMOL/L (ref 3–16)
ANTI-XA UNFRAC HEPARIN: 0.48 IU/ML (ref 0.3–0.7)
AST SERPL-CCNC: 12 U/L (ref 15–37)
BILIRUB SERPL-MCNC: 0.3 MG/DL (ref 0–1)
BUN BLDV-MCNC: 31 MG/DL (ref 7–20)
CALCIUM SERPL-MCNC: 8.7 MG/DL (ref 8.3–10.6)
CHLORIDE BLD-SCNC: 101 MMOL/L (ref 99–110)
CO2: 27 MMOL/L (ref 21–32)
CREAT SERPL-MCNC: 1.3 MG/DL (ref 0.6–1.2)
GFR SERPL CREATININE-BSD FRML MDRD: 40 ML/MIN/{1.73_M2}
GLUCOSE BLD-MCNC: 114 MG/DL (ref 70–99)
HCT VFR BLD CALC: 27.2 % (ref 36–48)
HCT VFR BLD CALC: 31.2 % (ref 36–48)
HEMOGLOBIN: 9.1 G/DL (ref 12–16)
HEMOGLOBIN: 9.8 G/DL (ref 12–16)
INR BLD: 1.12 (ref 0.87–1.14)
LIPASE: 28 U/L (ref 13–60)
MCH RBC QN AUTO: 30.4 PG (ref 26–34)
MCH RBC QN AUTO: 31.8 PG (ref 26–34)
MCHC RBC AUTO-ENTMCNC: 31.5 G/DL (ref 31–36)
MCHC RBC AUTO-ENTMCNC: 33.5 G/DL (ref 31–36)
MCV RBC AUTO: 94.9 FL (ref 80–100)
MCV RBC AUTO: 96.5 FL (ref 80–100)
PDW BLD-RTO: 15.8 % (ref 12.4–15.4)
PDW BLD-RTO: 15.9 % (ref 12.4–15.4)
PLATELET # BLD: 632 K/UL (ref 135–450)
PLATELET # BLD: 725 K/UL (ref 135–450)
PMV BLD AUTO: 6.5 FL (ref 5–10.5)
PMV BLD AUTO: 6.5 FL (ref 5–10.5)
POTASSIUM REFLEX MAGNESIUM: 5.3 MMOL/L (ref 3.5–5.1)
POTASSIUM SERPL-SCNC: 5.1 MMOL/L (ref 3.5–5.1)
PRO-BNP: 372 PG/ML (ref 0–449)
PROTHROMBIN TIME: 14.3 SEC (ref 11.7–14.5)
RBC # BLD: 2.87 M/UL (ref 4–5.2)
RBC # BLD: 3.23 M/UL (ref 4–5.2)
SODIUM BLD-SCNC: 136 MMOL/L (ref 136–145)
TOTAL PROTEIN: 7 G/DL (ref 6.4–8.2)
TROPONIN: 0.04 NG/ML
TROPONIN: 0.05 NG/ML
TROPONIN: 0.06 NG/ML
WBC # BLD: 9.2 K/UL (ref 4–11)
WBC # BLD: 9.4 K/UL (ref 4–11)

## 2023-01-07 PROCEDURE — 36415 COLL VENOUS BLD VENIPUNCTURE: CPT

## 2023-01-07 PROCEDURE — 85520 HEPARIN ASSAY: CPT

## 2023-01-07 PROCEDURE — 6360000002 HC RX W HCPCS: Performed by: STUDENT IN AN ORGANIZED HEALTH CARE EDUCATION/TRAINING PROGRAM

## 2023-01-07 PROCEDURE — 71045 X-RAY EXAM CHEST 1 VIEW: CPT

## 2023-01-07 PROCEDURE — 85014 HEMATOCRIT: CPT

## 2023-01-07 PROCEDURE — 6360000004 HC RX CONTRAST MEDICATION: Performed by: STUDENT IN AN ORGANIZED HEALTH CARE EDUCATION/TRAINING PROGRAM

## 2023-01-07 PROCEDURE — 85027 COMPLETE CBC AUTOMATED: CPT

## 2023-01-07 PROCEDURE — 2060000000 HC ICU INTERMEDIATE R&B

## 2023-01-07 PROCEDURE — 83880 ASSAY OF NATRIURETIC PEPTIDE: CPT

## 2023-01-07 PROCEDURE — 99285 EMERGENCY DEPT VISIT HI MDM: CPT

## 2023-01-07 PROCEDURE — 84484 ASSAY OF TROPONIN QUANT: CPT

## 2023-01-07 PROCEDURE — 2580000003 HC RX 258: Performed by: INTERNAL MEDICINE

## 2023-01-07 PROCEDURE — 93005 ELECTROCARDIOGRAM TRACING: CPT | Performed by: STUDENT IN AN ORGANIZED HEALTH CARE EDUCATION/TRAINING PROGRAM

## 2023-01-07 PROCEDURE — 2580000003 HC RX 258: Performed by: STUDENT IN AN ORGANIZED HEALTH CARE EDUCATION/TRAINING PROGRAM

## 2023-01-07 PROCEDURE — 80053 COMPREHEN METABOLIC PANEL: CPT

## 2023-01-07 PROCEDURE — 71260 CT THORAX DX C+: CPT | Performed by: STUDENT IN AN ORGANIZED HEALTH CARE EDUCATION/TRAINING PROGRAM

## 2023-01-07 PROCEDURE — 85018 HEMOGLOBIN: CPT

## 2023-01-07 PROCEDURE — 84132 ASSAY OF SERUM POTASSIUM: CPT

## 2023-01-07 PROCEDURE — 85610 PROTHROMBIN TIME: CPT

## 2023-01-07 PROCEDURE — 96374 THER/PROPH/DIAG INJ IV PUSH: CPT

## 2023-01-07 PROCEDURE — 6370000000 HC RX 637 (ALT 250 FOR IP): Performed by: INTERNAL MEDICINE

## 2023-01-07 PROCEDURE — 83690 ASSAY OF LIPASE: CPT

## 2023-01-07 PROCEDURE — 96361 HYDRATE IV INFUSION ADD-ON: CPT

## 2023-01-07 RX ORDER — SODIUM CHLORIDE 9 MG/ML
INJECTION, SOLUTION INTRAVENOUS CONTINUOUS
Status: ACTIVE | OUTPATIENT
Start: 2023-01-07 | End: 2023-01-08

## 2023-01-07 RX ORDER — TAMSULOSIN HYDROCHLORIDE 0.4 MG/1
0.4 CAPSULE ORAL DAILY
Status: DISCONTINUED | OUTPATIENT
Start: 2023-01-08 | End: 2023-01-08

## 2023-01-07 RX ORDER — ONDANSETRON 4 MG/1
4 TABLET, ORALLY DISINTEGRATING ORAL EVERY 8 HOURS PRN
Status: DISCONTINUED | OUTPATIENT
Start: 2023-01-07 | End: 2023-01-14 | Stop reason: HOSPADM

## 2023-01-07 RX ORDER — PANTOPRAZOLE SODIUM 40 MG/1
40 TABLET, DELAYED RELEASE ORAL
Status: DISCONTINUED | OUTPATIENT
Start: 2023-01-08 | End: 2023-01-14 | Stop reason: HOSPADM

## 2023-01-07 RX ORDER — ENOXAPARIN SODIUM 100 MG/ML
30 INJECTION SUBCUTANEOUS DAILY
Status: DISCONTINUED | OUTPATIENT
Start: 2023-01-08 | End: 2023-01-07 | Stop reason: SDUPTHER

## 2023-01-07 RX ORDER — SODIUM CHLORIDE 0.9 % (FLUSH) 0.9 %
5-40 SYRINGE (ML) INJECTION EVERY 12 HOURS SCHEDULED
Status: DISCONTINUED | OUTPATIENT
Start: 2023-01-07 | End: 2023-01-14 | Stop reason: HOSPADM

## 2023-01-07 RX ORDER — ACETAMINOPHEN 325 MG/1
650 TABLET ORAL EVERY 6 HOURS PRN
Status: DISCONTINUED | OUTPATIENT
Start: 2023-01-07 | End: 2023-01-14 | Stop reason: HOSPADM

## 2023-01-07 RX ORDER — HYDROCODONE BITARTRATE AND ACETAMINOPHEN 5; 325 MG/1; MG/1
1 TABLET ORAL EVERY 4 HOURS PRN
Status: DISCONTINUED | OUTPATIENT
Start: 2023-01-07 | End: 2023-01-10

## 2023-01-07 RX ORDER — ACETAMINOPHEN 650 MG/1
650 SUPPOSITORY RECTAL EVERY 6 HOURS PRN
Status: DISCONTINUED | OUTPATIENT
Start: 2023-01-07 | End: 2023-01-14 | Stop reason: HOSPADM

## 2023-01-07 RX ORDER — HEPARIN SODIUM 1000 [USP'U]/ML
40 INJECTION, SOLUTION INTRAVENOUS; SUBCUTANEOUS PRN
Status: DISCONTINUED | OUTPATIENT
Start: 2023-01-07 | End: 2023-01-11 | Stop reason: ALTCHOICE

## 2023-01-07 RX ORDER — LOPERAMIDE HYDROCHLORIDE 2 MG/1
2 CAPSULE ORAL EVERY 12 HOURS PRN
Status: DISCONTINUED | OUTPATIENT
Start: 2023-01-07 | End: 2023-01-11

## 2023-01-07 RX ORDER — HEPARIN SODIUM 1000 [USP'U]/ML
80 INJECTION, SOLUTION INTRAVENOUS; SUBCUTANEOUS PRN
Status: DISCONTINUED | OUTPATIENT
Start: 2023-01-07 | End: 2023-01-11 | Stop reason: ALTCHOICE

## 2023-01-07 RX ORDER — SODIUM CHLORIDE 0.9 % (FLUSH) 0.9 %
5-40 SYRINGE (ML) INJECTION PRN
Status: DISCONTINUED | OUTPATIENT
Start: 2023-01-07 | End: 2023-01-14 | Stop reason: HOSPADM

## 2023-01-07 RX ORDER — ONDANSETRON 2 MG/ML
4 INJECTION INTRAMUSCULAR; INTRAVENOUS EVERY 6 HOURS PRN
Status: DISCONTINUED | OUTPATIENT
Start: 2023-01-07 | End: 2023-01-14 | Stop reason: HOSPADM

## 2023-01-07 RX ORDER — POLYETHYLENE GLYCOL 3350 17 G/17G
17 POWDER, FOR SOLUTION ORAL DAILY PRN
Status: DISCONTINUED | OUTPATIENT
Start: 2023-01-07 | End: 2023-01-11

## 2023-01-07 RX ORDER — HEPARIN SODIUM 1000 [USP'U]/ML
80 INJECTION, SOLUTION INTRAVENOUS; SUBCUTANEOUS ONCE
Status: COMPLETED | OUTPATIENT
Start: 2023-01-07 | End: 2023-01-07

## 2023-01-07 RX ORDER — NYSTATIN 100000 [USP'U]/G
POWDER TOPICAL 2 TIMES DAILY
COMMUNITY

## 2023-01-07 RX ORDER — SODIUM CHLORIDE 9 MG/ML
INJECTION, SOLUTION INTRAVENOUS PRN
Status: DISCONTINUED | OUTPATIENT
Start: 2023-01-07 | End: 2023-01-14 | Stop reason: HOSPADM

## 2023-01-07 RX ORDER — HYDROCODONE BITARTRATE AND ACETAMINOPHEN 5; 325 MG/1; MG/1
1 TABLET ORAL EVERY 4 HOURS PRN
Status: ON HOLD | COMMUNITY
End: 2023-01-14 | Stop reason: SDUPTHER

## 2023-01-07 RX ORDER — LOPERAMIDE HYDROCHLORIDE 2 MG/1
2 CAPSULE ORAL EVERY 12 HOURS PRN
Status: ON HOLD | COMMUNITY
End: 2023-01-14 | Stop reason: HOSPADM

## 2023-01-07 RX ORDER — HEPARIN SODIUM 10000 [USP'U]/100ML
5-30 INJECTION, SOLUTION INTRAVENOUS CONTINUOUS
Status: DISCONTINUED | OUTPATIENT
Start: 2023-01-07 | End: 2023-01-10

## 2023-01-07 RX ORDER — 0.9 % SODIUM CHLORIDE 0.9 %
500 INTRAVENOUS SOLUTION INTRAVENOUS ONCE
Status: COMPLETED | OUTPATIENT
Start: 2023-01-07 | End: 2023-01-07

## 2023-01-07 RX ADMIN — SODIUM CHLORIDE, PRESERVATIVE FREE 10 ML: 5 INJECTION INTRAVENOUS at 22:38

## 2023-01-07 RX ADMIN — SODIUM CHLORIDE 500 ML: 9 INJECTION, SOLUTION INTRAVENOUS at 15:02

## 2023-01-07 RX ADMIN — HEPARIN SODIUM 4280 UNITS: 1000 INJECTION INTRAVENOUS; SUBCUTANEOUS at 17:30

## 2023-01-07 RX ADMIN — SODIUM CHLORIDE: 9 INJECTION, SOLUTION INTRAVENOUS at 22:42

## 2023-01-07 RX ADMIN — HEPARIN SODIUM 18 UNITS/KG/HR: 5000 INJECTION, SOLUTION INTRAVENOUS; SUBCUTANEOUS at 17:46

## 2023-01-07 RX ADMIN — HYDROCODONE BITARTRATE AND ACETAMINOPHEN 1 TABLET: 5; 325 TABLET ORAL at 22:33

## 2023-01-07 RX ADMIN — IOPAMIDOL 75 ML: 755 INJECTION, SOLUTION INTRAVENOUS at 15:40

## 2023-01-07 ASSESSMENT — PAIN DESCRIPTION - ONSET
ONSET: ON-GOING
ONSET: ON-GOING

## 2023-01-07 ASSESSMENT — PAIN DESCRIPTION - ORIENTATION
ORIENTATION: LEFT
ORIENTATION: LEFT;LOWER
ORIENTATION: LEFT

## 2023-01-07 ASSESSMENT — PAIN DESCRIPTION - LOCATION
LOCATION: CHEST;KNEE
LOCATION: CHEST
LOCATION: CHEST;KNEE

## 2023-01-07 ASSESSMENT — PAIN - FUNCTIONAL ASSESSMENT
PAIN_FUNCTIONAL_ASSESSMENT: PREVENTS OR INTERFERES SOME ACTIVE ACTIVITIES AND ADLS
PAIN_FUNCTIONAL_ASSESSMENT: 0-10
PAIN_FUNCTIONAL_ASSESSMENT: 0-10
PAIN_FUNCTIONAL_ASSESSMENT: PREVENTS OR INTERFERES SOME ACTIVE ACTIVITIES AND ADLS

## 2023-01-07 ASSESSMENT — PAIN DESCRIPTION - DESCRIPTORS
DESCRIPTORS: ACHING;STABBING
DESCRIPTORS: ACHING;STABBING

## 2023-01-07 ASSESSMENT — PAIN SCALES - GENERAL
PAINLEVEL_OUTOF10: 10
PAINLEVEL_OUTOF10: 0
PAINLEVEL_OUTOF10: 6
PAINLEVEL_OUTOF10: 0
PAINLEVEL_OUTOF10: 3

## 2023-01-07 ASSESSMENT — PAIN DESCRIPTION - FREQUENCY
FREQUENCY: CONTINUOUS
FREQUENCY: CONTINUOUS

## 2023-01-07 ASSESSMENT — PAIN DESCRIPTION - PAIN TYPE
TYPE: ACUTE PAIN
TYPE: ACUTE PAIN

## 2023-01-07 NOTE — ED PROVIDER NOTES
[id number]          EM RESIDENT NOTE       Date of evaluation: 1/7/2023    Chief Complaint     Chest Pain (Lower left sided pain, started during the night. Got aspirin at nursing home prior to EMS arrival.)      History of Present Illness     Juanita Richardson is a 81 y.o. female who presents with a history of RONNY, anemia, gout, hyponatremia, lower GI bleeding, hematuria, UTIs presenting for concerns of chest pain. He states last night she started having a sharp twinge chest pain in her left chest.  It was worse with breathing. She is never had it previously she also thought it was related to the underwire of her bra although even without that being present she still has this pain. In the emergency department she said anytime she took a big deep breath in she could feel the pain and it never changed locations. She had no shortness of breath with this. No swelling in her lower extremities. The patient does also have a history of DVTs was previous on anticoagulation but recently also had a GI bleed and was taken off the DOAC's. She generally has had limited mobility recently has trouble with her bilateral lower extremities. She otherwise denies any new cough fevers abdominal pain. No sore throat headaches pain radiating to her back or jaw. MEDICAL DECISION MAKING / ASSESSMENT / PLAN     INITIAL VITALS: BP: 115/68, Temp: 97.8 °F (36.6 °C), Heart Rate: (!) 121, Resp: 18, SpO2: 100 %    Juanita Richardson is a 81 y.o. female presenting for concerns of acute onset chest pain over the course the evening. On presentation the patient was notably tachycardic with a pleuritic chest pain as well as a recent history of being taken off of her DOAC's and relative immobility with history of DVTs. Because of this there was a high concern for possible pulmonary embolism. For further evaluation of this general labs EKG and imaging was ordered. The patient's CMP generally. Unremarkable. Patient's troponin initially 0.05 and then repeat 0.04 similar to prior values. The patient's chest pain also resolved while in the emergency department. BNP noted to be 372 significantly less than prior. CTPA ordered which showed a lingular PE which would be consistent with the distribution of the patient's pain and discomfort. Given this the patient was updated it was thought the next best step would be to start on heparin this would be a easily reversible method given her recent GI bleeding and further admission for planning of the patient's long-term anticoagulation. The patient's sons were also updated and gave input on the patient's care. Patient made stable in the emergency department with improved symptoms and was discussed with the hospitalist and admitted for further management of her acute pulmonary embolism. No signs of heart strain on labs or her EKG. Or on CT imaging. Medical Decision Making  Amount and/or Complexity of Data Reviewed  Independent Historian: caregiver  External Data Reviewed: labs. Labs: ordered. Decision-making details documented in ED Course. Radiology: ordered. ECG/medicine tests: ordered and independent interpretation performed. Decision-making details documented in ED Course. Risk  Prescription drug management. Drug therapy requiring intensive monitoring for toxicity. Decision regarding hospitalization. This patient was also evaluated by the attending physician. All care plans werediscussed and agreed upon. Clinical Impression     1. Single subsegmental pulmonary embolism without acute cor pulmonale (HCC)        Disposition     PATIENT REFERRED TO:  No follow-up provider specified.     DISCHARGE MEDICATIONS:  Current Discharge Medication List          DISPOSITION Admitted 01/07/2023 05:33:13 PM        Diagnostic Results and Other Data     RADIOLOGY:  VL Extremity Venous Bilateral   Final Result      CT CHEST PULMONARY EMBOLISM W CONTRAST Final Result      1. Acute pulmonary embolism involving left lingular lobar and segmental branches. No evidence of right heart strain. 2. Small left pleural effusion. Bibasilar atelectasis. 3. Moderate to large hiatal hernia. Critical results reported to ER Dr Milagros Huddleston at 3:55 PM.      XR CHEST PORTABLE   Final Result      No acute pulmonary disease. Moderate to large hiatal hernia.                       LABS:   Results for orders placed or performed during the hospital encounter of 01/07/23   Culture, Urine    Specimen: Urine, clean catch   Result Value Ref Range    Organism Staphylococcus aureus (A)     Urine Culture, Routine 75,000 CFU/ml  Sensitivity to follow      CBC   Result Value Ref Range    WBC 9.2 4.0 - 11.0 K/uL    RBC 3.23 (L) 4.00 - 5.20 M/uL    Hemoglobin 9.8 (L) 12.0 - 16.0 g/dL    Hematocrit 31.2 (L) 36.0 - 48.0 %    MCV 96.5 80.0 - 100.0 fL    MCH 30.4 26.0 - 34.0 pg    MCHC 31.5 31.0 - 36.0 g/dL    RDW 15.8 (H) 12.4 - 15.4 %    Platelets 729 (H) 564 - 450 K/uL    MPV 6.5 5.0 - 10.5 fL   CMP w/ Reflex to MG   Result Value Ref Range    Sodium 136 136 - 145 mmol/L    Potassium reflex Magnesium 5.3 (H) 3.5 - 5.1 mmol/L    Chloride 101 99 - 110 mmol/L    CO2 27 21 - 32 mmol/L    Anion Gap 8 3 - 16    Glucose 114 (H) 70 - 99 mg/dL    BUN 31 (H) 7 - 20 mg/dL    Creatinine 1.3 (H) 0.6 - 1.2 mg/dL    Est, Glom Filt Rate 40 (A) >60    Calcium 8.7 8.3 - 10.6 mg/dL    Total Protein 7.0 6.4 - 8.2 g/dL    Albumin 2.5 (L) 3.4 - 5.0 g/dL    Albumin/Globulin Ratio 0.6 (L) 1.1 - 2.2    Total Bilirubin 0.3 0.0 - 1.0 mg/dL    Alkaline Phosphatase 113 40 - 129 U/L    ALT 8 (L) 10 - 40 U/L    AST 12 (L) 15 - 37 U/L   Troponin   Result Value Ref Range    Troponin 0.05 (H) <0.01 ng/mL   Troponin   Result Value Ref Range    Troponin 0.04 (H) <0.01 ng/mL   Lipase   Result Value Ref Range    Lipase 28.0 13.0 - 60.0 U/L   Potassium   Result Value Ref Range    Potassium 5.1 3.5 - 5.1 mmol/L   Troponin   Result Value Ref Range    Troponin 0.06 (H) <0.01 ng/mL   Protime-INR   Result Value Ref Range    Protime 14.3 11.7 - 14.5 sec    INR 1.12 0.87 - 1.14   Brain Natriuretic Peptide   Result Value Ref Range    Pro- 0 - 449 pg/mL   Anti-Xa, Unfractionated Heparin   Result Value Ref Range    Anti-XA Unfrac Heparin 0.48 0.30 - 0.70 IU/mL   CBC   Result Value Ref Range    WBC 9.4 4.0 - 11.0 K/uL    RBC 2.87 (L) 4.00 - 5.20 M/uL    Hemoglobin 9.1 (L) 12.0 - 16.0 g/dL    Hematocrit 27.2 (L) 36.0 - 48.0 %    MCV 94.9 80.0 - 100.0 fL    MCH 31.8 26.0 - 34.0 pg    MCHC 33.5 31.0 - 36.0 g/dL    RDW 15.9 (H) 12.4 - 15.4 %    Platelets 863 (H) 332 - 450 K/uL    MPV 6.5 5.0 - 10.5 fL   Hemoglobin and Hematocrit   Result Value Ref Range    Hemoglobin 8.7 (L) 12.0 - 16.0 g/dL    Hematocrit 27.4 (L) 36.0 - 48.0 %   CBC   Result Value Ref Range    WBC 7.6 4.0 - 11.0 K/uL    RBC 2.61 (L) 4.00 - 5.20 M/uL    Hemoglobin 8.0 (L) 12.0 - 16.0 g/dL    Hematocrit 25.8 (L) 36.0 - 48.0 %    MCV 98.8 80.0 - 100.0 fL    MCH 30.7 26.0 - 34.0 pg    MCHC 31.1 31.0 - 36.0 g/dL    RDW 16.2 (H) 12.4 - 15.4 %    Platelets 598 (H) 273 - 450 K/uL    MPV 6.3 5.0 - 10.5 fL   Basic Metabolic Panel   Result Value Ref Range    Sodium 134 (L) 136 - 145 mmol/L    Potassium 4.7 3.5 - 5.1 mmol/L    Chloride 104 99 - 110 mmol/L    CO2 22 21 - 32 mmol/L    Anion Gap 8 3 - 16    Glucose 88 70 - 99 mg/dL    BUN 26 (H) 7 - 20 mg/dL    Creatinine 1.1 0.6 - 1.2 mg/dL    Est, Glom Filt Rate 49 (A) >60    Calcium 8.1 (L) 8.3 - 10.6 mg/dL   Hemoglobin and Hematocrit   Result Value Ref Range    Hemoglobin 9.0 (L) 12.0 - 16.0 g/dL    Hematocrit 28.7 (L) 36.0 - 48.0 %   Anti-Xa, Unfractionated Heparin   Result Value Ref Range    Anti-XA Unfrac Heparin 0.45 0.30 - 0.70 IU/mL   SPECIMEN REJECTION   Result Value Ref Range    Rejected Test [facility]     Reason for Rejection see below    Troponin   Result Value Ref Range    Troponin 0.08 (H) <0.01 ng/mL   Vitamin D 25 Hydroxy   Result Value Ref Range    Vit D, 25-Hydroxy 11.6 (L) >=30 ng/mL   Iron and TIBC   Result Value Ref Range    Iron 38 37 - 145 ug/dL    TIBC 138 (L) 260 - 445 ug/dL    Iron Saturation 28 15 - 50 %   Ferritin   Result Value Ref Range    Ferritin 204.6 (H) 15.0 - 150.0 ng/mL   Reticulocytes   Result Value Ref Range    Retic Ct Pct 1.40 0.50 - 2.18 %    Retic Ct Abs 0.037 0.024 - 0.100 M/uL    Immature Retic Fract 0.55 (H) 0.21 - 0.37    Hematocrit 26.3 (L) 36.0 - 48.0 %   Anti-Xa, Unfractionated Heparin   Result Value Ref Range    Anti-XA Unfrac Heparin 0.41 0.30 - 0.70 IU/mL   Basic Metabolic Panel   Result Value Ref Range    Sodium 135 (L) 136 - 145 mmol/L    Potassium 6.0 (HH) 3.5 - 5.1 mmol/L    Chloride 101 99 - 110 mmol/L    CO2 23 21 - 32 mmol/L    Anion Gap 11 3 - 16    Glucose 107 (H) 70 - 99 mg/dL    BUN 25 (H) 7 - 20 mg/dL    Creatinine 1.1 0.6 - 1.2 mg/dL    Est, Glom Filt Rate 49 (A) >60    Calcium 8.4 8.3 - 10.6 mg/dL   Magnesium   Result Value Ref Range    Magnesium 1.70 (L) 1.80 - 2.40 mg/dL   CBC   Result Value Ref Range    WBC 10.4 4.0 - 11.0 K/uL    RBC 2.97 (L) 4.00 - 5.20 M/uL    Hemoglobin 9.2 (L) 12.0 - 16.0 g/dL    Hematocrit 28.5 (L) 36.0 - 48.0 %    MCV 95.9 80.0 - 100.0 fL    MCH 30.9 26.0 - 34.0 pg    MCHC 32.2 31.0 - 36.0 g/dL    RDW 16.1 (H) 12.4 - 15.4 %    Platelets 875 (H) 223 - 450 K/uL    MPV 6.4 5.0 - 10.5 fL   Troponin   Result Value Ref Range    Troponin 0.04 (H) <0.01 ng/mL   Troponin   Result Value Ref Range    Troponin 0.05 (H) <0.01 ng/mL   Anti-Xa, Unfractionated Heparin   Result Value Ref Range    Anti-XA Unfrac Heparin 0.44 0.30 - 0.70 IU/mL   Basic Metabolic Panel   Result Value Ref Range    Sodium 134 (L) 136 - 145 mmol/L    Potassium 4.9 3.5 - 5.1 mmol/L    Chloride 99 99 - 110 mmol/L    CO2 23 21 - 32 mmol/L    Anion Gap 12 3 - 16    Glucose 99 70 - 99 mg/dL    BUN 25 (H) 7 - 20 mg/dL    Creatinine 1.2 0.6 - 1.2 mg/dL    Est, Glom Filt Rate 44 (A) >60    Calcium 8.6 8.3 - 10.6 mg/dL   Magnesium   Result Value Ref Range    Magnesium 2.60 (H) 1.80 - 2.40 mg/dL   Basic Metabolic Panel   Result Value Ref Range    Sodium 133 (L) 136 - 145 mmol/L    Potassium 4.5 3.5 - 5.1 mmol/L    Chloride 101 99 - 110 mmol/L    CO2 24 21 - 32 mmol/L    Anion Gap 8 3 - 16    Glucose 102 (H) 70 - 99 mg/dL    BUN 25 (H) 7 - 20 mg/dL    Creatinine 1.1 0.6 - 1.2 mg/dL    Est, Glom Filt Rate 49 (A) >60    Calcium 8.4 8.3 - 10.6 mg/dL   EKG 12 Lead   Result Value Ref Range    Ventricular Rate 112 BPM    Atrial Rate 112 BPM    P-R Interval 164 ms    QRS Duration 66 ms    Q-T Interval 320 ms    QTc Calculation (Bazett) 436 ms    P Axis 80 degrees    R Axis -32 degrees    T Axis 27 degrees    Diagnosis       EKG performed in ER and to be interpreted by ER physician. Confirmed by MD, ER ([id number]),  Ibeth Gu ([phone]) on 1/8/2023 7:34:50 AM     EKG   Interpreted in conjunction with emergencydepartment physician No att. providers found  Rhythm: normal sinus   Rate: 112  Axis: left  Ectopy: none  Conduction: normal  ST Segments: no acute change  T Waves:normal  Q Waves: none  Clinical Impression: no acute changes and non-specific EKG  Comparison:  within the limitations of the baseline artifact. Axis more leftward than prior from 12.16/22 with worse Rwave progression     ED BEDSIDE ULTRASOUND:  No results found. RECENT VITALS:  BP: (!) 94/59, Temp: 98 °F (36.7 °C), Heart Rate: 90,Resp: 19, SpO2: 94 %     Procedures       ED Course     Nursing Notes, Past Medical Hx, Past Surgical Hx, Social Hx, Allergies, and Family Hx were reviewed.          The patient was given the following medications:  Orders Placed This Encounter   Medications    0.9 % sodium chloride bolus    iopamidol (ISOVUE-370) 76 % injection 75 mL    heparin (porcine) injection 4,280 Units    heparin (porcine) injection 4,280 Units    heparin (porcine) injection 2,140 Units    DISCONTD: heparin 25,000 units in dextrose 5% 250 mL (premix) infusion HYDROcodone-acetaminophen (NORCO) 5-325 MG per tablet 1 tablet    pantoprazole (PROTONIX) tablet 40 mg    nystatin (MYCOSTATIN) 186112 UNIT/ML suspension 500,000 Units    DISCONTD: tamsulosin (FLOMAX) capsule 0.4 mg    sodium chloride flush 0.9 % injection 5-40 mL    sodium chloride flush 0.9 % injection 5-40 mL    0.9 % sodium chloride infusion    DISCONTD: enoxaparin Sodium (LOVENOX) injection 30 mg     Order Specific Question:   Indication of Use     Answer:   Prophylaxis-DVT/PE    OR Linked Order Group     ondansetron (ZOFRAN-ODT) disintegrating tablet 4 mg     ondansetron (ZOFRAN) injection 4 mg    polyethylene glycol (GLYCOLAX) packet 17 g    OR Linked Order Group     acetaminophen (TYLENOL) tablet 650 mg     acetaminophen (TYLENOL) suppository 650 mg    0.9 % sodium chloride infusion    loperamide (IMODIUM) capsule 2 mg    perflutren lipid microspheres (DEFINITY) injection 1.5 mL    Vitamin D (CHOLECALCIFEROL) tablet 2,000 Units    iron sucrose (VENOFER) 200 mg in sodium chloride 0.9 % 100 mL IVPB    magnesium sulfate 2000 mg in 50 mL IVPB premix    metoprolol tartrate (LOPRESSOR) tablet 12.5 mg    magnesium sulfate 1000 mg in dextrose 5% 100 mL IVPB    0.9 % sodium chloride bolus    FOLLOWED BY Linked Order Group     apixaban (ELIQUIS) tablet 10 mg      Order Specific Question:   Indication of Use      Answer:   Treatment-DVT/PE     apixaban (ELIQUIS) tablet 5 mg      Order Specific Question:   Indication of Use      Answer:   Treatment-DVT/PE    heparin 25,000 units in dextrose 5% 250 mL (premix) infusion     Stop heparin infusion 1 hour after starting eliquis.     vancomycin (VANCOCIN) 750 mg in dextrose 5 % 250 mL IVPB     Order Specific Question:   Antimicrobial Indications     Answer:   Urinary Tract Infection     Order Specific Question:   UTI duration of therapy     Answer:   10 days         CONSULTS:  IP CONSULT TO HOSPITALIST  IP CONSULT TO HEMATOLOGY  IP CONSULT TO CARDIOLOGY  PHARMACY TO DOSE VANCOMYCIN    Review of Systems     Review of Systems   Constitutional:         All other symptoms reviewed and otherwise negative except per HPI     Past Medical, Surgical, Family, and Social History     She has a past medical history of Acute deep vein thrombosis (DVT) of left lower extremity after procedure ([facility] [address] Acute renal failure (ARF) ([facility] [address] Anterior corneal dystrophy, Asymptomatic postmenopausal status (age-related) (natural), Cervical polyp, Dry eye syndrome, Edema, Fuch's endothelial dystrophy, Gout, Hip fracture requiring operative repair, left, closed[facility] IBS (irritable bowel syndrome), Mild cognitive impairment, Osteoarthritis, Osteopenia, Papilloma of breast, Pedal edema, Screening mammogram, Symptomatic menopausal or female climacteric states, Urinary retention, and Vaginal candidiasis. She has a past surgical history that includes Appendectomy; eye surgery; hip pinning (Left, 9/18/2019); Upper gastrointestinal endoscopy (N/A, 12/17/2022); and Colonoscopy (N/A, 12/17/2022). Her family history includes Breast Cancer in her daughter; Ovarian Cancer in her daughter. She reports that she has never smoked. She has never used smokeless tobacco. She reports that she does not drink alcohol and does not use drugs. Medications     Current Discharge Medication List        CONTINUE these medications which have NOT CHANGED    Details   Silver sulfADIAZINE (SILVADENE EX) Apply topically To left buttock BID and prn      HYDROcodone-acetaminophen (NORCO) 5-325 MG per tablet Take 1 tablet by mouth every 4 hours as needed for Pain.       loperamide (IMODIUM) 2 MG capsule Take 2 mg by mouth every 12 hours as needed for Diarrhea      nystatin (MYCOSTATIN) 274096 UNIT/GM powder Apply topically 2 times daily Apply to groin area      pantoprazole (PROTONIX) 40 MG tablet Take 1 tablet by mouth every morning (before breakfast)  Qty: 30 tablet, Refills: 3      nystatin (MYCOSTATIN) 357403 UNIT/ML suspension Take 500,000 Units by mouth 4 times daily Swish and swallow      povidone-iodine (BETADINE) 10 % external solution Apply topically 2 times daily Apply topically as needed -paint L heel w/ betadine      LACTOBACILLUS PROBIOTIC PO Take 2 capsules by mouth daily      tamsulosin (FLOMAX) 0.4 MG capsule Take 1 capsule by mouth daily  Qty: 30 capsule, Refills: 3             Allergies     She is allergic to amoxapine and related, lisinopril, and clindamycin/lincomycin. Physical Exam     INITIAL VITALS: BP: 115/68, Temp: 97.8 °F (36.6 °C), Heart Rate: (!) 121, Resp: 18, SpO2: 100 %   Physical Exam    Vital signs and nursing documentation reviewed  General:   81-year-old female resting in bed acutely alert and interactive  HEENT: PERRL, Moist MM, atraumatic     Neck: Full range of motion, no tenderness     Pulmonary: Easy work of breathing, clear to auscultation bilaterally     Cardiovascular: Tachycardic sinus rhythm  Abdomen: Nontender nondistended      Musculoskeletal: No deformities erythema, significant tenderness to the bilateral lower extremities, no areas of focal swelling or induration no palpable cords, no chronic healing wounds, bilateral feet and boots and no new  Skin: Warm dry and well perfused, no rashes     Neuro: Alert and oriented ×4, moves all extremities symmetrically no focal neurologic deficits.      Psych: Appropriate mood and affect              Akira Box MD  Resident  01/10/23 [id number]

## 2023-01-07 NOTE — H&P
Hospital Medicine History & Physical      PCP: Sloan Araiza MD    Date of Admission: 1/7/2023    Date of Service: Pt seen/examined on 1/7/2023 and Admitted to Inpatient with expected LOS greater than two midnights due to medical therapy. Chief Complaint:    Chest pain    History Of Present Illness: This is an 82 yo female who was recently hospitalized for acute gi bleed 2/2 avm bleed that was cauterized/ she received 1 unit of prbc and was discharged to snf off and discharged off eliquis (was on it for a fib). She presents today w/ left sided chest pain. On my eval pt reports that her chest pain is gone. She knows she is in the hospital but does not know the course of events and is a poor historian. In the ed w/u revealed acute pe. She is being started on iv heparin.     Past Medical History:          Diagnosis Date    Acute deep vein thrombosis (DVT) of left lower extremity after procedure ([address] 10/03/2019    Post hip rx repair    Acute renal failure (ARF) ([address] 01/17/2020    Anterior corneal dystrophy     Asymptomatic postmenopausal status (age-related) (natural)     Cervical polyp     Dry eye syndrome     Edema     Fuch's endothelial dystrophy     Gout     Hip fracture requiring operative repair, left, closed, initial encounter ([facility] [address] 09/17/2019    IBS (irritable bowel syndrome)     Mild cognitive impairment     Osteoarthritis     Osteopenia     Papilloma of breast 05/2013    Pedal edema 05/08/2017    Screening mammogram     Symptomatic menopausal or female climacteric states 11/04/2013    Urinary retention 09/21/2019    Vaginal candidiasis        Past Surgical History:          Procedure Laterality Date    APPENDECTOMY      COLONOSCOPY N/A 12/17/2022    COLONOSCOPY ABLATION performed by Zeus Sylvester MD at [address] [facility]    HIP PINNING Left 9/18/2019    LEFT HIP GAMMA NAIL performed by Mega Julien MD at [facility] 12/17/2022    EGD DIAGNOSTIC ONLY performed by Erin Kraft MD at [facility] ENDOSCOPY       Medications Prior to Admission:      Prior to Admission medications    Medication Sig Start Date End Date Taking? Authorizing Provider   Silver sulfADIAZINE (SILVADENE EX) Apply topically To left buttock BID and prn   Yes Historical Provider, MD   HYDROcodone-acetaminophen (NORCO) 5-325 MG per tablet Take 1 tablet by mouth every 4 hours as needed for Pain. Yes Historical Provider, MD   loperamide (IMODIUM) 2 MG capsule Take 2 mg by mouth every 12 hours as needed for Diarrhea   Yes Historical Provider, MD   nystatin (MYCOSTATIN) 992019 UNIT/GM powder Apply topically 2 times daily Apply to groin area   Yes Historical Provider, MD   pantoprazole (PROTONIX) 40 MG tablet Take 1 tablet by mouth every morning (before breakfast) 12/21/22   George Summers MD   nystatin (MYCOSTATIN) 743865 UNIT/ML suspension Take 500,000 Units by mouth 4 times daily    Historical Provider, MD   povidone-iodine (BETADINE) 10 % external solution Apply topically 2 times daily Apply topically as needed -paint L heel w/ betadine    Historical Provider, MD   lidocaine 4 % external patch Place 1 patch onto the skin daily Apply one patch to each knee. Apply in AM and remove qHS  Patient not taking: Reported on 1/7/2023    Historical Provider, MD   LACTOBACILLUS PROBIOTIC PO Take 20 Billion Cells by mouth daily    Historical Provider, MD   tamsulosin (FLOMAX) 0.4 MG capsule Take 1 capsule by mouth daily 12/1/22   Lyudmila Pillai MD       Allergies:  Amoxapine and related, Lisinopril, and Clindamycin/lincomycin    Social History:      The patient currently lives at a snf    TOBACCO:   reports that she has never smoked. She has never used smokeless tobacco.  ETOH:   reports no history of alcohol use.   E-cigarette/Vaping       Questions Responses    E-cigarette/Vaping Use     Start Date     Passive Exposure     Quit Date     Counseling Given     Comments Family History:          Problem Relation Age of Onset    Breast Cancer Daughter         BRAC 2    Ovarian Cancer Daughter        REVIEW OF SYSTEMS COMPLETED:   Pertinent positives as noted in the HPI. All other systems reviewed and negative. PHYSICAL EXAM PERFORMED:    /68   Pulse 93   Temp 97.8 °F (36.6 °C) (Oral)   Resp 18   Wt 118 lb (53.5 kg)   SpO2 99%   BMI 21.58 kg/m²     General appearance:  No apparent distress, appears stated age and cooperative. HEENT:  Extra ocular muscles intact. Conjunctivae/corneas clear. Neck: Supple, with full range of motion. Respiratory:  Normal respiratory effort. Clear to auscultation, bilaterally without Rales/Wheezes/Rhonchi. Cardiovascular:  irregularly, irregular  Abdomen: Soft, non-tender  Musculoskeletal:  No clubbing, cyanosis or edema bilaterally. Reports tenderness on exam  Neurologic:  Neurovascularly intact without any focal sensory/motor deficits. Grossly non-focal.  Psychiatric:  Alert and oriented to place and person    Labs:     Recent Labs     01/07/23  1319   WBC 9.2   HGB 9.8*   HCT 31.2*   *     Recent Labs     01/07/23  1319 01/07/23  1401     --    K 5.3* 5.1     --    CO2 27  --    BUN 31*  --    CREATININE 1.3*  --    CALCIUM 8.7  --      Recent Labs     01/07/23  1319   AST 12*   ALT 8*   BILITOT 0.3   ALKPHOS 113     Recent Labs     01/07/23  1506   INR 1.12     Recent Labs     01/07/23  1319 01/07/23  1401   TROPONINI 0.04*  0.05* 0.06*       Urinalysis:      Lab Results   Component Value Date/Time    NITRU POSITIVE 12/15/2022 06:05 PM    WBCUA see below 12/15/2022 06:05 PM    BACTERIA 4+ 12/15/2022 06:05 PM    RBCUA >100 12/15/2022 06:05 PM    BLOODU LARGE 12/15/2022 06:05 PM    SPECGRAV 1.010 12/15/2022 06:05 PM    GLUCOSEU Negative 12/15/2022 06:05 PM       Radiology:       CT CHEST PULMONARY EMBOLISM W CONTRAST   Final Result      1.  Acute pulmonary embolism involving left lingular lobar and segmental branches. No evidence of right heart strain. 2. Small left pleural effusion. Bibasilar atelectasis. 3. Moderate to large hiatal hernia. Critical results reported to ER Dr Tavia Renee at 3:55 PM.      XR CHEST PORTABLE   Final Result      No acute pulmonary disease. Moderate to large hiatal hernia. Consults:    IP CONSULT TO HOSPITALIST  IP CONSULT TO HEMATOLOGY    ASSESSMENT and PLAN:    Active Hospital Problems    Acute pulmonary embolism without acute cor pulmonale, unspecified pulmonary embolism type ([address] [I26.99] 01/07/2023     Priority: Medium   Started on heparin iv in the ed. Will need to watch closely for gi bleeding. Follow H/H. Will also consult heme to help w/ anticoagulation in this setting. Obtain le dopplers to exclude dvt. Obtain echo. A fib RVR:  Likely 2/2 acute pe- treat pe w/ ac/ hold off ccb or bb as bp borderline low. Treat w/ fluids. ARF:  Creatinine on recent admission was 2- has been improving and now 1.3- cont to monitor I/O and renal fnx. Avoid nephrotoxins. LGIB:  2/2 avm- cauterized per gi on recent admission. Monitor H.H on anticoagulation. DVT Prophylaxis: Heparin gtt  Diet: No diet orders on file  Code Status: Prior    PT/OT Eval Status: will consult    Dispo - Inpatient  Back to snf on dc- likely 2-3 days. Kali Gutierrez MD    Thank you Carlie Bartlett MD for the opportunity to be involved in this patient's care. If you have any questions or concerns please feel free to contact me at [phone].

## 2023-01-07 NOTE — ED NOTES
Patient BIBA for lower left side chest pain. Aspirin administered at nursing facility. PIV placed using aseptic technique per hospital policy. Blood collected and sent to lab.       Sheryle Netter, RN  01/07/23 7899

## 2023-01-07 NOTE — ED PROVIDER NOTES
ED Attending Attestation Note     Date of evaluation: 1/7/2023    This patient was seen by the resident. I have seen and examined the patient, agree with the workup, evaluation, management and diagnosis. The care plan has been discussed. I have reviewed the ECG and concur with the resident's interpretation. My assessment reveals a somewhat frail-appearing elderly patient, pleasantly conversational, in no acute distress. She presents to the emergency department from her long-term care facility with complaints of chest pain in the left lower chest, which is present primarily with deep breaths and coughing. She was given aspirin at her skilled nursing facility prior to transport. At the time of my examination, the patient stated that her chest pain had resolved. However, the patient has a history of neoplastic disease, was significantly tachycardic on arrival, with pleuritic chest pain, and concern was high for pulmonary embolus. Moreover, she had recently been taken off of previous anticoagulation due to a lower GI bleed about 3 weeks ago. On my examination, she has no chest wall tenderness to palpation. She is mildly tachycardic, but regular. She has clear lungs. Laboratory evaluation shows mildly elevated and slightly increasing troponin, starting at 0.04, increasing to 0.06, although she has had similarly elevated troponins in the past, in the setting of atrial fibrillation with rapid ventricular response. No radiographic or clinical evidence of right heart strain. The patient is being initiated on heparin, and admitted for further management.        Karthikeyan Escobar MD  01/07/23 6639

## 2023-01-08 LAB
ANION GAP SERPL CALCULATED.3IONS-SCNC: 8 MMOL/L (ref 3–16)
ANTI-XA UNFRAC HEPARIN: 0.45 IU/ML (ref 0.3–0.7)
BUN BLDV-MCNC: 26 MG/DL (ref 7–20)
CALCIUM SERPL-MCNC: 8.1 MG/DL (ref 8.3–10.6)
CHLORIDE BLD-SCNC: 104 MMOL/L (ref 99–110)
CO2: 22 MMOL/L (ref 21–32)
CREAT SERPL-MCNC: 1.1 MG/DL (ref 0.6–1.2)
EKG ATRIAL RATE: 112 BPM
EKG DIAGNOSIS: NORMAL
EKG P AXIS: 80 DEGREES
EKG P-R INTERVAL: 164 MS
EKG Q-T INTERVAL: 320 MS
EKG QRS DURATION: 66 MS
EKG QTC CALCULATION (BAZETT): 436 MS
EKG R AXIS: -32 DEGREES
EKG T AXIS: 27 DEGREES
EKG VENTRICULAR RATE: 112 BPM
FERRITIN: 204.6 NG/ML (ref 15–150)
GFR SERPL CREATININE-BSD FRML MDRD: 49 ML/MIN/{1.73_M2}
GLUCOSE BLD-MCNC: 88 MG/DL (ref 70–99)
HCT VFR BLD CALC: 25.8 % (ref 36–48)
HCT VFR BLD CALC: 26.3 % (ref 36–48)
HCT VFR BLD CALC: 27.4 % (ref 36–48)
HCT VFR BLD CALC: 28.7 % (ref 36–48)
HEMOGLOBIN: 8 G/DL (ref 12–16)
HEMOGLOBIN: 8.7 G/DL (ref 12–16)
HEMOGLOBIN: 9 G/DL (ref 12–16)
IMMATURE RETIC FRACT: 0.55 (ref 0.21–0.37)
IRON SATURATION: 28 % (ref 15–50)
IRON: 38 UG/DL (ref 37–145)
MCH RBC QN AUTO: 30.7 PG (ref 26–34)
MCHC RBC AUTO-ENTMCNC: 31.1 G/DL (ref 31–36)
MCV RBC AUTO: 98.8 FL (ref 80–100)
PDW BLD-RTO: 16.2 % (ref 12.4–15.4)
PLATELET # BLD: 565 K/UL (ref 135–450)
PMV BLD AUTO: 6.3 FL (ref 5–10.5)
POTASSIUM SERPL-SCNC: 4.7 MMOL/L (ref 3.5–5.1)
RBC # BLD: 2.61 M/UL (ref 4–5.2)
REASON FOR REJECTION: NORMAL
REJECTED TEST: NORMAL
RETICULOCYTE ABSOLUTE COUNT: 0.04 M/UL (ref 0.02–0.1)
RETICULOCYTE COUNT PCT: 1.4 % (ref 0.5–2.18)
SODIUM BLD-SCNC: 134 MMOL/L (ref 136–145)
TOTAL IRON BINDING CAPACITY: 138 UG/DL (ref 260–445)
TROPONIN: 0.08 NG/ML
VITAMIN D 25-HYDROXY: 11.6 NG/ML
WBC # BLD: 7.6 K/UL (ref 4–11)

## 2023-01-08 PROCEDURE — 87086 URINE CULTURE/COLONY COUNT: CPT

## 2023-01-08 PROCEDURE — 6360000002 HC RX W HCPCS: Performed by: INTERNAL MEDICINE

## 2023-01-08 PROCEDURE — 85045 AUTOMATED RETICULOCYTE COUNT: CPT

## 2023-01-08 PROCEDURE — 2580000003 HC RX 258: Performed by: INTERNAL MEDICINE

## 2023-01-08 PROCEDURE — 6370000000 HC RX 637 (ALT 250 FOR IP): Performed by: INTERNAL MEDICINE

## 2023-01-08 PROCEDURE — 85014 HEMATOCRIT: CPT

## 2023-01-08 PROCEDURE — 85520 HEPARIN ASSAY: CPT

## 2023-01-08 PROCEDURE — 1200000000 HC SEMI PRIVATE

## 2023-01-08 PROCEDURE — 87186 SC STD MICRODIL/AGAR DIL: CPT

## 2023-01-08 PROCEDURE — 36415 COLL VENOUS BLD VENIPUNCTURE: CPT

## 2023-01-08 PROCEDURE — 83540 ASSAY OF IRON: CPT

## 2023-01-08 PROCEDURE — 84484 ASSAY OF TROPONIN QUANT: CPT

## 2023-01-08 PROCEDURE — 83550 IRON BINDING TEST: CPT

## 2023-01-08 PROCEDURE — 85018 HEMOGLOBIN: CPT

## 2023-01-08 PROCEDURE — 80048 BASIC METABOLIC PNL TOTAL CA: CPT

## 2023-01-08 PROCEDURE — 85027 COMPLETE CBC AUTOMATED: CPT

## 2023-01-08 PROCEDURE — 87077 CULTURE AEROBIC IDENTIFY: CPT

## 2023-01-08 PROCEDURE — 82306 VITAMIN D 25 HYDROXY: CPT

## 2023-01-08 PROCEDURE — 82728 ASSAY OF FERRITIN: CPT

## 2023-01-08 RX ORDER — VITAMIN B COMPLEX
2000 TABLET ORAL DAILY
Status: DISCONTINUED | OUTPATIENT
Start: 2023-01-08 | End: 2023-01-14 | Stop reason: HOSPADM

## 2023-01-08 RX ADMIN — NYSTATIN 500000 UNITS: 100000 SUSPENSION ORAL at 23:36

## 2023-01-08 RX ADMIN — TAMSULOSIN HYDROCHLORIDE 0.4 MG: 0.4 CAPSULE ORAL at 08:50

## 2023-01-08 RX ADMIN — Medication 2000 UNITS: at 11:43

## 2023-01-08 RX ADMIN — HYDROCODONE BITARTRATE AND ACETAMINOPHEN 1 TABLET: 5; 325 TABLET ORAL at 05:02

## 2023-01-08 RX ADMIN — IRON SUCROSE 200 MG: 20 INJECTION, SOLUTION INTRAVENOUS at 11:55

## 2023-01-08 RX ADMIN — HYDROCODONE BITARTRATE AND ACETAMINOPHEN 1 TABLET: 5; 325 TABLET ORAL at 18:13

## 2023-01-08 RX ADMIN — HYDROCODONE BITARTRATE AND ACETAMINOPHEN 1 TABLET: 5; 325 TABLET ORAL at 23:35

## 2023-01-08 RX ADMIN — HYDROCODONE BITARTRATE AND ACETAMINOPHEN 1 TABLET: 5; 325 TABLET ORAL at 08:50

## 2023-01-08 RX ADMIN — PANTOPRAZOLE SODIUM 40 MG: 40 TABLET, DELAYED RELEASE ORAL at 05:02

## 2023-01-08 RX ADMIN — HEPARIN SODIUM 18 UNITS/KG/HR: 5000 INJECTION, SOLUTION INTRAVENOUS; SUBCUTANEOUS at 19:30

## 2023-01-08 ASSESSMENT — PAIN DESCRIPTION - LOCATION
LOCATION: KNEE

## 2023-01-08 ASSESSMENT — PAIN SCALES - GENERAL
PAINLEVEL_OUTOF10: 0
PAINLEVEL_OUTOF10: 10
PAINLEVEL_OUTOF10: 1
PAINLEVEL_OUTOF10: 7
PAINLEVEL_OUTOF10: 7

## 2023-01-08 ASSESSMENT — PAIN DESCRIPTION - ONSET
ONSET: ON-GOING

## 2023-01-08 ASSESSMENT — PAIN DESCRIPTION - ORIENTATION
ORIENTATION: LEFT
ORIENTATION: LEFT
ORIENTATION: LEFT;RIGHT

## 2023-01-08 ASSESSMENT — PAIN DESCRIPTION - FREQUENCY
FREQUENCY: CONTINUOUS

## 2023-01-08 ASSESSMENT — PAIN DESCRIPTION - PAIN TYPE
TYPE: CHRONIC PAIN
TYPE: CHRONIC PAIN
TYPE: CHRONIC PAIN;ACUTE PAIN

## 2023-01-08 ASSESSMENT — PAIN DESCRIPTION - DESCRIPTORS
DESCRIPTORS: ACHING

## 2023-01-08 ASSESSMENT — PAIN - FUNCTIONAL ASSESSMENT
PAIN_FUNCTIONAL_ASSESSMENT: PREVENTS OR INTERFERES SOME ACTIVE ACTIVITIES AND ADLS

## 2023-01-08 NOTE — PROGRESS NOTES
A4 Eyes Admission Assessment     I agree as the admission nurse that 2 RN's have performed a thorough Head to Toe Skin Assessment on the patient. ALL assessment sites listed below have been assessed on admission. Areas assessed by both nurses: Rendall Gobble  [x]   Head, Face, and Ears   [x]   Shoulders, Back, and Chest  [x]   Arms, Elbows, and Hands   [x]   Coccyx, Sacrum, and Ischium  [x]   Legs, Feet, and Heels        Does the Patient have Skin Breakdown? Purple discolored area on bottom of Left foot. Unstageable to Left heel. Scabbing to Bilateral LE. Bruising to BLE. Top of Left foot linear purple dsicolored area. Posterior left knee with purple discolored areas, redness and bruising. Redness (blanchable) to Right elbow. Excoriation to angelica area and buttocks. Stage 2 to sacrum/coccyx. Dry and Flaky BLE.         Pelon Prevention initiated:  Yes   Wound Care Orders initiated:  Yes      [id number]  nurse consulted for Pressure Injury (Stage 3,4, Unstageable, DTI, NWPT, and Complex wounds) or Pelon score 18 or lower:  Yes      Nurse 1 eSignature: Electronically signed by Trevor Haley RN on 1/7/23 at 10:12 PM EST    Nurse 2 eSignature: Electronically signed by Ceferino Resendez RN on 1/7/23 at 10:17 PM EST

## 2023-01-08 NOTE — PLAN OF CARE
Problem: ABCDS Injury Assessment  Goal: Absence of physical injury  Outcome: Progressing     Problem: Skin/Tissue Integrity  Goal: Absence of new skin breakdown  Description: 1. Monitor for areas of redness and/or skin breakdown  2. Assess vascular access sites hourly  3. Every 4-6 hours minimum:  Change oxygen saturation probe site  4. Every 4-6 hours:  If on nasal continuous positive airway pressure, respiratory therapy assess nares and determine need for appliance change or resting period.   Outcome: Progressing     Problem: Respiratory - Adult  Goal: Achieves optimal ventilation and oxygenation  Outcome: Progressing     Problem: Cardiovascular - Adult  Goal: Maintains optimal cardiac output and hemodynamic stability  1/8/2023 1553 by Marielle Blake RN  Outcome: Progressing     Problem: Safety - Adult  Goal: Free from fall injury  1/8/2023 1553 by Marielle Blake RN  Outcome: Progressing     Problem: Pain  Goal: Verbalizes/displays adequate comfort level or baseline comfort level  1/8/2023 1553 by Marielle Blake RN  Outcome: Progressing

## 2023-01-08 NOTE — PROGRESS NOTES
Hospitalist Progress Note      PCP: Luis Manuel Patel MD    Date of Admission: 1/7/2023    Chief Complaint: chest pain      Subjective:       Left chest pain improved. She denies shortness of breath. She reports lightheadedness with rising fast.      Bedbound since left calcaneal fracture. She used to be in wheelchair and pivots to transfers, but since 11/2022 she has not been able to stand. Light hematuria. Her bowel movements are watery, usually once daily. She states that she does not eat much. She denies nausea, abdominal pain, dysuria. 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 Has recurrent urinary retention and recurrent UTI's. Medications:  Reviewed    Infusion Medications    heparin (PORCINE) Infusion 18 Units/kg/hr (01/07/23 1746)    sodium chloride       Scheduled Medications    pantoprazole  40 mg Oral QAM AC    nystatin  500,000 Units Oral 4x Daily    tamsulosin  0.4 mg Oral Daily    sodium chloride flush  5-40 mL IntraVENous 2 times per day     PRN Meds: heparin (porcine), heparin (porcine), HYDROcodone-acetaminophen, sodium chloride flush, sodium chloride, ondansetron **OR** ondansetron, polyethylene glycol, acetaminophen **OR** acetaminophen, loperamide, perflutren lipid microspheres      Intake/Output Summary (Last 24 hours) at 1/8/2023 1026  Last data filed at 1/8/2023 0448  Gross per 24 hour   Intake --   Output 500 ml   Net -500 ml       Physical Exam Performed:    /62   Pulse 73   Temp 98.2 °F (36.8 °C) (Oral)   Resp 18   Ht 5' 2\" (1.575 m)   Wt 104 lb 0.9 oz (47.2 kg)   SpO2 97%   BMI 19.03 kg/m²       GEN alert, in no distress  HEENT normocephalic, anicteric sclera, EOMI, mucosa moist, no stridor  NECK supple, trachea midline  CHEST  RESP on RA, in no distress, clear to auscultation  CARDS RRR, S1, S2, no murmurs, no edema, radial pulse 2+, DP pulse light.   ABD +BS, soft nontender   miranda with light pink urine  MSK tender on bilateral legs, no cyanosis, no clubbing  SKIN warm, dry  NEURO alert, oriented x 3, no facial asymmetry, no dysarthria, moving spontaneously  PSYCH normal mood        Labs:   Recent Labs     01/07/23  1319 01/07/23  1822 01/07/23  2303 01/08/23  0444   WBC 9.2 9.4  --  7.6   HGB 9.8* 9.1* 8.7* 8.0*   HCT 31.2* 27.2* 27.4* 25.8*   * 632*  --  565*     Recent Labs     01/07/23  1319 01/07/23  1401 01/08/23  0444     --  134*   K 5.3* 5.1 4.7     --  104   CO2 27  --  22   BUN 31*  --  26*   CREATININE 1.3*  --  1.1   CALCIUM 8.7  --  8.1*     Recent Labs     01/07/23  1319   AST 12*   ALT 8*   BILITOT 0.3   ALKPHOS 113     Recent Labs     01/07/23  1506   INR 1.12     Recent Labs     01/07/23  1319 01/07/23  1401   TROPONINI 0.04*  0.05* 0.06*       Urinalysis:      Lab Results   Component Value Date/Time    NITRU POSITIVE 12/15/2022 06:05 PM    WBCUA see below 12/15/2022 06:05 PM    BACTERIA 4+ 12/15/2022 06:05 PM    RBCUA >100 12/15/2022 06:05 PM    BLOODU LARGE 12/15/2022 06:05 PM    SPECGRAV 1.010 12/15/2022 06:05 PM    GLUCOSEU Negative 12/15/2022 06:05 PM       Radiology:  CT CHEST PULMONARY EMBOLISM W CONTRAST   Final Result      1. Acute pulmonary embolism involving left lingular lobar and segmental branches. No evidence of right heart strain. 2. Small left pleural effusion. Bibasilar atelectasis. 3. Moderate to large hiatal hernia. Critical results reported to ER Dr Van Cruz at 3:55 PM.      XR CHEST PORTABLE   Final Result      No acute pulmonary disease. Moderate to large hiatal hernia.                   VL Extremity Venous Bilateral    (Results Pending)           Assessment/Plan:    Active Hospital Problems    Diagnosis Date Noted    Acute septic pulmonary embolus without acute cor pulmonale (HCC) [I26.90] 01/07/2023     Priority: Medium    Acute pulmonary embolism without acute cor pulmonale, unspecified pulmonary embolism type ([facility] [I26.99] 01/07/2023     Priority: Medium     Acute Pulmonary Embolism involving Left Lingular Lobar and Segmental Branches  History of DVT, provoked  - CTA Chest 1/7/23 showed acute pulmonary embolism involving left lingular lobar and segmental branches. No evidence of right heart strain.  - Started on heparin iv in the ed. Will need to watch closely for gi bleeding.   - Venous US of LE  - ECHO  Monitor H.H on anticoagulation. Lightheadedness with rising rapidly.  - Her baseline SBP 90's-110's    Sinus Tachycardia  Elevated Troponin  - troponins 0.04->0. 06. NTproBNP 372 (previously 8905 on 11/24/22). - EKG showed sinus tachycardia, . Qtc 436 ms. Atrial Fibrillation  - EKG showed sinus tachycardia, . CKD Stage 3  - Adm BUN/Scr 31/1.3. Baseline Scr 1.2-1.3.  - Avoid nephrotoxins. Recurrent UTI's  Recurrent Urinary Retention  - miranda placed in 12/2022. Changed on 12/26 at SNF.  - UC    Cognitive Impairment     Recent Lower GIB likely from AVMs in 12/2022  EGD/Colonoscopy 12/17/22 showed   - Tubular esophagus was tortuous. - 7 cm hiatal hernia sliding-type, possibly with a component of paraesophageal hernia. Otherwise unremarkable stomach. - Duodenum unremarkable to the distal third portion.  - Terminal ileum is normal in the distal 3 cm.  - Single small cecal AVM, and a single small ascending colon AVM ablated with APC. -7 mm distal ascending colon AVM, which appears to be at risk for intermittent bleeding, ablated with APC. - Moderate left-sided diverticulosis with severely tortuous colon, likely anatomic variation.  - Small internal and external hemorrhoids, which do not appear high risk for bleeding. GI suspected th 7 mm ascending colonic AVM may have been the source of recent bleeding, though cannot rule out internal hemorrhoids. In addition, she obviously has a component of hematuria, and it is impossible for us to determine whether the bright red blood per rectum noted at the nursing home was actually hematuria.   Reasonable to start Eliquis from GI perspective in 1 week if indicated     Bilateral inguinal hernia, with small bowel on the right without obstruction per CT scan 2 days ago. Diverticulosis   Recommend High-fiber diet to keep stool stoft. History of HTN          DVT Prophylaxis: heparin gtt  Diet: ADULT DIET; Regular  Code Status: Full Code    PT/OT Eval Status: ordered    Dispo -  dc to SNF once stable.     Alfredo Booker MD

## 2023-01-09 ENCOUNTER — APPOINTMENT (OUTPATIENT)
Dept: VASCULAR LAB | Age: 88
DRG: 175 | End: 2023-01-09
Payer: MEDICARE

## 2023-01-09 LAB
ANION GAP SERPL CALCULATED.3IONS-SCNC: 11 MMOL/L (ref 3–16)
ANION GAP SERPL CALCULATED.3IONS-SCNC: 8 MMOL/L (ref 3–16)
ANTI-XA UNFRAC HEPARIN: 0.41 IU/ML (ref 0.3–0.7)
BUN BLDV-MCNC: 25 MG/DL (ref 7–20)
BUN BLDV-MCNC: 25 MG/DL (ref 7–20)
CALCIUM SERPL-MCNC: 8.4 MG/DL (ref 8.3–10.6)
CALCIUM SERPL-MCNC: 8.4 MG/DL (ref 8.3–10.6)
CHLORIDE BLD-SCNC: 101 MMOL/L (ref 99–110)
CHLORIDE BLD-SCNC: 101 MMOL/L (ref 99–110)
CO2: 23 MMOL/L (ref 21–32)
CO2: 24 MMOL/L (ref 21–32)
CREAT SERPL-MCNC: 1.1 MG/DL (ref 0.6–1.2)
CREAT SERPL-MCNC: 1.1 MG/DL (ref 0.6–1.2)
GFR SERPL CREATININE-BSD FRML MDRD: 49 ML/MIN/{1.73_M2}
GFR SERPL CREATININE-BSD FRML MDRD: 49 ML/MIN/{1.73_M2}
GLUCOSE BLD-MCNC: 102 MG/DL (ref 70–99)
GLUCOSE BLD-MCNC: 107 MG/DL (ref 70–99)
HCT VFR BLD CALC: 28.5 % (ref 36–48)
HEMOGLOBIN: 9.2 G/DL (ref 12–16)
MAGNESIUM: 1.7 MG/DL (ref 1.8–2.4)
MCH RBC QN AUTO: 30.9 PG (ref 26–34)
MCHC RBC AUTO-ENTMCNC: 32.2 G/DL (ref 31–36)
MCV RBC AUTO: 95.9 FL (ref 80–100)
PDW BLD-RTO: 16.1 % (ref 12.4–15.4)
PLATELET # BLD: 649 K/UL (ref 135–450)
PMV BLD AUTO: 6.4 FL (ref 5–10.5)
POTASSIUM SERPL-SCNC: 4.5 MMOL/L (ref 3.5–5.1)
POTASSIUM SERPL-SCNC: 6 MMOL/L (ref 3.5–5.1)
RBC # BLD: 2.97 M/UL (ref 4–5.2)
SODIUM BLD-SCNC: 133 MMOL/L (ref 136–145)
SODIUM BLD-SCNC: 135 MMOL/L (ref 136–145)
WBC # BLD: 10.4 K/UL (ref 4–11)

## 2023-01-09 PROCEDURE — 93970 EXTREMITY STUDY: CPT

## 2023-01-09 PROCEDURE — 2580000003 HC RX 258: Performed by: INTERNAL MEDICINE

## 2023-01-09 PROCEDURE — 80048 BASIC METABOLIC PNL TOTAL CA: CPT

## 2023-01-09 PROCEDURE — 6360000002 HC RX W HCPCS: Performed by: INTERNAL MEDICINE

## 2023-01-09 PROCEDURE — 83735 ASSAY OF MAGNESIUM: CPT

## 2023-01-09 PROCEDURE — 6370000000 HC RX 637 (ALT 250 FOR IP): Performed by: INTERNAL MEDICINE

## 2023-01-09 PROCEDURE — 36415 COLL VENOUS BLD VENIPUNCTURE: CPT

## 2023-01-09 PROCEDURE — 1200000000 HC SEMI PRIVATE

## 2023-01-09 PROCEDURE — 85520 HEPARIN ASSAY: CPT

## 2023-01-09 PROCEDURE — 85027 COMPLETE CBC AUTOMATED: CPT

## 2023-01-09 RX ORDER — MAGNESIUM SULFATE 1 G/100ML
1000 INJECTION INTRAVENOUS ONCE
Status: COMPLETED | OUTPATIENT
Start: 2023-01-09 | End: 2023-01-09

## 2023-01-09 RX ORDER — MAGNESIUM SULFATE IN WATER 40 MG/ML
2000 INJECTION, SOLUTION INTRAVENOUS ONCE
Status: COMPLETED | OUTPATIENT
Start: 2023-01-09 | End: 2023-01-09

## 2023-01-09 RX ADMIN — HEPARIN SODIUM 18 UNITS/KG/HR: 5000 INJECTION, SOLUTION INTRAVENOUS; SUBCUTANEOUS at 23:22

## 2023-01-09 RX ADMIN — NYSTATIN 500000 UNITS: 100000 SUSPENSION ORAL at 17:24

## 2023-01-09 RX ADMIN — Medication 2000 UNITS: at 09:08

## 2023-01-09 RX ADMIN — PANTOPRAZOLE SODIUM 40 MG: 40 TABLET, DELAYED RELEASE ORAL at 06:05

## 2023-01-09 RX ADMIN — IRON SUCROSE 200 MG: 20 INJECTION, SOLUTION INTRAVENOUS at 12:03

## 2023-01-09 RX ADMIN — HYDROCODONE BITARTRATE AND ACETAMINOPHEN 1 TABLET: 5; 325 TABLET ORAL at 20:50

## 2023-01-09 RX ADMIN — SODIUM CHLORIDE: 9 INJECTION, SOLUTION INTRAVENOUS at 17:22

## 2023-01-09 RX ADMIN — MAGNESIUM SULFATE HEPTAHYDRATE 1000 MG: 1 INJECTION, SOLUTION INTRAVENOUS at 20:28

## 2023-01-09 RX ADMIN — NYSTATIN 500000 UNITS: 100000 SUSPENSION ORAL at 13:43

## 2023-01-09 RX ADMIN — HYDROCODONE BITARTRATE AND ACETAMINOPHEN 1 TABLET: 5; 325 TABLET ORAL at 09:10

## 2023-01-09 RX ADMIN — MAGNESIUM SULFATE HEPTAHYDRATE 2000 MG: 40 INJECTION, SOLUTION INTRAVENOUS at 17:23

## 2023-01-09 RX ADMIN — SODIUM CHLORIDE: 9 INJECTION, SOLUTION INTRAVENOUS at 12:03

## 2023-01-09 RX ADMIN — NYSTATIN 500000 UNITS: 100000 SUSPENSION ORAL at 09:08

## 2023-01-09 ASSESSMENT — PAIN DESCRIPTION - ORIENTATION
ORIENTATION: RIGHT;LEFT

## 2023-01-09 ASSESSMENT — PAIN DESCRIPTION - LOCATION
LOCATION: LEG;CHEST
LOCATION: CHEST
LOCATION: CHEST
LOCATION: LEG;CHEST

## 2023-01-09 ASSESSMENT — PAIN SCALES - GENERAL
PAINLEVEL_OUTOF10: 0
PAINLEVEL_OUTOF10: 9
PAINLEVEL_OUTOF10: 10
PAINLEVEL_OUTOF10: 7
PAINLEVEL_OUTOF10: 4
PAINLEVEL_OUTOF10: 0

## 2023-01-09 ASSESSMENT — PAIN DESCRIPTION - FREQUENCY
FREQUENCY: CONTINUOUS

## 2023-01-09 ASSESSMENT — PAIN DESCRIPTION - PAIN TYPE
TYPE: ACUTE PAIN
TYPE: CHRONIC PAIN;ACUTE PAIN

## 2023-01-09 ASSESSMENT — PAIN DESCRIPTION - DESCRIPTORS
DESCRIPTORS: SHARP
DESCRIPTORS: SHARP
DESCRIPTORS: ACHING

## 2023-01-09 ASSESSMENT — PAIN DESCRIPTION - ONSET
ONSET: ON-GOING

## 2023-01-09 ASSESSMENT — PAIN - FUNCTIONAL ASSESSMENT
PAIN_FUNCTIONAL_ASSESSMENT: ACTIVITIES ARE NOT PREVENTED

## 2023-01-09 NOTE — PROGRESS NOTES
Pt refusing new specialty mattress, she states she is fine with the one she has. Education provided on wound management, turning, repositioning, and special mattresses. She is also refusing turns at this time as well. Pt has same miranda from prior to admit to hospital, order in place to collect urine culture, but pt is refusing a new miranda for exchange for proper urine specimen collection.

## 2023-01-09 NOTE — PLAN OF CARE
Currently refusing q2 turns and specialty bed. Education provided. Problem: Skin/Tissue Integrity  Goal: Absence of new skin breakdown  Description: 1. Monitor for areas of redness and/or skin breakdown  2. Assess vascular access sites hourly  3. Every 4-6 hours minimum:  Change oxygen saturation probe site  4. Every 4-6 hours:  If on nasal continuous positive airway pressure, respiratory therapy assess nares and determine need for appliance change or resting period.   1/9/2023 0259 by Mily Morelos RN  Outcome: Progressing     Remains on room air, denies SOB, lungs clear/diminished    Problem: Respiratory - Adult  Goal: Achieves optimal ventilation and oxygenation  1/9/2023 0259 by Mily Morelos RN  Outcome: Progressing     Pt remains NSR, denies CP or SOB    Problem: Cardiovascular - Adult  Goal: Maintains optimal cardiac output and hemodynamic stability  1/9/2023 0259 by Mily Morelos RN  Outcome: Progressing

## 2023-01-09 NOTE — PLAN OF CARE
Problem: Skin/Tissue Integrity  Goal: Absence of new skin breakdown  Description: 1. Monitor for areas of redness and/or skin breakdown  2. Assess vascular access sites hourly  3. Every 4-6 hours minimum:  Change oxygen saturation probe site  4. Every 4-6 hours:  If on nasal continuous positive airway pressure, respiratory therapy assess nares and determine need for appliance change or resting period.   1/9/2023 0748 by Cammie Campo RN  Outcome: Progressing  1/9/2023 0259 by Marylen Huddle, RN  Outcome: Progressing

## 2023-01-09 NOTE — PROGRESS NOTES
Around 16:30 patient had an approximately 14 second run of cardiac arrhythmia on telemetry. MD notified at bedside. Started 2mg Mag, labs obtained, cardio consult placed. VSS but patient does endorse brief chest tightness above baseline during this even that has since resolved. Lab called with critical potassium of 6.0 although specimen was hemolyzed.   Notified MD

## 2023-01-09 NOTE — PROGRESS NOTES
Comprehensive Nutrition Assessment    RECOMMENDATIONS:  PO Diet: continue regular diet  ONS: start Ensure TID, magic cup BID, Ander BID  Nutrition Education: Education not appropriate     NUTRITION ASSESSMENT:   Nutritional summary & status: P/I Unstageable to L Heel & P/I Stage II to buttocks and sacrum x 2. Increased pro needs r/t wound healing. Noted pt is bedbound per speech. Pt well known to RD d/t previous admissions. Pt was asleep and would not wake up for RD visit x 2 attempts. Pt is a poor historian, but was agreeable to ONS upon previous admit. D/t unhealing wounds, rec'd Ander BID w/ meals, along w/ Ensure TID & magic cup BID. PO intake inadeqaute in meeting needs @ 1-25% ave. RD following for intakes/wts/wounds/ONS accpetance. Admission/PMH: Admit d/t Acute pulmonary embolism. PMHx of HTN, malnutrition, IBS    MALNUTRITION ASSESSMENT  Context of Malnutrition: Acute Illness   Malnutrition Status:  Moderate malnutrition  Findings of the 6 clinical characteristics of malnutrition (Minimum of 2 out of 6 clinical characteristics is required to make the diagnosis of moderate or severe Protein Calorie Malnutrition based on AND/ASPEN Guidelines):  Energy Intake:  50% or less of estimated energy requirements for 5 or more days  Weight Loss:  No significant weight loss     Body Fat Loss:  Mild body fat loss Orbital, Buccal region   Muscle Mass Loss:  Mild muscle mass loss Temples (temporalis), Clavicles (pectoralis & deltoids), Hand (interosseous)  Fluid Accumulation:  No significant fluid accumulation     Strength:  Not Performed    NUTRITION DIAGNOSIS   Inadequate protein intake related to increase demand for energy/nutrients as evidenced by wounds    Nutrition Monitoring and Evaluation:   Food/Nutrient Intake Outcomes:  Food and Nutrient Intake, Supplement Intake  Physical Signs/Symptoms Outcomes:  Weight, Biochemical Data, Skin     OBJECTIVE DATA: Significant to nutrition assessment  Nutrition Related Findings: lbm pta. No edema. Na 134, BUN 26, GFR 49  Wounds: Pressure Injury, Stage II, Unstageable (x 3)  Nutrition Goals: PO intake 50% or greater, by next RD assessment     CURRENT NUTRITION THERAPIES  ADULT DIET; Regular  PO Intake: 1-25%   PO Supplement Intake:None Ordered  Additional Sources of Calories/IVF:N/A     ANTHROPOMETRICS  Current Height: 5' 2\" (157.5 cm)  Current Weight: 105 lb 9.6 oz (47.9 kg)    Admission weight: 118 lb (53.5 kg)  Ideal Body Weight (IBW): 110 lbs  (50 kg)    BMI: 19.4    COMPARATIVE STANDARDS  Energy (kcal):  0418-9380 (25-30 kcal/kg CBW)     Protein (g):  72-86 (1.5-1.8 g/kg CBW)       Fluid (mL/day):  1 ml/kcal or per MD discretion    The patient will be monitored per nutrition standards of care. Consult dietitian if additional nutrition interventions are needed prior to RD reassessment.      Tk Carroll, [address] [facility]:  [phone]  Office:  [phone]

## 2023-01-09 NOTE — PROGRESS NOTES
Hospitalist Progress Note      PCP: Daniela Estevez MD    Date of Admission: 1/7/2023    Chief Complaint: chest pain      Subjective:       About 16:30 patient woken up by left chest tightness 5/10, the pain she experienced when she came. She denies lightheadedness while lying but becomes lightheaded with rising which is not new for her. She denies shortness of breath, palpitations. She denies nausea, abdominal pain dysuria diarrhea. She had 2 smears of brown stools. The chest tightness lasted about 10 minutes then subsided to a 1/10 or almost resolved pain. On telemetry she was noted to have Torsade de Pointes vs other polymorphic VT, lasting 14.09 seconds. Yesterday Mg was 1.8.  so administered 2g magnesium sulfate. Also started metoprolol 12.5 mg BID. Consider amiodarone infusion as needed.     Medications:  Reviewed    Infusion Medications    heparin (PORCINE) Infusion 18 Units/kg/hr (01/08/23 1930)    sodium chloride 25 mL/hr at 01/09/23 1203     Scheduled Medications    Vitamin D  2,000 Units Oral Daily    iron sucrose  200 mg IntraVENous Q24H    pantoprazole  40 mg Oral QAM AC    nystatin  500,000 Units Oral 4x Daily    sodium chloride flush  5-40 mL IntraVENous 2 times per day     PRN Meds: heparin (porcine), heparin (porcine), HYDROcodone-acetaminophen, sodium chloride flush, sodium chloride, ondansetron **OR** ondansetron, polyethylene glycol, acetaminophen **OR** acetaminophen, loperamide, perflutren lipid microspheres      Intake/Output Summary (Last 24 hours) at 1/9/2023 1643  Last data filed at 1/9/2023 0012  Gross per 24 hour   Intake 240 ml   Output 750 ml   Net -510 ml         Physical Exam Performed:    /67   Pulse 95   Temp 97.5 °F (36.4 °C) (Oral)   Resp 19   Ht 5' 2\" (1.575 m)   Wt 105 lb 9.6 oz (47.9 kg)   SpO2 96%   BMI 19.31 kg/m²       GEN alert, in no distress  HEENT normocephalic, anicteric sclera, EOMI, mucosa moist, no stridor  NECK supple, trachea midline  CHEST  RESP on RA, in no distress, clear to auscultation  CARDS RRR, S1, S2, no murmurs, no edema, radial pulse 2+, DP pulse light. ABD +BS, soft nontender   miranda with light pink urine  MSK tender on bilateral legs, no cyanosis, no clubbing  SKIN warm, dry  NEURO alert, oriented x 3, no facial asymmetry, no dysarthria, moving spontaneously  PSYCH normal mood        Labs:   Recent Labs     01/07/23  1319 01/07/23  1822 01/07/23  2303 01/08/23  0444 01/08/23  1418   WBC 9.2 9.4  --  7.6  --    HGB 9.8* 9.1* 8.7* 8.0* 9.0*   HCT 31.2* 27.2* 27.4* 26.3*  25.8* 28.7*   * 632*  --  565*  --        Recent Labs     01/07/23  1319 01/07/23  1401 01/08/23  0444     --  134*   K 5.3* 5.1 4.7     --  104   CO2 27  --  22   BUN 31*  --  26*   CREATININE 1.3*  --  1.1   CALCIUM 8.7  --  8.1*       Recent Labs     01/07/23  1319   AST 12*   ALT 8*   BILITOT 0.3   ALKPHOS 113       Recent Labs     01/07/23  1506   INR 1.12       Recent Labs     01/07/23  1319 01/07/23  1401 01/08/23  0444   TROPONINI 0.04*  0.05* 0.06* 0.08*         Urinalysis:      Lab Results   Component Value Date/Time    NITRU POSITIVE 12/15/2022 06:05 PM    WBCUA see below 12/15/2022 06:05 PM    BACTERIA 4+ 12/15/2022 06:05 PM    RBCUA >100 12/15/2022 06:05 PM    BLOODU LARGE 12/15/2022 06:05 PM    SPECGRAV 1.010 12/15/2022 06:05 PM    GLUCOSEU Negative 12/15/2022 06:05 PM       Radiology:  VL Extremity Venous Bilateral         CT CHEST PULMONARY EMBOLISM W CONTRAST   Final Result      1. Acute pulmonary embolism involving left lingular lobar and segmental branches. No evidence of right heart strain. 2. Small left pleural effusion. Bibasilar atelectasis. 3. Moderate to large hiatal hernia. Critical results reported to ER Dr Alayna Swartz at 3:55 PM.      XR CHEST PORTABLE   Final Result      No acute pulmonary disease. Moderate to large hiatal hernia.                           Assessment/Plan:    C/Berta Dean 9154 Problems    Diagnosis Date Noted    Acute septic pulmonary embolus without acute cor pulmonale ([facility] [address] [I26.90] 01/07/2023     Priority: Medium    Acute pulmonary embolism without acute cor pulmonale, unspecified pulmonary embolism type ([facility] [address] [I26.99] 01/07/2023     Priority: Medium     Acute Pulmonary Embolism involving Left Lingular Lobar and Segmental Branches  History of DVT, provoked  - CTA Chest 1/7/23 showed acute pulmonary embolism involving left lingular lobar and segmental branches. No evidence of right heart strain.  - Started on heparin iv in the ed. Will need to watch closely for gi bleeding.   - Venous US of LE  - ECHO  Monitor H.H on anticoagulation. Lightheadedness with rising rapidly.  - Her baseline SBP 90's-110's    Torsade de Pointes   Left Chest Pain/Tightness  - presented with left chest pain. - on 1/9/23 about 1630, patient was woken up with left chest tightness 5/10 that lasted for about 10 minutes. -- on telemetry noted polymorphic VT with duration of 14.03 seconds -- Torsade de Pointes pattern. -- Mag sulf 2g administered based on Mg level of 1.8 and STAT labs ordered. -- Another mag sulf 1g administered when Mg level 1.8 for 1/9.  -- started metoprolol 12.5 mg BID. -- Place pacer pads on chest.  -- Consider amiodaone infusion if sustained > 30 seconds. Sinus Tachycardia  Elevated Troponin  - troponins 0.04->0. 06. NTproBNP 372 (previously 8905 on 11/24/22). - EKG on 1/7 showed sinus tachycardia, . Qtc 436 ms. Atrial Fibrillation  - EKG showed sinus tachycardia, . CKD Stage 3  - Adm BUN/Scr 31/1.3. Baseline Scr 1.2-1.3.  - Avoid nephrotoxins. Recurrent UTI's  Recurrent Urinary Retention  - miranda placed in 12/2022. Changed on 12/26 at SNF.  - UC    Cognitive Impairment     Recent Lower GIB likely from AVMs in 12/2022  EGD/Colonoscopy 12/17/22 showed   - Tubular esophagus was tortuous.   - 7 cm hiatal hernia sliding-type, possibly with a component of paraesophageal hernia. Otherwise unremarkable stomach. - Duodenum unremarkable to the distal third portion.  - Terminal ileum is normal in the distal 3 cm.  - Single small cecal AVM, and a single small ascending colon AVM ablated with APC. -7 mm distal ascending colon AVM, which appears to be at risk for intermittent bleeding, ablated with APC. - Moderate left-sided diverticulosis with severely tortuous colon, likely anatomic variation.  - Small internal and external hemorrhoids, which do not appear high risk for bleeding. GI suspected th 7 mm ascending colonic AVM may have been the source of recent bleeding, though cannot rule out internal hemorrhoids. In addition, she obviously has a component of hematuria, and it is impossible for us to determine whether the bright red blood per rectum noted at the nursing home was actually hematuria. Reasonable to start Eliquis from GI perspective in 1 week if indicated     Bilateral inguinal hernia, with small bowel on the right without obstruction per CT scan 2 days ago. Diverticulosis   Recommend High-fiber diet to keep stool stoft. History of HTN          DVT Prophylaxis: heparin gtt  Diet: ADULT DIET; Regular  ADULT ORAL NUTRITION SUPPLEMENT; Breakfast, Lunch, Dinner; Standard High Calorie/High Protein Oral Supplement  ADULT ORAL NUTRITION SUPPLEMENT; Breakfast, Lunch; Wound Healing Oral Supplement  ADULT ORAL NUTRITION SUPPLEMENT; Lunch, Dinner; Frozen Oral Supplement  Code Status: Full Code    PT/OT Eval Status: ordered    Dispo -  dc to SNF once stable.     Taras Castro MD

## 2023-01-09 NOTE — CARE COORDINATION
Case Management Assessment  Initial Evaluation    Date/Time of Evaluation: 1/9/2023 8:49 AM  Assessment Completed by: MARILYN Torres, TEMOW    If patient is discharged prior to next notation, then this note serves as note for discharge by case management. Patient Name: Huy Carlson                   YOB: 1935  Diagnosis: Acute septic pulmonary embolus without acute cor pulmonale ([address] [I26.90]  Acute pulmonary embolism without acute cor pulmonale, unspecified pulmonary embolism type ([address] [I26.99]  Single subsegmental pulmonary embolism without acute cor pulmonale ([address] [I26.93]                   Date / Time: 1/7/2023 12:42 PM    Patient Admission Status: Inpatient   Readmission Risk (Low < 19, Mod (19-27), High > 27): Readmission Risk Score: 23.3    Current PCP: Carlie Bartlett MD  PCP verified by CM? Chart Reviewed: Yes      History Provided by: Patient, Medical Record  Patient Orientation: Self    Patient Cognition: Alert    Hospitalization in the last 30 days (Readmission):  Yes    If yes, Readmission Assessment in CM Navigator will be completed.     Readmission Assessment  Number of Days since last admission?: 8-30 days  Previous Disposition: [address] [place] [address] ([facility])  Who is being Interviewed: Patient  What was the patient's/caregiver's perception as to why they think they needed to return back to the hospital?: Other (Comment) (new/alternative medical issue)  Did you visit your Primary Care Physician after you left the hospital, before you returned this time?: Yes  Did you see a specialist, such as Cardiac, Pulmonary, Orthopedic Physician, etc. after you left the hospital?: No  Who advised the patient to return to the hospital?: Caregiver  Does the patient report anything that got in the way of taking their medications?: No  In our efforts to provide the best possible care to you and others like you, can you think of anything that we could have done to help you after you left the hospital the first time, so that you might not have needed to return so soon?: Other (Comment) (NA)     Advance Directives:      Code Status: Full Code   Patient's Primary Decision Maker is: Named in Scanned ACP Document    Primary Decision Maker: Meme Diego Child - [phone]    Secondary Decision Maker: Kimberly Sánchez Child - [phone]    Discharge Planning:    Patient lives with: Alone Type of Home: Long-Term Care  Primary Care Giver:  (LTC staff)  Patient Support Systems include: Children   Current Financial resources:    Current community resources:    Current services prior to admission: Edgar Osorio            Current DME:              Type of Home Care services:  None    ADLS  Prior functional level:    Current functional level:      PT AM-PAC:   /24  OT AM-PAC:   /24    Family can provide assistance at DC: Would you like Case Management to discuss the discharge plan with any other family members/significant others, and if so, who? Plans to Return to Present Housing:    Other Identified Issues/Barriers to RETURNING to current housing: NA  Potential Assistance needed at discharge: N/A            Potential DME:    Patient expects to discharge to: Long-term care  Plan for transportation at discharge:      Financial    Payor: [employer] Fees / Plan: MEDICARE PART A AND B / Product Type: *No Product type* /     Does insurance require precert for SNF: No    Potential assistance Purchasing Medications: No  Meds-to-Beds request:        Patrick Palm [facility] - F [phone]  [address] [place] [address]. [facility] [address]  Phone: [phone] Fax: [phone]      Notes:    Factors facilitating achievement of predicted outcomes: Caregiver support and Pleasant    Barriers to discharge: Medical complications    Additional Case Management Notes:  Pt is from [facility], will return at DC.   Pt recently Dc'd from [facility] 12/20/22, and transitioned from skilled to long term care at [facility] at that time. CM spoke Aristeo Jaime in admissions [phone] to confirm return at [facility], bed hold. CM will continue to follow for DC needs and recs. The Plan for Transition of Care is related to the following treatment goals of Acute septic pulmonary embolus without acute cor pulmonale (HCC) [I26.90]  Acute pulmonary embolism without acute cor pulmonale, unspecified pulmonary embolism type ([address] [I26.99]  Single subsegmental pulmonary embolism without acute cor pulmonale ([address] [X92.14]    IF APPLICABLE: The Patient and/or patient representative Dru Goins and her family were provided with a choice of provider and agrees with the discharge plan. Freedom of choice list with basic dialogue that supports the patient's individualized plan of care/goals and shares the quality data associated with the providers was provided to: Patient   Patient Representative Name:       The Patient and/or Patient Representative Agree with the Discharge Plan?  Yes    MARILYN Calhoun, [place]  Case Management Department  Ph: [phone] Fax: [phone]

## 2023-01-09 NOTE — PROGRESS NOTES
Physical Therapy              Discharge     Pt is [facility] and was recently transitioned to LTC from SNF due to non participation with therapy. Currently bedbound per .  PT/OT not indicated at this time. Discharge from acute therapies.        Yvonne Orr PT  [address] [facility] OTR/L #[id number]

## 2023-01-10 LAB
ANION GAP SERPL CALCULATED.3IONS-SCNC: 12 MMOL/L (ref 3–16)
ANTI-XA UNFRAC HEPARIN: 0.44 IU/ML (ref 0.3–0.7)
BUN BLDV-MCNC: 25 MG/DL (ref 7–20)
CALCIUM SERPL-MCNC: 8.6 MG/DL (ref 8.3–10.6)
CHLORIDE BLD-SCNC: 99 MMOL/L (ref 99–110)
CO2: 23 MMOL/L (ref 21–32)
CREAT SERPL-MCNC: 1.2 MG/DL (ref 0.6–1.2)
GFR SERPL CREATININE-BSD FRML MDRD: 44 ML/MIN/{1.73_M2}
GLUCOSE BLD-MCNC: 99 MG/DL (ref 70–99)
HCT VFR BLD CALC: 27.6 % (ref 36–48)
HEMOGLOBIN: 8.5 G/DL (ref 12–16)
LV EF: 58 %
LVEF MODALITY: NORMAL
MAGNESIUM: 2.6 MG/DL (ref 1.8–2.4)
MCH RBC QN AUTO: 30.1 PG (ref 26–34)
MCHC RBC AUTO-ENTMCNC: 30.7 G/DL (ref 31–36)
MCV RBC AUTO: 98.2 FL (ref 80–100)
ORGANISM: ABNORMAL
PDW BLD-RTO: 16.2 % (ref 12.4–15.4)
PLATELET # BLD: 539 K/UL (ref 135–450)
PMV BLD AUTO: 6.6 FL (ref 5–10.5)
POTASSIUM SERPL-SCNC: 4.9 MMOL/L (ref 3.5–5.1)
RBC # BLD: 2.81 M/UL (ref 4–5.2)
SODIUM BLD-SCNC: 134 MMOL/L (ref 136–145)
TROPONIN: 0.04 NG/ML
TROPONIN: 0.05 NG/ML
URINE CULTURE, ROUTINE: ABNORMAL
WBC # BLD: 23.8 K/UL (ref 4–11)

## 2023-01-10 PROCEDURE — 2580000003 HC RX 258: Performed by: INTERNAL MEDICINE

## 2023-01-10 PROCEDURE — 83735 ASSAY OF MAGNESIUM: CPT

## 2023-01-10 PROCEDURE — 99223 1ST HOSP IP/OBS HIGH 75: CPT | Performed by: INTERNAL MEDICINE

## 2023-01-10 PROCEDURE — 85520 HEPARIN ASSAY: CPT

## 2023-01-10 PROCEDURE — 6370000000 HC RX 637 (ALT 250 FOR IP): Performed by: INTERNAL MEDICINE

## 2023-01-10 PROCEDURE — 85027 COMPLETE CBC AUTOMATED: CPT

## 2023-01-10 PROCEDURE — 36415 COLL VENOUS BLD VENIPUNCTURE: CPT

## 2023-01-10 PROCEDURE — 93306 TTE W/DOPPLER COMPLETE: CPT

## 2023-01-10 PROCEDURE — 80048 BASIC METABOLIC PNL TOTAL CA: CPT

## 2023-01-10 PROCEDURE — 84484 ASSAY OF TROPONIN QUANT: CPT

## 2023-01-10 PROCEDURE — 6360000002 HC RX W HCPCS: Performed by: INTERNAL MEDICINE

## 2023-01-10 PROCEDURE — 1200000000 HC SEMI PRIVATE

## 2023-01-10 RX ORDER — HEPARIN SODIUM 10000 [USP'U]/100ML
5-30 INJECTION, SOLUTION INTRAVENOUS CONTINUOUS
Status: ACTIVE | OUTPATIENT
Start: 2023-01-10 | End: 2023-01-10

## 2023-01-10 RX ORDER — HEPARIN SODIUM 10000 [USP'U]/100ML
5-30 INJECTION, SOLUTION INTRAVENOUS CONTINUOUS
Status: DISCONTINUED | OUTPATIENT
Start: 2023-01-11 | End: 2023-01-11

## 2023-01-10 RX ORDER — 0.9 % SODIUM CHLORIDE 0.9 %
500 INTRAVENOUS SOLUTION INTRAVENOUS ONCE
Status: COMPLETED | OUTPATIENT
Start: 2023-01-10 | End: 2023-01-10

## 2023-01-10 RX ORDER — HYDROCODONE BITARTRATE AND ACETAMINOPHEN 5; 325 MG/1; MG/1
0.5 TABLET ORAL EVERY 4 HOURS PRN
Status: DISCONTINUED | OUTPATIENT
Start: 2023-01-10 | End: 2023-01-11

## 2023-01-10 RX ORDER — HEPARIN SODIUM 10000 [USP'U]/100ML
5-30 INJECTION, SOLUTION INTRAVENOUS CONTINUOUS
Status: DISCONTINUED | OUTPATIENT
Start: 2023-01-11 | End: 2023-01-10

## 2023-01-10 RX ADMIN — APIXABAN 10 MG: 5 TABLET, FILM COATED ORAL at 14:07

## 2023-01-10 RX ADMIN — HYDROCODONE BITARTRATE AND ACETAMINOPHEN 0.5 TABLET: 5; 325 TABLET ORAL at 22:34

## 2023-01-10 RX ADMIN — Medication 2000 UNITS: at 08:50

## 2023-01-10 RX ADMIN — SODIUM CHLORIDE: 9 INJECTION, SOLUTION INTRAVENOUS at 15:32

## 2023-01-10 RX ADMIN — IRON SUCROSE 200 MG: 20 INJECTION, SOLUTION INTRAVENOUS at 12:00

## 2023-01-10 RX ADMIN — SODIUM CHLORIDE 500 ML: 9 INJECTION, SOLUTION INTRAVENOUS at 10:36

## 2023-01-10 RX ADMIN — PANTOPRAZOLE SODIUM 40 MG: 40 TABLET, DELAYED RELEASE ORAL at 05:37

## 2023-01-10 RX ADMIN — HYDROCODONE BITARTRATE AND ACETAMINOPHEN 1 TABLET: 5; 325 TABLET ORAL at 05:37

## 2023-01-10 RX ADMIN — HYDROCODONE BITARTRATE AND ACETAMINOPHEN 1 TABLET: 5; 325 TABLET ORAL at 01:23

## 2023-01-10 RX ADMIN — NYSTATIN 500000 UNITS: 100000 SUSPENSION ORAL at 08:47

## 2023-01-10 RX ADMIN — HYDROCODONE BITARTRATE AND ACETAMINOPHEN 0.5 TABLET: 5; 325 TABLET ORAL at 18:34

## 2023-01-10 RX ADMIN — HYDROCODONE BITARTRATE AND ACETAMINOPHEN 1 TABLET: 5; 325 TABLET ORAL at 10:30

## 2023-01-10 RX ADMIN — VANCOMYCIN HYDROCHLORIDE 750 MG: 10 INJECTION, POWDER, LYOPHILIZED, FOR SOLUTION INTRAVENOUS at 15:33

## 2023-01-10 ASSESSMENT — PAIN - FUNCTIONAL ASSESSMENT
PAIN_FUNCTIONAL_ASSESSMENT: ACTIVITIES ARE NOT PREVENTED

## 2023-01-10 ASSESSMENT — PAIN SCALES - GENERAL
PAINLEVEL_OUTOF10: 10
PAINLEVEL_OUTOF10: 4

## 2023-01-10 ASSESSMENT — PAIN DESCRIPTION - ORIENTATION
ORIENTATION: RIGHT;LEFT

## 2023-01-10 ASSESSMENT — PAIN DESCRIPTION - PAIN TYPE
TYPE: ACUTE PAIN;CHRONIC PAIN
TYPE: ACUTE PAIN

## 2023-01-10 ASSESSMENT — PAIN DESCRIPTION - LOCATION
LOCATION: KNEE;CHEST
LOCATION: CHEST;HIP
LOCATION: KNEE
LOCATION: CHEST
LOCATION: CHEST

## 2023-01-10 ASSESSMENT — PAIN DESCRIPTION - DESCRIPTORS
DESCRIPTORS: DISCOMFORT
DESCRIPTORS: ACHING
DESCRIPTORS: SORE
DESCRIPTORS: SHARP

## 2023-01-10 ASSESSMENT — PAIN DESCRIPTION - ONSET: ONSET: ON-GOING

## 2023-01-10 ASSESSMENT — PAIN DESCRIPTION - FREQUENCY: FREQUENCY: CONTINUOUS

## 2023-01-10 NOTE — PROGRESS NOTES
Clinical Pharmacy Progress Note  Medication History     Admit Date: 1/7/2023    List of of current medications patient is taking is complete. Home Medication list in EPIC updated to reflect changes noted below. Source of information: medication list from [facility]; list dated 1/7/23    Changes made to medication list:   Medications removed: (include reason, ex: therapy completed, patient no longer taking, etc.)  Lidocaine patch - not on med list from facility     Current Outpatient Medications   Medication Instructions    HYDROcodone-acetaminophen (NORCO) 5-325 MG per tablet 1 tablet, Oral, EVERY 4 HOURS PRN    LACTOBACILLUS PROBIOTIC PO 2 capsules, Oral, DAILY    loperamide (IMODIUM) 2 mg, Oral, EVERY 12 HOURS PRN    nystatin (MYCOSTATIN) 940443 UNIT/GM powder Topical, 2 TIMES DAILY, Apply to groin area    nystatin (MYCOSTATIN) 500,000 Units, Oral, 4 TIMES DAILY, Swish and swallow    pantoprazole (PROTONIX) 40 mg, Oral, DAILY BEFORE BREAKFAST    povidone-iodine (BETADINE) 10 % external solution Topical, 2 TIMES DAILY, Apply topically as needed -paint L heel w/ betadine    Silver sulfADIAZINE (SILVADENE EX) Apply externally, To left buttock BID and prn    tamsulosin (FLOMAX) 0.4 mg, Oral, DAILY       Please call with any questions.   Merlin Isle, PharmD, [facility]  Wireless: [phone]   1/10/2023 10:51 AM

## 2023-01-10 NOTE — CONSULTS
Clinical Pharmacy Progress Note    Vancomycin - Management by Pharmacy    Consult Date(s): 1/10  Consulting Provider(s): Dr. Ruslan Mesa / Plan  UTI - Vancomycin  Concurrent Antimicrobials: n/a  Day of Vanc Therapy / Ordered Duration: #1 of 10  Current Dosing Method: Bayesian-Guided AUC Dosing  Therapeutic Goal: AUC < 500 mg/L*hr  Current Dose / Plan: Will begin vancomycin 750mg IV q24h. Regimen predicts AUC of 497 mg/L*h with steady-state trough of 16.2 mg/L. Plan to check vancomycin level in next ~24-36h to confirm kinetic estimates. Will continue to monitor clinical condition and make adjustments to regimen as appropriate. Thank you for consulting Pharmacy! Please call with questions--  Basilia Libman, PharmD, BCPS  Wireless: [phone]   1/10/2023 1:37 PM        Subjective/Objective:   Huy Carlson is a 81 y.o. 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 CT chest 1/7 showed acute PE. Currently admitted with acute PE, episode of torsades/polymorphic VTach; sinus tacycardia; also with hx of CKD3. Pt has hx of recurrent UTIs - urine Cx from 1/7 growing 75k Staph aureus (sensitivities pending). Started on IV vancomycin 1/10. Pharmacy is consulted to dose Vancomycin. Ht Readings from Last 1 Encounters:   01/07/23 5' 2\" (1.575 m)     Wt Readings from Last 1 Encounters:   01/10/23 106 lb 0.7 oz (48.1 kg)     Current & Prior Antimicrobial Regimen(s):  Vancomycin - Pharmacy to dose  750mg IV q24h (1/10-current)    Vancomycin Level(s) / Doses:    Date Time Dose Type of Level / Level Interpretation                 Note: Serum levels collected for AUC-based dosing may be high if collected in close proximity to the dose administered. This is not necessarily indicative of toxicity.     Cultures & Sensitivities:    Date Site Micro Susceptibility / Result   1/8 Urine  75k Staph aureus Pending Recent Labs     01/07/23  1822 01/08/23  0444 01/08/23  0444 01/09/23  1701 01/09/23  1925 01/10/23  0517   CREATININE  --  1.1   < > 1.1 1.1 1.2   BUN  --  26*   < > 25* 25* 25*   WBC 9.4 7.6  --  10.4  --   --     < > = values in this interval not displayed. Estimated Creatinine Clearance: 26 mL/min (based on SCr of 1.2 mg/dL). Additional Lab Values / Findings of Note:    No results for input(s): PROCAL in the last 72 hours.

## 2023-01-10 NOTE — PLAN OF CARE
Problem: ABCDS Injury Assessment  Goal: Absence of physical injury  Outcome: Progressing     Problem: Skin/Tissue Integrity  Goal: Absence of new skin breakdown  Description: 1. Monitor for areas of redness and/or skin breakdown  2. Assess vascular access sites hourly  3. Every 4-6 hours minimum:  Change oxygen saturation probe site  4. Every 4-6 hours:  If on nasal continuous positive airway pressure, respiratory therapy assess nares and determine need for appliance change or resting period. Outcome: Progressing     Problem: Respiratory - Adult  Goal: Achieves optimal ventilation and oxygenation  Outcome: Progressing     Problem: Cardiovascular - Adult  Goal: Maintains optimal cardiac output and hemodynamic stability  Outcome: Progressing     Problem: Safety - Adult  Goal: Free from fall injury  Outcome: Progressing- proper safety measures were in place to prevent the patient from having a fall. Problem: Pain  Goal: Verbalizes/displays adequate comfort level or baseline comfort level  Outcome: Progressing- patient verbalized when they were in pain and when comfort measures were effective.

## 2023-01-10 NOTE — CONSULTS
[facility]  Cardiology Inpatient Consult Service                                                                                          Pt Name: Tiffany Sexton  Age: 81 y.o. Sex: female  : 1935  Location: [facility]    Referring Physician: Geraldo Pavon MD    Primary Cardiologist: Dr Allyn Stockton    Reason for Consultation/Chief Complaint: Torsades vs other polymorphic VT      HPI      Tiffany Sexton is a 81 y.o. female with a medical history significant for Afib on elequis, GERD, and CKD  who presented to the hospital with complaint of chest pain. She described her chest pain as left sided with associated tightness and rated it a 5/10. She states is was primarily with deep breaths and coughing. She was given aspirin at her nursing facility. By the time she got to the ED the chest pain had resolved. She had recently been taking off her anticoagulation due to GI bleed and she was found to have an acute PE involving the left lingular lobe and segmental branches with no evidence of right heart strain. Her initial troponins were 0.04 and increased to 0.06    Patient's most recent echo on 2020 showed an EF of 55-60%, mild to moderate mitral regurgitation, and moderate tricuspid regurgitation. On  her rhythm strip showed possible V. tach/torsades at 16: 32 and 22: 39. She was given 2 g of magnesium and return to sinus rhythm. Previous results:  Echo: 2020 showed an EF of 55-60%, mild to moderate mitral regurgitation, and moderate tricuspid regurgitation. She denies chest pain, chest pressure/discomfort, dyspnea, exertional chest pressure/discomfort, irregular heart beat, and palpitations.       Histories     Past Medical History:   has a past medical history of Acute deep vein thrombosis (DVT) of left lower extremity after procedure ([address] Acute renal failure (ARF) (HCC), Anterior corneal dystrophy, Asymptomatic postmenopausal status (age-related) (natural), Cervical polyp, Dry eye syndrome, Edema, Fuch's endothelial dystrophy, Gout, Hip fracture requiring operative repair, left, closed, initial encounter ([facility] [address] IBS (irritable bowel syndrome), Mild cognitive impairment, Osteoarthritis, Osteopenia, Papilloma of breast, Pedal edema, Screening mammogram, Symptomatic menopausal or female climacteric states, Urinary retention, and Vaginal candidiasis. Surgical History:   has a past surgical history that includes Appendectomy; eye surgery; hip pinning (Left, 9/18/2019); Upper gastrointestinal endoscopy (N/A, 12/17/2022); and Colonoscopy (N/A, 12/17/2022). Social History:   reports that she has never smoked. She has never used smokeless tobacco. She reports that she does not drink alcohol and does not use drugs. Family History:  No evidence for sudden cardiac death or premature CAD. Medications     Home Medications  Prior to Admission medications    Medication Sig Start Date End Date Taking? Authorizing Provider   Silver sulfADIAZINE (SILVADENE EX) Apply topically To left buttock BID and prn   Yes Historical Provider, MD   HYDROcodone-acetaminophen (NORCO) 5-325 MG per tablet Take 1 tablet by mouth every 4 hours as needed for Pain.    Yes Historical Provider, MD   loperamide (IMODIUM) 2 MG capsule Take 2 mg by mouth every 12 hours as needed for Diarrhea   Yes Historical Provider, MD   nystatin (MYCOSTATIN) 381453 UNIT/GM powder Apply topically 2 times daily Apply to groin area   Yes Historical Provider, MD   pantoprazole (PROTONIX) 40 MG tablet Take 1 tablet by mouth every morning (before breakfast) 12/21/22   Dwayne Kevin MD   nystatin (MYCOSTATIN) 891441 UNIT/ML suspension Take 500,000 Units by mouth 4 times daily    Historical Provider, MD   povidone-iodine (BETADINE) 10 % external solution Apply topically 2 times daily Apply topically as needed -paint L heel w/ betadine    Historical Provider, MD   lidocaine 4 % external patch Place 1 patch onto the skin daily Apply one patch to each knee. Apply in AM and remove qHS  Patient not taking: Reported on 1/7/2023    Historical Provider, MD   LACTOBACILLUS PROBIOTIC PO Take 20 Billion Cells by mouth daily    Historical Provider, MD   tamsulosin (FLOMAX) 0.4 MG capsule Take 1 capsule by mouth daily 12/1/22   Wallace Brennan MD          Inpatient Medications:   sodium chloride  500 mL IntraVENous Once    metoprolol tartrate  12.5 mg Oral BID    Vitamin D  2,000 Units Oral Daily    iron sucrose  200 mg IntraVENous Q24H    pantoprazole  40 mg Oral QAM AC    nystatin  500,000 Units Oral 4x Daily    sodium chloride flush  5-40 mL IntraVENous 2 times per day       IV drips:   heparin (PORCINE) Infusion 18 Units/kg/hr (01/09/23 2322)    sodium chloride 25 mL/hr at 01/09/23 1722       PRN:  heparin (porcine), heparin (porcine), HYDROcodone-acetaminophen, sodium chloride flush, sodium chloride, ondansetron **OR** ondansetron, polyethylene glycol, acetaminophen **OR** acetaminophen, loperamide, perflutren lipid microspheres    Allergy     Amoxapine and related, Lisinopril, and Clindamycin/lincomycin       Review of Systems     Pertinent positives identified in the HPI, all other review of symptoms negative as below. CONSTITUTIONAL: No unanticipated weight loss, change in energy level, sleep pattern, or activity level. SKIN: No rash or pruritis. EYES: No visual changes or diplopia. ENT: No hearing loss or tinnitus. No mouth sores or sore throat. CARDIOVASCULAR: No chest pain/chest pressure/chest discomfort or palpitations. RESPIRATORY: No dyspnea, cough, wheezing, sputum production, or hematemesis. GASTROINTESTINAL: No nausea, vomiting, diarrhea, constipation, melena, hematochezia, abdominal pain, or appetite loss. GENITOURINARY: No dysuria, trouble voiding, or hematuria. MUSCULOSKELETAL:  No gait disturbance, weakness, or joint complaints.   NEUROLOGICAL: No headache, diplopia, change in muscle strength, numbness or tingling. No change in gait, balance, coordination, mood, affect, memory, mentation, behavior. PSYCH: No anxiety, loss of interest, change in sexual behavior, feelings of self-harm, or confusion. ENDOCRINE: No malaise, fatigue or temperature intolerance. No excessive thirst, fluid intake, or urination. No tremor. HEMATOLOGIC: No abnormal bruising or bleeding. ALLERGY: No nasal congestion or hives. Physical Examination     Vitals:    01/10/23 0434 01/10/23 0717 01/10/23 0722 01/10/23 0847   BP: 108/66  (!) 104/55 (!) 97/59   Pulse: 94  97 91   Resp: 18  17    Temp: 98.3 °F (36.8 °C)  98.2 °F (36.8 °C)    TempSrc: Oral  Oral    SpO2: 95%  94%    Weight:  106 lb 0.7 oz (48.1 kg)     Height:           Wt Readings from Last 3 Encounters:   01/10/23 106 lb 0.7 oz (48.1 kg)   12/17/22 112 lb 14 oz (51.2 kg)   12/01/22 135 lb 9.3 oz (61.5 kg)       Objective:  General Appearance:  Comfortable and well-appearing. Vital signs: (most recent): Blood pressure (!) 97/59, pulse 91, temperature 98.2 °F (36.8 °C), temperature source Oral, resp. rate 17, height 5' 2\" (1.575 m), weight 106 lb 0.7 oz (48.1 kg), SpO2 94 %, not currently breastfeeding. Vital signs are normal.  No fever. Output: Producing urine and producing stool. HEENT: Normal HEENT exam.    Lungs:  Normal effort and normal respiratory rate. Breath sounds clear to auscultation. Heart: Normal rate. Irregular rhythm. Abdomen: Abdomen is soft and flat. Bowel sounds are normal.   There is no abdominal tenderness. Extremities: Normal range of motion. Pulses: Distal pulses are intact. Neurological: Patient is alert and oriented to person, place and time. Normal strength. Pupils:  Pupils are equal, round, and reactive to light. Skin:  Warm and dry.         Labs     Recent Labs     01/07/23  1319 01/07/23  1401 01/08/23  0444 01/09/23  1701 01/09/23  1925 01/10/23  0517     --  134* 135* 133* 134*   K 5.3* 5.1 4.7 6.0* 4.5 4.9 BUN 31*  --  26* 25* 25* 25*   CREATININE 1.3*  --  1.1 1.1 1.1 1.2     --  104 101 101 99   CO2 27  --  22 23 24 23   GLUCOSE 114*  --  88 107* 102* 99   CALCIUM 8.7  --  8.1* 8.4 8.4 8.6   MG  --   --   --  1.70*  --  2.60*     Recent Labs     01/07/23  1319 01/07/23  1822 01/07/23  2303 01/08/23  0444 01/08/23  1418 01/09/23  1701   WBC 9.2 9.4  --  7.6  --  10.4   HGB 9.8* 9.1* 8.7* 8.0* 9.0* 9.2*   HCT 31.2* 27.2* 27.4* 26.3*  25.8* 28.7* 28.5*   * 632*  --  565*  --  649*   MCV 96.5 94.9  --  98.8  --  95.9     No results for input(s): CHOLTOT, TRIG, HDL, CHOLHDL, LDL in the last 72 hours. Invalid input(s): [employer], [address]  Recent Labs     01/07/23  1506   INR 1.12     Recent Labs     01/07/23  1319 01/07/23  1401 01/08/23  0444 01/10/23  0018 01/10/23  0517   TROPONINI 0.04*  0.05* 0.06* 0.08* 0.04* 0.05*     Recent Labs     01/07/23  1401   PROBNP 372     No results for input(s): TSH in the last 72 hours. No results for input(s): CHOL, HDL, LDLCALC, TRIG in the last 72 hours.]    Lab Results   Component Value Date    CKTOTAL 16 (L) 12/16/2022    TROPONINI 0.05 (H) 01/10/2023         Assessment & Plan   Concern for V. tach/torsades  -Her rhythm strips on 1/9 and 16:32 and 22:39 had concern for possible V. tach and torsades. Reviewed the rhythm strips and this is just likely due to artifact and no concern for V. tach or torsades  -Recommend continuing her metoprolol 12.5 twice daily today. Would recommend switching her to Toprol 25 mg daily tomorrow to help avoid her going into A. fib with RVR  -Patient has been hypotensive so we gave a 500 bolus to help with her BP. Recommend continuing beta-blocker  -Replete electrolytes as needed  -Continue on telemetry  -2D echo to    Acute PE  -Currently being managed by the primary team  -On heparin drip. Would recommend transitioning to oral anticoagulation  -Monitor H&H closely    3.   Elevated troponins  -Patient troponins 0.04 and increased to 0.05. She appears to be chronically elevated at this range      Thank you for allowing to us to participate in the care or Marlene Pugh. Further evaluation will be based upon the patient's clinical course and testing results. Leda Rico DO PGY-1    Patient discussed with Dr. Kelvin Feldman MD.    Nati Orozco note by resident/student was edited based on my personal history and exam of the patient. I have personally reviewed the reports and images of labs, radiological studies, cardiac studies including ECG's and telemetry, current and old medical records. The note was completed using EMR. Every effort was made to ensure accuracy; however, inadvertent computerized transcription errors may be present. Marlene Pugh is a 81 y.o. 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 Echo 01/2020: Normal LV, EF 60%, indeterminate diastolic function due to A. fib, normal RV, mild to moderate MR, moderate TR. No recent ischemic evaluation     Patient presented to the emergency room on 1/7 with left-sided pleuritic chest pain. CTA chest revealed an acute left lobular and segmental PE. He was admitted and was placed on heparin drip. During admission patient was on telemetry which suggested an episode of VT, therefore cardiology was consulted. Telemetry was personally reviewed since admission. Patient appears to be in stable sinus rhythm with a couple of episodes of artifacts, no tachyarrhythmias were identified. ECG consistent with sinus tachycardia 112 bpm, no ischemia. Assessment and plan:  1. Acute PE. Patient is currently on heparin drip and will need oral anticoagulation going home. 2.  History of PAF, currently in sinus rhythm  3. 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 4.  History of hematuria. 5.  Chronic HFpEF.   There is no acute exacerbation this admission. 6.  Abnormal telemetry. There is no evidence of tachyarrhythmia on telemetry (VT, V. fib, A. fib RVR) on personal review. She has a couple of episodes of artifacts.        -Echo is pending to assess RV in the setting of acute PE  - We will give normal saline 500 mill over 2 hours for low BP this morning and to be able to tolerate beta-blockers  - Continue with Lopressor 12.5 mg p.o. twice daily today and transition to Toprol 25 mg p.o. daily tomorrow for prevention of A. fib. - Patient will likely need to be on oral anticoagulation in the setting of acute PE; however she runs the risk of recurrent GI bleeding and/or hematuria. If echo unremarkable, patient may be discharged to home (if ok with primary team) tomorrow am on the above meds and follow up with me in a few weeks. Please call me with any questions. I have personally reviewed the reports and images of labs, radiological studies, cardiac studies including ECG's and telemetry, current and old medical records. The note was completed using EMR and Dragon dictation system. Every effort was made to ensure accuracy; however, inadvertent computerized transcription errors may be present. All questions and concerns were addressed to the patient/family. Alternatives to my treatment were discussed. I would like to thank you for providing me the opportunity to participate in the care of your patient. If you have any questions, please do not hesitate to contact me.      Edgar Castillo MD, [facility] [place], [address]  The [address]  [address] [address]  Ph: [phone]  Fax: [phone]

## 2023-01-10 NOTE — PLAN OF CARE
Problem: Skin/Tissue Integrity  Goal: Absence of new skin breakdown  Description: 1. Monitor for areas of redness and/or skin breakdown  2. Assess vascular access sites hourly  3. Every 4-6 hours minimum:  Change oxygen saturation probe site  4. Every 4-6 hours:  If on nasal continuous positive airway pressure, respiratory therapy assess nares and determine need for appliance change or resting period.   1/10/2023 1225 by Melanie Mclean RN  Outcome: Progressing  1/10/2023 0518 by Ange Egan RN  Outcome: Progressing

## 2023-01-10 NOTE — PROGRESS NOTES
Patient with new blood draining from miranda catheter. Eliquis was started at 1400. No clots are witnessed at this time. MD notified. MD ordered to hold eliquis tonight as she will restart heparin later, and obtain type and screen.

## 2023-01-10 NOTE — CARE COORDINATION
CM spoke with admissions at [facility], they are concerned about goals of care d/t pt refusing most care (miranda care, turns for wound offloading). MD aware, stated they will order palliative to assess.

## 2023-01-11 ENCOUNTER — APPOINTMENT (OUTPATIENT)
Dept: GENERAL RADIOLOGY | Age: 88
DRG: 175 | End: 2023-01-11
Payer: MEDICARE

## 2023-01-11 ENCOUNTER — APPOINTMENT (OUTPATIENT)
Dept: INTERVENTIONAL RADIOLOGY/VASCULAR | Age: 88
DRG: 175 | End: 2023-01-11
Payer: MEDICARE

## 2023-01-11 LAB
ALBUMIN SERPL-MCNC: 1.7 G/DL (ref 3.4–5)
ALP BLD-CCNC: 108 U/L (ref 40–129)
ALT SERPL-CCNC: <5 U/L (ref 10–40)
ANION GAP SERPL CALCULATED.3IONS-SCNC: 10 MMOL/L (ref 3–16)
ANTI-XA UNFRAC HEPARIN: >1.1 IU/ML (ref 0.3–0.7)
APTT: 42.8 SEC (ref 23–34.3)
APTT: 55.2 SEC (ref 23–34.3)
APTT: 82.6 SEC (ref 23–34.3)
APTT: >180 SEC (ref 23–34.3)
AST SERPL-CCNC: 7 U/L (ref 15–37)
BASOPHILS ABSOLUTE: 0 K/UL (ref 0–0.2)
BASOPHILS RELATIVE PERCENT: 0.2 %
BILIRUB SERPL-MCNC: 0.3 MG/DL (ref 0–1)
BILIRUBIN DIRECT: <0.2 MG/DL (ref 0–0.3)
BILIRUBIN, INDIRECT: ABNORMAL MG/DL (ref 0–1)
BUN BLDV-MCNC: 24 MG/DL (ref 7–20)
CALCIUM SERPL-MCNC: 8.3 MG/DL (ref 8.3–10.6)
CHLORIDE BLD-SCNC: 100 MMOL/L (ref 99–110)
CO2: 22 MMOL/L (ref 21–32)
CREAT SERPL-MCNC: 1.1 MG/DL (ref 0.6–1.2)
EOSINOPHILS ABSOLUTE: 0.4 K/UL (ref 0–0.6)
EOSINOPHILS RELATIVE PERCENT: 2.8 %
GFR SERPL CREATININE-BSD FRML MDRD: 49 ML/MIN/{1.73_M2}
GLUCOSE BLD-MCNC: 95 MG/DL (ref 70–99)
HCT VFR BLD CALC: 26.6 % (ref 36–48)
HEMOGLOBIN: 7.8 G/DL (ref 12–16)
LACTIC ACID: 1 MMOL/L (ref 0.4–2)
LYMPHOCYTES ABSOLUTE: 1.2 K/UL (ref 1–5.1)
LYMPHOCYTES RELATIVE PERCENT: 8.1 %
MAGNESIUM: 2.3 MG/DL (ref 1.8–2.4)
MCH RBC QN AUTO: 30.8 PG (ref 26–34)
MCHC RBC AUTO-ENTMCNC: 29.4 G/DL (ref 31–36)
MCV RBC AUTO: 104.8 FL (ref 80–100)
MONOCYTES ABSOLUTE: 0.8 K/UL (ref 0–1.3)
MONOCYTES RELATIVE PERCENT: 5.4 %
NEUTROPHILS ABSOLUTE: 12.8 K/UL (ref 1.7–7.7)
NEUTROPHILS RELATIVE PERCENT: 83.5 %
PDW BLD-RTO: 17.3 % (ref 12.4–15.4)
PLATELET # BLD: 486 K/UL (ref 135–450)
PMV BLD AUTO: 6 FL (ref 5–10.5)
POTASSIUM SERPL-SCNC: 4.3 MMOL/L (ref 3.5–5.1)
PROCALCITONIN: 0.2 NG/ML (ref 0–0.15)
RBC # BLD: 2.54 M/UL (ref 4–5.2)
SODIUM BLD-SCNC: 132 MMOL/L (ref 136–145)
TOTAL PROTEIN: 5.7 G/DL (ref 6.4–8.2)
VANCOMYCIN RANDOM: 7.9 UG/ML
WBC # BLD: 15.3 K/UL (ref 4–11)

## 2023-01-11 PROCEDURE — 37191 INS ENDOVAS VENA CAVA FILTR: CPT

## 2023-01-11 PROCEDURE — 87040 BLOOD CULTURE FOR BACTERIA: CPT

## 2023-01-11 PROCEDURE — 80076 HEPATIC FUNCTION PANEL: CPT

## 2023-01-11 PROCEDURE — 85520 HEPARIN ASSAY: CPT

## 2023-01-11 PROCEDURE — 6360000002 HC RX W HCPCS: Performed by: INTERNAL MEDICINE

## 2023-01-11 PROCEDURE — 2580000003 HC RX 258: Performed by: INTERNAL MEDICINE

## 2023-01-11 PROCEDURE — 83605 ASSAY OF LACTIC ACID: CPT

## 2023-01-11 PROCEDURE — 80202 ASSAY OF VANCOMYCIN: CPT

## 2023-01-11 PROCEDURE — 6360000002 HC RX W HCPCS

## 2023-01-11 PROCEDURE — 6360000004 HC RX CONTRAST MEDICATION: Performed by: RADIOLOGY

## 2023-01-11 PROCEDURE — 6370000000 HC RX 637 (ALT 250 FOR IP): Performed by: INTERNAL MEDICINE

## 2023-01-11 PROCEDURE — 99452 NTRPROF PH1/NTRNET/EHR RFRL: CPT | Performed by: INTERNAL MEDICINE

## 2023-01-11 PROCEDURE — 85730 THROMBOPLASTIN TIME PARTIAL: CPT

## 2023-01-11 PROCEDURE — 84145 PROCALCITONIN (PCT): CPT

## 2023-01-11 PROCEDURE — 2709999900 HC NON-CHARGEABLE SUPPLY

## 2023-01-11 PROCEDURE — C1894 INTRO/SHEATH, NON-LASER: HCPCS

## 2023-01-11 PROCEDURE — 36415 COLL VENOUS BLD VENIPUNCTURE: CPT

## 2023-01-11 PROCEDURE — 1200000000 HC SEMI PRIVATE

## 2023-01-11 PROCEDURE — 06H03DZ INSERTION OF INTRALUMINAL DEVICE INTO INFERIOR VENA CAVA, PERCUTANEOUS APPROACH: ICD-10-PCS | Performed by: RADIOLOGY

## 2023-01-11 PROCEDURE — C1880 VENA CAVA FILTER: HCPCS

## 2023-01-11 PROCEDURE — 2500000003 HC RX 250 WO HCPCS

## 2023-01-11 PROCEDURE — 83735 ASSAY OF MAGNESIUM: CPT

## 2023-01-11 PROCEDURE — 71046 X-RAY EXAM CHEST 2 VIEWS: CPT

## 2023-01-11 PROCEDURE — C1769 GUIDE WIRE: HCPCS

## 2023-01-11 PROCEDURE — 85025 COMPLETE CBC W/AUTO DIFF WBC: CPT

## 2023-01-11 PROCEDURE — 80048 BASIC METABOLIC PNL TOTAL CA: CPT

## 2023-01-11 RX ORDER — POLYETHYLENE GLYCOL 3350 17 G/17G
17 POWDER, FOR SOLUTION ORAL DAILY
Status: DISCONTINUED | OUTPATIENT
Start: 2023-01-11 | End: 2023-01-14 | Stop reason: HOSPADM

## 2023-01-11 RX ORDER — HYDROCODONE BITARTRATE AND ACETAMINOPHEN 5; 325 MG/1; MG/1
1 TABLET ORAL EVERY 4 HOURS PRN
Status: DISCONTINUED | OUTPATIENT
Start: 2023-01-11 | End: 2023-01-14 | Stop reason: HOSPADM

## 2023-01-11 RX ORDER — SODIUM CHLORIDE, SODIUM LACTATE, POTASSIUM CHLORIDE, CALCIUM CHLORIDE 600; 310; 30; 20 MG/100ML; MG/100ML; MG/100ML; MG/100ML
INJECTION, SOLUTION INTRAVENOUS CONTINUOUS
Status: DISCONTINUED | OUTPATIENT
Start: 2023-01-11 | End: 2023-01-13

## 2023-01-11 RX ORDER — DOCUSATE SODIUM 100 MG/1
100 CAPSULE, LIQUID FILLED ORAL 2 TIMES DAILY
Status: DISCONTINUED | OUTPATIENT
Start: 2023-01-11 | End: 2023-01-14 | Stop reason: HOSPADM

## 2023-01-11 RX ADMIN — HYDROCODONE BITARTRATE AND ACETAMINOPHEN 1 TABLET: 5; 325 TABLET ORAL at 21:04

## 2023-01-11 RX ADMIN — PANTOPRAZOLE SODIUM 40 MG: 40 TABLET, DELAYED RELEASE ORAL at 06:46

## 2023-01-11 RX ADMIN — SODIUM CHLORIDE, POTASSIUM CHLORIDE, SODIUM LACTATE AND CALCIUM CHLORIDE: 600; 310; 30; 20 INJECTION, SOLUTION INTRAVENOUS at 09:49

## 2023-01-11 RX ADMIN — SODIUM CHLORIDE, PRESERVATIVE FREE 10 ML: 5 INJECTION INTRAVENOUS at 21:05

## 2023-01-11 RX ADMIN — IOHEXOL 30 ML: 350 INJECTION, SOLUTION INTRAVENOUS at 15:06

## 2023-01-11 RX ADMIN — HYDROCODONE BITARTRATE AND ACETAMINOPHEN 1 TABLET: 5; 325 TABLET ORAL at 04:18

## 2023-01-11 RX ADMIN — Medication 2000 UNITS: at 07:56

## 2023-01-11 RX ADMIN — HYDROCODONE BITARTRATE AND ACETAMINOPHEN 1 TABLET: 5; 325 TABLET ORAL at 16:55

## 2023-01-11 RX ADMIN — VANCOMYCIN HYDROCHLORIDE 750 MG: 10 INJECTION, POWDER, LYOPHILIZED, FOR SOLUTION INTRAVENOUS at 12:51

## 2023-01-11 RX ADMIN — HYDROCODONE BITARTRATE AND ACETAMINOPHEN 1 TABLET: 5; 325 TABLET ORAL at 12:38

## 2023-01-11 RX ADMIN — HYDROCODONE BITARTRATE AND ACETAMINOPHEN 1 TABLET: 5; 325 TABLET ORAL at 08:33

## 2023-01-11 RX ADMIN — Medication 18 UNITS/KG/HR: at 02:01

## 2023-01-11 RX ADMIN — NYSTATIN 500000 UNITS: 100000 SUSPENSION ORAL at 18:04

## 2023-01-11 RX ADMIN — SODIUM CHLORIDE, PRESERVATIVE FREE 10 ML: 5 INJECTION INTRAVENOUS at 07:57

## 2023-01-11 ASSESSMENT — PAIN DESCRIPTION - LOCATION
LOCATION: KNEE;CHEST

## 2023-01-11 ASSESSMENT — PAIN - FUNCTIONAL ASSESSMENT
PAIN_FUNCTIONAL_ASSESSMENT: ACTIVITIES ARE NOT PREVENTED
PAIN_FUNCTIONAL_ASSESSMENT: PREVENTS OR INTERFERES SOME ACTIVE ACTIVITIES AND ADLS

## 2023-01-11 ASSESSMENT — PAIN DESCRIPTION - ORIENTATION
ORIENTATION: RIGHT;LEFT
ORIENTATION: LEFT;RIGHT
ORIENTATION: RIGHT;LEFT
ORIENTATION: RIGHT;LEFT

## 2023-01-11 ASSESSMENT — PAIN SCALES - GENERAL
PAINLEVEL_OUTOF10: 10
PAINLEVEL_OUTOF10: 0
PAINLEVEL_OUTOF10: 10
PAINLEVEL_OUTOF10: 3
PAINLEVEL_OUTOF10: 10
PAINLEVEL_OUTOF10: 10

## 2023-01-11 ASSESSMENT — PAIN DESCRIPTION - DESCRIPTORS
DESCRIPTORS: SHARP
DESCRIPTORS: ACHING

## 2023-01-11 ASSESSMENT — PAIN DESCRIPTION - FREQUENCY: FREQUENCY: INTERMITTENT

## 2023-01-11 ASSESSMENT — PAIN DESCRIPTION - ONSET: ONSET: GRADUAL

## 2023-01-11 ASSESSMENT — PAIN DESCRIPTION - PAIN TYPE: TYPE: CHRONIC PAIN

## 2023-01-11 NOTE — CARE COORDINATION
TRAVIS spoke with pt and family at bedside- pt plans to return to [facility] at discharge. CM informed pt and family that [facility] had concerns about accepting her back. Pt states she will allow miranda exchange tomorrow. CM told pt she will call [facility] to inform them of pt's wishes. TRAVIS called [facility] in admissions- stated they will have to have medical director review case prior to accepting pt back. [facility] stated she will call tomorrow to update.      TRAVIS updated Dr. Derik Jacinto    Electronically signed by Dorothy Ortiz RN, BSN, CM on 1/11/2023 at 5:17 PM

## 2023-01-11 NOTE — H&P
Patient:  Ian Alicea   :   1935      Relevant patient history reviewed and discussed. The procedure including risks and benefits was discussed at length with the patient (or designated family member) and all questions were answered. Informed consent to proceed with the procedure was given. Condition : stable    [facility] nurses notes reviewed and agreed. Medications reviewed. Allergies: Allergies   Allergen Reactions    Amoxapine And Related Diarrhea    Lisinopril Swelling     Swelling tongue.      Clindamycin/Lincomycin

## 2023-01-11 NOTE — PROCEDURES
IR Brief Postoperative Note    Ian Alicea  YOB: 1935  [id number]    Pre-operative Diagnosis: dvt, hematuria, PE    Post-operative Diagnosis: Same    Procedure: IVC filter placement    Anesthesia: local    Surgeons/Assistants: cristy    Estimated Blood Loss: Minimal    Complications: none    Specimens: were not obtained    See full procedure dictation to follow      Kristin Valentino MD MD  1/11/2023

## 2023-01-11 NOTE — PROGRESS NOTES
Pt. returned from cath lab. The site looks clean dry and intact. No complications following procedure.   Electronically signed by Vianney Murray RN on 1/11/2023 at 15:37 PM.

## 2023-01-11 NOTE — PROGRESS NOTES
Alena Smith is a 81 y.o. 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 Echo 01/2020: Normal LV, EF 60%, indeterminate diastolic function due to A. fib, normal RV, mild to moderate MR, moderate TR. No recent ischemic evaluation      Patient presented to the emergency room on 1/7 with left-sided pleuritic chest pain. CTA chest revealed an acute left lobular and segmental PE. He was admitted and was placed on heparin drip. During admission patient was on telemetry which suggested an episode of VT, therefore cardiology was consulted. Telemetry was personally reviewed since admission. Patient appears to be in stable sinus rhythm with a couple of episodes of artifacts, no tachyarrhythmias were identified. ECG consistent with sinus tachycardia 112 bpm, no ischemia. Echo 1/10/22: Normal LV, EF 38%, grade 2 diastolic dysfunction, mild aortic stenosis, RV could not be well visualized (on personal review, RV is normal size with normal function), normal IVC, mild TR, RVSP 38 (3). Interval history:  BP remains low 80/50 mmHg. Lopressor could not be given due to low BP. CODE STATUS has been changed to DNR. Patient remains off supplemental oxygen. Albumin 1.7, hemoglobin 7.8 down from 9, WBC up at 17 with neutrophilic predominance. Patient is on LR 75 ml/hr and empiric IV antibiotics. Heparin drip has been discontinued due to severe anemia. Assessment and plan:  1. Acute PE. Coagulation has been stopped due to severe anemia. Echo shows only mild hypertension with no cor pulmonale. 2.  History of PAF, currently in sinus rhythm patient is unable to tolerate beta-blockers due to low BP.  3.  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 4.  History of hematuria. 5.  Chronic HFpEF. There is no acute exacerbation this admission.   6.  Abnormal telemetry. There is no evidence of tachyarrhythmia on telemetry (VT, V. fib, A. fib RVR) on personal review. She had a couple of episodes of artifacts. 7.  Rule out sepsis. Blood cultures were drawn and patient is on IV antibiotics. Patient has history of recurrent UTI with MDRO organisms.           -Will stop BB.   - No AC due to drop in Hb  - IV abxs per primary team.           There are no further recommendations at this time and hence we will now sign off. Patient may follow up in our clinic once discharged from the hospital for further cardiac care. Thank you for the consult and please do not hesitate to contact me with any questions.      Yohana Gomez MD, [facility] [place], [address]  The [address]  [address] [address] [place] [address]  Ph: [phone]  Fax: [phone]

## 2023-01-11 NOTE — PROGRESS NOTES
Hospitalist Progress Note      PCP: Mateo Joiner MD    Date of Admission: 1/7/2023    Chief Complaint: chest pain      Subjective:     She feels fine today. After switching to apixaban we noticed dark cherry red urine output. Miranda still draining urine. We are monitoring for blood clots. She has not have recurrence of chest pain during day. She reports lightheadedness, not all the time, on rising. She denies shortness of breath. She denies nausea, abdominal pain dysuria, and diarrhea. Medications:  Reviewed    Infusion Medications    heparin (PORCINE) Infusion 18 Units/kg/hr (01/11/23 0201)    sodium chloride 25 mL/hr at 01/10/23 1532     Scheduled Medications    vancomycin  750 mg IntraVENous Q24H    metoprolol tartrate  12.5 mg Oral BID    Vitamin D  2,000 Units Oral Daily    pantoprazole  40 mg Oral QAM AC    nystatin  500,000 Units Oral 4x Daily    sodium chloride flush  5-40 mL IntraVENous 2 times per day     PRN Meds: HYDROcodone-acetaminophen, heparin (porcine), heparin (porcine), sodium chloride flush, sodium chloride, ondansetron **OR** ondansetron, polyethylene glycol, acetaminophen **OR** acetaminophen, loperamide, perflutren lipid microspheres      Intake/Output Summary (Last 24 hours) at 1/11/2023 0217  Last data filed at 1/10/2023 2326  Gross per 24 hour   Intake 780 ml   Output 1325 ml   Net -545 ml         Physical Exam Performed:    BP (!) 87/50   Pulse 89   Temp 98 °F (36.7 °C) (Oral)   Resp 22   Ht 5' 2\" (1.575 m)   Wt 106 lb 0.7 oz (48.1 kg)   SpO2 94%   BMI 19.40 kg/m²       GEN alert, in no distress  HEENT normocephalic, anicteric sclera, EOMI, mucosa moist, no stridor  NECK supple, trachea midline  CHEST  RESP on RA, in no distress, clear to auscultation  CARDS RRR, S1, S2, no murmurs, no edema, radial pulse 2+, DP pulse light. ABD +BS, soft nontender   miranda with dark cherry urine, small amounts of red cells.   MSK tender on bilateral legs, no cyanosis, no clubbing  SKIN warm, dry  NEURO alert, oriented x 3, no facial asymmetry, no dysarthria, moving spontaneously  PSYCH normal mood        Labs:   Recent Labs     01/08/23  0444 01/08/23  1418 01/09/23  1701 01/10/23  1626   WBC 7.6  --  10.4 23.8*   HGB 8.0* 9.0* 9.2* 8.5*   HCT 26.3*  25.8* 28.7* 28.5* 27.6*   *  --  649* 539*       Recent Labs     01/09/23  1701 01/09/23  1925 01/10/23  0517   * 133* 134*   K 6.0* 4.5 4.9    101 99   CO2 23 24 23   BUN 25* 25* 25*   CREATININE 1.1 1.1 1.2   CALCIUM 8.4 8.4 8.6       No results for input(s): AST, ALT, BILIDIR, BILITOT, ALKPHOS in the last 72 hours. No results for input(s): INR in the last 72 hours. Recent Labs     01/08/23  0444 01/10/23  0018 01/10/23  0517   TROPONINI 0.08* 0.04* 0.05*         Urinalysis:      Lab Results   Component Value Date/Time    NITRU POSITIVE 12/15/2022 06:05 PM    WBCUA see below 12/15/2022 06:05 PM    BACTERIA 4+ 12/15/2022 06:05 PM    RBCUA >100 12/15/2022 06:05 PM    BLOODU LARGE 12/15/2022 06:05 PM    SPECGRAV 1.010 12/15/2022 06:05 PM    GLUCOSEU Negative 12/15/2022 06:05 PM       Radiology:  VL Extremity Venous Bilateral   Final Result      CT CHEST PULMONARY EMBOLISM W CONTRAST   Final Result      1. Acute pulmonary embolism involving left lingular lobar and segmental branches. No evidence of right heart strain. 2. Small left pleural effusion. Bibasilar atelectasis. 3. Moderate to large hiatal hernia. Critical results reported to ER Dr Almeida Given at 3:55 PM.      XR CHEST PORTABLE   Final Result      No acute pulmonary disease. Moderate to large hiatal hernia.                           Assessment/Plan:    Active Hospital Problems    Diagnosis Date Noted    Acute septic pulmonary embolus without acute cor pulmonale ([address] [I26.90] 01/07/2023     Priority: Medium    Acute pulmonary embolism without acute cor pulmonale, unspecified pulmonary embolism type ([facility] [address] [I26.99] 01/07/2023     Priority: Medium Acute Pulmonary Embolism involving Left Lingular Lobar and Segmental Branches  Acute DVT in Right Gastrocnemius  History of DVT, provoked  - CTA Chest 1/7/23 showed acute pulmonary embolism involving left lingular lobar and segmental branches. No evidence of right heart strain.  - Started on heparin iv in the ed. Will need to watch closely for gi bleeding.   - Venous US of LE 1/10 showed acute deep venous thrombosis in a single right gastrocnemius vein without deep or proximal extension.  - ECHO1/10/23 showed normal LV size, normal LV wall thickness, LVEF 55-60%, no regional wall motion abnormalities. Grade 2 diastolic dysfunction. Mild MR. Mild AAS. Mild to moderate TR. LA mildly dilated. - Monitor H.H on anticoagulation. Lightheadedness with rising rapidly.  - Her baseline SBP 90's-110's    Torsade de Pointes vs Polymorphic VT vs Artifact  Left Chest Pain/Tightness  - presented with left chest pain. - on 1/9/23 about 1630, patient was woken up with left chest tightness 5/10 that lasted for about 10 minutes. -- on telemetry noted polymorphic VT with duration of 14.03 seconds -- Torsade de Pointes pattern. -- Mag sulf 2g administered based on Mg level of 1.8 and STAT labs ordered. -- Another mag sulf 1g administered when Mg level 1.8 for 1/9.  -- started metoprolol 12.5 mg BID. -- Consider amiodaone infusion if sustained > 30 seconds. -- discussed goals of care with patient and her son in law, who is a physician. Patient states that she would like to continue with medical therapy but does not want to proceed with aggressive procedures or shocking. Sinus Tachycardia  Elevated Troponin  - troponins 0.04->0. 06. NTproBNP 372 (previously 8905 on 11/24/22). - EKG on 1/7 showed sinus tachycardia, . Qtc 436 ms. Atrial Fibrillation  - EKG showed sinus tachycardia, . CKD Stage 3  - Adm BUN/Scr 31/1.3. Baseline Scr 1.2-1.3.  - Avoid nephrotoxins.     Hematuria  - noted 1/10 in the afternoon after transitioning to apixaban 10 mg BID from heparin infusion. She received first dose of apixaban and about 2-3 hours after demonstrated bleeding. Recurrent UTI's  Recurrent Urinary Retention  History of MDRO Infections  - miranda placed in 12/2022. Changed on 12/26 at SNF.  - Lact UC 12/15/22 grew Morganella morganii MDR sensitive to cefepime. - UC 1/8 grew MRSA. - Started IV vancomycin (1/10-). Leukocytosis  - new rise in WBC to 23.8K. - UC 1/8 grew MRSA. - CXR  - Blood cultures. - Started IV vancomycin (1/10-). Cognitive Impairment     Recent Lower GIB likely from AVMs in 12/2022  EGD/Colonoscopy 12/17/22 showed   - Tubular esophagus was tortuous. - 7 cm hiatal hernia sliding-type, possibly with a component of paraesophageal hernia. Otherwise unremarkable stomach. - Duodenum unremarkable to the distal third portion.  - Terminal ileum is normal in the distal 3 cm.  - Single small cecal AVM, and a single small ascending colon AVM ablated with APC. -7 mm distal ascending colon AVM, which appears to be at risk for intermittent bleeding, ablated with APC. - Moderate left-sided diverticulosis with severely tortuous colon, likely anatomic variation.  - Small internal and external hemorrhoids, which do not appear high risk for bleeding. GI suspected th 7 mm ascending colonic AVM may have been the source of recent bleeding, though cannot rule out internal hemorrhoids. In addition, she obviously has a component of hematuria, and it is impossible for us to determine whether the bright red blood per rectum noted at the nursing home was actually hematuria. Reasonable to start Eliquis from GI perspective in 1 week if indicated     Bilateral inguinal hernia, with small bowel on the right without obstruction per CT scan 2 days ago. Diverticulosis   Recommend High-fiber diet to keep stool stoft. History of HTN          DVT Prophylaxis: heparin gtt  Diet: ADULT DIET;  Regular  ADULT ORAL NUTRITION SUPPLEMENT; Breakfast, Lunch, Dinner; Standard High Calorie/High Protein Oral Supplement  ADULT ORAL NUTRITION SUPPLEMENT; Breakfast, Lunch; Wound Healing Oral Supplement  ADULT ORAL NUTRITION SUPPLEMENT; Lunch, Dinner; Frozen Oral Supplement  Code Status: DNR-CCA    PT/OT Eval Status: ordered    Dispo -  dc to SNF once stable.     Desire Calvillo MD

## 2023-01-11 NOTE — PROGRESS NOTES
Clinical Pharmacy Progress Note    Vancomycin - Management by Pharmacy    Consult Date(s): 1/10  Consulting Provider(s): Dr. Manuel Chacko / Plan  UTI - Vancomycin  Concurrent Antimicrobials: n/a  Day of Vanc Therapy / Ordered Duration: #2 of 10  Current Dosing Method: Bayesian-Guided AUC Dosing  Therapeutic Goal: AUC < 500 mg/L*hr  Current Dose / Plan:   On vancomycin 750mg IV q24h. Renal function stable; SCr 1.1. Level today = 7.9 mg/L - drawn ~18h after prior dose  Calculated AUC = 514 mg/L*h with steady-state trough of 16.4 mg/L. Calculated AUC is close to goal of ~500 -- will continue current dose. Plan to recheck vancomycin level in next ~2-3 days to ensure regimen remains therapeutic. Will continue to monitor clinical condition and make adjustments to regimen as appropriate. Please call with questions--  Khushbu Key PharmD, BCPS  Wireless: [id number]   1/11/2023 9:31 AM        Interval update: Urine Cx growing MRSA. Subjective/Objective:   Chris Man is a 81 y.o. 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 CT chest 1/7 showed acute PE. Currently admitted with acute PE, episode of torsades/polymorphic VTach; sinus tacycardia; also with hx of CKD3. Pt has hx of recurrent UTIs - urine Cx from 1/7 growing 75k MRSA. Started on IV vancomycin 1/10. Pharmacy is consulted to dose Vancomycin.     Ht Readings from Last 1 Encounters:   01/07/23 5' 2\" (1.575 m)     Wt Readings from Last 1 Encounters:   01/10/23 106 lb 0.7 oz (48.1 kg)     Current & Prior Antimicrobial Regimen(s):  Vancomycin - Pharmacy to dose  750mg IV q24h (1/10-current)    Vancomycin Level(s) / Doses:    Date Time Dose Type of Level / Level Interpretation   1/11 0916 750mg IV q24h Random = 7.9 mg/L Drawn ~18h after prior dose  Calculated AUC = 514 mg/L*h with trough of 16.5 mg/L  Continue same dose          Note: Serum levels collected for AUC-based dosing may be high if collected in close proximity to the dose administered. This is not necessarily indicative of toxicity. Cultures & Sensitivities:    Date Site Micro Susceptibility / Result   1/8 Urine  75k MRSA Sensitive = daptomycin, linezolid, Bactrim, Vanc              Recent Labs     01/09/23  1701 01/09/23  1925 01/10/23  0517 01/10/23  1626 01/11/23  0326   CREATININE 1.1 1.1 1.2  --  1.1   BUN 25* 25* 25*  --  24*   WBC 10.4  --   --  23.8*  --        Estimated Creatinine Clearance: 28 mL/min (based on SCr of 1.1 mg/dL). Additional Lab Values / Findings of Note:    No results for input(s): PROCAL in the last 72 hours.

## 2023-01-11 NOTE — PLAN OF CARE
Problem: Cardiovascular - Adult  Goal: Maintains optimal cardiac output and hemodynamic stability  Outcome: Progressing     Problem: Pain  Goal: Verbalizes/displays adequate comfort level or baseline comfort level  Outcome: Progressing     Problem: Nutrition Deficit:  Goal: Optimize nutritional status  Outcome: Progressing

## 2023-01-11 NOTE — PROGRESS NOTES
Paged MD about hep gtt being restarted due to bloody episode through pts F/C. Orders to start gtt at 0200, eliquis on hold.

## 2023-01-12 LAB
ANION GAP SERPL CALCULATED.3IONS-SCNC: 10 MMOL/L (ref 3–16)
ANISOCYTOSIS: ABNORMAL
APTT: 47.5 SEC (ref 23–34.3)
APTT: 50.6 SEC (ref 23–34.3)
BANDED NEUTROPHILS RELATIVE PERCENT: 1 % (ref 0–7)
BASOPHILS ABSOLUTE: 0 K/UL (ref 0–0.2)
BASOPHILS RELATIVE PERCENT: 0 %
BUN BLDV-MCNC: 26 MG/DL (ref 7–20)
CALCIUM SERPL-MCNC: 8.1 MG/DL (ref 8.3–10.6)
CHLORIDE BLD-SCNC: 102 MMOL/L (ref 99–110)
CO2: 22 MMOL/L (ref 21–32)
CREAT SERPL-MCNC: 1 MG/DL (ref 0.6–1.2)
EOSINOPHILS ABSOLUTE: 0.6 K/UL (ref 0–0.6)
EOSINOPHILS RELATIVE PERCENT: 7 %
GFR SERPL CREATININE-BSD FRML MDRD: 55 ML/MIN/{1.73_M2}
GLUCOSE BLD-MCNC: 91 MG/DL (ref 70–99)
HCT VFR BLD CALC: 23.2 % (ref 36–48)
HEMOGLOBIN: 7.2 G/DL (ref 12–16)
LYMPHOCYTES ABSOLUTE: 1 K/UL (ref 1–5.1)
LYMPHOCYTES RELATIVE PERCENT: 11 %
MAGNESIUM: 1.8 MG/DL (ref 1.8–2.4)
MCH RBC QN AUTO: 30.9 PG (ref 26–34)
MCHC RBC AUTO-ENTMCNC: 31.2 G/DL (ref 31–36)
MCV RBC AUTO: 99.2 FL (ref 80–100)
METAMYELOCYTES RELATIVE PERCENT: 2 %
MONOCYTES ABSOLUTE: 0.6 K/UL (ref 0–1.3)
MONOCYTES RELATIVE PERCENT: 7 %
MYELOCYTE PERCENT: 1 %
NEUTROPHILS ABSOLUTE: 6.9 K/UL (ref 1.7–7.7)
NEUTROPHILS RELATIVE PERCENT: 71 %
PDW BLD-RTO: 16.9 % (ref 12.4–15.4)
PLATELET # BLD: 473 K/UL (ref 135–450)
PLATELET SLIDE REVIEW: ABNORMAL
PMV BLD AUTO: 6.8 FL (ref 5–10.5)
POTASSIUM SERPL-SCNC: 4.6 MMOL/L (ref 3.5–5.1)
RBC # BLD: 2.34 M/UL (ref 4–5.2)
SLIDE REVIEW: ABNORMAL
SODIUM BLD-SCNC: 134 MMOL/L (ref 136–145)
WBC # BLD: 9.2 K/UL (ref 4–11)

## 2023-01-12 PROCEDURE — 6370000000 HC RX 637 (ALT 250 FOR IP): Performed by: INTERNAL MEDICINE

## 2023-01-12 PROCEDURE — 85730 THROMBOPLASTIN TIME PARTIAL: CPT

## 2023-01-12 PROCEDURE — 80048 BASIC METABOLIC PNL TOTAL CA: CPT

## 2023-01-12 PROCEDURE — 83735 ASSAY OF MAGNESIUM: CPT

## 2023-01-12 PROCEDURE — 85025 COMPLETE CBC W/AUTO DIFF WBC: CPT

## 2023-01-12 PROCEDURE — 2580000003 HC RX 258: Performed by: INTERNAL MEDICINE

## 2023-01-12 PROCEDURE — 36415 COLL VENOUS BLD VENIPUNCTURE: CPT

## 2023-01-12 PROCEDURE — 6360000002 HC RX W HCPCS: Performed by: INTERNAL MEDICINE

## 2023-01-12 PROCEDURE — 1200000000 HC SEMI PRIVATE

## 2023-01-12 RX ADMIN — SODIUM CHLORIDE, PRESERVATIVE FREE 10 ML: 5 INJECTION INTRAVENOUS at 08:03

## 2023-01-12 RX ADMIN — CEFEPIME HYDROCHLORIDE 1000 MG: 1 INJECTION, POWDER, FOR SOLUTION INTRAMUSCULAR; INTRAVENOUS at 17:34

## 2023-01-12 RX ADMIN — HYDROCODONE BITARTRATE AND ACETAMINOPHEN 1 TABLET: 5; 325 TABLET ORAL at 17:21

## 2023-01-12 RX ADMIN — HYDROCODONE BITARTRATE AND ACETAMINOPHEN 1 TABLET: 5; 325 TABLET ORAL at 13:19

## 2023-01-12 RX ADMIN — Medication 2000 UNITS: at 08:02

## 2023-01-12 RX ADMIN — SODIUM CHLORIDE, PRESERVATIVE FREE 10 ML: 5 INJECTION INTRAVENOUS at 21:16

## 2023-01-12 RX ADMIN — NYSTATIN 500000 UNITS: 100000 SUSPENSION ORAL at 08:02

## 2023-01-12 RX ADMIN — HYDROCODONE BITARTRATE AND ACETAMINOPHEN 1 TABLET: 5; 325 TABLET ORAL at 01:06

## 2023-01-12 RX ADMIN — PANTOPRAZOLE SODIUM 40 MG: 40 TABLET, DELAYED RELEASE ORAL at 05:16

## 2023-01-12 RX ADMIN — VANCOMYCIN HYDROCHLORIDE 750 MG: 10 INJECTION, POWDER, LYOPHILIZED, FOR SOLUTION INTRAVENOUS at 13:27

## 2023-01-12 RX ADMIN — HYDROCODONE BITARTRATE AND ACETAMINOPHEN 1 TABLET: 5; 325 TABLET ORAL at 21:23

## 2023-01-12 RX ADMIN — SODIUM CHLORIDE, POTASSIUM CHLORIDE, SODIUM LACTATE AND CALCIUM CHLORIDE: 600; 310; 30; 20 INJECTION, SOLUTION INTRAVENOUS at 02:38

## 2023-01-12 RX ADMIN — HYDROCODONE BITARTRATE AND ACETAMINOPHEN 1 TABLET: 5; 325 TABLET ORAL at 09:19

## 2023-01-12 RX ADMIN — CEFEPIME HYDROCHLORIDE 2000 MG: 2 INJECTION, POWDER, FOR SOLUTION INTRAMUSCULAR; INTRAVENOUS at 04:37

## 2023-01-12 RX ADMIN — HYDROCODONE BITARTRATE AND ACETAMINOPHEN 1 TABLET: 5; 325 TABLET ORAL at 05:16

## 2023-01-12 ASSESSMENT — PAIN - FUNCTIONAL ASSESSMENT
PAIN_FUNCTIONAL_ASSESSMENT: ACTIVITIES ARE NOT PREVENTED
PAIN_FUNCTIONAL_ASSESSMENT: PREVENTS OR INTERFERES SOME ACTIVE ACTIVITIES AND ADLS
PAIN_FUNCTIONAL_ASSESSMENT: ACTIVITIES ARE NOT PREVENTED
PAIN_FUNCTIONAL_ASSESSMENT: PREVENTS OR INTERFERES SOME ACTIVE ACTIVITIES AND ADLS
PAIN_FUNCTIONAL_ASSESSMENT: PREVENTS OR INTERFERES SOME ACTIVE ACTIVITIES AND ADLS
PAIN_FUNCTIONAL_ASSESSMENT: ACTIVITIES ARE NOT PREVENTED
PAIN_FUNCTIONAL_ASSESSMENT: PREVENTS OR INTERFERES SOME ACTIVE ACTIVITIES AND ADLS

## 2023-01-12 ASSESSMENT — PAIN DESCRIPTION - DESCRIPTORS
DESCRIPTORS: SHARP
DESCRIPTORS: SHARP
DESCRIPTORS: ACHING
DESCRIPTORS: ACHING
DESCRIPTORS: SHARP
DESCRIPTORS: SHARP
DESCRIPTORS: ACHING;SORE

## 2023-01-12 ASSESSMENT — PAIN DESCRIPTION - ORIENTATION
ORIENTATION: RIGHT;LEFT
ORIENTATION: RIGHT;LEFT
ORIENTATION: LEFT
ORIENTATION: LEFT
ORIENTATION: RIGHT;LEFT
ORIENTATION: RIGHT;LEFT
ORIENTATION: LEFT

## 2023-01-12 ASSESSMENT — PAIN DESCRIPTION - LOCATION
LOCATION: KNEE;CHEST
LOCATION: NECK;KNEE
LOCATION: NECK;KNEE
LOCATION: KNEE;CHEST
LOCATION: CHEST;KNEE
LOCATION: KNEE;NECK

## 2023-01-12 ASSESSMENT — PAIN SCALES - GENERAL
PAINLEVEL_OUTOF10: 5
PAINLEVEL_OUTOF10: 10
PAINLEVEL_OUTOF10: 5
PAINLEVEL_OUTOF10: 10
PAINLEVEL_OUTOF10: 8
PAINLEVEL_OUTOF10: 8
PAINLEVEL_OUTOF10: 10
PAINLEVEL_OUTOF10: 10

## 2023-01-12 ASSESSMENT — PAIN DESCRIPTION - PAIN TYPE
TYPE: ACUTE PAIN;SURGICAL PAIN
TYPE: CHRONIC PAIN
TYPE: ACUTE PAIN;SURGICAL PAIN

## 2023-01-12 ASSESSMENT — PAIN DESCRIPTION - ONSET: ONSET: ON-GOING

## 2023-01-12 ASSESSMENT — PAIN DESCRIPTION - FREQUENCY: FREQUENCY: INTERMITTENT

## 2023-01-12 NOTE — PROGRESS NOTES
Hospitalist Progress Note      PCP: Marlo Tsang MD    Date of Admission: 1/7/2023    Chief Complaint: chest pain      Subjective:     She had an episode of chest pain yesterday evening. The pain improved with norco.      She has not have recurrence of chest pain today. She reports lightheadedness, not all the time, on rising. She denies shortness of breath. She denies nausea, abdominal pain dysuria, and diarrhea. Goals of care discussed with son Princess Bound --- no aggressive procedures, treat to maintain comfort. Medications:  Reviewed    Infusion Medications    lactated ringers 75 mL/hr at 01/12/23 0238    sodium chloride 25 mL/hr at 01/10/23 1532     Scheduled Medications    polyethylene glycol  17 g Oral Daily    docusate sodium  100 mg Oral BID    vancomycin  750 mg IntraVENous Q24H    Vitamin D  2,000 Units Oral Daily    pantoprazole  40 mg Oral QAM AC    nystatin  500,000 Units Oral 4x Daily    sodium chloride flush  5-40 mL IntraVENous 2 times per day     PRN Meds: HYDROcodone-acetaminophen, sodium chloride flush, sodium chloride, ondansetron **OR** ondansetron, acetaminophen **OR** acetaminophen, perflutren lipid microspheres      Intake/Output Summary (Last 24 hours) at 1/12/2023 0247  Last data filed at 1/11/2023 2334  Gross per 24 hour   Intake 820 ml   Output 1400 ml   Net -580 ml         Physical Exam Performed:    BP (!) 96/56   Pulse 86   Temp 97.9 °F (36.6 °C) (Oral)   Resp 18   Ht 5' 2\" (1.575 m)   Wt 106 lb 0.7 oz (48.1 kg)   SpO2 98%   BMI 19.40 kg/m²       GEN alert, in no distress  HEENT normocephalic, anicteric sclera, EOMI, mucosa moist, no stridor  NECK supple, trachea midline  RESP on RA, in no distress, clear to auscultation  CARDS RRR, S1, S2, no murmurs, no edema, radial pulse 2+, DP pulse light. ABD +BS, soft nontender   miranda with dark cherry urine, small amounts of red cells.   MSK tender on bilateral legs, no cyanosis, no clubbing  SKIN warm, dry  NEURO alert, oriented x 3, no facial asymmetry, no dysarthria, moving spontaneously  PSYCH normal mood    Labs:   Recent Labs     01/09/23  1701 01/10/23  1626 01/11/23  0942   WBC 10.4 23.8* 15.3*   HGB 9.2* 8.5* 7.8*   HCT 28.5* 27.6* 26.6*   * 539* 486*       Recent Labs     01/09/23  1925 01/10/23  0517 01/11/23  0326   * 134* 132*   K 4.5 4.9 4.3    99 100   CO2 24 23 22   BUN 25* 25* 24*   CREATININE 1.1 1.2 1.1   CALCIUM 8.4 8.6 8.3       Recent Labs     01/11/23  0947   AST 7*   ALT <5*   BILIDIR <0.2   BILITOT 0.3   ALKPHOS 108       No results for input(s): INR in the last 72 hours. Recent Labs     01/10/23  0018 01/10/23  0517   TROPONINI 0.04* 0.05*         Urinalysis:      Lab Results   Component Value Date/Time    NITRU POSITIVE 12/15/2022 06:05 PM    WBCUA see below 12/15/2022 06:05 PM    BACTERIA 4+ 12/15/2022 06:05 PM    RBCUA >100 12/15/2022 06:05 PM    BLOODU LARGE 12/15/2022 06:05 PM    SPECGRAV 1.010 12/15/2022 06:05 PM    GLUCOSEU Negative 12/15/2022 06:05 PM       Radiology:  IR GUIDED IVC FILTER PLACEMENT   Final Result   IMPRESSION :      Normal IVC anatomy. Normal, patent left internal jugular vein      Placement of filter in the infra-renal IVC. Please note that this is a retrievable filter. Fluoroscopy time : 2.1 minutes   Number of exposures obtained : 4      Estimated blood loss: Less than 5 cc. XR CHEST (2 VW)   Final Result   1. Minimal left basilar airspace disease with small left pleural effusion, new from prior exam.      VL Extremity Venous Bilateral   Final Result      CT CHEST PULMONARY EMBOLISM W CONTRAST   Final Result      1. Acute pulmonary embolism involving left lingular lobar and segmental branches. No evidence of right heart strain. 2. Small left pleural effusion. Bibasilar atelectasis. 3. Moderate to large hiatal hernia.       Critical results reported to ER Dr Martir Choi at 3:55 PM.      XR CHEST PORTABLE   Final Result      No acute pulmonary disease. Moderate to large hiatal hernia. Assessment/Plan:    Active Hospital Problems    Diagnosis Date Noted    Acute septic pulmonary embolus without acute cor pulmonale ([address] [I26.90] 01/07/2023     Priority: Medium    Acute pulmonary embolism without acute cor pulmonale, unspecified pulmonary embolism type ([address] [I26.99] 01/07/2023     Priority: Medium     Acute Pulmonary Embolism involving Left Lingular Lobar and Segmental Branches  Acute DVT in Right Gastrocnemius  History of DVT, provoked  - CTA Chest 1/7/23 showed acute pulmonary embolism involving left lingular lobar and segmental branches. No evidence of right heart strain. - Venous US of LE 1/10 showed acute deep venous thrombosis in a single right gastrocnemius vein without deep or proximal extension.  - ECHO1/10/23 showed normal LV size, normal LV wall thickness, LVEF 55-60%, no regional wall motion abnormalities. Grade 2 diastolic dysfunction. Mild MR. Mild AAS. Mild to moderate TR. LA mildly dilated. - Placed on heparin gtt (1/7-1/10). Then transitioned to apixabn (1/10). Transitioned back to heparin gtt (1/10-). Stopped anticoagulation on 1/11. --- developed worsening hematuria -- no large blood clots. --- Discussed goals of care with Champ and patient, decision to proceed with IVC filter (1/11) and stopped anticoagulation since she has had recurrent hematuria with anticoagulation in the past.    Lightheadedness with rising rapidly.  - Her baseline SBP 90's-110's    ? Torsade de Pointes vs Polymorphic VT vs Artifact  Left Chest Pain/Tightness  - presented with left chest pain. - on 1/9/23 about 1630, patient was woken up with left chest tightness 5/10 that lasted for about 10 minutes. -- on telemetry noted ? polymorphic VT vs artifact with duration of 14.03 seconds -- Cardiology felt likely artifact. -- Mag sulf 2g administered based on Mg level of 1.8 and STAT labs ordered.   -- Another mag sulf 1g administered when Mg level 1.8 for 1/9.  -- started metoprolol 12.5 mg BID (1/10). Stopped due to borderline BP.  -- Consider amiodaone infusion if sustained > 30 seconds. -- discussed goals of care with patient and her son in law, who is a physician. Patient states that she would like to continue with medical therapy but does not want to proceed with aggressive procedures or shocking.  -- [address] cardiology participation. Sinus Tachycardia  Elevated Troponin  - troponins 0.04->0. 06. NTproBNP 372 (previously 8905 on 11/24/22). - EKG on 1/7 showed sinus tachycardia, . Qtc 436 ms. Paroxysmal Atrial Fibrillation  - EKG showed sinus tachycardia, .  - started heparin (1/7-1/10), apixaban (1/10) for VTE above, complicated by hematuria so decision by family and patient to discontinue anticoagulation. CKD Stage 3  - Adm BUN/Scr 31/1.3. Baseline Scr 1.2-1.3.  - Monitor kidney function. Hematuria  - noted 1/10 in the afternoon after transitioning to apixaban 10 mg BID from heparin infusion. She received first dose of apixaban and about 2-3 hours after demonstrated bleeding.  - stop anticoagulation on 1/11. Recurrent UTI's  Recurrent Urinary Retention  History of MDRO Infections  - miranda placed in 12/2022. Changed on 12/[facility] at [facility].  - Lact UC 12/15/22 grew Morganella morganii MDR sensitive to cefepime. - UC 1/8 grew MRSA. - Started IV vancomycin (1/10-). Treat for 2 days, then change miranda. Leukocytosis  LLL Pneumonia  - new rise in WBC to 23.8K on 1/10.  - procalcitonin 0.20 on 1/11.  - UC 1/8 grew MRSA. - CXR 1/11 showed minimal left basilar airspace disease with small left pleural effusion, new. - Blood cultures 1/11 pending.  - Started IV vancomycin (1/10-). - Start IV cefepime (1/12-). Cognitive Impairment     Recent Lower GIB likely from AVMs in 12/2022  EGD/Colonoscopy 12/17/22 showed   - Tubular esophagus was tortuous.   - 7 cm hiatal hernia sliding-type, possibly with a component of paraesophageal hernia. Otherwise unremarkable stomach. - Duodenum unremarkable to the distal third portion.  - Terminal ileum is normal in the distal 3 cm.  - Single small cecal AVM, and a single small ascending colon AVM ablated with APC. -7 mm distal ascending colon AVM, which appears to be at risk for intermittent bleeding, ablated with APC. - Moderate left-sided diverticulosis with severely tortuous colon, likely anatomic variation.  - Small internal and external hemorrhoids, which do not appear high risk for bleeding. GI suspected th 7 mm ascending colonic AVM may have been the source of recent bleeding, though cannot rule out internal hemorrhoids. In addition, she obviously has a component of hematuria, and it is impossible for us to determine whether the bright red blood per rectum noted at the nursing home was actually hematuria. Reasonable to start Eliquis from GI perspective in 1 week if indicated     Bilateral inguinal hernia, with small bowel on the right without obstruction per CT scan. Diverticulosis   Recommend High-fiber diet to keep stool stoft. History of HTN      DVT Prophylaxis: Sc heparin. Diet: ADULT DIET; Regular  ADULT ORAL NUTRITION SUPPLEMENT; Breakfast, Lunch, Dinner; Standard High Calorie/High Protein Oral Supplement  ADULT ORAL NUTRITION SUPPLEMENT; Breakfast, Lunch; Wound Healing Oral Supplement  ADULT ORAL NUTRITION SUPPLEMENT; Lunch, Dinner; Frozen Oral Supplement  Code Status: DNR-CCA    PT/OT Eval Status: ordered    Dispo -  dc to SNF once stable.     Christian Chaudhari MD

## 2023-01-12 NOTE — PROGRESS NOTES
Pharmacy Note - Renal Dosing and Extended Infusion Beta-Lactam Adjustment    Cefepime ordered for treatment of HAP. Per [facility] Renal Dose Adjustment Policy and Extended Infusion Beta-Lactam Policy, dosing will be changed to cefepime 1000 mg IV q 12 hr- EI. Estimated Creatinine Clearance: Estimated Creatinine Clearance: 28 mL/min (based on SCr of 1.1 mg/dL). Dialysis Status, RONNY, CKD: CKD stage 3  BMI: Body mass index is 19.4 kg/m². Rationale for Adjustment: Agent is renally eliminated and demonstrates time-dependent effect on bacterial eradication. Extended-infusion dosing strategy aims to enhance microbiologic and clinical efficacy. Pharmacy will continue to monitor renal function, cultures and sensitivities (where available) and adjust dose as necessary. Please call with any questions.     Jose Weems [facility], PharmD, Shanna You [address]  1/12/2023 3:14 AM ;

## 2023-01-12 NOTE — PLAN OF CARE
Problem: ABCDS Injury Assessment  Goal: Absence of physical injury  Outcome: Progressing     Problem: Skin/Tissue Integrity  Goal: Absence of new skin breakdown  Description: 1. Monitor for areas of redness and/or skin breakdown  2. Assess vascular access sites hourly  3. Every 4-6 hours minimum:  Change oxygen saturation probe site  4. Every 4-6 hours:  If on nasal continuous positive airway pressure, respiratory therapy assess nares and determine need for appliance change or resting period.   Outcome: Progressing     Problem: Respiratory - Adult  Goal: Achieves optimal ventilation and oxygenation  Outcome: Progressing     Problem: Cardiovascular - Adult  Goal: Maintains optimal cardiac output and hemodynamic stability  1/11/2023 2123 by Jacque Frey RN  Outcome: Progressing  1/11/2023 0905 by Palak Chiu RN  Outcome: Progressing  Goal: Absence of cardiac dysrhythmias or at baseline  Outcome: Progressing     Problem: Discharge Planning  Goal: Discharge to home or other facility with appropriate resources  Outcome: Progressing     Problem: Safety - Adult  Goal: Free from fall injury  Outcome: Progressing     Problem: Pain  Goal: Verbalizes/displays adequate comfort level or baseline comfort level  1/11/2023 2123 by Jacque Frey RN  Outcome: Progressing  1/11/2023 0905 by Palak Chiu RN  Outcome: Progressing     Problem: Nutrition Deficit:  Goal: Optimize nutritional status  1/11/2023 2123 by Jacque Frey RN  Outcome: Progressing  1/11/2023 0905 by Palak Chiu RN  Outcome: Progressing

## 2023-01-12 NOTE — PROGRESS NOTES
Hospitalist Progress Note      PCP: Maurice Bull MD    Date of Admission: 1/7/2023    Chief Complaint: chest pain      Subjective:       She has not have recurrence of chest pain today. She re-iterates no aggressive procedures, testing. States that she feels fine and improved. She denies shortness of breath. She denies nausea, abdominal pain, dysuria, diarrhea. Hematuria clearing after stopping anticoagulation. Medications:  Reviewed    Infusion Medications    lactated ringers 75 mL/hr at 01/12/23 0238    sodium chloride 25 mL/hr at 01/10/23 1532     Scheduled Medications    cefepime  1,000 mg IntraVENous Q12H    polyethylene glycol  17 g Oral Daily    docusate sodium  100 mg Oral BID    vancomycin  750 mg IntraVENous Q24H    Vitamin D  2,000 Units Oral Daily    pantoprazole  40 mg Oral QAM AC    nystatin  500,000 Units Oral 4x Daily    sodium chloride flush  5-40 mL IntraVENous 2 times per day     PRN Meds: HYDROcodone-acetaminophen, sodium chloride flush, sodium chloride, ondansetron **OR** ondansetron, acetaminophen **OR** acetaminophen, perflutren lipid microspheres      Intake/Output Summary (Last 24 hours) at 1/12/2023 0929  Last data filed at 1/12/2023 7342  Gross per 24 hour   Intake 1100 ml   Output 1850 ml   Net -750 ml         Physical Exam Performed:    /69   Pulse 75   Temp 97.9 °F (36.6 °C) (Oral)   Resp 16   Ht 5' 2\" (1.575 m)   Wt 110 lb 10.7 oz (50.2 kg)   SpO2 95%   BMI 20.24 kg/m²       GEN alert, in no distress  HEENT normocephalic, anicteric sclera, EOMI, mucosa moist, no stridor  NECK supple, trachea midline  RESP on RA, in no distress, clear to auscultation  CARDS RRR, S1, S2, no murmurs, no edema, radial pulse 2+, DP pulse light. ABD +BS, soft nontender   miranda with lighter hematuria today, stopped AC yesterday. Miranda draining.   MSK tender on bilateral legs, no cyanosis, no clubbing  SKIN warm, dry  NEURO alert, oriented x 3, no facial asymmetry, no dysarthria, moving spontaneously  PSYCH normal mood    Labs:   Recent Labs     01/09/23  1701 01/10/23  1626 01/11/23  0942   WBC 10.4 23.8* 15.3*   HGB 9.2* 8.5* 7.8*   HCT 28.5* 27.6* 26.6*   * 539* 486*       Recent Labs     01/10/23  0517 01/11/23  0326 01/12/23  0419   * 132* 134*   K 4.9 4.3 4.6   CL 99 100 102   CO2 23 22 22   BUN 25* 24* 26*   CREATININE 1.2 1.1 1.0   CALCIUM 8.6 8.3 8.1*       Recent Labs     01/11/23  0947   AST 7*   ALT <5*   BILIDIR <0.2   BILITOT 0.3   ALKPHOS 108       No results for input(s): INR in the last 72 hours. Recent Labs     01/10/23  0018 01/10/23  0517   TROPONINI 0.04* 0.05*         Urinalysis:      Lab Results   Component Value Date/Time    NITRU POSITIVE 12/15/2022 06:05 PM    WBCUA see below 12/15/2022 06:05 PM    BACTERIA 4+ 12/15/2022 06:05 PM    RBCUA >100 12/15/2022 06:05 PM    BLOODU LARGE 12/15/2022 06:05 PM    SPECGRAV 1.010 12/15/2022 06:05 PM    GLUCOSEU Negative 12/15/2022 06:05 PM       Radiology:  IR GUIDED IVC FILTER PLACEMENT   Final Result   IMPRESSION :      Normal IVC anatomy. Normal, patent left internal jugular vein      Placement of filter in the infra-renal IVC. Please note that this is a retrievable filter. Fluoroscopy time : 2.1 minutes   Number of exposures obtained : 4      Estimated blood loss: Less than 5 cc. XR CHEST (2 VW)   Final Result   1. Minimal left basilar airspace disease with small left pleural effusion, new from prior exam.      VL Extremity Venous Bilateral   Final Result      CT CHEST PULMONARY EMBOLISM W CONTRAST   Final Result      1. Acute pulmonary embolism involving left lingular lobar and segmental branches. No evidence of right heart strain. 2. Small left pleural effusion. Bibasilar atelectasis. 3. Moderate to large hiatal hernia. Critical results reported to ER Dr Pérez  at 3:55 PM.      XR CHEST PORTABLE   Final Result      No acute pulmonary disease.       Moderate to large hiatal hernia. Assessment/Plan:    Active Hospital Problems    Diagnosis Date Noted    Acute septic pulmonary embolus without acute cor pulmonale ([address] [I26.90] 01/07/2023     Priority: Medium    Acute pulmonary embolism without acute cor pulmonale, unspecified pulmonary embolism type ([address] [I26.99] 01/07/2023     Priority: Medium     Acute Pulmonary Embolism involving Left Lingular Lobar and Segmental Branches  Acute DVT in Right Gastrocnemius  History of DVT, provoked  - CTA Chest 1/7/23 showed acute pulmonary embolism involving left lingular lobar and segmental branches. No evidence of right heart strain. - Venous US of LE 1/10 showed acute deep venous thrombosis in a single right gastrocnemius vein without deep or proximal extension.  - ECHO1/10/23 showed normal LV size, normal LV wall thickness, LVEF 55-60%, no regional wall motion abnormalities. Grade 2 diastolic dysfunction. Mild MR. Mild AAS. Mild to moderate TR. LA mildly dilated. - Placed on heparin gtt (1/7-1/10). Then transitioned to apixabn (1/10). Transitioned back to heparin gtt (1/10-). Stopped anticoagulation on 1/11. --- developed worsening hematuria -- no large blood clots. --- Discussed goals of care with Champ and patient, decision to proceed with IVC filter (1/11) and stopped anticoagulation since she has had recurrent hematuria with anticoagulation in the past.    Lightheadedness with rising rapidly.  - Her baseline SBP 90's-110's    ? Torsade de Pointes vs Polymorphic VT vs Artifact  Left Chest Pain/Tightness  - presented with left chest pain. - on 1/9/23 about 1630, patient was woken up with left chest tightness 5/10 that lasted for about 10 minutes. -- on telemetry noted ? polymorphic VT vs artifact with duration of 14.03 seconds -- Cardiology felt likely artifact. -- Mag sulf 2g administered based on Mg level of 1.8 and STAT labs ordered.   -- Another mag sulf 1g administered when Mg level 1.8 for 1/9.  -- started metoprolol 12.5 mg BID (1/10). Stopped due to borderline BP.  -- Consider amiodaone infusion if sustained > 30 seconds. -- discussed goals of care with patient and her son in law, who is a physician. Patient states that she would like to continue with medical therapy but does not want to proceed with aggressive procedures or shocking.  -- [address] cardiology participation. Sinus Tachycardia  Elevated Troponin  - troponins 0.04->0. 06. NTproBNP 372 (previously 8905 on 11/24/22). - EKG on 1/7 showed sinus tachycardia, . Qtc 436 ms. Paroxysmal Atrial Fibrillation  - EKG showed sinus tachycardia, .  - started heparin (1/7-1/10), apixaban (1/10) for VTE above, complicated by hematuria so decision by family and patient to discontinue anticoagulation. CKD Stage 3  - Adm BUN/Scr 31/1.3. Baseline Scr 1.2-1.3.  - Monitor kidney function. Hematuria  - noted 1/10 in the afternoon after transitioning to apixaban 10 mg BID from heparin infusion. She received first dose of apixaban and about 2-3 hours after demonstrated bleeding.  - stop anticoagulation on 1/11. Recurrent UTI's  Recurrent Urinary Retention  History of MDRO Infections  - miranda placed in 12/2022. Changed on 12/26 at [facility].  - Lact UC 12/15/22 grew Morganella morganii MDR sensitive to cefepime. - UC 1/8 grew MRSA. - Started IV vancomycin (1/10-). Treat for 2 days, then change miranda. Leukocytosis  LLL Pneumonia  - new rise in WBC to 23.8K on 1/10.  - procalcitonin 0.20 on 1/11.  - UC 1/8 grew MRSA. - CXR 1/11 showed minimal left basilar airspace disease with small left pleural effusion, new. - Blood cultures 1/11 pending.  - Started IV vancomycin (1/10-). - Start IV cefepime (1/12-). Cognitive Impairment     Recent Lower GIB likely from AVMs in 12/2022  EGD/Colonoscopy 12/17/22 showed   - Tubular esophagus was tortuous.   - 7 cm hiatal hernia sliding-type, possibly with a component of paraesophageal hernia. Otherwise unremarkable stomach. - Duodenum unremarkable to the distal third portion.  - Terminal ileum is normal in the distal 3 cm.  - Single small cecal AVM, and a single small ascending colon AVM ablated with APC. -7 mm distal ascending colon AVM, which appears to be at risk for intermittent bleeding, ablated with APC. - Moderate left-sided diverticulosis with severely tortuous colon, likely anatomic variation.  - Small internal and external hemorrhoids, which do not appear high risk for bleeding. GI suspected th 7 mm ascending colonic AVM may have been the source of recent bleeding, though cannot rule out internal hemorrhoids. In addition, she obviously has a component of hematuria, and it is impossible for us to determine whether the bright red blood per rectum noted at the nursing home was actually hematuria. Reasonable to start Eliquis from GI perspective in 1 week if indicated     Bilateral inguinal hernia, with small bowel on the right without obstruction per CT scan. Diverticulosis   Recommend High-fiber diet to keep stool stoft. History of HTN      DVT Prophylaxis: Sc heparin. Diet: ADULT DIET; Regular  ADULT ORAL NUTRITION SUPPLEMENT; Breakfast, Lunch, Dinner; Standard High Calorie/High Protein Oral Supplement  ADULT ORAL NUTRITION SUPPLEMENT; Breakfast, Lunch; Wound Healing Oral Supplement  ADULT ORAL NUTRITION SUPPLEMENT; Lunch, Dinner; Frozen Oral Supplement  Code Status: DNR-CCA    PT/OT Eval Status: ordered    Dispo -  dc to facility tomorrow.     Kike Perez MD Padma Taylor

## 2023-01-12 NOTE — CONSULTS
Urology Attending Consult Note      Reason for Consultation: Gross hematuria    History: 80 yo F with history of afib previously on Eliquis with recurrent gross hematuria. We were consulted in November for bilateral hydronephrosis with voiding dysfunction and patient wanted to keep indwelling catheter. Again consulted  for gross hematuria. She has refused cystoscopy in the past. Eventually was discharged to SNF and had catheter replaced . Now admitted to [facility] for acute PE and started on heparin gtt. Due to risk of bleed, IR placed IVC filter yesterday. Staff noticed blood in her catheter and we were consulted. Ucx from 2023 positive for staph MRSA. Family History, Social History, Review of Systems:  Reviewed and agreed to as per chart    Vitals:  /69   Pulse 75   Temp 97.9 °F (36.6 °C) (Oral)   Resp 16   Ht 5' 2\" (1.575 m)   Wt 110 lb 10.7 oz (50.2 kg)   SpO2 95%   BMI 20.24 kg/m²   Temp  Av.8 °F (36.6 °C)  Min: 97.5 °F (36.4 °C)  Max: 98.3 °F (36.8 °C)    Intake/Output Summary (Last 24 hours) at 2023 0901  Last data filed at 2023 0753  Gross per 24 hour   Intake 1220 ml   Output 1850 ml   Net -630 ml         Physical:  Well developed, well nourished in no acute distress  Mood indicates no abnormalities. Pt doesnt appear depressed  Orientated to time and place  Neck is supple, trachea is midline  Respiratory effort is normal  Cardiovascular show no extremity swelling  Abdomen no masses or hernias are palpated, there is no tenderness. Liver and Spleen appear normal.  Skin show no abnormal lesions  Lymph nodes are not palpated in the inguinal, neck, or axillary area.     Female :   Urine light pink tinged    Labs:  WBC:    Lab Results   Component Value Date/Time    WBC 15.3 2023 09:42 AM     Hemoglobin/Hematocrit:    Lab Results   Component Value Date/Time    HGB 7.8 2023 09:42 AM    HCT 26.6 2023 09:42 AM     BMP:    Lab Results   Component Value Date/Time     01/12/2023 04:19 AM    K 4.6 01/12/2023 04:19 AM    K 5.3 01/07/2023 01:19 PM     01/12/2023 04:19 AM    CO2 22 01/12/2023 04:19 AM    BUN 26 01/12/2023 04:19 AM    LABALBU 1.7 01/11/2023 09:47 AM    CREATININE 1.0 01/12/2023 04:19 AM    CALCIUM 8.1 01/12/2023 04:19 AM    GFRAA >60 01/22/2020 06:45 AM    GFRAA >60 04/26/2013 03:18 PM    LABGLOM 55 01/12/2023 04:19 AM     PT/INR:    Lab Results   Component Value Date/Time    PROTIME 14.3 01/07/2023 03:06 PM    INR 1.12 01/07/2023 03:06 PM     PTT:    Lab Results   Component Value Date/Time    APTT 47.5 01/12/2023 04:19 AM   [APTT    Urine Culture: Staph MRSA 1/8    Impression/Plan: 82 yo F with chronic indwelling miranda and history of [facility] admitted for PE.    -Now sp IVC filter placement 1/11/23 with IR  -She denies  complaints, urine light pink tinged   -I manually irrigated catheter without complication for no clot return, urine clear. Continue to flush PRN.  She wishes to keep catheter.  -I discussed cystoscopy with her and she is refusing at this time  -Will continue to follow, call with any questions    HENNY Green

## 2023-01-12 NOTE — PROGRESS NOTES
Clinical Pharmacy Progress Note    Vancomycin - Management by Pharmacy    Consult Date(s): 1/10  Consulting Provider(s): Dr. Sanchez Every / Plan  UTI - Vancomycin  Concurrent Antimicrobials:   Cefepime - Day #1  Day of Vanc Therapy / Ordered Duration: #3 of 10  Current Dosing Method: Bayesian-Guided AUC Dosing  Therapeutic Goal: -600 mg/L*hr  Current Dose / Plan:   On vancomycin 750mg IV q24h. Renal function stable; SCr 1  New concern for HAP - will adjust AUC goal to 400-600 mg/L*h. Calculated AUC = 514 mg/L*h with steady-state trough of 16.4 mg/L based on level 1/11. Will continue current dose. Plan to recheck vancomycin level in next ~2-3 days to ensure regimen remains therapeutic. Will continue to monitor clinical condition and make adjustments to regimen as appropriate. Please call with questions--  Casa Trejo PharmD, BCPS  Wireless: [id number]   1/12/2023 9:57 AM        Interval update: Cefepime added overnight for HAP coverage. Pt developed hematuria after heparin/apixaban for DVT - IVC filter placed 1/11. Subjective/Objective:   Antonio Mock is a 81 y.o. 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 CT chest 1/7 showed acute PE. Currently admitted with acute PE, episode of torsades/polymorphic VTach; sinus tacycardia; also with hx of CKD3. Pt has hx of recurrent UTIs - urine Cx from 1/7 growing 75k MRSA. Started on IV vancomycin 1/10. Pharmacy is consulted to dose Vancomycin.     Ht Readings from Last 1 Encounters:   01/07/23 5' 2\" (1.575 m)     Wt Readings from Last 1 Encounters:   01/12/23 110 lb 10.7 oz (50.2 kg)     Current & Prior Antimicrobial Regimen(s):  Cefepime (1/12-current)  Vancomycin - Pharmacy to dose  750mg IV q24h (1/10-current)    Vancomycin Level(s) / Doses:    Date Time Dose Type of Level / Level Interpretation   1/11 0916 750mg IV q24h Random = 7.9 mg/L Drawn ~18h after prior dose  Calculated AUC = 514 mg/L*h with trough of 16.5 mg/L  Continue same dose          Note: Serum levels collected for AUC-based dosing may be high if collected in close proximity to the dose administered. This is not necessarily indicative of toxicity. Cultures & Sensitivities:    Date Site Micro Susceptibility / Result   1/8 Urine  75k MRSA Sensitive = daptomycin, linezolid, Bactrim, Vanc              Recent Labs     01/09/23  1701 01/09/23  1925 01/10/23  0517 01/10/23  1626 01/11/23  0326 01/11/23  0942 01/12/23  0419   CREATININE 1.1   < > 1.2  --  1.1  --  1.0   BUN 25*   < > 25*  --  24*  --  26*   WBC 10.4  --   --  23.8*  --  15.3*  --     < > = values in this interval not displayed. Estimated Creatinine Clearance: 32 mL/min (based on SCr of 1 mg/dL).     Additional Lab Values / Findings of Note:    Recent Labs     01/11/23  0942   PROCAL 0.20*

## 2023-01-13 LAB
ABO/RH: NORMAL
ANION GAP SERPL CALCULATED.3IONS-SCNC: 7 MMOL/L (ref 3–16)
ANTIBODY SCREEN: NORMAL
BLOOD BANK DISPENSE STATUS: NORMAL
BLOOD BANK PRODUCT CODE: NORMAL
BPU ID: NORMAL
BUN BLDV-MCNC: 19 MG/DL (ref 7–20)
CALCIUM SERPL-MCNC: 8.1 MG/DL (ref 8.3–10.6)
CHLORIDE BLD-SCNC: 98 MMOL/L (ref 99–110)
CO2: 26 MMOL/L (ref 21–32)
CREAT SERPL-MCNC: 0.7 MG/DL (ref 0.6–1.2)
DESCRIPTION BLOOD BANK: NORMAL
GFR SERPL CREATININE-BSD FRML MDRD: >60 ML/MIN/{1.73_M2}
GLUCOSE BLD-MCNC: 83 MG/DL (ref 70–99)
HCT VFR BLD CALC: 26.3 % (ref 36–48)
HEMOGLOBIN: 8.5 G/DL (ref 12–16)
POTASSIUM SERPL-SCNC: 4.6 MMOL/L (ref 3.5–5.1)
SODIUM BLD-SCNC: 131 MMOL/L (ref 136–145)
VANCOMYCIN RANDOM: 12.2 UG/ML

## 2023-01-13 PROCEDURE — 86850 RBC ANTIBODY SCREEN: CPT

## 2023-01-13 PROCEDURE — 6360000002 HC RX W HCPCS: Performed by: INTERNAL MEDICINE

## 2023-01-13 PROCEDURE — 6370000000 HC RX 637 (ALT 250 FOR IP): Performed by: INTERNAL MEDICINE

## 2023-01-13 PROCEDURE — 1200000000 HC SEMI PRIVATE

## 2023-01-13 PROCEDURE — 2580000003 HC RX 258: Performed by: INTERNAL MEDICINE

## 2023-01-13 PROCEDURE — 85014 HEMATOCRIT: CPT

## 2023-01-13 PROCEDURE — 80202 ASSAY OF VANCOMYCIN: CPT

## 2023-01-13 PROCEDURE — 36415 COLL VENOUS BLD VENIPUNCTURE: CPT

## 2023-01-13 PROCEDURE — 85018 HEMOGLOBIN: CPT

## 2023-01-13 PROCEDURE — 86900 BLOOD TYPING SEROLOGIC ABO: CPT

## 2023-01-13 PROCEDURE — P9016 RBC LEUKOCYTES REDUCED: HCPCS

## 2023-01-13 PROCEDURE — 36430 TRANSFUSION BLD/BLD COMPNT: CPT

## 2023-01-13 PROCEDURE — 86901 BLOOD TYPING SEROLOGIC RH(D): CPT

## 2023-01-13 PROCEDURE — 80048 BASIC METABOLIC PNL TOTAL CA: CPT

## 2023-01-13 PROCEDURE — 86923 COMPATIBILITY TEST ELECTRIC: CPT

## 2023-01-13 RX ORDER — SODIUM CHLORIDE 9 MG/ML
INJECTION, SOLUTION INTRAVENOUS PRN
Status: DISCONTINUED | OUTPATIENT
Start: 2023-01-13 | End: 2023-01-14 | Stop reason: HOSPADM

## 2023-01-13 RX ADMIN — HYDROCODONE BITARTRATE AND ACETAMINOPHEN 1 TABLET: 5; 325 TABLET ORAL at 10:41

## 2023-01-13 RX ADMIN — HYDROCODONE BITARTRATE AND ACETAMINOPHEN 1 TABLET: 5; 325 TABLET ORAL at 02:08

## 2023-01-13 RX ADMIN — SODIUM CHLORIDE, PRESERVATIVE FREE 10 ML: 5 INJECTION INTRAVENOUS at 20:55

## 2023-01-13 RX ADMIN — NYSTATIN 500000 UNITS: 100000 SUSPENSION ORAL at 20:54

## 2023-01-13 RX ADMIN — Medication 2000 UNITS: at 08:40

## 2023-01-13 RX ADMIN — SODIUM CHLORIDE, POTASSIUM CHLORIDE, SODIUM LACTATE AND CALCIUM CHLORIDE: 600; 310; 30; 20 INJECTION, SOLUTION INTRAVENOUS at 03:14

## 2023-01-13 RX ADMIN — HYDROCODONE BITARTRATE AND ACETAMINOPHEN 1 TABLET: 5; 325 TABLET ORAL at 14:42

## 2023-01-13 RX ADMIN — CEFEPIME HYDROCHLORIDE 1000 MG: 1 INJECTION, POWDER, FOR SOLUTION INTRAMUSCULAR; INTRAVENOUS at 03:17

## 2023-01-13 RX ADMIN — NYSTATIN 500000 UNITS: 100000 SUSPENSION ORAL at 17:19

## 2023-01-13 RX ADMIN — CEFEPIME HYDROCHLORIDE 2000 MG: 2 INJECTION, POWDER, FOR SOLUTION INTRAMUSCULAR; INTRAVENOUS at 19:32

## 2023-01-13 RX ADMIN — HYDROCODONE BITARTRATE AND ACETAMINOPHEN 1 TABLET: 5; 325 TABLET ORAL at 23:50

## 2023-01-13 RX ADMIN — DOCUSATE SODIUM 100 MG: 100 CAPSULE, LIQUID FILLED ORAL at 20:53

## 2023-01-13 RX ADMIN — SODIUM CHLORIDE, PRESERVATIVE FREE 10 ML: 5 INJECTION INTRAVENOUS at 08:41

## 2023-01-13 RX ADMIN — VANCOMYCIN HYDROCHLORIDE 1000 MG: 10 INJECTION, POWDER, LYOPHILIZED, FOR SOLUTION INTRAVENOUS at 17:31

## 2023-01-13 RX ADMIN — PANTOPRAZOLE SODIUM 40 MG: 40 TABLET, DELAYED RELEASE ORAL at 06:12

## 2023-01-13 RX ADMIN — NYSTATIN 500000 UNITS: 100000 SUSPENSION ORAL at 08:41

## 2023-01-13 RX ADMIN — HYDROCODONE BITARTRATE AND ACETAMINOPHEN 1 TABLET: 5; 325 TABLET ORAL at 19:07

## 2023-01-13 RX ADMIN — HYDROCODONE BITARTRATE AND ACETAMINOPHEN 1 TABLET: 5; 325 TABLET ORAL at 06:12

## 2023-01-13 ASSESSMENT — PAIN DESCRIPTION - ORIENTATION
ORIENTATION: RIGHT;LEFT
ORIENTATION: RIGHT;LEFT
ORIENTATION: LEFT;RIGHT
ORIENTATION: RIGHT;LEFT

## 2023-01-13 ASSESSMENT — PAIN DESCRIPTION - DESCRIPTORS
DESCRIPTORS: SHARP
DESCRIPTORS: ACHING
DESCRIPTORS: SHARP
DESCRIPTORS: SHARP

## 2023-01-13 ASSESSMENT — PAIN DESCRIPTION - LOCATION
LOCATION: KNEE;CHEST
LOCATION: ANKLE;FOOT

## 2023-01-13 ASSESSMENT — PAIN SCALES - GENERAL
PAINLEVEL_OUTOF10: 3
PAINLEVEL_OUTOF10: 0
PAINLEVEL_OUTOF10: 7
PAINLEVEL_OUTOF10: 3
PAINLEVEL_OUTOF10: 8
PAINLEVEL_OUTOF10: 7
PAINLEVEL_OUTOF10: 8
PAINLEVEL_OUTOF10: 0

## 2023-01-13 ASSESSMENT — PAIN DESCRIPTION - ONSET: ONSET: ON-GOING

## 2023-01-13 ASSESSMENT — PAIN - FUNCTIONAL ASSESSMENT
PAIN_FUNCTIONAL_ASSESSMENT: PREVENTS OR INTERFERES SOME ACTIVE ACTIVITIES AND ADLS

## 2023-01-13 ASSESSMENT — PAIN DESCRIPTION - FREQUENCY: FREQUENCY: INTERMITTENT

## 2023-01-13 ASSESSMENT — PAIN DESCRIPTION - PAIN TYPE: TYPE: CHRONIC PAIN

## 2023-01-13 NOTE — PROGRESS NOTES
Clinical Pharmacy Progress Note    Vancomycin - Management by Pharmacy    Consult Date(s): 1/10  Consulting Provider(s): Dr. Louann Blum / Plan  MRSA UTI , concern for PNA- Vancomycin  Concurrent Antimicrobials:   Cefepime - Day #2  (increasing to 2000 mg IV q12h per renal dosing policy)  Day of Vanc Therapy / Ordered Duration: #4 of 10  Current Dosing Method: Bayesian-Guided AUC Dosing  Therapeutic Goal: -600 mg/L*hr  Current Dose / Plan:   On vancomycin 750mg IV q24h. Renal function improving ; SCr  1.2->1.1->1.0-> (0.7 today)   Ordered repeat random level today given improved renal function  Level = 12.2 mg/L. (drawn ~ 20h after prior dose. Predicted AUC ~ 366 mg/L*hr and steady-state trough ~ 10.3 mg/L on current regimen. - subtherapeutic   Will increase dosing to 1000 mg IV q24h. Predicted AUC ~ 475 mg/L*hr and steady-state trough ~ 13.5 mg/L on new regimen  Plan to recheck vancomycin level in next ~2-3 days    Will continue to monitor clinical condition and make adjustments to regimen as appropriate. Please call with any questions    Irwin Hopper  Pharm. D. BCPS  [phone] (Main Pharmacy)          Interval update: Afebrile and HDS, on room air. Anticoagulation for PE/DVT stopped 2/2 hematuria; IV filter placed 1/11. Subjective/Objective:   Simi Lucas is a 81 y.o. 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 CT chest 1/7 showed acute PE. Currently admitted with acute PE, episode of torsades/polymorphic VTach; sinus tacycardia; also with hx of CKD3. Pt has hx of recurrent UTIs - urine Cx from 1/7 growing 75k MRSA. Started on IV vancomycin 1/10. Pharmacy is consulted to dose Vancomycin.     Ht Readings from Last 1 Encounters:   01/07/23 5' 2\" (1.575 m)     Wt Readings from Last 1 Encounters:   01/12/23 110 lb 10.7 oz (50.2 kg)     Current & Prior Antimicrobial Regimen(s):  Cefepime (1/12-current)  Vancomycin - Pharmacy to dose  750mg IV q24h (1/10-1/12)  1000 mg IV q24h (1/13--current)    Vancomycin Level(s) / Doses:    Date Time Dose Type of Level / Level Interpretation   1/11 0916 750mg IV q24h Random = 7.9 mg/L Drawn ~18h after prior dose  Calculated AUC = 514 mg/L*h with trough of 16.5 mg/L  Continue same dose   1/13 0933 750 mg IV q24h Random = 12.2 mg/L 20h after dose. AUC/Tr 366/10.2  Increase to 1000 mg q12h   Note: Serum levels collected for AUC-based dosing may be high if collected in close proximity to the dose administered. This is not necessarily indicative of toxicity. Cultures & Sensitivities:    Date Site Micro Susceptibility / Result   1/8 Urine  75k MRSA Sensitive = daptomycin, linezolid, Bactrim, Vanc              Recent Labs     01/10/23  1626 01/11/23  0326 01/11/23  0942 01/12/23  0419 01/13/23  0933   CREATININE  --  1.1  --  1.0 0.7   BUN  --  24*  --  26* 19   WBC 23.8*  --  15.3* 9.2  --        Estimated Creatinine Clearance: 46 mL/min (based on SCr of 0.7 mg/dL).     Additional Lab Values / Findings of Note:    Recent Labs     01/11/23  0942   PROCAL 0.20*

## 2023-01-13 NOTE — PROGRESS NOTES
Burk catheter:   Placed per Dr Miguel Brown order. Procedure performed per sterile technique. Patient tolerated well. Inflated with 10 cc of sterile water in balloon. Pink and yellow, cloudy drainage present upon insertion.

## 2023-01-13 NOTE — PROGRESS NOTES
Comprehensive Nutrition Assessment    RECOMMENDATIONS:  PO Diet: continue regular diet  ONS: continue Ensure TID, magic cup BID, Ander BID  Nutrition Education: Education not appropriate     NUTRITION ASSESSMENT:   Nutritional summary & status: Folow up. Pt sleeping upon attempted RD visit this AM. RD following d/t P/I Unstageable to L Heel & P/I Stage II to buttocks and sacrum x 2. D/t unhealing wounds, rec'd continuing Ander BID w/ meals, along w/ Ensure TID & magic cup BID. Suspect pt meeting EEN w/ current intakes & interventions in place, ave 21-76% (variable throughout admit). Noted previous wt of 105 lbs (1/9);  lbs. Rec'd continuing w/ current POC. RD following for intakes/wts/wounds/ONS. Admission/PMH: Admit d/t Acute pulmonary embolism. PMHx of HTN, malnutrition, IBS    MALNUTRITION ASSESSMENT  Context of Malnutrition: Acute Illness   Malnutrition Status: Moderate malnutrition  Findings of the 6 clinical characteristics of malnutrition (Minimum of 2 out of 6 clinical characteristics is required to make the diagnosis of moderate or severe Protein Calorie Malnutrition based on AND/ASPEN Guidelines):  Energy Intake:  50% or less of estimated energy requirements for 5 or more days  Weight Loss:  No significant weight loss     Body Fat Loss:  Mild body fat loss Orbital, Buccal region   Muscle Mass Loss:  Mild muscle mass loss Temples (temporalis), Clavicles (pectoralis & deltoids), Hand (interosseous)  Fluid Accumulation:  No significant fluid accumulation     Strength:  Not Performed    NUTRITION DIAGNOSIS   Inadequate protein intake related to increase demand for energy/nutrients as evidenced by wounds    Nutrition Monitoring and Evaluation:   Food/Nutrient Intake Outcomes:  Food and Nutrient Intake, Supplement Intake  Physical Signs/Symptoms Outcomes:  Weight, Biochemical Data, Skin     OBJECTIVE DATA: Significant to nutrition assessment  Nutrition Related Findings: +BM 1/9.  Na 134, BUN 26, GFR 55  Wounds: Pressure Injury, Stage II, Unstageable (x 3)  Nutrition Goals: PO intake 75% or greater, prior to discharge     [address]; Regular  ADULT ORAL NUTRITION SUPPLEMENT; Breakfast, Lunch, Dinner; Standard High Calorie/High Protein Oral Supplement  ADULT ORAL NUTRITION SUPPLEMENT; Breakfast, Lunch; Wound Healing Oral Supplement  ADULT ORAL NUTRITION SUPPLEMENT; Lunch, Dinner; Frozen Oral Supplement  PO Intake: 1-25%, 51-75%   PO Supplement Intake:26-50%  Additional Sources of Calories/IVF:lactated ringers infusion @ 75 ml/hr     ANTHROPOMETRICS  Current Height: 5' 2\" (157.5 cm)  Current Weight: 110 lb 10.7 oz (50.2 kg)    Admission weight: 118 lb (53.5 kg)  Ideal Body Weight (IBW): 110 lbs  (50 kg)    BMI: 20.3    COMPARATIVE STANDARDS  Energy (kcal):  9031-8049 (25-30 kcal/kg CBW)     Protein (g):  72-86 (1.5-1.8 g/kg CBW)       Fluid (mL/day):  1 ml/kcal or per MD discretion    The patient will be monitored per nutrition standards of care. Consult dietitian if additional nutrition interventions are needed prior to RD reassessment.      Cyrus Reyes, [address]:  [phone]  Office:  [phone]

## 2023-01-13 NOTE — DISCHARGE INSTR - COC
Continuity of Care Form    Patient Name: Rosa Garcia   :  1935  MRN:  [id number]    Admit date:  2023  Discharge date:  2023    Code Status Order: DNR-CCA   Advance Directives:     Admitting Physician:  Samira Olivo MD  PCP: Letitia Cordoba MD    Discharging Nurse: Cecilio Bower RN  [address] Unit/Room#: 0319/7063-15  Discharging Unit Phone Number:     Emergency Contact:   Extended Emergency Contact Information  Primary Emergency Contact:  [address] Phone: [phone]  Mobile Phone: [phone]  Relation: Child  Secondary Emergency Contact: [address] Phone: [phone]  Mobile Phone: [phone]  Relation: Child    Past Surgical History:  Past Surgical History:   Procedure Laterality Date    APPENDECTOMY      COLONOSCOPY N/A 2022    COLONOSCOPY ABLATION performed by Jhony Hobson MD at [address] [facility]    HIP PINNING Left 2019    LEFT HIP GAMMA NAIL performed by Susan Nichols MD at [facility] 33 IVC FILTER PLACEMENT W IMAGING  2023    IR IVC FILTER PLACEMENT W IMAGING 2023 [address] SPECIAL PROCEDURES    UPPER GASTROINTESTINAL ENDOSCOPY N/A 2022    EGD DIAGNOSTIC ONLY performed by Jhony Hobson MD at [address] ENDOSCOPY       Immunization History:   Immunization History   Administered Date(s) Administered    COVID-19, PFIZER Bivalent BOOSTER, DO NOT Dilute, (age 12y+), IM, 30 mcg/0.3 mL 10/26/2022    COVID-19, PFIZER GRAY top, DO NOT Dilute, (age 15 y+), IM, 30 mcg/0.3 mL 2022    COVID-19, PFIZER PURPLE top, DILUTE for use, (age 15 y+), 30mcg/0.3mL 2021, 2021, 2021    Influenza, FLUAD, (age 72 y+), Adjuvanted, 0.5mL 09/15/2021, 2022    Influenza, FLUZONE (age 72 y+), High Dose, 0.7mL 10/14/2020, 10/19/2020    Influenza, High Dose (Fluzone 65 yrs and older) 2013, 2015, 2016, 2018, 2019    Pneumococcal Conjugate 13-valent (Kami Mcdonald) 11/13/2015    Pneumococcal Polysaccharide ([id number]) 01/05/2009    Zoster Live (Zostavax) 02/03/2008       Active Problems:  Patient Active Problem List   Diagnosis Code    Osteoarthritis M19.90    IBS (irritable bowel syndrome) K58.9    Gout M10.9    Dry eye syndrome H04.129    Osteopenia with high risk of fracture M85.80    Essential hypertension, benign I10    Glucose intolerance (impaired glucose tolerance) R73.02    Primary osteoarthritis of both knees M17.0    Anemia D64.9    Inguinal hernia, right K40.90    RONNY (acute kidney injury) ([facility] N17.9    History of DVT (deep vein thrombosis) Z86.718    Chronic pain of left knee M25.562, G89.29    Chronic pain of right knee M25.561, G89.29    Swelling of both lower extremities M79.89    Atrial fibrillation with RVR ([facility] I48.91    Recurrent UTI (urinary tract infection) N39.0    Hyponatremia E45.8    Metabolic acidosis V00.32    Mild cognitive impairment P27.03    Acute metabolic encephalopathy L17.63    Lower GI bleed K92.2    Hematuria R31.9    Hydronephrosis due to obstruction of bladder N13.30, N32.0    Hyperkalemia E87.5    Malnutrition (HCC) D92    Complicated UTI (urinary tract infection) N39.0    Multiple subsegmental pulmonary emboli without acute cor pulmonale (HCC) I26.94    Acute septic pulmonary embolus without acute cor pulmonale (HCC) I26.90    Acute pulmonary embolism without acute cor pulmonale, unspecified pulmonary embolism type (HCC) I26.99       Isolation/Infection:   Isolation            Contact          Patient Infection Status       Infection Onset Added Last Indicated Last Indicated By Review Planned Expiration Resolved Resolved By    MRSA 01/08/23 01/10/23 01/08/23 Culture, Urine        MDRO (multi-drug resistant organism) 12/19/22 12/27/22 22/00/83 Kimberly Hicks        Urine    Resolved    C-diff Rule Out 11/27/22 11/27/22 11/27/22 Clostridium difficile toxin/antigen (Ordered)   11/27/22 Rule-Out Test Canceled    COVID-19 (Rule Out) 11/24/22 11/24/22 11/24/22 COVID-19 & Influenza Combo (Ordered)   11/24/22 Rule-Out Test Resulted            Nurse Assessment:  Last Vital Signs: /71   Pulse (!) 102   Temp 97.6 °F (36.4 °C) (Oral)   Resp 16   Ht 5' 2\" (1.575 m)   Wt 107 lb 9.4 oz (48.8 kg)   SpO2 97%   BMI 19.68 kg/m²     Last documented pain score (0-10 scale): Pain Level: 10  Last Weight:   Wt Readings from Last 1 Encounters:   01/14/23 107 lb 9.4 oz (48.8 kg)     Mental Status:  oriented, alert, coherent, logical, thought processes intact, and able to concentrate and follow conversation    IV Access:  - None    Nursing Mobility/ADLs:  Walking   Dependent  Transfer  Dependent  Bathing  Dependent  Dressing  Assisted  Toileting  Dependent  Feeding  Independent  Med Admin  Assisted  Med Delivery   Whole    Wound Care Documentation and Therapy:  Wound 12/16/22 Heel Left Dry Eschar (Active)   Wound Image   12/16/22 1156   Wound Etiology Pressure Unstageable 01/14/23 0830   Dressing Status Dry; Intact 01/14/23 0830   Wound Cleansed Cleansed with saline 01/14/23 0830   Dressing/Treatment Barrier film 01/14/23 0830   Offloading for Diabetic Foot Ulcers Offloading boot 01/14/23 0830   Dressing Change Due 12/17/22 12/17/22 0315   Wound Length (cm) 5.7 cm 12/16/22 1156   Wound Width (cm) 3.5 cm 12/16/22 1156   Wound Depth (cm) 0 cm 12/16/22 1156   Wound Surface Area (cm^2) 19.95 cm^2 12/16/22 1156   Wound Volume (cm^3) 0 cm^3 12/16/22 1156   Wound Assessment Eschar dry 01/14/23 0830   Drainage Amount None 01/14/23 0830   Odor None 01/14/23 0830   Shi-wound Assessment Non-blanchable erythema 01/14/23 0830   Margins Attached edges; Defined edges 12/19/22 2004   Number of days: 29       Wound 12/16/22 Buttocks Left (Active)   Wound Image   12/16/22 1156   Wound Etiology Pressure Stage 2 01/14/23 0830   Dressing Status Clean;Dry; Intact 01/14/23 0830   Wound Cleansed Not Cleansed 01/14/23 0830   Dressing/Treatment Foam 01/14/23 0830   Wound Length (cm) 2.5 cm 12/16/22 1156   Wound Width (cm) 2.5 cm 12/16/22 1156   Wound Depth (cm) 0.3 cm 12/16/22 1156   Wound Surface Area (cm^2) 6.25 cm^2 12/16/22 1156   Wound Volume (cm^3) 1.875 cm^3 12/16/22 1156   Wound Assessment Pink/red;Granulation tissue 01/14/23 0830   Drainage Amount None 01/14/23 0830   Drainage Description Serosanguinous; Serous 01/13/23 2044   Odor None 01/14/23 0830   Shi-wound Assessment Blanchable erythema;Fragile 01/14/23 0830   Margins Attached edges; Defined edges 01/14/23 0830   Number of days: 29       Wound 12/16/22 Sacrum (Active)   Wound Image   12/16/22 1156   Wound Etiology Pressure Stage 2 01/14/23 0830   Dressing Status Clean;Dry; Intact 01/14/23 0830   Wound Cleansed Not Cleansed 01/14/23 0830   Dressing/Treatment Pharmaceutical agent (see MAR) 01/14/23 0830   Dressing Change Due 12/16/22 12/16/22 1156   Wound Length (cm) 0.5 cm 12/16/22 1156   Wound Width (cm) 0.5 cm 12/16/22 1156   Wound Depth (cm) 0.1 cm 12/16/22 1156   Wound Surface Area (cm^2) 0.25 cm^2 12/16/22 1156   Wound Volume (cm^3) 0.025 cm^3 12/16/22 1156   Wound Assessment Pink/red;Granulation tissue 01/14/23 0830   Drainage Amount None 01/14/23 0830   Drainage Description Serosanguinous 01/13/23 2044   Odor None 01/14/23 0830   Shi-wound Assessment Dry/flaky;Fragile;Blanchable erythema 01/14/23 0830   Margins Attached edges; Defined edges 01/14/23 0830   Number of days: 29       Wound Leg Left;Posterior (Active)   Wound Etiology Traumatic 01/14/23 0830   Dressing Status Dry; Intact 01/14/23 0830   Wound Cleansed Cleansed with saline 01/10/23 1638   Dressing/Treatment Roll gauze;Gauze dressing/dressing sponge 01/10/23 1638   Drainage Amount None 01/14/23 0830   Number of days:         Elimination:  Continence: Bowel: Yes  Bladder: No  Urinary Catheter:  Insertion Date: 1/13/2023    Colostomy/Ileostomy/Ileal Conduit: No       Date of Last BM: 1/13/2023    Intake/Output Summary (Last 24 hours) at 1/14/2023 1223  Last data filed at 1/14/2023 1200  Gross per 24 hour   Intake 761.56 ml   Output 1350 ml   Net -588.44 ml     I/O last 3 completed shifts: In: 1612.5 [P.O.:650; Blood:362.5; Other:600]  Out: 2100 [Urine:2100]    Safety Concerns: At Risk for Falls    Impairments/Disabilities:      None    Nutrition Therapy:  Current Nutrition Therapy:   - Oral Diet:  General    Routes of Feeding: Oral  Liquids: No Restrictions  Daily Fluid Restriction: no  Last Modified Barium Swallow with Video (Video Swallowing Test): not done    Treatments at the Time of Hospital Discharge:   Respiratory Treatments: none  Oxygen Therapy:  is not on home oxygen therapy. Ventilator:    - No ventilator support    Rehab Therapies: None  Weight Bearing Status/Restrictions: Wheelchair bound   Other Medical Equipment (for information only, NOT a DME order):  wheelchair  Other Treatments: ***    Patient's personal belongings (please select all that are sent with patient):  Glasses and Clothes     RN SIGNATURE:  Electronically signed by Pancho Garvey RN on 1/13/23 at 1:59 PM LILLIAN Reyes RN  12:24 PM  1/14/23    CASE MANAGEMENT/SOCIAL WORK SECTION    Inpatient Status Date: 1/7/23    Readmission Risk Assessment Score:  Readmission Risk              Risk of Unplanned Readmission:  25           Discharging to Facility/ Agency   Name: [facility]  Address: [place] [address]  Phone: [phone]  Fax: [phone]    Dialysis Facility (if applicable)   Name:  Address:  Dialysis Schedule:  Phone:  Fax:    / signature: Electronically signed by Gus Carroll on 1/13/23 at 11:40 AM EST    PHYSICIAN SECTION    Prognosis: Fair for her age    Condition at Discharge:  improved. Not acutely unstable. Rehab Potential (if transferring to Rehab): Fair    Recommended Labs or Other Treatments After Discharge:     CBC w diff, CMP, Mg, phos in 4-5 days if she would like to continue.     Continue linezolid orally 600 mg every 12 hours for 3 additional days.  Continue cefdinir 300 mg BID for 5 additional days. Physician Certification: I certify the above information and transfer of Simi Lucas  is necessary for the continuing treatment of the diagnosis listed and that she requires LTAC for greater 30 days.      Update Admission H&P: No change in H&P    PHYSICIAN SIGNATURE:  Electronically signed by Donita Powell MD on 1/14/23 at 11:46 AM EST

## 2023-01-13 NOTE — PROGRESS NOTES
Urology Attending Progress Note      Subjective: No  complaints    Vitals:  /68   Pulse 86   Temp 97.6 °F (36.4 °C) (Oral)   Resp 18   Ht 5' 2\" (1.575 m)   Wt 110 lb 10.7 oz (50.2 kg)   SpO2 98%   BMI 20.24 kg/m²   Temp  Av °F (36.7 °C)  Min: 97.6 °F (36.4 °C)  Max: 98.3 °F (36.8 °C)    Intake/Output Summary (Last 24 hours) at 2023 1024  Last data filed at 2023 0830  Gross per 24 hour   Intake 1780 ml   Output 1475 ml   Net 305 ml       Exam: Miranda draining light pink tinged urine    Labs:  WBC:    Lab Results   Component Value Date/Time    WBC 9.2 2023 04:19 AM     Hemoglobin/Hematocrit:    Lab Results   Component Value Date/Time    HGB 7.2 2023 04:19 AM    HCT 23.2 2023 04:19 AM     BMP:    Lab Results   Component Value Date/Time     2023 04:19 AM    K 4.6 2023 04:19 AM    K 5.3 2023 01:19 PM     2023 04:19 AM    CO2 22 2023 04:19 AM    BUN 26 2023 04:19 AM    LABALBU 1.7 2023 09:47 AM    CREATININE 1.0 2023 04:19 AM    CALCIUM 8.1 2023 04:19 AM    GFRAA >60 2020 06:45 AM    GFRAA >60 2013 03:18 PM    LABGLOM 55 2023 04:19 AM     PT/INR:    Lab Results   Component Value Date/Time    PROTIME 14.3 2023 03:06 PM    INR 1.12 2023 03:06 PM     PTT:    Lab Results   Component Value Date/Time    APTT 50.6 2023 03:24 PM   [APTT      Impression/Plan: 80 yo F with chronic indwelling miranda and history of [address] admitted for PE.     -Now sp IVC filter placement 23 with IR  -She denies  complaints, urine light pink tinged.  Wants to keep catheter  -Continues to refuse further testing  -Will sign off at this time, call with any questions      HENNY Bliss

## 2023-01-13 NOTE — CARE COORDINATION
ADDENDUM [id number]: Gerardo Ziegler is aware and agreeable to DC tomorrow at noon pending stable labs. Case Management Assessment            Discharge Note                    Date / Time of Note: 1/13/2023 9:21 AM                  Discharge Note Completed by: Mitzi Rust    Patient Name: ePdro Small   YOB: 1935  Diagnosis: Acute septic pulmonary embolus without acute cor pulmonale ([address] [I26.90]  Acute pulmonary embolism without acute cor pulmonale, unspecified pulmonary embolism type ([address] [I26.99]  Single subsegmental pulmonary embolism without acute cor pulmonale ([address] [I26.93]   Date / Time: 1/7/2023 12:42 PM    Current PCP: Trung Summers MD  Clinic patient: No    Hospitalization in the last 30 days: Yes    Advance Directives:  Code Status: DNR-CCA  [facility] DNR form completed and on chart: Yes    Financial:  Payor: MEDICARE / Plan: MEDICARE PART A AND B / Product Type: *No Product type* /      Pharmacy:    [place] [address] [phone] - F [phone]  [address] [place] [address]. [address] [place] [address]  Phone: [phone] Fax: [phone]      Assistance purchasing medications?: Potential Assistance Purchasing Medications: No  Assistance provided by Case Management: None at this time    Does patient want to participate in local refill/ meds to beds program?: No    Meds To Beds General Rules:  1. Can ONLY be done Monday- Friday between 8:30am-5pm  2. Prescription(s) must be in pharmacy by 3pm to be filled same day  3. Copy of patient's insurance/ prescription drug card and patient face sheet must be sent along with the prescription(s)  4. Cost of Rx cannot be added to hospital bill. If financial assistance is needed, please contact unit  or ;  or  CANNOT provide pharmacy voucher for patients co-pays  5.  Patients can then  the prescription on their way out of the hospital at discharge, or pharmacy can deliver to the bedside if staff is available. (payment due at time of pick-up or delivery - cash, check, or card accepted)     Able to afford home medications/ co-pay costs: Yes    ADLS:  Current PT AM-PAC Score:   /24  Current OT AM-PAC Score:   /24      DISCHARGE Disposition: Nichole (LTC): Nikkie Query  Phone: [phone]  Fax: [phone]    LOC at discharge: [address] Completed: Yes    Notification completed in HENS/PAS?:  Not Applicable    IMM Completed:   Yes, Case management has presented and reviewed IMM letter #2 to the patient and/or family/ POA. Patient and/or family/POA verbalized understanding of their medicare rights and appeal process if needed. Patient and/or family/POA has signed, initialed and placed today's date (1/13/23) and time ([phone]) on IMM letter #2 on the the appropriate lines. Patient and/or family/POA, copy of letter offered and they are aware that this original copy of IMM letter #2 is available prior to discharge from the paper chart on the unit. Electronic documentation has been entered into epic for IMM letter #2 and original paper copy has been added to the paper chart at the nurses station.      Transportation:  Transportation PLAN for discharge: EMS transportation   Mode of Transport: Ambulance stretcher - BLS  Reason for medical transport: Bed confined: Meets the following criteria 1) unable to get out of bed without assistance or ambulate, 2) unable to safely sit up in a wheelchair, 3) unable to maintain erect seating position in a chair for time needed for transport and Unable to sit in a chair or wheelchair due to grade II or greater decubitus ulcers on the buttocks  Name of Transport Company:  [employer]   Phone: [phone]  Time of Transport: 1/14 at 1200    Transport form completed: Yes    Home Care:  [address] ordered at discharge: Not [address]: Not Applicable  Orders faxed: No    Durable Medical Equipment:  DME Provider: Equipment obtained during hospitalization:     Home Oxygen and Respiratory Equipment:  Oxygen needed at discharge?: No  [address]: Not Applicable  Portable tank available for discharge?: Not Indicated    Dialysis:  Dialysis patient: No    Dialysis Center:  Not Applicable    Hospice Services:  Location: Not Applicable  Agency: Not Applicable    Consents signed: Not Indicated    Referrals made at [facility] for outpatient continued care:  Not Applicable    Additional CM Notes:     CM has had multiple conversations throughout day with Nicole Hebert in admissions at [facility], as well as attending MD Dr. Miguel Brown- who has had a telephone conversation with MD at [facility] concerning pt's return back to LTC. [facility] MD deems pt \"unstable\" and is denying taking patient back at this time. Per  , Fredrik Essex, a post transfusion H&H must be drawn this afternoon and a full set of labs in the AM, to prove pt's stability. Pending stable labs, pt is set to be transported back to LTC at [facility] at noon tomorrow (1/13) via [facility].  has updated pt, as well as son Sydney Reynolds and daughter in law concerning change in discharge plan and barriers to discharge. The Plan for Transition of Care is related to the following treatment goals of Acute septic pulmonary embolus without acute cor pulmonale (HCC) [I26.90]  Acute pulmonary embolism without acute cor pulmonale, unspecified pulmonary embolism type ([address] [I26.99]  Single subsegmental pulmonary embolism without acute cor pulmonale ([address] [I26.93]    The Patient and/or patient representative Deepti Awad and her family were provided with a choice of provider and agrees with the discharge plan Yes    Freedom of choice list was provided with basic dialogue that supports the patient's individualized plan of care/goals and shares the quality data associated with the providers.  Yes    Care Transitions patient: No    Megan Simmons  [facility]  Case Management Department  Ph: [phone]

## 2023-01-13 NOTE — PLAN OF CARE
Problem: ABCDS Injury Assessment  Goal: Absence of physical injury  Outcome: Progressing     Problem: Skin/Tissue Integrity  Goal: Absence of new skin breakdown  Description: 1. Monitor for areas of redness and/or skin breakdown  2. Assess vascular access sites hourly  3. Every 4-6 hours minimum:  Change oxygen saturation probe site  4. Every 4-6 hours:  If on nasal continuous positive airway pressure, respiratory therapy assess nares and determine need for appliance change or resting period.   Outcome: Progressing     Problem: Respiratory - Adult  Goal: Achieves optimal ventilation and oxygenation  Outcome: Progressing     Problem: Cardiovascular - Adult  Goal: Maintains optimal cardiac output and hemodynamic stability  Outcome: Progressing  Goal: Absence of cardiac dysrhythmias or at baseline  Outcome: Progressing     Problem: Discharge Planning  Goal: Discharge to home or other facility with appropriate resources  1/12/2023 2116 by George Scott RN  Outcome: Progressing  1/12/2023 2012 by Janet Pavon RN  Outcome: Progressing     Problem: Safety - Adult  Goal: Free from fall injury  Outcome: Progressing     Problem: Pain  Goal: Verbalizes/displays adequate comfort level or baseline comfort level  1/12/2023 2116 by George Scott RN  Outcome: Progressing  1/12/2023 2012 by Janet Pavon RN  Outcome: Progressing

## 2023-01-13 NOTE — PROGRESS NOTES
Reason for Admission: Acute septic pulmonary embolus without acute cor pulmonale    Major Shift Events: No signficant events during shift. Pt refused miranda care, turns, and night medications. Pt anxious about possible blood transfusion 1/13/23.      Systems Review   Neuro:    A&O: x4   NIHSS: 0  Sedation/RASS   RASS: 0   Pain: 10/10     Cardiac   Rhythm: NSR    Gtts: LR @ 75    Pulmonary   O2 Modality: RA    GI/    Last BM: OSCAR   I/O:    01/11 0700   01/12 0659 01/12 0700   01/13 0659   P.O. (mL/kg/hr) 720 (0.6) 1200 (1)   Other (mL/kg) 400 (8) 700 (13.9)   Total Intake(mL/kg) 1120 (22.3) 1900 (37.8)   Urine (mL/kg/hr) 1850 (1.5) 1475 (1.2)   Emesis/NG output (mL/kg) 0 (0)    Stool (mL/kg) 0 (0)    Total Output(mL/kg) 1850 (36.9) 1475 (29.4)   Net -730 +425        Urine Occurrence 1 x    Stool Occurrence 0 x    Emesis Occurrence 0 x       NPO/PO/TF/TPN/rate(goal): PO, regular diet     Hematology   Blood Products: N/A   VTE Prophylaxis/Anticoagulation: SCDs   H/H:    Latest Reference Range & Units Most Recent   Hemoglobin Quant 12.0 - 16.0 g/dL 7.2 (L)  1/12/23 04:19   Hematocrit 36.0 - 48.0 % 23.2 (L)  1/12/23 04:19   (L): Data is abnormally low    Infectious Disease   Culture results/pending: MRSA, MDRO   ABX: Cefepime    Isolation: Contact     Skin: fragile, scattered bruising     Lines/tubes: PIV 22 R cephalic    Lab or unit collect: Lab    Mobility    PT/OT: bedrest

## 2023-01-13 NOTE — PLAN OF CARE
Problem: ABCDS Injury Assessment  Goal: Absence of physical injury  1/13/2023 1037 by Miguel Angel Ibrahim RN  Outcome: Progressing  1/12/2023 2116 by Cezar Ramirez RN  Outcome: Progressing     Problem: Discharge Planning  Goal: Discharge to home or other facility with appropriate resources  1/12/2023 2116 by Cezar Ramirez RN  Outcome: Progressing     Problem: Safety - Adult  Goal: Free from fall injury  1/12/2023 2116 by Cezar Ramirez RN  Outcome: Progressing

## 2023-01-13 NOTE — PROGRESS NOTES
Physician Progress Note      Leoncio Sanchez  CSN #:                  [id number]  :                       1935  ADMIT DATE:       2023 12:42 PM  [address] [place] DATE:  Katelynn Armass  PROVIDER #:        Kevin Loya MD          QUERY TEXT:    Pt admitted with PE. Pt noted to have PNA and WBC: 23.8 15.3  RR 25  . If possible, please document in the progress notes and discharge summary if   you are evaluating and /or treating any of the following: The medical record reflects the following:  Risk Factors: 82 y/o female with PNA, PE  Clinical Indicators: WBC: 23.8 15.3  RR 25 Leukocytosis LLL Pneumonia-   new rise in WBC to 23.8K on 1/10.- procalcitonin 0.20 on . Treatment:  Blood cultures, IV vancomycin, IV cefepime, IVF boluses 500 ml x   2. Options provided:  -- Sepsis, developed following admission, due to PNA  -- PNA without Sepsis  -- Other - I will add my own diagnosis  -- Disagree - Not applicable / Not valid  -- Disagree - Clinically unable to determine / Unknown  -- Refer to Clinical Documentation Reviewer    PROVIDER RESPONSE TEXT:    Possible LLL Pneumonia. Would not call sepsis based on rise of WBC. She is not   hypoxic nor demonstrating an organ dysfunction. She developed chest pain on   same day which may have confounded interpretation.     Query created by: Donny Fletcher on 2023 4:02 PM      Electronically signed by:  Kevin Loya MD 2023 7:47 AM

## 2023-01-14 VITALS
DIASTOLIC BLOOD PRESSURE: 71 MMHG | SYSTOLIC BLOOD PRESSURE: 118 MMHG | OXYGEN SATURATION: 97 % | RESPIRATION RATE: 16 BRPM | HEIGHT: 62 IN | WEIGHT: 107.58 LBS | TEMPERATURE: 97.6 F | BODY MASS INDEX: 19.8 KG/M2 | HEART RATE: 102 BPM

## 2023-01-14 LAB
A/G RATIO: 0.5 (ref 1.1–2.2)
ALBUMIN SERPL-MCNC: 1.8 G/DL (ref 3.4–5)
ALP BLD-CCNC: 98 U/L (ref 40–129)
ALT SERPL-CCNC: <5 U/L (ref 10–40)
ANION GAP SERPL CALCULATED.3IONS-SCNC: 7 MMOL/L (ref 3–16)
AST SERPL-CCNC: 10 U/L (ref 15–37)
BASOPHILS ABSOLUTE: 0.1 K/UL (ref 0–0.2)
BASOPHILS RELATIVE PERCENT: 1.1 %
BILIRUB SERPL-MCNC: 0.3 MG/DL (ref 0–1)
BUN BLDV-MCNC: 21 MG/DL (ref 7–20)
CALCIUM SERPL-MCNC: 7.9 MG/DL (ref 8.3–10.6)
CHLORIDE BLD-SCNC: 100 MMOL/L (ref 99–110)
CO2: 24 MMOL/L (ref 21–32)
CREAT SERPL-MCNC: 0.7 MG/DL (ref 0.6–1.2)
EOSINOPHILS ABSOLUTE: 0.4 K/UL (ref 0–0.6)
EOSINOPHILS RELATIVE PERCENT: 6.7 %
GFR SERPL CREATININE-BSD FRML MDRD: >60 ML/MIN/{1.73_M2}
GLUCOSE BLD-MCNC: 92 MG/DL (ref 70–99)
HCT VFR BLD CALC: 29.3 % (ref 36–48)
HEMOGLOBIN: 9.2 G/DL (ref 12–16)
LYMPHOCYTES ABSOLUTE: 0.9 K/UL (ref 1–5.1)
LYMPHOCYTES RELATIVE PERCENT: 17 %
MAGNESIUM: 1.7 MG/DL (ref 1.8–2.4)
MCH RBC QN AUTO: 30.6 PG (ref 26–34)
MCHC RBC AUTO-ENTMCNC: 31.6 G/DL (ref 31–36)
MCV RBC AUTO: 97 FL (ref 80–100)
MONOCYTES ABSOLUTE: 0.7 K/UL (ref 0–1.3)
MONOCYTES RELATIVE PERCENT: 14.1 %
NEUTROPHILS ABSOLUTE: 3.2 K/UL (ref 1.7–7.7)
NEUTROPHILS RELATIVE PERCENT: 61.1 %
PDW BLD-RTO: 16.9 % (ref 12.4–15.4)
PHOSPHORUS: 2.5 MG/DL (ref 2.5–4.9)
PLATELET # BLD: 378 K/UL (ref 135–450)
PMV BLD AUTO: 6.3 FL (ref 5–10.5)
POTASSIUM SERPL-SCNC: 4.2 MMOL/L (ref 3.5–5.1)
RBC # BLD: 3.02 M/UL (ref 4–5.2)
SODIUM BLD-SCNC: 131 MMOL/L (ref 136–145)
TOTAL PROTEIN: 5.2 G/DL (ref 6.4–8.2)
WBC # BLD: 5.3 K/UL (ref 4–11)

## 2023-01-14 PROCEDURE — 6360000002 HC RX W HCPCS: Performed by: INTERNAL MEDICINE

## 2023-01-14 PROCEDURE — 85025 COMPLETE CBC W/AUTO DIFF WBC: CPT

## 2023-01-14 PROCEDURE — 36415 COLL VENOUS BLD VENIPUNCTURE: CPT

## 2023-01-14 PROCEDURE — 84100 ASSAY OF PHOSPHORUS: CPT

## 2023-01-14 PROCEDURE — 83735 ASSAY OF MAGNESIUM: CPT

## 2023-01-14 PROCEDURE — 2580000003 HC RX 258: Performed by: INTERNAL MEDICINE

## 2023-01-14 PROCEDURE — 80053 COMPREHEN METABOLIC PANEL: CPT

## 2023-01-14 PROCEDURE — 6370000000 HC RX 637 (ALT 250 FOR IP): Performed by: INTERNAL MEDICINE

## 2023-01-14 RX ORDER — CHOLECALCIFEROL (VITAMIN D3) 50 MCG
2000 TABLET ORAL DAILY
Qty: 60 TABLET | Refills: 1 | Status: SHIPPED | OUTPATIENT
Start: 2023-01-15

## 2023-01-14 RX ORDER — LANOLIN ALCOHOL/MO/W.PET/CERES
800 CREAM (GRAM) TOPICAL ONCE
Status: COMPLETED | OUTPATIENT
Start: 2023-01-14 | End: 2023-01-14

## 2023-01-14 RX ORDER — HYDROCODONE BITARTRATE AND ACETAMINOPHEN 5; 325 MG/1; MG/1
1 TABLET ORAL EVERY 4 HOURS PRN
Qty: 15 TABLET | Refills: 0 | Status: SHIPPED | OUTPATIENT
Start: 2023-01-14 | End: 2023-01-19

## 2023-01-14 RX ORDER — CEFDINIR 300 MG/1
300 CAPSULE ORAL 2 TIMES DAILY
Qty: 10 CAPSULE | Refills: 0 | Status: SHIPPED | OUTPATIENT
Start: 2023-01-14 | End: 2023-01-19

## 2023-01-14 RX ORDER — SACCHAROMYCES BOULARDII 250 MG
250 CAPSULE ORAL 2 TIMES DAILY
Qty: 20 CAPSULE | Refills: 0 | Status: SHIPPED | OUTPATIENT
Start: 2023-01-14 | End: 2023-01-24

## 2023-01-14 RX ORDER — POLYETHYLENE GLYCOL 3350 17 G/17G
17 POWDER, FOR SOLUTION ORAL DAILY
Qty: 527 G | Refills: 1 | Status: SHIPPED | OUTPATIENT
Start: 2023-01-15 | End: 2023-02-14

## 2023-01-14 RX ORDER — LINEZOLID 600 MG/1
600 TABLET, FILM COATED ORAL 2 TIMES DAILY
Qty: 6 TABLET | Refills: 0 | Status: SHIPPED | OUTPATIENT
Start: 2023-01-14 | End: 2023-01-17

## 2023-01-14 RX ADMIN — PANTOPRAZOLE SODIUM 40 MG: 40 TABLET, DELAYED RELEASE ORAL at 06:43

## 2023-01-14 RX ADMIN — HYDROCODONE BITARTRATE AND ACETAMINOPHEN 1 TABLET: 5; 325 TABLET ORAL at 08:37

## 2023-01-14 RX ADMIN — Medication 2000 UNITS: at 08:37

## 2023-01-14 RX ADMIN — Medication 800 MG: at 08:37

## 2023-01-14 RX ADMIN — HYDROCODONE BITARTRATE AND ACETAMINOPHEN 1 TABLET: 5; 325 TABLET ORAL at 04:11

## 2023-01-14 RX ADMIN — SODIUM CHLORIDE 5 ML/HR: 9 INJECTION, SOLUTION INTRAVENOUS at 04:14

## 2023-01-14 RX ADMIN — CEFEPIME HYDROCHLORIDE 2000 MG: 2 INJECTION, POWDER, FOR SOLUTION INTRAMUSCULAR; INTRAVENOUS at 04:15

## 2023-01-14 RX ADMIN — NYSTATIN 500000 UNITS: 100000 SUSPENSION ORAL at 08:37

## 2023-01-14 RX ADMIN — SODIUM CHLORIDE, PRESERVATIVE FREE 10 ML: 5 INJECTION INTRAVENOUS at 08:36

## 2023-01-14 RX ADMIN — HYDROCODONE BITARTRATE AND ACETAMINOPHEN 1 TABLET: 5; 325 TABLET ORAL at 12:14

## 2023-01-14 ASSESSMENT — PAIN DESCRIPTION - LOCATION
LOCATION: ANKLE;FOOT
LOCATION: KNEE;CHEST
LOCATION: KNEE;CHEST

## 2023-01-14 ASSESSMENT — PAIN - FUNCTIONAL ASSESSMENT
PAIN_FUNCTIONAL_ASSESSMENT: PREVENTS OR INTERFERES SOME ACTIVE ACTIVITIES AND ADLS
PAIN_FUNCTIONAL_ASSESSMENT: ACTIVITIES ARE NOT PREVENTED

## 2023-01-14 ASSESSMENT — PAIN DESCRIPTION - PAIN TYPE
TYPE: CHRONIC PAIN

## 2023-01-14 ASSESSMENT — PAIN DESCRIPTION - ORIENTATION
ORIENTATION: RIGHT;LEFT
ORIENTATION: LEFT;RIGHT
ORIENTATION: RIGHT;LEFT

## 2023-01-14 ASSESSMENT — PAIN DESCRIPTION - DESCRIPTORS
DESCRIPTORS: ACHING
DESCRIPTORS: THROBBING
DESCRIPTORS: STABBING

## 2023-01-14 ASSESSMENT — PAIN SCALES - GENERAL
PAINLEVEL_OUTOF10: 0
PAINLEVEL_OUTOF10: 10
PAINLEVEL_OUTOF10: 0
PAINLEVEL_OUTOF10: 10
PAINLEVEL_OUTOF10: 7

## 2023-01-14 ASSESSMENT — PAIN DESCRIPTION - FREQUENCY: FREQUENCY: INTERMITTENT

## 2023-01-14 ASSESSMENT — PAIN DESCRIPTION - ONSET: ONSET: ON-GOING

## 2023-01-14 NOTE — PROGRESS NOTES
Patient's telemetry disconnected, IV removed. To be transported with miranda catheter. Patient belongings sent with patient. Patient verifies all belongs are present. AVS  and packet transported with EMS. At 12:29 PM    Attempt to call report at 12:40 with no answer  Attempted report again at 12:45 with no answer. RN assuming care is going to be [facility] ([place]). Message left on unit with call back number will attempt again in 10 minutes.    [facility], RN

## 2023-01-14 NOTE — PLAN OF CARE
Problem: Skin/Tissue Integrity  Goal: Absence of new skin breakdown  Description: 1. Monitor for areas of redness and/or skin breakdown  2. Assess vascular access sites hourly  3. Every 4-6 hours minimum:  Change oxygen saturation probe site  4. Every 4-6 hours:  If on nasal continuous positive airway pressure, respiratory therapy assess nares and determine need for appliance change or resting period.   Outcome: Progressing     Problem: Respiratory - Adult  Goal: Achieves optimal ventilation and oxygenation  Outcome: Progressing  Flowsheets (Taken 1/14/2023 0317)  Achieves optimal ventilation and oxygenation:   Assess for changes in respiratory status   Assess for changes in mentation and behavior   Position to facilitate oxygenation and minimize respiratory effort   Oxygen supplementation based on oxygen saturation or arterial blood gases   Encourage broncho-pulmonary hygiene including cough, deep breathe, incentive spirometry   Assess the need for suctioning and aspirate as needed   Assess and instruct to report shortness of breath or any respiratory difficulty   Respiratory therapy support as indicated     Problem: Cardiovascular - Adult  Goal: Maintains optimal cardiac output and hemodynamic stability  1/14/2023 0317 by Kennedy Tobin RN  Outcome: Progressing  Flowsheets (Taken 1/14/2023 0317)  Maintains optimal cardiac output and hemodynamic stability:   Monitor urine output and notify Licensed Independent Practitioner for values outside of normal range   Monitor blood pressure and heart rate   Administer fluid and/or volume expanders as ordered   Administer vasoactive medications as ordered

## 2023-01-14 NOTE — CARE COORDINATION
Case Management Assessment            Discharge Note                    Date / Time of Note: 1/14/2023 1:25 PM                  Discharge Note Completed by: MARILYN Loco LSW    Patient Name: Wendy Weir   YOB: 1935  Diagnosis: Acute septic pulmonary embolus without acute cor pulmonale ([facility] [address] [I26.90]  Acute pulmonary embolism without acute cor pulmonale, unspecified pulmonary embolism type ([address] [I26.99]  Single subsegmental pulmonary embolism without acute cor pulmonale ([address] [I26.93]   Date / Time: 1/7/2023 12:42 PM    Current PCP: Jeffrey Perry MD  Clinic patient: No    Hospitalization in the last 30 days: No    Advance Directives:  Code Status: DNR-CCA  [facility] DNR form completed and on chart: Yes    Financial:  Payor: MEDICARE / Plan: MEDICARE PART A AND B / Product Type: *No Product type* /      Pharmacy:    [facility] - F [phone]  [address] [place] [address]  Phone: [phone] Fax: [phone]      Assistance purchasing medications?: Potential Assistance Purchasing Medications: No  Assistance provided by Case Management: None at this time    Does patient want to participate in local refill/ meds to beds program?: No    Meds To Beds General Rules:  1. Can ONLY be done Monday- Friday between 8:30am-5pm  2. Prescription(s) must be in pharmacy by 3pm to be filled same day  3. Copy of patient's insurance/ prescription drug card and patient face sheet must be sent along with the prescription(s)  4. Cost of Rx cannot be added to hospital bill. If financial assistance is needed, please contact unit  or ;  or  CANNOT provide pharmacy voucher for patients co-pays  5.  Patients can then  the prescription on their way out of the hospital at discharge, or pharmacy can deliver to the bedside if staff is available. (payment due at time of pick-up or delivery - cash, check, or card accepted)     Able to afford home medications/ co-pay costs: Yes    ADLS:  Current PT AM-PAC Score:   /24  Current OT AM-PAC Score:   /24      DISCHARGE Disposition: Edgar Osorio (SNF): [facility] Phone: [phone] Fax: [phone]    LOC at discharge: Skilled  [address] Completed: Yes    Notification completed in HENS/PAS?:  Not Applicable    IMM Completed:   Not Indicated    Transportation:  Transportation PLAN for discharge: EMS transportation   Mode of Transport: Ambulance stretcher - BLS  Reason for medical transport: Bed confined: Meets the following criteria 1) unable to get out of bed without assistance or ambulate, 2) unable to safely sit up in a wheelchair, 3) unable to maintain erect seating position in a chair for time needed for transport  Name of Transport Company:  [employer]: [phone]  Time of Transport: 12:00pm    Transport form completed: Yes    Home Care:  Home Care ordered at discharge: No  Home Care Agency: Not Applicable  Orders faxed: Yes    Durable Medical Equipment:  DME Provider: None  Equipment obtained during hospitalization:     Home Oxygen and Respiratory Equipment:  Oxygen needed at discharge?: No  [address]: Not Applicable  Portable tank available for discharge?: No    Dialysis:  Dialysis patient: No    Dialysis Center:  Not Applicable    Additional CM Notes: Pt to DC to [facility] today @ 12pm via [facility]. SW spoke w/Cara [phone] @ [facility] and confirmed SOC. Report is [phone] and fax is [phone].     The Plan for Transition of Care is related to the following treatment goals of Acute septic pulmonary embolus without acute cor pulmonale (HCC) [I26.90]  Acute pulmonary embolism without acute cor pulmonale, unspecified pulmonary embolism type ([address] [I26.99]  Single subsegmental pulmonary embolism without acute cor pulmonale ([address] [I26.93]    The Patient and/or patient representative Preeti Guadarrama and her family were provided with a choice of provider and agrees with the discharge plan Yes    Freedom of choice list was provided with basic dialogue that supports the patient's individualized plan of care/goals and shares the quality data associated with the providers.  Yes    Care Transitions patient: No    MARILYN Enriquez, [facility]  Case Management Department  Ph: [phone]  Fax: [phone]

## 2023-01-14 NOTE — PLAN OF CARE
Problem: Cardiovascular - Adult  Goal: Maintains optimal cardiac output and hemodynamic stability  1/13/2023 2045 by Aldo Mccoy RN  Outcome: Progressing  1/13/2023 1037 by Aldo Mccoy RN  Outcome: Progressing     Problem: Discharge Planning  Goal: Discharge to home or other facility with appropriate resources  Outcome: Progressing

## 2023-01-14 NOTE — PROGRESS NOTES
Clinical Pharmacy Progress Note    Vancomycin - Management by Pharmacy    Consult Date(s): 1/10  Consulting Provider(s): Dr. Chan Jules / Plan  MRSA UTI , concern for PNA- Vancomycin  Concurrent Antimicrobials:   Cefepime - Day #3   Day of Vanc Therapy / Ordered Duration: #5 of 10  Current Dosing Method: Bayesian-Guided AUC Dosing  Therapeutic Goal: -600 mg/L*hr  Current Dose / Plan:   On vancomycin 1000mg IV q24h. Predicted AUC ~ 475 mg/L*hr and steady-state trough ~ 13.5 mg/L  Renal function improving, now stable and at baseline. Continue current dose. Plan to recheck vancomycin level in next ~1-2 days    Will continue to monitor clinical condition and make adjustments to regimen as appropriate. Please call with any questions. Radha Tsai, PharmD, [facility]  Main pharmacy: [id number]  1/14/2023 9:30 AM            Interval update: Afebrile and HDS, on room air. Anticoagulation for PE/DVT stopped 2/2 hematuria; IV filter placed 1/11. Subjective/Objective:   Don Burkett is a 81 y.o. 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 CT chest 1/7 showed acute PE. Currently admitted with acute PE, episode of torsades/polymorphic VTach; sinus tacycardia; also with hx of CKD3. Pt has hx of recurrent UTIs - urine Cx from 1/7 growing 75k MRSA. Started on IV vancomycin 1/10. Pharmacy is consulted to dose Vancomycin.     Ht Readings from Last 1 Encounters:   01/07/23 5' 2\" (1.575 m)     Wt Readings from Last 1 Encounters:   01/14/23 107 lb 9.4 oz (48.8 kg)     Current & Prior Antimicrobial Regimen(s):  Cefepime (1/12-current)  Vancomycin - Pharmacy to dose  750mg IV q24h (1/10-1/12)  1000 mg IV q24h (1/13--current)    Vancomycin Level(s) / Doses:    Date Time Dose Type of Level / Level Interpretation   1/11 0916 750mg IV q24h Random = 7.9 mg/L Drawn ~18h after prior dose  Calculated AUC = 514 mg/L*h with trough of 16.5 mg/L  Continue same dose   1/13 0933 750 mg IV q24h Random = 12.2 mg/L 20h after dose. AUC/Tr 366/10.2  Increase to 1000 mg q12h   Note: Serum levels collected for AUC-based dosing may be high if collected in close proximity to the dose administered. This is not necessarily indicative of toxicity. Cultures & Sensitivities:    Date Site Micro Susceptibility / Result   1/8 Urine  75k MRSA Sensitive = daptomycin, linezolid, Bactrim, Vanc              Recent Labs     01/11/23  0942 01/12/23  0419 01/13/23  0933 01/14/23  0500 01/14/23  0501   CREATININE  --  1.0 0.7 0.7  --    BUN  --  26* 19 21*  --    WBC 15.3* 9.2  --   --  5.3         Estimated Creatinine Clearance: 44 mL/min (based on SCr of 0.7 mg/dL).     Additional Lab Values / Findings of Note:    Recent Labs     01/11/23  0942   PROCAL 0.20*

## 2023-01-14 NOTE — PROGRESS NOTES
Hospitalist Progress Note      PCP: Marlo Tsang MD    Date of Admission: 1/7/2023    Chief Complaint: chest pain      Subjective:       She has not have recurrence of chest pain today. She re-iterates no aggressive procedures, testing. States that she feels fine and improved. She denies shortness of breath. She denies nausea, abdominal pain, dysuria, diarrhea. Hematuria clearing after stopping anticoagulation. Medications:  Reviewed    Infusion Medications    sodium chloride      sodium chloride 25 mL/hr at 01/10/23 1532     Scheduled Medications    cefepime  2,000 mg IntraVENous Q12H    vancomycin  1,000 mg IntraVENous Q24H    polyethylene glycol  17 g Oral Daily    docusate sodium  100 mg Oral BID    Vitamin D  2,000 Units Oral Daily    pantoprazole  40 mg Oral QAM AC    nystatin  500,000 Units Oral 4x Daily    sodium chloride flush  5-40 mL IntraVENous 2 times per day     PRN Meds: sodium chloride, HYDROcodone-acetaminophen, sodium chloride flush, sodium chloride, ondansetron **OR** ondansetron, acetaminophen **OR** acetaminophen, perflutren lipid microspheres      Intake/Output Summary (Last 24 hours) at 1/14/2023 0044  Last data filed at 1/13/2023 1707  Gross per 24 hour   Intake 902.5 ml   Output 1150 ml   Net -247.5 ml         Physical Exam Performed:    BP 95/60   Pulse 80   Temp 97.3 °F (36.3 °C) (Oral)   Resp 18   Ht 5' 2\" (1.575 m)   Wt 110 lb 10.7 oz (50.2 kg)   SpO2 97%   BMI 20.24 kg/m²       GEN alert, in no distress  HEENT normocephalic, anicteric sclera, EOMI, mucosa moist, no stridor  NECK supple, trachea midline  RESP on RA, in no distress, clear to auscultation  CARDS RRR, S1, S2, no murmurs, no edema, radial pulse 2+, DP pulse light. ABD +BS, soft nontender   miranda with lighter hematuria today, stopped AC yesterday. Miranda draining.   MSK tender on bilateral legs, no cyanosis, no clubbing  SKIN warm, dry  NEURO alert, oriented x 3, no facial asymmetry, no dysarthria, moving spontaneously  PSYCH normal mood    Labs:   Recent Labs     01/11/23  0942 01/12/23  0419 01/13/23  1733   WBC 15.3* 9.2  --    HGB 7.8* 7.2* 8.5*   HCT 26.6* 23.2* 26.3*   * 473*  --        Recent Labs     01/11/23  0326 01/12/23  0419 01/13/23  0933   * 134* 131*   K 4.3 4.6 4.6    102 98*   CO2 22 22 26   BUN 24* 26* 19   CREATININE 1.1 1.0 0.7   CALCIUM 8.3 8.1* 8.1*       Recent Labs     01/11/23  0947   AST 7*   ALT <5*   BILIDIR <0.2   BILITOT 0.3   ALKPHOS 108       No results for input(s): INR in the last 72 hours. No results for input(s): Adam Clap in the last 72 hours. Urinalysis:      Lab Results   Component Value Date/Time    NITRU POSITIVE 12/15/2022 06:05 PM    WBCUA see below 12/15/2022 06:05 PM    BACTERIA 4+ 12/15/2022 06:05 PM    RBCUA >100 12/15/2022 06:05 PM    BLOODU LARGE 12/15/2022 06:05 PM    SPECGRAV 1.010 12/15/2022 06:05 PM    GLUCOSEU Negative 12/15/2022 06:05 PM       Radiology:  IR GUIDED IVC FILTER PLACEMENT   Final Result   IMPRESSION :      Normal IVC anatomy. Normal, patent left internal jugular vein      Placement of filter in the infra-renal IVC. Please note that this is a retrievable filter. Fluoroscopy time : 2.1 minutes   Number of exposures obtained : 4      Estimated blood loss: Less than 5 cc. XR CHEST (2 VW)   Final Result   1. Minimal left basilar airspace disease with small left pleural effusion, new from prior exam.      VL Extremity Venous Bilateral   Final Result      CT CHEST PULMONARY EMBOLISM W CONTRAST   Final Result      1. Acute pulmonary embolism involving left lingular lobar and segmental branches. No evidence of right heart strain. 2. Small left pleural effusion. Bibasilar atelectasis. 3. Moderate to large hiatal hernia. Critical results reported to ER Dr Remyundo Smalls at 3:55 PM.      XR CHEST PORTABLE   Final Result      No acute pulmonary disease.       Moderate to large hiatal hernia. Assessment/Plan:    Active Hospital Problems    Diagnosis Date Noted    Acute septic pulmonary embolus without acute cor pulmonale ([facility] [address] [I26.90] 01/07/2023     Priority: Medium    Acute pulmonary embolism without acute cor pulmonale, unspecified pulmonary embolism type ([address] [I26.99] 01/07/2023     Priority: Medium     Acute Pulmonary Embolism involving Left Lingular Lobar and Segmental Branches  Acute DVT in Right Gastrocnemius  History of DVT, provoked  - CTA Chest 1/7/23 showed acute pulmonary embolism involving left lingular lobar and segmental branches. No evidence of right heart strain. - Venous US of LE 1/10 showed acute deep venous thrombosis in a single right gastrocnemius vein without deep or proximal extension.  - ECHO1/10/23 showed normal LV size, normal LV wall thickness, LVEF 55-60%, no regional wall motion abnormalities. Grade 2 diastolic dysfunction. Mild MR. Mild AAS. Mild to moderate TR. LA mildly dilated. - Placed on heparin gtt (1/7-1/10). Then transitioned to apixabn (1/10). Transitioned back to heparin gtt (1/10-). Stopped anticoagulation on 1/11. --- developed worsening hematuria -- no large blood clots. --- Discussed goals of care with Champ and patient, decision to proceed with IVC filter (1/11) and stopped anticoagulation since she has had recurrent hematuria with anticoagulation in the past.    Lightheadedness with rising rapidly.  - Her baseline SBP 90's-110's    ? Tors[place] de Pointes vs Polymorphic VT vs Artifact  Left Chest Pain/Tightness  - presented with left chest pain. - on 1/9/23 about 1630, patient was woken up with left chest tightness 5/10 that lasted for about 10 minutes. -- on telemetry noted ? polymorphic VT vs artifact with duration of 14.03 seconds -- Cardiology felt likely artifact. -- Mag sulf 2g administered based on Mg level of 1.8 and STAT labs ordered.   -- Another mag sulf 1g administered when Mg level 1.8 for 1/9.  -- started metoprolol 12.5 mg BID (1/10). Stopped due to borderline BP.  -- Consider amiodaone infusion if sustained > 30 seconds. -- discussed goals of care with patient and her son in law, who is a physician. Patient states that she would like to continue with medical therapy but does not want to proceed with aggressive procedures or shocking.  -- [address] cardiology participation. Sinus Tachycardia  Elevated Troponin  - troponins 0.04->0. 06. NTproBNP 372 (previously 8905 on 11/24/22). - EKG on 1/7 showed sinus tachycardia, . Qtc 436 ms. Paroxysmal Atrial Fibrillation  - EKG showed sinus tachycardia, .  - started heparin (1/7-1/10), apixaban (1/10) for VTE above, complicated by hematuria so decision by family and patient to discontinue anticoagulation. CKD Stage 3  - Adm BUN/Scr 31/1.3. Baseline Scr 1.2-1.3.  - Monitor kidney function. Hematuria  - noted 1/10 in the afternoon after transitioning to apixaban 10 mg BID from heparin infusion. She received first dose of apixaban and about 2-3 hours after demonstrated bleeding.  - stop anticoagulation on 1/11. Recurrent UTI's  Recurrent Urinary Retention  History of MDRO Infections  - miranda placed in 12/2022. Changed on 12/26 at [facility].  - Lact UC 12/15/22 grew Morganella morganii MDR sensitive to cefepime. - UC 1/8 grew MRSA. - Started IV vancomycin (1/10-). Treat for 2 days, then change miranda. Leukocytosis  LLL Pneumonia  - new rise in WBC to 23.8K on 1/10.  - procalcitonin 0.20 on 1/11.  - UC 1/8 grew MRSA. - CXR 1/11 showed minimal left basilar airspace disease with small left pleural effusion, new. - Blood cultures 1/11 pending.  - Started IV vancomycin (1/10-). - Start IV cefepime (1/12-). Cognitive Impairment     Recent Lower GIB likely from AVMs in 12/2022  EGD/Colonoscopy 12/17/22 showed   - Tubular esophagus was tortuous. - 7 cm hiatal hernia sliding-type, possibly with a component of paraesophageal hernia. Otherwise unremarkable stomach. - Duodenum unremarkable to the distal third portion.  - Terminal ileum is normal in the distal 3 cm.  - Single small cecal AVM, and a single small ascending colon AVM ablated with APC. -7 mm distal ascending colon AVM, which appears to be at risk for intermittent bleeding, ablated with APC. - Moderate left-sided diverticulosis with severely tortuous colon, likely anatomic variation.  - Small internal and external hemorrhoids, which do not appear high risk for bleeding. GI suspected th 7 mm ascending colonic AVM may have been the source of recent bleeding, though cannot rule out internal hemorrhoids. In addition, she obviously has a component of hematuria, and it is impossible for us to determine whether the bright red blood per rectum noted at the nursing home was actually hematuria. Reasonable to start Eliquis from GI perspective in 1 week if indicated     Bilateral inguinal hernia, with small bowel on the right without obstruction per CT scan. Diverticulosis   Recommend High-fiber diet to keep stool stoft. History of HTN      DVT Prophylaxis: Sc heparin. Diet: ADULT DIET; Regular  ADULT ORAL NUTRITION SUPPLEMENT; Breakfast, Lunch, Dinner; Standard High Calorie/High Protein Oral Supplement  ADULT ORAL NUTRITION SUPPLEMENT; Breakfast, Lunch; Wound Healing Oral Supplement  ADULT ORAL NUTRITION SUPPLEMENT; Lunch, Dinner; Frozen Oral Supplement  Code Status: DNR-CCA    PT/OT Eval Status: ordered    Dispo -  dc to facility tomorrow.     Donita Powell MD

## 2023-01-14 NOTE — DISCHARGE SUMMARY
Hospital Medicine Discharge Summary    Patient ID: Zully Thonrton      Patient's PCP: Kelsi Medina MD    Admit Date: 1/7/2023     Discharge Date:   01/14/2023    Admitting Physician: Renata Guerrero MD     Discharge Physician: Felicita Carey MD     Discharge Diagnoses:     As below. The patient was seen and examined on day of discharge and this discharge summary is in conjunction with any daily progress note from day of discharge. Hospital Course:     Acute Pulmonary Embolism involving Left Lingular Lobar and Segmental Branches  Acute DVT in Right Gastrocnemius  History of DVT, provoked  - CTA Chest 1/7/23 showed acute pulmonary embolism involving left lingular lobar and segmental branches. No evidence of right heart strain. - Venous US of LE 1/10 showed acute deep venous thrombosis in a single right gastrocnemius vein without deep or proximal extension.  - ECHO1/10/23 showed normal LV size, normal LV wall thickness, LVEF 55-60%, no regional wall motion abnormalities. Grade 2 diastolic dysfunction. Mild MR. Mild AAS. Mild to moderate TR. LA mildly dilated. - Placed on heparin gtt (1/7-1/10). Then transitioned to apixabn (1/10). Transitioned back to heparin gtt (1/10-). Stopped anticoagulation on 1/11. --- developed worsening hematuria -- no large blood clots. --- Discussed goals of care with Champ and patient, decision to proceed with IVC filter (1/11) and stopped anticoagulation since she has had recurrent hematuria with anticoagulation in the past.     Lightheadedness with rising rapidly.  - Her baseline SBP 90's-110's. - she is reminded to take her time      ? Torsade de Pointes vs Polymorphic VT vs Artifact  Left Chest Pain/Tightness  - presented with left chest pain. - on 1/9/23 about 1630, patient was woken up with left chest tightness 5/10 that lasted for about 10 minutes.   -- on telemetry noted ? polymorphic VT vs artifact with duration of 14.03 seconds -- Cardiology felt likely artifact. -- Mag sulf 2g administered based on Mg level of 1.8 and STAT labs ordered. Another mag sulfate 1g administered when Mg level 1.8 for 1/9.  -- started metoprolol 12.5 mg BID (1/10). Stopped due to borderline BP.  -- She did not have recurrence of events. -- Discussed goals of care with patient and her family (son Turner Gonzalez and son-in-law Gabriel Kwan) who are physicians. Patient stated that she would like to continue with medical therapy but does not want to proceed with aggressive procedures and intervention such as shocking.  - Started magnesium oxide 400 mg daily supplements. - Her chest pain is relieved by norco 5-325 mg --- patient and son Turner Gonzalez specifically requested to treat symptom and declined any further evaluation for the cause of chest pain (including ischemic evaluation; we know that she has left sided pulmonary embolism for which we are not continuing with anticoagulation.)     Sinus Tachycardia  Elevated Troponin  - troponins 0.04->0. 06. NTproBNP 372 (previously 8905 on 11/24/22). - EKG on 1/7 showed sinus tachycardia, . Qtc 436 ms.  - In the setting of acute pulmonary embolism as above and if she exerts. Paroxysmal Atrial Fibrillation  - EKG showed sinus tachycardia, .  - started heparin (1/7-1/10), apixaban (1/10) for VTE above, complicated by hematuria so decision by family and patient to discontinue anticoagulation. CKD Stage 3  - Adm BUN/Scr 31/1.3. Baseline Scr 1.2-1.3.  - Scr improved to 0.7 on 1/12 and 1/13 after receiving IVF's, possible she was in some RONNY on admission.  - Monitor kidney function. Hematuria  - noted 1/10 in the afternoon after transitioning to apixaban 10 mg BID from heparin infusion. She received first dose of apixaban and about 2-3 hours after demonstrated bleeding.  - stopped all anticoagulation on 1/11. This was discussed with patient and family. -- Her urine color is pinkish tidat.        Recurrent UTI's  Recurrent Urinary Retention  History of MDRO Infections  - miranda placed in 12/2022 for urinary retention. Changed on 12/26 at [facility].  - Lact UC 12/15/22 grew Morganella morganii MDR sensitive to cefepime. - UC 1/8 grew MRSA. - Received IV vancomycin (1/10-1/13). Discharged on linezolid  mg BID for 3 additional days.    - Removed and placed new miranda on 1/13/23 after patient received > 48 hours of antibiotics. Leukocytosis  LLL Pneumonia  - new rise in WBC to 23.8K on 1/10.  - procalcitonin 0.20 on 1/11.  - UC 1/8 grew MRSA. - CXR 1/11 showed minimal left basilar airspace disease with small left pleural effusion, new. - Blood cultures 1/11 negative. - Received IV vancomycin (1/10-1/13). Discharged on linezolid  mg BID for 3 additional days.  - Received IV cefepime (1/12-1/14/23). Discharged on cefdinir  mg BID for 5 additional days. Cognitive Impairment  - alert and oriented x 3 and coherent. Recent Lower GIB likely from AVMs in 12/2022  EGD/Colonoscopy 12/17/22 showed   - Tubular esophagus was tortuous. - 7 cm hiatal hernia sliding-type, possibly with a component of paraesophageal hernia. Otherwise unremarkable stomach. - Duodenum unremarkable to the distal third portion.  - Terminal ileum is normal in the distal 3 cm.  - Single small cecal AVM, and a single small ascending colon AVM ablated with APC. -7 mm distal ascending colon AVM, which appears to be at risk for intermittent bleeding, ablated with APC. - Moderate left-sided diverticulosis with severely tortuous colon, likely anatomic variation.  - Small internal and external hemorrhoids, which do not appear high risk for bleeding. GI suspected th 7 mm ascending colonic AVM may have been the source of recent bleeding, though cannot rule out internal hemorrhoids.   In addition, she obviously has a component of hematuria, and it is impossible for us to determine whether the bright red blood per rectum noted at the nursing home was actually hematuria. Reasonable to start Eliquis from GI perspective in 1 week if indicated     Bilateral inguinal hernia, with small bowel on the right without obstruction per CT scan. Diverticulosis   Recommend High-fiber diet to keep stool stoft. History of HTN   - blood pressure more borderline, reportedly SBP 90's-100's. History of Nondisplaced Left Posterior Calcaneal Insufficiency Fracture  - in November 2022.  - this has limited her mobility. Physical Exam Performed:     /75   Pulse 77   Temp 97.6 °F (36.4 °C) (Oral)   Resp 18   Ht 5' 2\" (1.575 m)   Wt 107 lb 9.4 oz (48.8 kg)   SpO2 97%   BMI 19.68 kg/m²       GEN alert, in no distress  HEENT normocephalic, anicteric sclera, EOMI, mucosa moist, no stridor  NECK supple, trachea midline  RESP on RA, in no distress, clear to auscultation  CARDS RRR, S1, S2, no murmurs, no edema, radial pulse 2+, DP pulse light. ABD +BS, soft nontender   miranda with light (pinkish) hematuria, stopped AC since 1/11/23. Miranda draining. MSK tender on bilateral legs, no cyanosis, no clubbing  SKIN frail, dry with some scaling, warm,  NEURO alert, oriented x 3, no facial asymmetry, no dysarthria, moving spontaneously  PSYCH normal mood       Labs:  For convenience and continuity at follow-up the following most recent labs are provided:      CBC:    Lab Results   Component Value Date/Time    WBC 5.3 01/14/2023 05:01 AM    HGB 9.2 01/14/2023 05:01 AM    HCT 29.3 01/14/2023 05:01 AM     01/14/2023 05:01 AM       Renal:    Lab Results   Component Value Date/Time     01/14/2023 05:00 AM    K 4.2 01/14/2023 05:00 AM    K 5.3 01/07/2023 01:19 PM     01/14/2023 05:00 AM    CO2 24 01/14/2023 05:00 AM    BUN 21 01/14/2023 05:00 AM    CREATININE 0.7 01/14/2023 05:00 AM    CALCIUM 7.9 01/14/2023 05:00 AM    PHOS 2.5 01/14/2023 05:00 AM         Significant Diagnostic Studies    Radiology:   IR GUIDED IVC FILTER PLACEMENT   Final Result   IMPRESSION : Normal IVC anatomy. Normal, patent left internal jugular vein      Placement of filter in the infra-renal IVC. Please note that this is a retrievable filter. Fluoroscopy time : 2.1 minutes   Number of exposures obtained : 4      Estimated blood loss: Less than 5 cc. XR CHEST (2 VW)   Final Result   1. Minimal left basilar airspace disease with small left pleural effusion, new from prior exam.      VL Extremity Venous Bilateral   Final Result      CT CHEST PULMONARY EMBOLISM W CONTRAST   Final Result      1. Acute pulmonary embolism involving left lingular lobar and segmental branches. No evidence of right heart strain. 2. Small left pleural effusion. Bibasilar atelectasis. 3. Moderate to large hiatal hernia. Critical results reported to ER Dr Lianne Lee at 3:55 PM.      XR CHEST PORTABLE   Final Result      No acute pulmonary disease. Moderate to large hiatal hernia. Consults:     IP CONSULT TO HOSPITALIST  IP CONSULT TO HEMATOLOGY  IP CONSULT TO CARDIOLOGY  PHARMACY TO DOSE VANCOMYCIN  IP CONSULT TO UROLOGY    Disposition:  return to LTAC facility    Condition at Discharge:  improved, not acutely unstable    Discharge Instructions/Follow-up:    PCP in 1 week    Code Status:  DNR-CCA   Patient expressed clearly that she does not desire any further aggressive diagnostics and interventions. She does desire shocking. She intermittently develops chest pain and would like her pain to be treated with norco 5-325 mg, which provides relief. Her blood pressure is borderline at baseline thus she is not able to tolerate a beta blocker or SL nitro. This mode of treatment with norco was agreed upon with patient her her son Cornelio Alfred, who is patient's POA. Activity: activity as tolerated    Diet: cardiac diet with supplements TID between meals.         Discharge Medications:     Current Discharge Medication List             Details   polyethylene glycol (GLYCOLAX) 17 g packet Take 17 g by mouth daily  Qty: 527 g, Refills: 1      Vitamin D (CHOLECALCIFEROL) 50 MCG (2000 UT) TABS tablet Take 1 tablet by mouth daily  Qty: 60 tablet, Refills: 1    Comments: Labeling may look different. 25 mcg=1000 Units. Please double check dosages. saccharomyces boulardii (FLORASTOR) 250 MG capsule Take 1 capsule by mouth 2 times daily for 10 days  Qty: 20 capsule, Refills: 0      linezolid (ZYVOX) 600 MG tablet Take 1 tablet by mouth 2 times daily for 3 days  Qty: 6 tablet, Refills: 0      cefdinir (OMNICEF) 300 MG capsule Take 1 capsule by mouth 2 times daily for 5 days  Qty: 10 capsule, Refills: 0      Magnesium 400 MG CAPS Take 400 mg by mouth daily  Qty: 30 capsule, Refills: 1                Details   HYDROcodone-acetaminophen (NORCO) 5-325 MG per tablet Take 1 tablet by mouth every 4 hours as needed for Pain for up to 5 days. Max Daily Amount: 6 tablets  Qty: 15 tablet, Refills: 0    Comments: Reduce doses taken as pain becomes manageable  Associated Diagnoses: Chest pain, unspecified type                Details   Silver sulfADIAZINE (SILVADENE EX) Apply topically To left buttock BID and prn      nystatin (MYCOSTATIN) 572707 UNIT/GM powder Apply topically 2 times daily Apply to groin area      pantoprazole (PROTONIX) 40 MG tablet Take 1 tablet by mouth every morning (before breakfast)  Qty: 30 tablet, Refills: 3      nystatin (MYCOSTATIN) 279071 UNIT/ML suspension Take 500,000 Units by mouth 4 times daily Swish and swallow      povidone-iodine (BETADINE) 10 % external solution Apply topically 2 times daily Apply topically as needed -paint L heel w/ betadine             Time Spent on discharge is less than 30 minutes in the examination, evaluation, counseling and review of medications and discharge plan. Signed:    Alfredo Booker MD   1/14/2023      Thank you Janet Lam MD for the opportunity to be involved in this patient's care.  If you have any questions or concerns please feel free to contact me at [phone].

## 2023-01-15 LAB
BLOOD CULTURE, ROUTINE: NORMAL
CULTURE, BLOOD 2: NORMAL

## 2023-02-07 ENCOUNTER — TELEPHONE (OUTPATIENT)
Dept: FAMILY MEDICINE CLINIC | Age: 88
End: 2023-02-07

## 2023-02-07 NOTE — TELEPHONE ENCOUNTER
Marci Avila received a referral for Pt and OT and needs verbal order. Please call to advise.     Thank you

## 2023-03-23 ENCOUNTER — TELEPHONE (OUTPATIENT)
Dept: FAMILY MEDICINE CLINIC | Age: 88
End: 2023-03-23

## 2023-03-23 NOTE — TELEPHONE ENCOUNTER
Sanjana Friedman from [facility] is wondering if we received faxed orders for OT Re-evaluation and PT Re-evaluation. Is this ok for patient? Please call.     Thank you

## 2023-03-23 NOTE — TELEPHONE ENCOUNTER
OK; pt will need appt with me if is going to sign up for  medicine or schedule with a new doctor since she has not been seen in 6 months.

## 2023-03-30 ENCOUNTER — TELEPHONE (OUTPATIENT)
Dept: FAMILY MEDICINE CLINIC | Age: 88
End: 2023-03-30

## 2023-03-30 NOTE — TELEPHONE ENCOUNTER
[facility] called stating that the patient declined there service as of today.  Just letting doctor/staff know. Thanks

## 2023-09-21 NOTE — DISCHARGE INSTR - COC
support    Rehab Therapies: Physical Therapy and Occupational Therapy  Weight Bearing Status/Restrictions: No weight bearing restirctions, weight bearing as tolerated. Other Medical Equipment (for information only, NOT a DME order):  walker  Other Treatments: PT/OT    Patient's personal belongings (please select all that are sent with patient):      RN SIGNATURE:  Electronically signed by Shakeel Williamson RN on 9/21/19 at 11:01 AM    CASE MANAGEMENT/SOCIAL WORK SECTION    Inpatient Status Date: 09/17/2019    Readmission Risk Assessment Score:  Readmission Risk              Risk of Unplanned Readmission:        13           Discharging to Facility/ Agency   · Name: [facility]  · Address:  · Phone:report- [phone]  · Fax:[phone]    ·     / signature: Electronically signed by Jean Pina RN on 9/20/19 at 6:36 PM    PHYSICIAN SECTION    Prognosis: Good    Condition at Discharge: Stable    Rehab Potential (if transferring to Rehab): Good    Recommended Labs or Other Treatments After Discharge:     Please see separate orthopedic instructions for weight bearing, PT/OT and incision care    Miranda catheter care, take the miranda catheter out on Tuesday Sep 24 at 6 am and bring the patient for urology appointment afternoon on Sept 24 for bladder scan. Incentive spirometry      Physician Certification: I certify the above information and transfer of Alice Mccallum  is necessary for the continuing treatment of the diagnosis listed and that she requires [facility] for less 30 days.      Update Admission H&P: No change in H&P    PHYSICIAN SIGNATURE:  Electronically signed by Home Shannon MD on 9/21/19 at 9:46 AM
show

## 2024-03-14 NOTE — PLAN OF CARE
----- Message from Saira Urrutia MD sent at 3/12/2024  8:59 AM CDT -----  Please let pt know that her CA-125 came back normal. Plan to repeat u/s in 3 months.    Problem: Falls - Risk of:  Goal: Will remain free from falls  Description  Will remain free from falls  Outcome: Ongoing  Note:   Pt is free from falls at this time. Bed is in lowest position, wheels are locked and bed alarm is set. Call light and belongings are within reach. Visual fall sign/blanket are posted. Will continue to monitor. Problem: Falls - Risk of:  Goal: Absence of physical injury  Description  Absence of physical injury  Outcome: Ongoing  Note:   Pt is free from accidental physical injury at this time. Pt is AxOx4. Fall Risk assessed per shift. ID band in place. Bed in lowest position, wheels locked and alarm is set. Call light and belongings are within reach. Maintaining a safe environment. Will continue to assess and monitor. Problem: Risk for Impaired Skin Integrity  Goal: Tissue integrity - skin and mucous membranes  Description  Structural intactness and normal physiological function of skin and  mucous membranes. Outcome: Ongoing  Note:   Pt at risk for skin breakdown. Skin assessment complete. No new signs of skin breakdown. Pts skin cleansed with inc cleanser. Pt repositioned in bed with pillow support. Will continue to monitor.
